# Patient Record
Sex: MALE | Race: WHITE | NOT HISPANIC OR LATINO | Employment: OTHER | URBAN - METROPOLITAN AREA
[De-identification: names, ages, dates, MRNs, and addresses within clinical notes are randomized per-mention and may not be internally consistent; named-entity substitution may affect disease eponyms.]

---

## 2017-02-02 ENCOUNTER — OFFICE VISIT (OUTPATIENT)
Dept: FAMILY MEDICINE | Facility: CLINIC | Age: 82
End: 2017-02-02
Payer: COMMERCIAL

## 2017-02-02 VITALS
OXYGEN SATURATION: 94 % | TEMPERATURE: 97.4 F | DIASTOLIC BLOOD PRESSURE: 52 MMHG | WEIGHT: 187 LBS | HEART RATE: 57 BPM | BODY MASS INDEX: 30.05 KG/M2 | SYSTOLIC BLOOD PRESSURE: 138 MMHG | HEIGHT: 66 IN

## 2017-02-02 LAB
CREAT UR-MCNC: 47 MG/DL
GLUCOSE BLD-MCNC: 250 MG/DL (ref 70–99)
HBA1C MFR BLD: 8.8 % (ref 4.3–6)
MICROALBUMIN UR-MCNC: 130 MG/L
MICROALBUMIN/CREAT UR: 278.97 MG/G CR (ref 0–17)
TSH SERPL DL<=0.005 MIU/L-ACNC: 1.21 MU/L (ref 0.4–4)

## 2017-02-02 PROCEDURE — 36415 COLL VENOUS BLD VENIPUNCTURE: CPT | Performed by: FAMILY MEDICINE

## 2017-02-02 PROCEDURE — 99214 OFFICE O/P EST MOD 30 MIN: CPT | Performed by: FAMILY MEDICINE

## 2017-02-02 PROCEDURE — 84443 ASSAY THYROID STIM HORMONE: CPT | Performed by: FAMILY MEDICINE

## 2017-02-02 PROCEDURE — 82947 ASSAY GLUCOSE BLOOD QUANT: CPT | Performed by: FAMILY MEDICINE

## 2017-02-02 PROCEDURE — 82043 UR ALBUMIN QUANTITATIVE: CPT | Performed by: FAMILY MEDICINE

## 2017-02-02 PROCEDURE — 83036 HEMOGLOBIN GLYCOSYLATED A1C: CPT | Performed by: FAMILY MEDICINE

## 2017-02-02 ASSESSMENT — PAIN SCALES - GENERAL: PAINLEVEL: NO PAIN (0)

## 2017-02-02 NOTE — PATIENT INSTRUCTIONS
================================================================================  Normal Values   Blood pressure  <140/90 for most adults    <130/80 for some chronic diseases (ask your care team about yours)    BMI (body mass index)  18.5-25 kg/m2 (based on height and weight)     Thank you for visiting [facility]    Normal or non-critical lab and imaging results will be communicated to you by MyChart, letter or phone within 7 days.  If you do not hear from us within 10 days, please call the clinic. If you have a critical or abnormal lab result, we will notify you by phone as soon as possible.     If you have any questions regarding your visit please contact:     Team Comfort:   Clinic Hours Telephone Number   Dr. Ronaldo Morillo 7am-5pm  Monday - Friday [phone]  Chavez Mora RN   Pharmacy 8:00am-8pm Monday-Friday    9am-5pm Saturday-Sunday [phone]   Urgent Care 11am-9pm Monday-Friday        9am-5pm Saturday-Sunday [phone]     After hours, weekend or if you need to make an appointment with your primary provider please call [phone].   After Hours nurse advise: call [facility]: [phone]    Medication Refills:  Call your pharmacy and they will forward the refill to us. Please allow 3 business days for your refills to be completed.

## 2017-02-02 NOTE — NURSING NOTE
"Chief Complaint   Patient presents with     Hypertension     Diabetes     Lipids       Initial /62 mmHg  Pulse 57  Temp(Src) 97.4  F (36.3  C) (Oral)  Ht 5' 6\" (1.676 m)  Wt 187 lb (84.823 kg)  BMI 30.20 kg/m2  SpO2 94% Estimated body mass index is 30.2 kg/(m^2) as calculated from the following:    Height as of this encounter: 5' 6\" (1.676 m).    Weight as of this encounter: 187 lb (84.823 kg).  BP completed using cuff size: ruben Galdamez MA      "

## 2017-02-02 NOTE — MR AVS SNAPSHOT
After Visit Summary   2/2/2017    Manuel Rosales    MRN: [id number]           Patient Information     Date Of Birth          3/22/1931        Visit Information        Provider Department      2/2/2017 9:20 AM Ronaldo Corral MD [facility]        Today's Diagnoses     Uncontrolled type 2 diabetes mellitus with diabetic nephropathy, without long-term current use of insulin (H)    -  1       Care Instructions        ================================================================================  Normal Values   Blood pressure  <140/90 for most adults    <130/80 for some chronic diseases (ask your care team about yours)    BMI (body mass index)  18.5-25 kg/m2 (based on height and weight)     Thank you for visiting [facility]    Normal or non-critical lab and imaging results will be communicated to you by MyChart, letter or phone within 7 days.  If you do not hear from us within 10 days, please call the clinic. If you have a critical or abnormal lab result, we will notify you by phone as soon as possible.     If you have any questions regarding your visit please contact:     Team Comfort:   Clinic Hours Telephone Number   Dr. Ronaldo Morillo 7am-5pm  Monday - Friday [phone]  Chavez Mora RN   Pharmacy 8:00am-8pm Monday-Friday    9am-5pm Saturday-Sunday [phone]   Urgent Care 11am-9pm Monday-Friday        9am-5pm Saturday-Sunday [phone]     After hours, weekend or if you need to make an appointment with your primary provider please call [phone].   After Hours nurse advise: call [facility]: [phone]    Medication Refills:  Call your pharmacy and they will forward the refill to us. Please allow 3 business days for your refills to be completed.                Follow-ups after your visit        Additional Services     MED THERAPY MANAGE REFERRAL        Your provider has referred you to: **[facility] Scheduling (numerous locations) [phone]   [email or web address]  [facility]: [facility] [phone]   [email or web address]    Reason for Referral: Uncontrolled diabetes (max dose glipizide, on Januvia which he gets from the , can't take metformin)    The [facility] department will contact you to schedule an appointment.  You may also schedule the appointment by calling [phone].  For [facility]   [place] patients, please call [phone] to confirm/schedule your appointment on the next business day.    This service is designed to help you get the most from your medications.  A specially trained Pharmacist will work closely with you and your providers to solve any questions, concerns, issues or problems related to your medications.    Please bring all of your prescription and non-prescription medications (such as vitamins, over-the-counter medications, and herbals) or a detailed medication list to your appointment.    If you have a glucose meter or other home monitoring information, please also bring this to your appointment (i.e. blood glucose log, blood pressure log, pain log, etc.).                  Follow-up notes from your care team     Return in about 4 months (around 5/22/2017) for diabetes.      Your next 10 appointments already scheduled     Feb 06, 2017  1:45 PM   Return Visit with Willy Cardoso MD   [facility] [place] [facility] [place]    [address]  [place] [address] [phone]              Who to contact     If you have questions or need follow up information about today's clinic visit or your schedule please contact [facility] RAMEZ [facility] directly at [phone].  Normal or non-critical lab and imaging results will  "be communicated to you by MyChart, letter or phone within 4 business days after the clinic has received the results. If you do not hear from us within 7 days, please contact the clinic through [employer] or phone. If you have a critical or abnormal lab result, we will notify you by phone as soon as possible.  Submit refill requests through [employer] or call your pharmacy and they will forward the refill request to us. Please allow 3 business days for your refill to be completed.          Additional Information About Your Visit        [employer] Information     [employer] lets you send messages to your doctor, view your test results, renew your prescriptions, schedule appointments and more. To sign up, go to [email or web address] . Click on \"Log in\" on the left side of the screen, which will take you to the Welcome page. Then click on \"Sign up Now\" on the right side of the page.     You will be asked to enter the access code listed below, as well as some personal information. Please follow the directions to create your username and password.     Your access code is: [email or web address]  Expires: 2017 10:35 AM     Your access code will  in 90 days. If you need help or a new code, please call your [place] clinic or [phone].        Care EveryWhere ID     This is your Care EveryWhere ID. This could be used by other organizations to access your [place] medical records  [id number]        Your Vitals Were     Pulse Temperature Height BMI (Body Mass Index) Pulse Oximetry       57 97.4  F (36.3  C) (Oral) 5' 6\" (1.676 m) 30.20 kg/m2 94%        Blood Pressure from Last 3 Encounters:   17 138/52   16 136/48   16 132/52    Weight from Last 3 Encounters:   17 187 lb (84.823 kg)   16 189 lb (85.73 kg)   16 195 lb (88.451 kg)              We Performed the Following     Albumin Random Urine Quantitative     Glucose whole blood     Hemoglobin A1c     MED THERAPY MANAGE REFERRAL     TSH with " free T4 reflex          Today's Medication Changes          These changes are accurate as of: 2/2/17  9:32 AM.  If you have any questions, ask your nurse or doctor.               These medicines have changed or have updated prescriptions.        Dose/Directions    blood glucose monitoring test strip   Commonly known as:  ONE TOUCH ULTRA   This may have changed:  See the new instructions.   Used for:  Uncontrolled type 2 diabetes mellitus with diabetic nephropathy, without long-term current use of insulin (H)   Changed by:  Ronaldo Corral MD        Use to test blood sugar once daily or as directed.   Quantity:  25 each   Refills:  15            Where to get your medicines      These medications were sent to [facility] [address] [place] [address] [address] [facility] [address]  [address] [facility] [address]     Phone:  [phone]    - blood glucose monitoring test strip             Primary Care Provider Office Phone # Fax #    Ronaldo Corral [phone]       [facility] [address] [place]  [facility] [address]        Thank you!     Thank you for choosing [facility]  for your care. Our goal is always to provide you with excellent care. Hearing back from our patients is one way we can continue to improve our services. Please take a few minutes to complete the written survey that you may receive in the mail after your visit with us. Thank you!             Your Updated Medication List - Protect others around you: Learn how to safely use, store and throw away your medicines at www.disposemymeds.org.          This list is accurate as of: 2/2/17  9:32 AM.  Always use your most recent med list.                   Brand Name Dispense Instructions for use    amLODIPine 10 MG tablet    NORVASC    180 tablet    Take 1 tablet (10 mg) by mouth 2 times daily for blood pressure.       ASPIRIN NOT PRESCRIBED    INTENTIONAL     due to chronic gastritis on EGD 2/09*        atorvastatin 80 MG tablet    LIPITOR    90 tablet    Take 1 tablet (80 mg) by mouth daily for cholesterol.       blood glucose monitoring lancets     100 Box    USE ONE TO CHECK GLUCOSE ONCE DAILY       blood glucose monitoring test strip    ONE TOUCH ULTRA    25 each    Use to test blood sugar once daily or as directed.       brimonidine 0.15 % ophthalmic solution    ALPHAGAN-P    1 Bottle    Place 1 drop into both eyes 2 times daily       dorzolamide-timolol 2-0.5 % ophthalmic solution    COSOPT    1 Bottle    Place 1 drop into both eyes 2 times daily       glipiZIDE 10 MG tablet    GLUCOTROL    180 tablet    Take 1 tablet (10 mg) by mouth 2 times daily (with meals) for diabetes.       hydrochlorothiazide 25 MG tablet    HYDRODIURIL    90 tablet    Take 1 tablet (25 mg) by mouth daily for blood pressure and leg swelling.       latanoprost 0.005 % ophthalmic solution    XALATAN    1 Bottle    Place 1 drop into both eyes At Bedtime       lisinopril 40 MG tablet    PRINIVIL/ZESTRIL    90 tablet    Take 1 tablet (40 mg) by mouth daily for blood pressure.       metoprolol 25 MG 24 hr tablet    TOPROL-XL    90 tablet    Take 1 tablet (25 mg) by mouth daily for blood pressure and heart.       sitagliptin 100 MG tablet    JANUVIA    90 tablet    Take 1 tablet (100 mg) by mouth daily for diabetes.

## 2017-02-02 NOTE — PROGRESS NOTES
"  SUBJECTIVE:                                                    Manuel Rosales is a 85 year old male who presents to clinic today for the following health issues:    Diabetes Follow-up    Patient is checking blood sugars: once daily.  Results are as follows (patient brought in home glucose logs):         am - 170-199 over the past 2+ months    Diabetic concerns: other - blood sugars     Symptoms of hypoglycemia (low blood sugar): none     Paresthesias (numbness or burning in feet) or sores: No     Date of last diabetic eye exam: DUE   -Patient notes he still has to work on his diet and reports his blood sugars \"jump around.\"     Hyperlipidemia Follow-Up      Rate your low fat/cholesterol diet?: fair    Taking statin?  Yes, no muscle aches from statin    Other lipid medications/supplements?:  none     Hypertension Follow-up      Outpatient blood pressures are not being checked.    Low Salt Diet: low salt         Amount of exercise or physical activity: None    Problems taking medications regularly: No    Medication side effects: none    Diet: carbohydrate counting    Past medical, family, and social histories, medications, and allergies are reviewed and updated in Epic.     ROS:  Constitutional, HEENT, cardiovascular, pulmonary, gi and gu systems are negative, except as otherwise noted.      Any history above obtained by the Medical Assistant was reviewed by Dr. Ronaldo Corral MD, and edited when necessary.    This document serves as a record of the services and decisions personally performed and made by Dr. Corral. It was created on his behalf by Araceli Perez, a trained medical scribe. The creation of this document is based on the provider's statements to the medical scribe.  Araceli Perez February 2, 2017 9:08 AM   OBJECTIVE:                                                    /52 mmHg  Pulse 57  Temp(Src) 97.4  F (36.3  C) (Oral)  Ht 5' 6\" (1.676 m)  Wt 187 lb (84.823 kg)  BMI 30.20 kg/m2  SpO2 " 94%  Body mass index is 30.2 kg/(m^2).  GENERAL: healthy, alert and no distress  EYES: Eyes grossly normal to inspection, PERRL and conjunctivae and sclerae normal  Heart & CV:  RRR no murmur.  Intact distal pulses, good cap refill.  LUNGS:  CTA B/L, no wheezing or crackles.  MS: no gross musculoskeletal defects noted, no edema  SKIN: no suspicious lesions or rashes  NEURO: Normal strength and tone, mentation intact and speech normal, cranial nerves 2-12 intact   PSYCH: mentation appears normal, affect normal/bright     Diagnostic Test Results:  Results for orders placed or performed in visit on 02/02/17 (from the past 24 hour(s))   Hemoglobin A1c   Result Value Ref Range    Hemoglobin A1C 8.8 (H) 4.3 - 6.0 %   Glucose whole blood   Result Value Ref Range    Glucose Whole Blood 250 (H) 70 - 99 mg/dL     A1C      8.8   2/2/2017  A1C      8.4   11/29/2016  A1C      7.9   7/28/2016  A1C      8.6   5/19/2016  A1C      7.2   11/19/2015      ASSESSMENT/PLAN:                                                    (E11.21,  E11.65) Uncontrolled type 2 diabetes mellitus with diabetic nephropathy, without long-term current use of insulin (H)  (primary encounter diagnosis)  Comment: Despite medication compliance and his attempts at diet change, his diabetes has been uncontrolled for the past 1/2 year. I think he needs basal insulin or some other third agent.   Plan: Hemoglobin A1c, Glucose whole blood, Albumin         Random Urine Quantitative, TSH with free T4         reflex, blood glucose monitoring (ONE TOUCH         ULTRA) test strip, MED THERAPY MANAGE REFERRAL        Follow up in 4 months (around 5/22/17)         The information in this document, created by the medical scribe for me, accurately reflects the services I personally performed and the decisions made by me. I have reviewed and approved this document for accuracy prior to leaving the patient care area.    Ronaldo Corral MD

## 2017-02-06 ENCOUNTER — OFFICE VISIT (OUTPATIENT)
Dept: OPHTHALMOLOGY | Facility: CLINIC | Age: 82
End: 2017-02-06
Payer: COMMERCIAL

## 2017-02-06 DIAGNOSIS — H40.1132 PRIMARY OPEN ANGLE GLAUCOMA OF BOTH EYES, MODERATE STAGE: Primary | ICD-10-CM

## 2017-02-06 PROCEDURE — 92012 INTRM OPH EXAM EST PATIENT: CPT | Performed by: OPHTHALMOLOGY

## 2017-02-06 PROCEDURE — 92133 CPTRZD OPH DX IMG PST SGM ON: CPT | Performed by: OPHTHALMOLOGY

## 2017-02-06 PROCEDURE — 92083 EXTENDED VISUAL FIELD XM: CPT | Performed by: OPHTHALMOLOGY

## 2017-02-06 ASSESSMENT — SLIT LAMP EXAM - LIDS
COMMENTS: 2+ SCLERAL SHOW, 1+ DERMATOCHALASIS - UPPER LID, CICATRICIAL ECTROPION LOWER LID LEFT EYE>OD
COMMENTS: 2+ SCLERAL SHOW, 1+ DERMATOCHALASIS - UPPER LID, CICATRICIAL ECTROPION LOWER LID LEFT EYE>OD

## 2017-02-06 ASSESSMENT — TONOMETRY
OS_IOP_MMHG: 18
OD_IOP_MMHG: 17
IOP_METHOD: APPLANATION

## 2017-02-06 ASSESSMENT — VISUAL ACUITY
OD_CC: 20/20
OS_CC+: -1
OS_CC: 20/20
OD_CC+: -1
METHOD: SNELLEN - LINEAR

## 2017-02-06 ASSESSMENT — EXTERNAL EXAM - LEFT EYE: OS_EXAM: NORMAL

## 2017-02-06 ASSESSMENT — EXTERNAL EXAM - RIGHT EYE: OD_EXAM: NORMAL

## 2017-02-06 NOTE — PROGRESS NOTES
" Current Eye Medications:  Brimonidine bid both eyes  8am, Cosopt bid both eyes 8am, Latanoprost every evening both eyes 8:30pm     Subjective:  Ta, OCt, HVF  Vision and eyes about the same. Patient \"not bothered\" by ectropian of lower lids.  Both eyes do water however.     Objective:  See Ophthalmology Exam.       Assessment:  Stable intraocular pressure, glaucoma OCT, and Rios Visual Field both eyes in patient with open angle glaucoma.      Plan: Continue Brimonidine twice daily both eyes, Cosopt twice daily both eyes, and Latanoprost every evening both eyes.  Return visit 6 months for a complete exam.     Willy Cardoso M.D.             "

## 2017-02-06 NOTE — MR AVS SNAPSHOT
After Visit Summary   2/6/2017    Maunel Rosales    MRN: [id number]           Patient Information     Date Of Birth          3/22/1931        Visit Information        Provider Department      2/6/2017 1:45 PM Willy Cardoso MD [facility]        Today's Diagnoses     Primary open angle glaucoma of both eyes, moderate stage    -  1       Care Instructions    Continue Brimonidine twice daily  both eyes, Cosopt twice daily both eyes and Latanoprost every evening both eyes  Return visit 6 months for a complete exam     Willy Cardoso M.D.          Follow-ups after your visit        Your next 10 appointments already scheduled     Feb 13, 2017  1:00 PM   Office Visit with [place]   [facility]    [address] [place] [address] [phone]           Bring a current list of meds and any records pertaining to this visit.  For Physicals, please bring immunization records and any forms needing to be filled out.  Please arrive 10 minutes early to complete paperwork.            May 22, 2017  9:20 AM   [facility]    [address] [address] [phone]           Bring a current list of meds and any records pertaining to this visit.  For Physicals, please bring immunization records and any forms needing to be filled out.  Please arrive 10 minutes early to complete paperwork.              Who to contact     If you have questions or need follow up information about today's clinic visit or your schedule please contact [facility] directly at [phone].  Normal or non-critical lab and imaging results will be communicated to you by MyChart, letter or phone within 4 business days after the clinic has received the results. If you do not hear from us within 7 days,  "please contact the clinic through [email or web address] or phone. If you have a critical or abnormal lab result, we will notify you by phone as soon as possible.  Submit refill requests through [email or web address] or call your pharmacy and they will forward the refill request to us. Please allow 3 business days for your refill to be completed.          Additional Information About Your Visit        [employer] Information     [email or web address] lets you send messages to your doctor, view your test results, renew your prescriptions, schedule appointments and more. To sign up, go to [email or web address] . Click on \"Log in\" on the left side of the screen, which will take you to the Welcome page. Then click on \"Sign up Now\" on the right side of the page.     You will be asked to enter the access code listed below, as well as some personal information. Please follow the directions to create your username and password.     Your access code is: [email or web address]  Expires: 2017 10:35 AM     Your access code will  in 90 days. If you need help or a new code, please call your [place] clinic or [phone].        Care EveryWhere ID     This is your Care EveryWhere ID. This could be used by other organizations to access your [place] medical records  [id number]         Blood Pressure from Last 3 Encounters:   17 138/52   16 136/48   16 132/52    Weight from Last 3 Encounters:   17 84.823 kg (187 lb)   16 85.73 kg (189 lb)   16 88.451 kg (195 lb)              Today, you had the following     No orders found for display       Primary Care Provider Office Phone # Fax #    Ronaldo Corral [phone]       [facility] [address] [place]  [facility] [address]        Thank you!     Thank you for choosing [facility]  for your care. Our goal is always to provide you with excellent care. Hearing back from our patients is one way we can continue to improve our services. Please take a " few minutes to complete the written survey that you may receive in the mail after your visit with us. Thank you!             Your Updated Medication List - Protect others around you: Learn how to safely use, store and throw away your medicines at www.disposemymeds.org.          This list is accurate as of: 2/6/17  3:04 PM.  Always use your most recent med list.                   Brand Name Dispense Instructions for use    amLODIPine 10 MG tablet    NORVASC    180 tablet    Take 1 tablet (10 mg) by mouth 2 times daily for blood pressure.       ASPIRIN NOT PRESCRIBED    INTENTIONAL     due to chronic gastritis on EGD 2/09*       atorvastatin 80 MG tablet    LIPITOR    90 tablet    Take 1 tablet (80 mg) by mouth daily for cholesterol.       blood glucose monitoring lancets     100 Box    USE ONE TO CHECK GLUCOSE ONCE DAILY       blood glucose monitoring test strip    ONE TOUCH ULTRA    25 each    Use to test blood sugar once daily or as directed.       brimonidine 0.15 % ophthalmic solution    ALPHAGAN-P    1 Bottle    Place 1 drop into both eyes 2 times daily       dorzolamide-timolol 2-0.5 % ophthalmic solution    COSOPT    1 Bottle    Place 1 drop into both eyes 2 times daily       glipiZIDE 10 MG tablet    GLUCOTROL    180 tablet    Take 1 tablet (10 mg) by mouth 2 times daily (with meals) for diabetes.       hydrochlorothiazide 25 MG tablet    HYDRODIURIL    90 tablet    Take 1 tablet (25 mg) by mouth daily for blood pressure and leg swelling.       latanoprost 0.005 % ophthalmic solution    XALATAN    1 Bottle    Place 1 drop into both eyes At Bedtime       lisinopril 40 MG tablet    PRINIVIL/ZESTRIL    90 tablet    Take 1 tablet (40 mg) by mouth daily for blood pressure.       metoprolol 25 MG 24 hr tablet    TOPROL-XL    90 tablet    Take 1 tablet (25 mg) by mouth daily for blood pressure and heart.       sitagliptin 100 MG tablet    JANUVIA    90 tablet    Take 1 tablet (100 mg) by mouth daily for diabetes.

## 2017-02-06 NOTE — PATIENT INSTRUCTIONS
Continue Brimonidine twice daily  both eyes, Cosopt twice daily both eyes and Latanoprost every evening both eyes  Return visit 6 months for a complete exam     Willy Cardoso M.D.

## 2017-02-07 ASSESSMENT — PACHYMETRY
OS_CT(UM): 594
OD_CT(UM): 593

## 2017-02-13 ENCOUNTER — OFFICE VISIT (OUTPATIENT)
Dept: PHARMACY | Facility: CLINIC | Age: 82
End: 2017-02-13
Payer: COMMERCIAL

## 2017-02-13 DIAGNOSIS — I10 HYPERTENSION GOAL BP (BLOOD PRESSURE) < 130/80: ICD-10-CM

## 2017-02-13 DIAGNOSIS — E11.65 TYPE 2 DIABETES MELLITUS WITH HYPERGLYCEMIA, WITHOUT LONG-TERM CURRENT USE OF INSULIN (H): Primary | ICD-10-CM

## 2017-02-13 DIAGNOSIS — E78.5 HYPERLIPIDEMIA LDL GOAL <100: ICD-10-CM

## 2017-02-13 DIAGNOSIS — H40.1132 PRIMARY OPEN ANGLE GLAUCOMA OF BOTH EYES, MODERATE STAGE: ICD-10-CM

## 2017-02-13 PROCEDURE — 99607 MTMS BY PHARM ADDL 15 MIN: CPT | Performed by: PHARMACIST

## 2017-02-13 PROCEDURE — 99605 MTMS BY PHARM NP 15 MIN: CPT | Performed by: PHARMACIST

## 2017-02-13 RX ORDER — VITAMIN B COMPLEX
1 TABLET ORAL DAILY
COMMUNITY
End: 2022-07-29

## 2017-02-13 NOTE — PROGRESS NOTES
SUBJECTIVE/OBJECTIVE:                                                    Manuel Rosales is a 85 year old male coming in for an initial visit for Medication Therapy Management.  He was referred to me from Dr. Corral.     Chief Complaint: Increasing A1c/BGs.    Allergies/ADRs: Reviewed in Epic  Tobacco: History of tobacco dependence - quit 1970   Alcohol: 7-9 beverages / week  PMH: Reviewed in Epic    Medication Adherence: no issues reported. Pt seems very informed about his medications and conditions.     Diabetes:  Pt currently taking glipizide 10mg BID and Januvia 100mg daily. Pt is not experiencing side effects. Has previously tried metformin, saxagliptin, Actos, Avandia.   SMBG: one time daily.   Ranges (from patient's glucose log): 193, 214, 242, 274, 192, 183, 193, 190, 189, 159, 173, 170, 177  Symptoms of low blood sugar? none. Frequency of hypoglycemia? never.  Recent symptoms of high blood sugar? none  Eye exam: due  Foot exam: up to date  Microalbumin is not < 30 mg/g. Pt is taking an ACEi/ARB.  Aspirin: Not taking due to chronic gastritis.   Diet/Exercise: Has cut down on alcohol to at max 1 drink per day.   Breakfast: bowl of Crunchy Raisin Bran with 2% milk and cup of coffee  Lunch: Salad, celery, radish and half a tomato  Dinner: Henderson Soup with chicken, steak occasionally  Rare chocolate, but he says that this doesn't change his sugars too much.    Hypertension: Current medications include amlodipine 10mg BID, HCTZ 25mg daily, lisinopril 40mg daily, metoprolol XL 25mg.  Patient does not self-monitor BP.  Patient reports no current medication side effects.    Hyperlipidemia: Current therapy includes atorvastatin 80mg once daily.  Pt reports no significant myalgias or other side effects.     Glaucoma: Currently using Alphagan-P in both eyes BID, Cosopt in both eyes BID, Xalatan in both eyes QHS. No side effects noted with these.       Current labs include:  BP Readings from Last 3 Encounters:    02/02/17 138/52   11/29/16 136/48   07/28/16 132/52     Lab Results   Component Value Date    A1C 8.8 02/02/2017   .  Lab Results   Component Value Date    CHOL 179 11/29/2016     Lab Results   Component Value Date    TRIG 143 11/29/2016     Lab Results   Component Value Date    HDL 67 11/29/2016     Lab Results   Component Value Date    LDL 83 11/29/2016       Liver Function Studies -   Recent Labs   Lab Test  11/29/16   0936   ALT  39       Lab Results   Component Value Date    UCRR 47 02/02/2017    MICROL 130 02/02/2017    UMALCR 278.97 (H) 02/02/2017       Last Basic Metabolic Panel:  Lab Results   Component Value Date     11/29/2016      Lab Results   Component Value Date    POTASSIUM 4.6 11/29/2016     Lab Results   Component Value Date    CHLORIDE 99 11/29/2016     Lab Results   Component Value Date    BUN 19 11/29/2016     Lab Results   Component Value Date    CR 1.08 11/29/2016     GFR Estimate   Date Value Ref Range Status   11/29/2016 65 >60 mL/min/1.7m2 Final     Comment:     Non  GFR Calc   05/19/2016 78 >60 mL/min/1.7m2 Final     Comment:     Non  GFR Calc   11/19/2015 83 >60 mL/min/1.7m2 Final     Comment:     Non  GFR Calc     TSH   Date Value Ref Range Status   02/02/2017 1.21 0.40 - 4.00 mU/L Final     Most Recent Immunizations   Administered Date(s) Administered     Influenza (H1N1) 01/14/2010     Influenza (High Dose) 3 valent vaccine 09/16/2016     Influenza (IIV3) 10/01/2013     Pneumococcal (PCV 13) 11/19/2015     Pneumococcal 23 valent 11/29/2000     TD (ADULT, 7+) 10/25/2010     Tdap (Adacel,Boostrix) 10/01/2013     Zoster vaccine, live 09/30/2008     ASSESSMENT:                                                       Current medications were reviewed today.     Medication Adherence: no issues identified    Diabetes: Needs Improvement. Patient is not meeting A1c goal of < 8%. Self monitoring of blood glucose is not at goal of fasting   mg/dL and post prandial < 150 mg/dL. Pt would benefit from Basal Insulin (Lantus) :  Start at dose : 5 units daily. Basic nutrition discussion, pt declines CDE/RD referral at this time.    Hypertension: Stable. Patient is meeting BP goal of < 140/90mmHg.     Hyperlipidemia: Stable.    Glaucoma: Stable, followed by ophthalmology.    PLAN:                                                      1. Start L[facility]us 5units daily. [facility]   2. Pt to try getting carbs and protein with each meal - continue to consider CDE/RD referral    I spent 30 minutes with this patient today.  All changes were made via collaborative practice agreement with Ronaldo Corral. A copy of the visit note was provided to the patient's primary care provider.    Will follow up in 2 weeks via phone.    The patient was given a summary of these recommendations as an after visit summary.     [place], PharmD  Medication Therapy Management Pharmacist  Pager: [phone]

## 2017-02-13 NOTE — PATIENT INSTRUCTIONS
Recommendations from today's MTM visit:                                                    MTM (medication therapy management) is a service provided by a clinical pharmacist designed to help you get the most of out of your medicines.     1. Try to get carbs and protein in with each meal.     2. Start Lantus 5 units daily. Keep taking the other meds. We might need to change it to either insulins called Levemir, Basaglar or Tresiba.      Next MTM visit: Phone appointment on 12/27/17 at 12:30    To schedule another MTM appointment, please call the clinic directly or you may call the MTM scheduling line at [phone] or toll-free at [phone].     My Clinical Pharmacist's contact information:                                                      It was a pleasure seeing you today!  Please feel free to contact me with any questions or concerns you have.      [place], PharmD  Medication Therapy Management Pharmacist  Pager: [phone]    You may receive a survey about the MTM services you received.  I would appreciate your feedback to help me serve you better in the future. Please fill it out and return it when you can. Your comments will be anonymous.

## 2017-02-13 NOTE — MR AVS SNAPSHOT
After Visit Summary   2/13/2017    Manuel Rosales    MRN: [id number]           Patient Information     Date Of Birth          3/22/1931        Visit Information        Provider Department      2/13/2017 1:00 PM La Santo [facility]        Today's Diagnoses     Type 2 diabetes mellitus with hyperglycemia, without long-term current use of insulin (H)    -  1      Care Instructions    Recommendations from today's MTM visit:                                                    MTM (medication therapy management) is a service provided by a clinical pharmacist designed to help you get the most of out of your medicines.     1. Try to get carbs and protein in with each meal.     2. Start Lantus 5 units daily. Keep taking the other meds. We might need to change it to either insulins called Levemir, Basaglar or Tresiba.      Next MTM visit: Phone appointment on 12/27/17 at 12:30    To schedule another MTM appointment, please call the clinic directly or you may call the MTM scheduling line at [phone] or toll-free at [phone].     My Clinical Pharmacist's contact information:                                                      It was a pleasure seeing you today!  Please feel free to contact me with any questions or concerns you have.      [place], PharmD  Medication Therapy Management Pharmacist  Pager: [phone]    You may receive a survey about the MTM services you received.  I would appreciate your feedback to help me serve you better in the future. Please fill it out and return it when you can. Your comments will be anonymous.                  Follow-ups after your visit        Your next 10 appointments already scheduled     Feb 27, 2017 12:30 PM CST   TELEMEDICINE with La Santo   [facility]    [address] [place] [id number] [phone]            "Note: this is not an onsite visit; there is no need to come to the facility.            May 22, 2017  9:20 AM CDT   [facility]ral MD   [facility]    [address] [facility] [id number] [phone]           Bring a current list of meds and any records pertaining to this visit.  For Physicals, please bring immunization records and any forms needing to be filled out.  Please arrive 10 minutes early to complete paperwork.            Aug 08, 2017  1:00 PM CDT   New Visit with Willy Cardoso MD   [facility] ([address] [address] [phone]              Who to contact     If you have questions or need follow up information about today's clinic visit or your schedule please contact [facility] MTM directly at [phone].  Normal or non-critical lab and imaging results will be communicated to you by MyChart, letter or phone within 4 business days after the clinic has received the results. If you do not hear from us within 7 days, please contact the clinic through [employer] or phone. If you have a critical or abnormal lab result, we will notify you by phone as soon as possible.  Submit refill requests through [employer] or call your pharmacy and they will forward the refill request to us. Please allow 3 business days for your refill to be completed.          Additional Information About Your Visit        [employer] Information     [employer] lets you send messages to your doctor, view your test results, renew your prescriptions, schedule appointments and more. To sign up, go to [email or web address] . Click on \"Log in\" on the left side of the screen, which will take you to the Welcome page. Then click on \"Sign up Now\" on the right side of the page.     You will be asked to enter the access code listed below, as well as some personal information. " Please follow the directions to create your username and password.     Your access code is: [email or web address]  Expires: 2017 10:35 AM     Your access code will  in 90 days. If you need help or a new code, please call your [facility] clinic or [phone].        Care EveryWhere ID     This is your Care EveryWhere ID. This could be used by other organizations to access your [facility] medical records  [id number]         Blood Pressure from Last 3 Encounters:   17 138/52   16 136/48   16 132/52    Weight from Last 3 Encounters:   17 187 lb (84.8 kg)   16 189 lb (85.7 kg)   16 195 lb (88.5 kg)              Today, you had the following     No orders found for display         Today's Medication Changes          These changes are accurate as of: 17  1:50 PM.  If you have any questions, ask your nurse or doctor.               Start taking these medicines.        Dose/Directions    insulin glargine 100 UNIT/ML injection   Commonly known as:  LANTUS SOLOSTAR   Used for:  Type 2 diabetes mellitus with hyperglycemia, without long-term current use of insulin (H)   Started by:  La Santo        Inject 5 units under the skin daily.   Quantity:  15 mL   Refills:  0            Where to get your medicines      Some of these will need a paper prescription and others can be bought over the counter.  Ask your nurse if you have questions.     Bring a paper prescription for each of these medications     insulin glargine 100 UNIT/ML injection                Primary Care Provider Office Phone # Fax #    Ronaldo Corral [phone]       [facility] [address]  [facility] [address]        Thank you!     Thank you for choosing [facility]  for your care. Our goal is always to provide you with excellent care. Hearing back from our patients is one way we can continue to improve our services. Please take a few minutes to  complete the written survey that you may receive in the mail after your visit with us. Thank you!             Your Updated Medication List - Protect others around you: Learn how to safely use, store and throw away your medicines at www.disposemymeds.org.          This list is accurate as of: 2/13/17  1:50 PM.  Always use your most recent med list.                   Brand Name Dispense Instructions for use    amLODIPine 10 MG tablet    NORVASC    180 tablet    Take 1 tablet (10 mg) by mouth 2 times daily for blood pressure.       ASPIRIN NOT PRESCRIBED    INTENTIONAL     due to chronic gastritis on EGD 2/09*       atorvastatin 80 MG tablet    LIPITOR    90 tablet    Take 1 tablet (80 mg) by mouth daily for cholesterol.       blood glucose monitoring lancets     100 Box    USE ONE TO CHECK GLUCOSE ONCE DAILY       blood glucose monitoring test strip    ONE TOUCH ULTRA    25 each    Use to test blood sugar once daily or as directed.       brimonidine 0.15 % ophthalmic solution    ALPHAGAN-P    1 Bottle    Place 1 drop into both eyes 2 times daily       dorzolamide-timolol 2-0.5 % ophthalmic solution    COSOPT    1 Bottle    Place 1 drop into both eyes 2 times daily       glipiZIDE 10 MG tablet    GLUCOTROL    180 tablet    Take 1 tablet (10 mg) by mouth 2 times daily (with meals) for diabetes.       hydrochlorothiazide 25 MG tablet    HYDRODIURIL    90 tablet    Take 1 tablet (25 mg) by mouth daily for blood pressure and leg swelling.       insulin glargine 100 UNIT/ML injection    LANTUS SOLOSTAR    15 mL    Inject 5 units under the skin daily.       latanoprost 0.005 % ophthalmic solution    XALATAN    1 Bottle    Place 1 drop into both eyes At Bedtime       lisinopril 40 MG tablet    PRINIVIL/ZESTRIL    90 tablet    Take 1 tablet (40 mg) by mouth daily for blood pressure.       metoprolol 25 MG 24 hr tablet    TOPROL-XL    90 tablet    Take 1 tablet (25 mg) by mouth daily for blood pressure and heart.        sitagliptin 100 MG tablet    JANUVIA    90 tablet    Take 1 tablet (100 mg) by mouth daily for diabetes.       vitamin D 2000 UNITS Caps      Take 1 capsule by mouth daily

## 2017-02-17 DIAGNOSIS — E11.59 CONTROLLED TYPE 2 DIABETES MELLITUS WITH OTHER CIRCULATORY COMPLICATION, WITHOUT LONG-TERM CURRENT USE OF INSULIN (H): ICD-10-CM

## 2017-02-17 RX ORDER — LANCETS
EACH MISCELLANEOUS
Qty: 1 BOX | Refills: 0 | Status: SHIPPED | OUTPATIENT
Start: 2017-02-17 | End: 2017-03-27

## 2017-02-17 NOTE — TELEPHONE ENCOUNTER
Prescription approved per [facility] Refill Protocol.  Abby Palumbo RN

## 2017-02-17 NOTE — TELEPHONE ENCOUNTER
blood glucose monitoring (ONE TOUCH ULTRASOFT) lancets         Last Written Prescription Date: 11/22/16  Last Fill Quantity: 100, # refills: 0  Last Office Visit with G, [facility] or [facility] prescribing provider:  2/2/17        BP Readings from Last 3 Encounters:   02/02/17 138/52   11/29/16 136/48   07/28/16 132/52     Lab Results   Component Value Date    MICROL 130 02/02/2017     No results found for: MICROALBUMIN  Creatinine   Date Value Ref Range Status   11/29/2016 1.08 0.66 - 1.25 mg/dL Final   ]  GFR Estimate   Date Value Ref Range Status   11/29/2016 65 >60 mL/min/1.7m2 Final     Comment:     Non  GFR Calc   05/19/2016 78 >60 mL/min/1.7m2 Final     Comment:     Non  GFR Calc   11/19/2015 83 >60 mL/min/1.7m2 Final     Comment:     Non  GFR Calc     GFR Estimate If Black   Date Value Ref Range Status   11/29/2016 79 >60 mL/min/1.7m2 Final     Comment:      GFR Calc   05/19/2016 >90   GFR Calc   >60 mL/min/1.7m2 Final   11/19/2015 >90   GFR Calc   >60 mL/min/1.7m2 Final     Lab Results   Component Value Date    CHOL 179 11/29/2016     Lab Results   Component Value Date    HDL 67 11/29/2016     Lab Results   Component Value Date    LDL 83 11/29/2016     Lab Results   Component Value Date    TRIG 143 11/29/2016     Lab Results   Component Value Date    CHOLHDLRATIO 2.5 02/12/2015     No results found for: AST  Lab Results   Component Value Date    ALT 39 11/29/2016     Lab Results   Component Value Date    A1C 8.8 02/02/2017    A1C 8.4 11/29/2016    A1C 7.9 07/28/2016    A1C 8.6 05/19/2016    A1C 7.2 11/19/2015     Potassium   Date Value Ref Range Status   11/29/2016 4.6 3.4 - 5.3 mmol/L Final

## 2017-02-27 ENCOUNTER — ALLIED HEALTH/NURSE VISIT (OUTPATIENT)
Dept: PHARMACY | Facility: CLINIC | Age: 82
End: 2017-02-27
Payer: COMMERCIAL

## 2017-02-27 ENCOUNTER — TELEPHONE (OUTPATIENT)
Dept: PHARMACY | Facility: CLINIC | Age: 82
End: 2017-02-27

## 2017-02-27 DIAGNOSIS — E11.65 TYPE 2 DIABETES MELLITUS WITH HYPERGLYCEMIA, WITHOUT LONG-TERM CURRENT USE OF INSULIN (H): Primary | ICD-10-CM

## 2017-02-27 PROCEDURE — 99606 MTMS BY PHARM EST 15 MIN: CPT | Performed by: PHARMACIST

## 2017-02-27 NOTE — TELEPHONE ENCOUNTER
Called pt for Lantus f/up phone appt. Line busy. Will call again in 10-15 minutes.     [place], PharmD  Medication Therapy Management Pharmacist  Pager: [phone]

## 2017-02-27 NOTE — PROGRESS NOTES
After visit: [facility]. They informed me that they can't see the Rx in the system until at least 24 hours after being faxed in to the pharmacy.     Will call later this week to make sure that they got it.     [place], PharmD  Medication Therapy Management Pharmacist  Pager: [phone]

## 2017-02-27 NOTE — PROGRESS NOTES
SUBJECTIVE/OBJECTIVE:                                                    Manuel Rosales is a 85 year old male coming in for an initial visit for Medication Therapy Management.  He was referred to me from Dr. Corral.     Chief Complaint: Increasing A1c/BGs.    Allergies/ADRs: Reviewed in Epic  Tobacco: History of tobacco dependence - quit 1970   Alcohol: 7-9 beverages / week  PMH: Reviewed in Epic    Medication Adherence: no issues reported. Pt seems very informed about his medications and conditions.     Diabetes:  Pt currently taking glipizide 10mg BID and Januvia 100mg daily. Pt is not experiencing side effects. Has previously tried metformin, saxagliptin, Actos, Avandia. Is not taking long-acting insulin as discussed at last visit - [facility] and they have no record of the [facility]ed prescription. [facility] with [facility] that Lantus is on formulary for them.   SMBG: one time daily.   Range is unchanged.  Symptoms of low blood sugar? none. Frequency of hypoglycemia? never.  Recent symptoms of high blood sugar? none  Eye exam: due  Foot exam: up to date  Microalbumin is not < 30 mg/g. Pt is taking an ACEi/ARB.  Aspirin: Not taking due to chronic gastritis.   Diet/Exercise: Asking today for a referral for RD/CDE    Current labs include:  BP Readings from Last 3 Encounters:   02/02/17 138/52   11/29/16 136/48   07/28/16 132/52     Today's Vitals: There were no vitals taken for this visit.  Lab Results   Component Value Date    A1C 8.8 02/02/2017   .  Lab Results   Component Value Date    CHOL 179 11/29/2016     Lab Results   Component Value Date    TRIG 143 11/29/2016     Lab Results   Component Value Date    HDL 67 11/29/2016     Lab Results   Component Value Date    LDL 83 11/29/2016       Liver Function Studies -   Recent Labs   Lab Test  11/29/16   0936   ALT  39       Lab Results   Component Value Date    UCRR 47 02/02/2017    MICROL 130 02/02/2017    UMALCR 278.97 (H) 02/02/2017       Last Basic Metabolic Panel:  Lab  Results   Component Value Date     11/29/2016      Lab Results   Component Value Date    POTASSIUM 4.6 11/29/2016     Lab Results   Component Value Date    CHLORIDE 99 11/29/2016     Lab Results   Component Value Date    BUN 19 11/29/2016     Lab Results   Component Value Date    CR 1.08 11/29/2016     GFR Estimate   Date Value Ref Range Status   11/29/2016 65 >60 mL/min/1.7m2 Final     Comment:     Non  GFR Calc   05/19/2016 78 >60 mL/min/1.7m2 Final     Comment:     Non  GFR Calc   11/19/2015 83 >60 mL/min/1.7m2 Final     Comment:     Non  GFR Calc     TSH   Date Value Ref Range Status   02/02/2017 1.21 0.40 - 4.00 mU/L Final     Most Recent Immunizations   Administered Date(s) Administered     Influenza (H1N1) 01/14/2010     Influenza (High Dose) 3 valent vaccine 09/16/2016     Influenza (IIV3) 10/01/2013     Pneumococcal (PCV 13) 11/19/2015     Pneumococcal 23 valent 11/29/2000     TD (ADULT, 7+) 10/25/2010     Tdap (Adacel,Boostrix) 10/01/2013     Zoster vaccine, live 09/30/2008     ASSESSMENT:                                                       Current medications were reviewed today.     Medication Adherence: no issues identified    Diabetes: Needs Improvement. Re-faxed Lantus and pen needle prescriptions, along with progress note from last week ([facility] for [facility]). Also placed CDE/RD referral today.     PLAN:                                                      1. Refaxed Lantus 5 units daily and pen needle prescriptions.   2. Placed CDE/RD referral.     I spent 10minutes with this patient today.  All changes were made via collaborative practice agreement with Ronaldo Corral. A copy of the visit note was provided to the patient's primary care provider.    Will follow up in 1 week.    The patient declined a summary of these recommendations as an after visit summary.     [place], PharmD  Medication Therapy Management  Pharmacist  Pager: [phone]

## 2017-02-27 NOTE — MR AVS SNAPSHOT
After Visit Summary   2/27/2017    Manuel Rosales    MRN: [id number]           Patient Information     Date Of Birth          3/22/1931        Visit Information        Provider Department      2/27/2017 12:30 PM [place] [facility] [place]        Today's Diagnoses     Type 2 diabetes mellitus with hyperglycemia, without long-term current use of insulin (H)    -  1       Follow-ups after your visit        Additional Services     DIABETES EDUCATOR REFERRAL       Your provider has referred you to Diabetes Education: [facility]: Diabetes Education - [facility] [phone]   [email or web address]    This is a Previous Diagnosis: Follow-up DSMT - 2 hours.  Type of diabetes is Type 2 - On Oral Medication                                                          A1C is: Lab Results       Component                Value               Date                       A1C                      8.8                 02/02/2017            If an urgent visit is needed or A1C is above 12, Care Team to call the diabetes education team at [phone] or send a message to the diabetes education pool (P DIAB ED-PATIENT CARE).    Diabetes education focus: Comprehensive Knowledge Assessment and Instruction      Education needs: None                                                                                                                                                      Please be aware that coverage of these services is subject to the terms and limitations of your health insurance plan.  Call member services at your health plan to determine Diabetes Self-Management Training benefits and ask which blood glucose monitor brands are covered by your plan.      Please bring the following to your appointment:    -   List of current medications   -   List of blood glucose monitor brands that are covered by your insurance plan  -   Blood glucose  "monitor and log book  -   Food records for the 3 days prior to your visit                  Your next 10 appointments already scheduled     May 22, 2017  9:20 AM CDT   Office Visit with Ronaldo Corral MD   [facility] ([facility])    [address] [facility] [id number] [phone]           Bring a current list of meds and any records pertaining to this visit.  For Physicals, please bring immunization records and any forms needing to be filled out.  Please arrive 10 minutes early to complete paperwork.            Aug 08, 2017  1:00 PM CDT   New Visit with Willy Cardoso MD   [facility] ([address] [address] [phone]              Who to contact     If you have questions or need follow up information about today's clinic visit or your schedule please contact [facility] MTM directly at [phone].  Normal or non-critical lab and imaging results will be communicated to you by Voxoundhart, letter or phone within 4 business days after the clinic has received the results. If you do not hear from us within 7 days, please contact the clinic through Voxoundhart or phone. If you have a critical or abnormal lab result, we will notify you by phone as soon as possible.  Submit refill requests through [facility] or call your pharmacy and they will forward the refill request to us. Please allow 3 business days for your refill to be completed.          Additional Information About Your Visit        [facility] Information     [facility] lets you send messages to your doctor, view your test results, renew your prescriptions, schedule appointments and more. To sign up, go to [email or web address] . Click on \"Log in\" on the left side of the screen, which will take you to the Welcome page. Then click on \"Sign up Now\" on the right side of the page.     You will be asked to enter the access code " listed below, as well as some personal information. Please follow the directions to create your username and password.     Your access code is: [email or web address]  Expires: 2017  1:03 PM     Your access code will  in 90 days. If you need help or a new code, please call your [place] clinic or [phone].        Care EveryWhere ID     This is your Care EveryWhere ID. This could be used by other organizations to access your [place] medical records  [id number]         Blood Pressure from Last 3 Encounters:   17 138/52   16 136/48   16 132/52    Weight from Last 3 Encounters:   17 187 lb (84.8 kg)   16 189 lb (85.7 kg)   16 195 lb (88.5 kg)              We Performed the Following     DIABETES EDUCATOR REFERRAL        Primary Care Provider Office Phone # Fax #    Ronaldo Corral [phone]       [facility] [address]  [facility] [address]        Thank you!     Thank you for choosing [facility]  for your care. Our goal is always to provide you with excellent care. Hearing back from our patients is one way we can continue to improve our services. Please take a few minutes to complete the written survey that you may receive in the mail after your visit with us. Thank you!             Your Updated Medication List - Protect others around you: Learn how to safely use, store and throw away your medicines at www.disposemymeds.org.          This list is accurate as of: 17  1:03 PM.  Always use your most recent med list.                   Brand Name Dispense Instructions for use    amLODIPine 10 MG tablet    NORVASC    180 tablet    Take 1 tablet (10 mg) by mouth 2 times daily for blood pressure.       ASPIRIN NOT PRESCRIBED    INTENTIONAL     due to chronic gastritis on EGD *       atorvastatin 80 MG tablet    LIPITOR    90 tablet    Take 1 tablet (80 mg) by mouth daily for cholesterol.       blood glucose  monitoring lancets     1 Box    USE ONE  TO CHECK GLUCOSE ONCE DAILY       blood glucose monitoring test strip    ONE TOUCH ULTRA    25 each    Use to test blood sugar once daily or as directed.       brimonidine 0.15 % ophthalmic solution    ALPHAGAN-P    1 Bottle    Place 1 drop into both eyes 2 times daily       dorzolamide-timolol 2-0.5 % ophthalmic solution    COSOPT    1 Bottle    Place 1 drop into both eyes 2 times daily       glipiZIDE 10 MG tablet    GLUCOTROL    180 tablet    Take 1 tablet (10 mg) by mouth 2 times daily (with meals) for diabetes.       hydrochlorothiazide 25 MG tablet    HYDRODIURIL    90 tablet    Take 1 tablet (25 mg) by mouth daily for blood pressure and leg swelling.       insulin glargine 100 UNIT/ML injection    LANTUS SOLOSTAR    15 mL    Inject 5 units under the skin daily.       insulin pen needle 32G X 4 MM    BD CAROL ANN U/F    100 each    Use 1 daily as directed.       latanoprost 0.005 % ophthalmic solution    XALATAN    1 Bottle    Place 1 drop into both eyes At Bedtime       lisinopril 40 MG tablet    PRINIVIL/ZESTRIL    90 tablet    Take 1 tablet (40 mg) by mouth daily for blood pressure.       metoprolol 25 MG 24 hr tablet    TOPROL-XL    90 tablet    Take 1 tablet (25 mg) by mouth daily for blood pressure and heart.       sitagliptin 100 MG tablet    JANUVIA    90 tablet    Take 1 tablet (100 mg) by mouth daily for diabetes.       vitamin D 2000 UNITS Caps      Take 1 capsule by mouth daily

## 2017-03-03 ENCOUNTER — ALLIED HEALTH/NURSE VISIT (OUTPATIENT)
Dept: EDUCATION SERVICES | Facility: CLINIC | Age: 82
End: 2017-03-03
Payer: COMMERCIAL

## 2017-03-03 VITALS — WEIGHT: 192 LBS | BODY MASS INDEX: 30.99 KG/M2

## 2017-03-03 PROCEDURE — G0108 DIAB MANAGE TRN  PER INDIV: HCPCS

## 2017-03-03 PROCEDURE — 99207 ZZC NO CHARGE LOS: CPT

## 2017-03-03 NOTE — PROGRESS NOTES
Diabetes Self Management Training: Individual Review Visit    Manuel Rosales presents today for evaluation of glucose control related to Type 2 diabetes.  He is accompanied by self.  Patient's diabetes management related comments/concerns: has had DM2 x 20 years plus, on oral medications.  [facility]  MTM is managing the insulin, but will review injection technique so that patient feels confident with that.  [facility] but [facility] was difficult.  Wants to review what he can do with food and diet.  Feels stressed out with wife's illness.     Patient's emotional response to diabetes: expresses readiness to learn.  Patient would like this visit to be focused around the following diabetes-related behaviors and goals: Healthy Eating    ASSESSMENT:  Patient Problem List and Family Medical History reviewed for relevant medical history, current medical status, and diabetes risk factors.    Current Diabetes Management per Patient:  Taking diabetes medications?   yes:     Diabetes Medication(s)     Dipeptidyl Peptidase-4 (DPP-4) Inhibitors Sig    sitagliptin (JANUVIA) 100 MG tablet Take 1 tablet (100 mg) by mouth daily for diabetes.    Insulin Sig    insulin glargine (LANTUS SOLOSTAR) 100 UNIT/ML injection Inject 5 units under the skin daily.    Sulfonylureas Sig    glipiZIDE (GLUCOTROL) 10 MG tablet Take 1 tablet (10 mg) by mouth 2 times daily (with meals) for diabetes.      Hasn't started lantus yet.     Past Diabetes Education: Yes    Patient glucose self monitoring as follows: one time daily.   BG meter: One Touch Ultra meter  BG results:   Takes a fasting value every morning, averaging around 170mg/dL.  Downloaded meter.     BG values are: Not in goal  Patient's most recent   Lab Results   Component Value Date    A1C 8.8 02/02/2017    is not meeting goal of <8.0    Nutrition:  Patient eats 3 meals per day    Breakfast - 1 boiled egg, bowl  "of cereal (granola raisin bran), 3 prunes, 1-2 black coffee   Lunch - raw salad (tomato, radish, celery, green pepper) + 1 glass white wine  Dinner -3pm *5oz steak, asparagus, 1 glass 2% milk OR soup  Snacks - blueberries, pickle, apple 1/2 cheese snack + 1 glass wine, pistachios     Beverages: water a couple glasses, soups, 3 cups decaff, 2 wine glasses a day    Cultural/Anglican diet restrictions: No   Biggest Challenge to Healthy Eating: evening snacking    Physical Activity:    Not assessed.     Diabetes Complications:  Acute Complications: At risk for hypoglycemia? yes Frequency of hypoglycemia: has never had    Vitals:  Wt 192 lb (87.1 kg)  BMI 30.99 kg/m2  Estimated body mass index is 30.99 kg/(m^2) as calculated from the following:    Height as of 2/2/17: 5' 6\" (1.676 m).    Weight as of this encounter: 192 lb (87.1 kg).   Last 3 BP:   BP Readings from Last 3 Encounters:   02/02/17 138/52   11/29/16 136/48   07/28/16 132/52       History   Smoking Status     Former Smoker     Quit date: 1/14/1970   Smokeless Tobacco     Never Used       Labs:  Lab Results   Component Value Date    A1C 8.8 02/02/2017     Lab Results   Component Value Date     11/29/2016     Lab Results   Component Value Date    LDL 83 11/29/2016     HDL Cholesterol   Date Value Ref Range Status   11/29/2016 67 >39 mg/dL Final   ]  GFR Estimate   Date Value Ref Range Status   11/29/2016 65 >60 mL/min/1.7m2 Final     Comment:     Non  GFR Calc     GFR Estimate If Black   Date Value Ref Range Status   11/29/2016 79 >60 mL/min/1.7m2 Final     Comment:      GFR Calc     Lab Results   Component Value Date    CR 1.08 11/29/2016     No results found for: MICROALBUMIN    Socio/Economic Considerations:  Support system: family    Health Beliefs and Attitudes:   Patient Activation Measure Survey Score:  ARPIT Score (Last Two) 7/25/2011   ARPIT Raw Score 46   Activation Score 75.3   ARPIT Level 4     Stage of Change: " ACTION (Actively working towards change)    Diabetes knowledge and skills assessment:   Patient is knowledgeable in diabetes management concepts related to: Healthy Eating, Being Active, Taking Medication, Reducing Risks and Healthy Coping  Patient needs further education on the following diabetes management concepts: Monitoring, Taking Medication and Reducing Risks  Barriers to Learning Assessment: No Barriers identified  Based on learning assessment above, most appropriate setting for further diabetes education would be: Individual setting.    INTERVENTION/Education provided today on:  AADE Self-Care Behaviors:  Healthy Eating: carbohydrate counting, consistency in amount, composition, and timing of food intake, portion control, plate planning method and label reading.  Carb level varies at meals from ~ 20gm-90gm per records.  Discussed balancing this throughout the three meals.   Monitoring: individual blood glucose targets and frequency of monitoring  Taking Medication: action of prescribed medication, drawing up, administering and storing injectable diabetes medications, proper site selection and rotation for injections, side effects of prescribed medications and when to take medications    Reviewed insulin injection technique.  Patient had learned this with MTM, but additional review was helpful.  [facility]  Reviewed(new needle each time, priming, insulin storage,  90 degree insertion angle, rotating  location of injection,  holding pen needle in skin for 10 seconds, holding button on top until needle is fully removed.  Pt verbalized understanding of above technique.   Reveiwed disposing of sharps.     Spent time reviewing low blood sugars, how to treat, fast acting carbs, buying glucose tabs etc.  Patient will review booklet information again on how to treat a low BG.      Opportunities for ongoing education and support in diabetes-self management were discussed.  Pt verbalized  understanding of concepts discussed and recommendations provided today.       Education Materials Provided:  [facility] Understanding Diabetes Booklet    PLAN:  See Patient Instructions for co-developed, patient-stated behavior change goals.  AVS printed and provided to patient today.    FOLLOW-UP:  Follow-up as needed.   Follow-up yearly for diabetes education review.  Chart routed to referring provider - MTM    Ongoing plan for education and support: Follow-up with primary care provider and MTM    Mikayla Garza RD, LD   Diabetes Education    Time Spent: 40 minutes  Encounter Type: Individual

## 2017-03-03 NOTE — MR AVS SNAPSHOT
After Visit Summary   3/3/2017    Manuel Rosales    MRN: [id number]           Patient Information     Date Of Birth          3/22/1931        Visit Information        Provider Department      3/3/2017 9:00 AM [facility] DIABETIC ED RESOURCE [facility]        Care Instructions    1.  Try to balance out meals for around 50-70 grams of carb.   For example take out some of the carbohydrates at breakfast and move them to lunch since your lunch is lower carbohydrate.       2.  Always have a source of carbohydrate if you have a glass of wine    3.  Start checking blood sugar 2-3 times daily once you start the insulin    4.  Buy glucose tabs to keep in your car and a source of sugar with you at all times.       Mikayla Garza RD, LD  For Diabetes Education appointments call: [phone]   For Diabetes Education Related Questions call: [phone] or email: [email or web address]          Follow-ups after your visit        Your next 10 appointments already scheduled     May 22, 2017  9:20 AM CDT   Office Visit with Ronaldo Corral MD   [facility] ([facility])    [address] [place] [address] [phone]           Bring a current list of meds and any records pertaining to this visit.  For Physicals, please bring immunization records and any forms needing to be filled out.  Please arrive 10 minutes early to complete paperwork.            Aug 08, 2017  1:00 PM CDT   New Visit with Willy Cardoso MD   [facility] ([address] [id number] [phone]              Who to contact     If you have questions or need follow up information about today's clinic visit or your schedule please contact [facility] directly at [phone].  Normal or non-critical lab and imaging results will be communicated to you by MyChart, letter or phone within 4  "business days after the clinic has received the results. If you do not hear from us within 7 days, please contact the clinic through BPT or phone. If you have a critical or abnormal lab result, we will notify you by phone as soon as possible.  Submit refill requests through BPT or call your pharmacy and they will forward the refill request to us. Please allow 3 business days for your refill to be completed.          Additional Information About Your Visit        BPT Information     BPT lets you send messages to your doctor, view your test results, renew your prescriptions, schedule appointments and more. To sign up, go to [email or web address] . Click on \"Log in\" on the left side of the screen, which will take you to the Welcome page. Then click on \"Sign up Now\" on the right side of the page.     You will be asked to enter the access code listed below, as well as some personal information. Please follow the directions to create your username and password.     Your access code is: [email or web address]  Expires: 2017  1:03 PM     Your access code will  in 90 days. If you need help or a new code, please call your [place] clinic or [phone].        Care EveryWhere ID     This is your Care EveryWhere ID. This could be used by other organizations to access your [place] medical records  [id number]        Your Vitals Were     BMI (Body Mass Index)                   30.99 kg/m2            Blood Pressure from Last 3 Encounters:   17 138/52   16 136/48   16 132/52    Weight from Last 3 Encounters:   17 192 lb (87.1 kg)   17 187 lb (84.8 kg)   16 189 lb (85.7 kg)              Today, you had the following     No orders found for display       Primary Care Provider Office Phone # Fax #    Ronaldo Corral [phone]       [facility] [address]  [facility] [address]        Thank you!     Thank you for choosing [facility] " [facility]  for your care. Our goal is always to provide you with excellent care. Hearing back from our patients is one way we can continue to improve our services. Please take a few minutes to complete the written survey that you may receive in the mail after your visit with us. Thank you!             Your Updated Medication List - Protect others around you: Learn how to safely use, store and throw away your medicines at www.disposemymeds.org.          This list is accurate as of: 3/3/17  9:43 AM.  Always use your most recent med list.                   Brand Name Dispense Instructions for use    amLODIPine 10 MG tablet    NORVASC    180 tablet    Take 1 tablet (10 mg) by mouth 2 times daily for blood pressure.       ASPIRIN NOT PRESCRIBED    INTENTIONAL     due to chronic gastritis on EGD 2/09*       atorvastatin 80 MG tablet    LIPITOR    90 tablet    Take 1 tablet (80 mg) by mouth daily for cholesterol.       blood glucose monitoring lancets     1 Box    USE ONE  TO CHECK GLUCOSE ONCE DAILY       blood glucose monitoring test strip    ONE TOUCH ULTRA    25 each    Use to test blood sugar once daily or as directed.       brimonidine 0.15 % ophthalmic solution    ALPHAGAN-P    1 Bottle    Place 1 drop into both eyes 2 times daily       dorzolamide-timolol 2-0.5 % ophthalmic solution    COSOPT    1 Bottle    Place 1 drop into both eyes 2 times daily       glipiZIDE 10 MG tablet    GLUCOTROL    180 tablet    Take 1 tablet (10 mg) by mouth 2 times daily (with meals) for diabetes.       hydrochlorothiazide 25 MG tablet    HYDRODIURIL    90 tablet    Take 1 tablet (25 mg) by mouth daily for blood pressure and leg swelling.       insulin glargine 100 UNIT/ML injection    LANTUS SOLOSTAR    15 mL    Inject 5 units under the skin daily.       insulin pen needle 32G X 4 MM    BD CAROL ANN U/F    100 each    Use 1 daily as directed.       latanoprost 0.005 % ophthalmic solution    XALATAN    1 Bottle    Place 1 drop into both  eyes At Bedtime       lisinopril 40 MG tablet    PRINIVIL/ZESTRIL    90 tablet    Take 1 tablet (40 mg) by mouth daily for blood pressure.       metoprolol 25 MG 24 hr tablet    TOPROL-XL    90 tablet    Take 1 tablet (25 mg) by mouth daily for blood pressure and heart.       sitagliptin 100 MG tablet    JANUVIA    90 tablet    Take 1 tablet (100 mg) by mouth daily for diabetes.       vitamin D 2000 UNITS Caps      Take 1 capsule by mouth daily

## 2017-03-03 NOTE — PATIENT INSTRUCTIONS
1.  Try to balance out meals for around 50-70 grams of carb.   For example take out some of the carbohydrates at breakfast and move them to lunch since your lunch is lower carbohydrate.       2.  Always have a source of carbohydrate if you have a glass of wine    3.  Start checking blood sugar 2-3 times daily once you start the insulin    4.  Buy glucose tabs to keep in your car and a source of sugar with you at all times.       Mikayla Garza RD, LD  For Diabetes Education appointments call: [phone]   For Diabetes Education Related Questions call: [phone] or email: [email or web address]

## 2017-03-20 ENCOUNTER — TELEPHONE (OUTPATIENT)
Dept: PHARMACY | Facility: CLINIC | Age: 82
End: 2017-03-20

## 2017-03-20 ENCOUNTER — ALLIED HEALTH/NURSE VISIT (OUTPATIENT)
Dept: PHARMACY | Facility: CLINIC | Age: 82
End: 2017-03-20
Payer: COMMERCIAL

## 2017-03-20 DIAGNOSIS — E11.65 TYPE 2 DIABETES MELLITUS WITH HYPERGLYCEMIA, WITHOUT LONG-TERM CURRENT USE OF INSULIN (H): Primary | ICD-10-CM

## 2017-03-20 PROCEDURE — 99606 MTMS BY PHARM EST 15 MIN: CPT | Performed by: PHARMACIST

## 2017-03-20 NOTE — TELEPHONE ENCOUNTER
Called pt to follow-up on whether or not the Lantus had been sent to him. See MTM note for details.    [place], PharmD  Medication Therapy Management Pharmacist  Pager: [phone]

## 2017-03-20 NOTE — MR AVS SNAPSHOT
After Visit Summary   3/20/2017    Manuel Rosales    MRN: [id number]           Patient Information     Date Of Birth          3/22/1931        Visit Information        Provider Department      3/20/2017 3:00 PM [place] [facility]        Today's Diagnoses     Type 2 diabetes mellitus with hyperglycemia, without long-term current use of insulin (H)    -  1       Follow-ups after your visit        Your next 10 appointments already scheduled     Mar 27, 2017 12:30 PM CDT   SHORT with La Maguirekim Hansa   [facility] ([facility])    [address] [id number] [phone]            May 22, 2017  9:20 AM CDT   Office Visit with Ronaldo Corral MD   [facility] ([facility])    [address] [address] [id number] [phone]           Bring a current list of meds and any records pertaining to this visit.  For Physicals, please bring immunization records and any forms needing to be filled out.  Please arrive 10 minutes early to complete paperwork.            Aug 08, 2017  1:00 PM CDT   New Visit with Willy Cardoso MD   [facility] ([address] [phone] [phone]              Who to contact     If you have questions or need follow up information about today's clinic visit or your schedule please contact [facility] directly at [phone].  Normal or non-critical lab and imaging results will be communicated to you by MyChart, letter or phone within 4 business days after the clinic has received the results. If you do not hear from us within 7 days, please contact the clinic through MyChart or phone. If you have a critical or abnormal lab result, we will notify you by phone as soon as possible.  Submit refill requests through Beneqhart or call  "your pharmacy and they will forward the refill request to us. Please allow 3 business days for your refill to be completed.          Additional Information About Your Visit        MyChart Information     Dodreamshart lets you send messages to your doctor, view your test results, renew your prescriptions, schedule appointments and more. To sign up, go to [email or web address] . Click on \"Log in\" on the left side of the screen, which will take you to the Welcome page. Then click on \"Sign up Now\" on the right side of the page.     You will be asked to enter the access code listed below, as well as some personal information. Please follow the directions to create your username and password.     Your access code is: [email or web address]  Expires: 2017  2:03 PM     Your access code will  in 90 days. If you need help or a new code, please call your [place] clinic or [phone].        Care EveryWhere ID     This is your Care EveryWhere ID. This could be used by other organizations to access your [place] medical records  [id number]         Blood Pressure from Last 3 Encounters:   17 138/52   16 136/48   16 132/52    Weight from Last 3 Encounters:   17 192 lb (87.1 kg)   17 187 lb (84.8 kg)   16 189 lb (85.7 kg)              Today, you had the following     No orders found for display       Primary Care Provider Office Phone # Fax #    Ronaldo Corral [phone]       [facility] [address]  [facility] [address]        Thank you!     Thank you for choosing [facility]  for your care. Our goal is always to provide you with excellent care. Hearing back from our patients is one way we can continue to improve our services. Please take a few minutes to complete the written survey that you may receive in the mail after your visit with us. Thank you!             Your Updated Medication List - Protect others around you: Learn how " to safely use, store and throw away your medicines at www.disposemymeds.org.          This list is accurate as of: 3/20/17  3:11 PM.  Always use your most recent med list.                   Brand Name Dispense Instructions for use    amLODIPine 10 MG tablet    NORVASC    180 tablet    Take 1 tablet (10 mg) by mouth 2 times daily for blood pressure.       ASPIRIN NOT PRESCRIBED    INTENTIONAL     due to chronic gastritis on EGD 2/09*       atorvastatin 80 MG tablet    LIPITOR    90 tablet    Take 1 tablet (80 mg) by mouth daily for cholesterol.       blood glucose monitoring lancets     1 Box    USE ONE  TO CHECK GLUCOSE ONCE DAILY       blood glucose monitoring test strip    ONE TOUCH ULTRA    25 each    Use to test blood sugar once daily or as directed.       brimonidine 0.15 % ophthalmic solution    ALPHAGAN-P    1 Bottle    Place 1 drop into both eyes 2 times daily       dorzolamide-timolol 2-0.5 % ophthalmic solution    COSOPT    1 Bottle    Place 1 drop into both eyes 2 times daily       glipiZIDE 10 MG tablet    GLUCOTROL    180 tablet    Take 1 tablet (10 mg) by mouth 2 times daily (with meals) for diabetes.       hydrochlorothiazide 25 MG tablet    HYDRODIURIL    90 tablet    Take 1 tablet (25 mg) by mouth daily for blood pressure and leg swelling.       insulin glargine 100 UNIT/ML injection    LANTUS SOLOSTAR    15 mL    Inject 5 units under the skin daily.       insulin pen needle 32G X 4 MM    BD CAROL ANN U/F    100 each    Use 1 daily as directed.       latanoprost 0.005 % ophthalmic solution    XALATAN    1 Bottle    Place 1 drop into both eyes At Bedtime       lisinopril 40 MG tablet    PRINIVIL/ZESTRIL    90 tablet    Take 1 tablet (40 mg) by mouth daily for blood pressure.       metoprolol 25 MG 24 hr tablet    TOPROL-XL    90 tablet    Take 1 tablet (25 mg) by mouth daily for blood pressure and heart.       sitagliptin 100 MG tablet    JANUVIA    90 tablet    Take 1 tablet (100 mg) by mouth daily for  diabetes.       vitamin D 2000 UNITS Caps      Take 1 capsule by mouth daily

## 2017-03-20 NOTE — PROGRESS NOTES
SUBJECTIVE/OBJECTIVE:                                                    Manuel Rosales is a 85 year old male called for a follow-up visit for Medication Therapy Management.  He was referred to me from Dr. Corral. Pt is hard of hearing, making phone appointment difficult.     Chief Complaint: Follow-up from our appointment on 2/27. F/up on Lantus start.     Allergies/ADRs: Reviewed in Epic  Tobacco: History of tobacco dependence - quit 1970   Alcohol: 7-9 beverages / week  PMH: Reviewed in Epic    Medication Adherence: no issues reported. Pt seems very informed about his medications and conditions.     Diabetes:  Pt currently taking Lantus 5 units daily, glipizide 10mg BID and Januvia 100mg daily. Pt is not experiencing side effects. Has previously tried metformin, saxagliptin, Actos, Avandia. Pt says that he was getting Januvia free from [employer] in the past, however he didn't renew the paperwork. Wondering if he can just stop the Januvia and adjust the Lantus.   SMBG: one time daily. Improved per pt with Lantus, however not significantly changed. Doesn't have values today.   Symptoms of low blood sugar? none. Frequency of hypoglycemia? never.  Recent symptoms of high blood sugar? none  Eye exam: due  Foot exam: up to date  Microalbumin is not < 30 mg/g. Pt is taking an ACEi/ARB.  Aspirin: Not taking due to chronic gastritis.     Current labs include:  BP Readings from Last 3 Encounters:   02/02/17 138/52   11/29/16 136/48   07/28/16 132/52     Today's Vitals: There were no vitals taken for this visit.  Lab Results   Component Value Date    A1C 8.8 02/02/2017   .  Lab Results   Component Value Date    CHOL 179 11/29/2016     Lab Results   Component Value Date    TRIG 143 11/29/2016     Lab Results   Component Value Date    HDL 67 11/29/2016     Lab Results   Component Value Date    LDL 83 11/29/2016       Liver Function Studies -   Recent Labs   Lab Test  11/29/16   0936   ALT  39       Lab Results   Component Value  Date    UCRR 47 02/02/2017    MICROL 130 02/02/2017    UMALCR 278.97 (H) 02/02/2017       Last Basic Metabolic Panel:  Lab Results   Component Value Date     11/29/2016      Lab Results   Component Value Date    POTASSIUM 4.6 11/29/2016     Lab Results   Component Value Date    CHLORIDE 99 11/29/2016     Lab Results   Component Value Date    BUN 19 11/29/2016     Lab Results   Component Value Date    CR 1.08 11/29/2016     GFR Estimate   Date Value Ref Range Status   11/29/2016 65 >60 mL/min/1.7m2 Final     Comment:     Non  GFR Calc   05/19/2016 78 >60 mL/min/1.7m2 Final     Comment:     Non  GFR Calc   11/19/2015 83 >60 mL/min/1.7m2 Final     Comment:     Non  GFR Calc     TSH   Date Value Ref Range Status   02/02/2017 1.21 0.40 - 4.00 mU/L Final     Most Recent Immunizations   Administered Date(s) Administered     Influenza (H1N1) 01/14/2010     Influenza (High Dose) 3 valent vaccine 09/16/2016     Influenza (IIV3) 10/01/2013     Pneumococcal (PCV 13) 11/19/2015     Pneumococcal 23 valent 11/29/2000     TD (ADULT, 7+) 10/25/2010     Tdap (Adacel,Boostrix) 10/01/2013     Zoster vaccine, live 09/30/2008     ASSESSMENT:                                                       Current medications were reviewed today.     Medication Adherence: no issues identified    Diabetes: Needs Improvement. Januvia unlikely to change BGs too much, can discontinue and see what values are without the Januvia and adjust Lantus in the future. Hesitate to change Lantus over the phone as he is hard of hearing.     PLAN:                                                      1. Stop Januvia  2. Pt to keep checking BGs and bring to next appointment.     I spent 10 minutes with this patient today.  All changes were made via collaborative practice agreement with Ronaldo Corral A copy of the visit note was provided to the patient's primary care provider.    Will follow up in 1 week in  clinic.    The patient declined a summary of these recommendations as an after visit summary.     [place], PharmD  Medication Therapy Management Pharmacist  Pager: [phone]

## 2017-03-27 ENCOUNTER — OFFICE VISIT (OUTPATIENT)
Dept: PHARMACY | Facility: CLINIC | Age: 82
End: 2017-03-27
Payer: COMMERCIAL

## 2017-03-27 DIAGNOSIS — E11.65 TYPE 2 DIABETES MELLITUS WITH HYPERGLYCEMIA, WITHOUT LONG-TERM CURRENT USE OF INSULIN (H): Primary | ICD-10-CM

## 2017-03-27 PROCEDURE — 99606 MTMS BY PHARM EST 15 MIN: CPT | Performed by: PHARMACIST

## 2017-03-27 PROCEDURE — 99607 MTMS BY PHARM ADDL 15 MIN: CPT | Performed by: PHARMACIST

## 2017-03-27 NOTE — PATIENT INSTRUCTIONS
Recommendations from today's MTM visit:                                                      1. Increase Lantus to 7 units. Increase by 1 unit every 4 days until your morning sugars are 80-120mg/dL. Call me if you reach a max of 15 units.     Next MTM visit: 5/22 with Dr. Corral    To schedule another MTM appointment, please call the clinic directly or you may call the MTM scheduling line at [phone] or toll-free at [phone].     My Clinical Pharmacist's contact information:                                                      It was a pleasure seeing you today!  Please feel free to contact me with any questions or concerns you have.      [place], PharmD  Medication Therapy Management Pharmacist  Pager: [phone]    You may receive a survey about the MTM services you received.  I would appreciate your feedback to help me serve you better in the future. Please fill it out and return it when you can. Your comments will be anonymous.

## 2017-03-27 NOTE — PROGRESS NOTES
SUBJECTIVE/OBJECTIVE:                                                    Manuel Rosales is a 86 year old male coming in for a follow-up visit for Medication Therapy Management.  He was referred to me from Dr. Corral.    Chief Complaint: Follow-up from our appointment on 3/20/17. DM    Allergies/ADRs: Reviewed in Epic  Tobacco: History of tobacco dependence - quit 1970   Alcohol: 7-9 beverages / week  PMH: Reviewed in Epic    Medication Adherence: no issues reported. Pt seems very informed about his medications and conditions.     Diabetes:  Pt currently taking Lantus 5 units daily, glipizide 10mg BID. Pt is not experiencing side effects. Has previously tried metformin, saxagliptin, Actos, Avandia, Januvia. Now has an Accuchek Maria Elena Plus.   SMBG: one time daily. 190, 162, 145, 209, 168, 142, 171, 170, 152, 150, 154, 174, 170, 187  Symptoms of low blood sugar? none. Frequency of hypoglycemia? never.  Recent symptoms of high blood sugar? none  Eye exam: due  Foot exam: up to date  Microalbumin is not < 30 mg/g. Pt is taking an ACEi/ARB.  Aspirin: Not taking due to chronic gastritis.     Current labs include:  BP Readings from Last 3 Encounters:   02/02/17 138/52   11/29/16 136/48   07/28/16 132/52     Today's Vitals: There were no vitals taken for this visit.  Lab Results   Component Value Date    A1C 8.8 02/02/2017   .  Lab Results   Component Value Date    CHOL 179 11/29/2016     Lab Results   Component Value Date    TRIG 143 11/29/2016     Lab Results   Component Value Date    HDL 67 11/29/2016     Lab Results   Component Value Date    LDL 83 11/29/2016       Liver Function Studies -   Recent Labs   Lab Test  11/29/16   0936   ALT  39       Lab Results   Component Value Date    UCRR 47 02/02/2017    MICROL 130 02/02/2017    UMALCR 278.97 (H) 02/02/2017       Last Basic Metabolic Panel:  Lab Results   Component Value Date     11/29/2016      Lab Results   Component Value Date    POTASSIUM 4.6 11/29/2016      Lab Results   Component Value Date    CHLORIDE 99 11/29/2016     Lab Results   Component Value Date    BUN 19 11/29/2016     Lab Results   Component Value Date    CR 1.08 11/29/2016     GFR Estimate   Date Value Ref Range Status   11/29/2016 65 >60 mL/min/1.7m2 Final     Comment:     Non  GFR Calc   05/19/2016 78 >60 mL/min/1.7m2 Final     Comment:     Non  GFR Calc   11/19/2015 83 >60 mL/min/1.7m2 Final     Comment:     Non  GFR Calc     TSH   Date Value Ref Range Status   02/02/2017 1.21 0.40 - 4.00 mU/L Final     Most Recent Immunizations   Administered Date(s) Administered     Influenza (H1N1) 01/14/2010     Influenza (High Dose) 3 valent vaccine 09/16/2016     Influenza (IIV3) 10/01/2013     Pneumococcal (PCV 13) 11/19/2015     Pneumococcal 23 valent 11/29/2000     TD (ADULT, 7+) 10/25/2010     Tdap (Adacel,Boostrix) 10/01/2013     Zoster vaccine, live 09/30/2008     ASSESSMENT:                                                       Current medications were reviewed today.     Medication Adherence: no issues identified    Diabetes: Needs Improvement. BGs still not at goal. Lantus dose still low and pt not experiencing hypoglycemia. As above, he is well informed and will be able to self-titrate insulin in an appropriate manner.     PLAN:                                                      1. Increase Lantus to 7 units. Increase by 1 unit every 4 days until your morning sugars are 80-120mg/dL. Call me if you reach a max of 15 units.    I spent 30 minutes with this patient today.  All changes were made via collaborative practice agreement with Ronaldo Corral. A copy of the visit note was provided to the patient's primary care provider.    Will follow up in 3 weeks via phone and on 5/22 with PCP.    The patient declined a summary of these recommendations as an after visit summary.     [place], PharmD  Medication Therapy Management Pharmacist  Pager:  [phone]

## 2017-03-27 NOTE — MR AVS SNAPSHOT
After Visit Summary   3/27/2017    Manuel Rosales    MRN: [id number]           Patient Information     Date Of Birth          3/22/1931        Visit Information        Provider Department      3/27/2017 12:30 PM [place] [facility] MTM        Care Instructions    Recommendations from today's MTM visit:                                                      1. Increase Lantus to 7 units. Increase by 1 unit every 4 days until your morning sugars are 80-120mg/dL. Call me if you reach a max of 15 units.     Next MTM visit: 5/22 with Dr. Corral    To schedule another MTM appointment, please call the clinic directly or you may call the MTM scheduling line at [phone] or toll-free at [phone].     My Clinical Pharmacist's contact information:                                                      It was a pleasure seeing you today!  Please feel free to contact me with any questions or concerns you have.      [place], PharmD  Medication Therapy Management Pharmacist  Pager: [phone]    You may receive a survey about the MTM services you received.  I would appreciate your feedback to help me serve you better in the future. Please fill it out and return it when you can. Your comments will be anonymous.                Follow-ups after your visit        Your next 10 appointments already scheduled     May 22, 2017  9:20 AM CDT   [facility] Visit [facility] Ronaldo [facility]ral MD   [facility]    [address] [place] [id number] [phone]           Bring a current list of meds and any records pertaining to this visit.  For Physicals, please bring immunization records and any forms needing to be filled out.  Please arrive 10 minutes early to complete paperwork.            May 22, 2017  9:30 AM CDT   Office Visit with [place]   [facility] ([facility]  "[facility]    [address] [address] [id number] [phone]           Bring a current list of meds and any records pertaining to this visit.  For Physicals, please bring immunization records and any forms needing to be filled out.  Please arrive 10 minutes early to complete paperwork.            Aug 08, 2017  1:00 PM CDT   New Visit with Willy Cardoso MD   [facility] ([address] [address] [phone]              Who to contact     If you have questions or need follow up information about today's clinic visit or your schedule please contact [facility] MTM directly at [phone].  Normal or non-critical lab and imaging results will be communicated to you by MyChart, letter or phone within 4 business days after the clinic has received the results. If you do not hear from us within 7 days, please contact the clinic through MyChart or phone. If you have a critical or abnormal lab result, we will notify you by phone as soon as possible.  Submit refill requests through NeurogesX or call your pharmacy and they will forward the refill request to us. Please allow 3 business days for your refill to be completed.          Additional Information About Your Visit        Xiamhart Information     NeurogesX lets you send messages to your doctor, view your test results, renew your prescriptions, schedule appointments and more. To sign up, go to [email or web address] . Click on \"Log in\" on the left side of the screen, which will take you to the Welcome page. Then click on \"Sign up Now\" on the right side of the page.     You will be asked to enter the access code listed below, as well as some personal information. Please follow the directions to create your username and password.     Your access code is: [email or web address]  Expires: 2017  2:03 PM     Your access code will  in 90 days. If you need help or a new " code, please call your [place] clinic or [phone].        Care EveryWhere ID     This is your Care EveryWhere ID. This could be used by other organizations to access your [place] medical records  [id number]         Blood Pressure from Last 3 Encounters:   02/02/17 138/52   11/29/16 136/48   07/28/16 132/52    Weight from Last 3 Encounters:   03/03/17 192 lb (87.1 kg)   02/02/17 187 lb (84.8 kg)   11/29/16 189 lb (85.7 kg)              Today, you had the following     No orders found for display       Primary Care Provider Office Phone # Fax #    Ronaldo Corral [phone]       [facility] [address]  [facility] [address]        Thank you!     Thank you for choosing [facility]  for your care. Our goal is always to provide you with excellent care. Hearing back from our patients is one way we can continue to improve our services. Please take a few minutes to complete the written survey that you may receive in the mail after your visit with us. Thank you!             Your Updated Medication List - Protect others around you: Learn how to safely use, store and throw away your medicines at www.disposemymeds.org.          This list is accurate as of: 3/27/17 12:37 PM.  Always use your most recent med list.                   Brand Name Dispense Instructions for use    amLODIPine 10 MG tablet    NORVASC    180 tablet    Take 1 tablet (10 mg) by mouth 2 times daily for blood pressure.       ASPIRIN NOT PRESCRIBED    INTENTIONAL     due to chronic gastritis on EGD 2/09*       atorvastatin 80 MG tablet    LIPITOR    90 tablet    Take 1 tablet (80 mg) by mouth daily for cholesterol.       brimonidine 0.15 % ophthalmic solution    ALPHAGAN-P    1 Bottle    Place 1 drop into both eyes 2 times daily       dorzolamide-timolol 2-0.5 % ophthalmic solution    COSOPT    1 Bottle    Place 1 drop into both eyes 2 times daily       glipiZIDE 10 MG tablet    GLUCOTROL     180 tablet    Take 1 tablet (10 mg) by mouth 2 times daily (with meals) for diabetes.       hydrochlorothiazide 25 MG tablet    HYDRODIURIL    90 tablet    Take 1 tablet (25 mg) by mouth daily for blood pressure and leg swelling.       insulin glargine 100 UNIT/ML injection    LANTUS SOLOSTAR    15 mL    Inject 5 units under the skin daily.       insulin pen needle 32G X 4 MM    BD CAROL ANN U/F    100 each    Use 1 daily as directed.       latanoprost 0.005 % ophthalmic solution    XALATAN    1 Bottle    Place 1 drop into both eyes At Bedtime       lisinopril 40 MG tablet    PRINIVIL/ZESTRIL    90 tablet    Take 1 tablet (40 mg) by mouth daily for blood pressure.       metoprolol 25 MG 24 hr tablet    TOPROL-XL    90 tablet    Take 1 tablet (25 mg) by mouth daily for blood pressure and heart.       sitagliptin 100 MG tablet    JANUVIA    90 tablet    Take 1 tablet (100 mg) by mouth daily for diabetes.       vitamin D 2000 UNITS Caps      Take 1 capsule by mouth daily

## 2017-04-10 DIAGNOSIS — H40.1132 PRIMARY OPEN ANGLE GLAUCOMA OF BOTH EYES, MODERATE STAGE: Primary | ICD-10-CM

## 2017-04-10 RX ORDER — BRIMONIDINE TARTRATE 1.5 MG/ML
1 SOLUTION/ DROPS OPHTHALMIC 2 TIMES DAILY
Qty: 1 BOTTLE | Refills: 12 | Status: SHIPPED | OUTPATIENT
Start: 2017-04-10 | End: 2018-02-06

## 2017-05-22 ENCOUNTER — OFFICE VISIT (OUTPATIENT)
Dept: PHARMACY | Facility: CLINIC | Age: 82
End: 2017-05-22
Payer: COMMERCIAL

## 2017-05-22 ENCOUNTER — OFFICE VISIT (OUTPATIENT)
Dept: FAMILY MEDICINE | Facility: CLINIC | Age: 82
End: 2017-05-22
Payer: COMMERCIAL

## 2017-05-22 VITALS
OXYGEN SATURATION: 98 % | HEART RATE: 56 BPM | HEIGHT: 66 IN | SYSTOLIC BLOOD PRESSURE: 134 MMHG | TEMPERATURE: 97.1 F | BODY MASS INDEX: 29.41 KG/M2 | DIASTOLIC BLOOD PRESSURE: 44 MMHG | WEIGHT: 183 LBS

## 2017-05-22 DIAGNOSIS — L57.0 ACTINIC KERATOSES: ICD-10-CM

## 2017-05-22 DIAGNOSIS — E11.65 TYPE 2 DIABETES MELLITUS WITH HYPERGLYCEMIA, WITHOUT LONG-TERM CURRENT USE OF INSULIN (H): ICD-10-CM

## 2017-05-22 DIAGNOSIS — I12.9 BENIGN HYPERTENSION WITH CHRONIC KIDNEY DISEASE, STAGE III (H): ICD-10-CM

## 2017-05-22 DIAGNOSIS — J01.90 ACUTE SINUSITIS WITH SYMPTOMS > 10 DAYS: ICD-10-CM

## 2017-05-22 DIAGNOSIS — E78.5 HYPERLIPIDEMIA LDL GOAL <100: ICD-10-CM

## 2017-05-22 DIAGNOSIS — N18.30 BENIGN HYPERTENSION WITH CHRONIC KIDNEY DISEASE, STAGE III (H): ICD-10-CM

## 2017-05-22 LAB
ALT SERPL W P-5'-P-CCNC: 35 U/L (ref 0–70)
CHOLEST SERPL-MCNC: 143 MG/DL
CREAT SERPL-MCNC: 0.81 MG/DL (ref 0.66–1.25)
GFR SERPL CREATININE-BSD FRML MDRD: NORMAL ML/MIN/1.7M2
HBA1C MFR BLD: 8.4 % (ref 4.3–6)
HDLC SERPL-MCNC: 76 MG/DL
LDLC SERPL CALC-MCNC: 51 MG/DL
NONHDLC SERPL-MCNC: 67 MG/DL
POTASSIUM SERPL-SCNC: 5 MMOL/L (ref 3.4–5.3)
TRIGL SERPL-MCNC: 82 MG/DL

## 2017-05-22 PROCEDURE — 84132 ASSAY OF SERUM POTASSIUM: CPT | Performed by: FAMILY MEDICINE

## 2017-05-22 PROCEDURE — 82565 ASSAY OF CREATININE: CPT | Performed by: FAMILY MEDICINE

## 2017-05-22 PROCEDURE — 84460 ALANINE AMINO (ALT) (SGPT): CPT | Performed by: FAMILY MEDICINE

## 2017-05-22 PROCEDURE — 80061 LIPID PANEL: CPT | Performed by: FAMILY MEDICINE

## 2017-05-22 PROCEDURE — 83036 HEMOGLOBIN GLYCOSYLATED A1C: CPT | Performed by: FAMILY MEDICINE

## 2017-05-22 PROCEDURE — 99606 MTMS BY PHARM EST 15 MIN: CPT | Performed by: PHARMACIST

## 2017-05-22 PROCEDURE — 17000 DESTRUCT PREMALG LESION: CPT | Performed by: FAMILY MEDICINE

## 2017-05-22 PROCEDURE — 17003 DESTRUCT PREMALG LES 2-14: CPT | Performed by: FAMILY MEDICINE

## 2017-05-22 PROCEDURE — 36415 COLL VENOUS BLD VENIPUNCTURE: CPT | Performed by: FAMILY MEDICINE

## 2017-05-22 PROCEDURE — 99214 OFFICE O/P EST MOD 30 MIN: CPT | Mod: 25 | Performed by: FAMILY MEDICINE

## 2017-05-22 RX ORDER — GLIPIZIDE 10 MG/1
10 TABLET ORAL 2 TIMES DAILY WITH MEALS
Qty: 180 TABLET | Refills: 1 | Status: SHIPPED | OUTPATIENT
Start: 2017-05-22 | End: 2017-12-17

## 2017-05-22 RX ORDER — HYDROCHLOROTHIAZIDE 25 MG/1
25 TABLET ORAL DAILY
Qty: 90 TABLET | Refills: 1 | Status: SHIPPED | OUTPATIENT
Start: 2017-05-22 | End: 2018-01-08

## 2017-05-22 RX ORDER — AMLODIPINE BESYLATE 10 MG/1
10 TABLET ORAL 2 TIMES DAILY
Qty: 180 TABLET | Refills: 1 | Status: SHIPPED | OUTPATIENT
Start: 2017-05-22 | End: 2017-08-07

## 2017-05-22 RX ORDER — METOPROLOL SUCCINATE 25 MG/1
25 TABLET, EXTENDED RELEASE ORAL DAILY
Qty: 90 TABLET | Refills: 1 | Status: SHIPPED | OUTPATIENT
Start: 2017-05-22 | End: 2017-08-07

## 2017-05-22 RX ORDER — ATORVASTATIN CALCIUM 80 MG/1
80 TABLET, FILM COATED ORAL DAILY
Qty: 90 TABLET | Refills: 3 | Status: SHIPPED | OUTPATIENT
Start: 2017-05-22 | End: 2018-01-08

## 2017-05-22 RX ORDER — AMOXICILLIN 500 MG/1
500 TABLET, FILM COATED ORAL 3 TIMES DAILY
Qty: 30 TABLET | Refills: 0 | Status: SHIPPED | OUTPATIENT
Start: 2017-05-22 | End: 2017-06-01

## 2017-05-22 ASSESSMENT — PAIN SCALES - GENERAL: PAINLEVEL: NO PAIN (0)

## 2017-05-22 NOTE — PROGRESS NOTES
"  SUBJECTIVE:                                                    Manuel Rosales is a 86 year old male who presents to clinic today for the following health issues:        Diabetes Follow-up    Patient is checking blood sugars: once daily.  Results are as follows:         am - 105 today    Diabetic concerns: None     Symptoms of hypoglycemia (low blood sugar): none     Paresthesias (numbness or burning in feet) or sores: No     Date of last diabetic eye exam: DUE    STOPPED TAKING JANUVIA ON 3/20/17 due to maseymouractkeith's rule change on financial support     Hyperlipidemia Follow-Up      Rate your low fat/cholesterol diet?: good    Taking statin?  Yes, no muscle aches from statin    Other lipid medications/supplements?:  none     Hypertension Follow-up      Outpatient blood pressures are not being checked.    Low Salt Diet: not monitoring salt         Amount of exercise or physical activity: None    Problems taking medications regularly: No    Medication side effects: none    Diet: regular (no restrictions)      Past medical, family, and social histories, medications, and allergies are reviewed and updated in Epic.     ROS:  C: NEGATIVE for fever, chills, change in weight  I: scaly spot on right cheek for 2 years, doesn't respond well to lotion  ENT/MOUTH: cold symptoms for about 3 weeks  R: NEGATIVE for significant cough or SOB  CV: NEGATIVE for chest pain, palpitations or peripheral edema      OBJECTIVE:                                                    /44  Pulse 56  Temp 97.1  F (36.2  C) (Oral)  Ht 5' 6\" (1.676 m)  Wt 183 lb (83 kg)  SpO2 98%  BMI 29.54 kg/m2  Body mass index is 29.54 kg/(m^2).  GENERAL: healthy, alert and no distress  EYES: Eyes grossly normal to inspection, PERRL and conjunctivae and sclerae normal  HENT: ear canals and TM's normal, nose and mouth without ulcers or lesions  NECK: no adenopathy, no asymmetry, masses, or scars and thyroid normal to palpation  RESP: lungs clear to " auscultation - no rales, rhonchi or wheezes  CV: regular rate and rhythm, normal S1 S2, no S3 or S4, no murmur, click or rub, no peripheral edema and peripheral pulses strong  MS: no gross musculoskeletal defects noted, no edema  SKIN: several actinic keratoses: 1 RT cheek, 5 along RT temple/sideburn, 1 superior helix of RT ear  NEURO: Normal strength and tone, mentation intact and speech normal  NEURO: cranial nerves 2-12 intact  PSYCH: mentation appears normal, affect normal/bright    Diagnostic Test Results:  Results for orders placed or performed in visit on 05/22/17 (from the past 24 hour(s))   Hemoglobin A1c   Result Value Ref Range    Hemoglobin A1C 8.4 (H) 4.3 - 6.0 %        Lab Results   Component Value Date    A1C 8.4 05/22/2017    A1C 8.8 02/02/2017    A1C 8.4 11/29/2016    A1C 7.9 07/28/2016    A1C 8.6 05/19/2016        ASSESSMENT/PLAN:                                                    ASSESSMENT/PLAN:  (E11.29,  E11.65,  R80.9,  Z79.4) Uncontrolled type 2 diabetes mellitus with microalbuminuria, with long-term current use of insulin (H)  (primary encounter diagnosis)  Comment: Improved, but not yet at goal. His am fasting glc levels (and Lantus dose) have stablised in the past 1-2 months, so his HgbA1c should continue to improve over the next 3 mo.  Plan: Hemoglobin A1c, glipiZIDE (GLUCOTROL) 10 MG         tablet, Hemoglobin A1c        Recheck HgbA1c late July (lab only)    (E78.5) Hyperlipidemia LDL goal <100  Comment: historically @ goal  Plan: Lipid panel reflex to direct LDL, ALT        F/u 6 mo @ [facility]/diabetes check    (I12.9,  N18.3) Benign hypertension with chronic kidney disease, stage III  Comment: well cotnrolled  Plan: Potassium, Creatinine, amLODIPine (NORVASC) 10         MG tablet, hydrochlorothiazide (HYDRODIURIL) 25        MG tablet, metoprolol (TOPROL-XL) 25 MG 24 hr         tablet        F/u 6 mo    (L57.0) Actinic keratoses  Comment: 7 on RT side of face  Plan: DESTRUCT PREMALIGNANT  LESION, FIRST, DESTRUCT         PREMALIGNANT LESION, 2-14        Each lesion is treated with cryotherapy using liquid nitrogen x 6 seconds x 3 applications. The patient tolerated the procedure well.     (J01.90) Acute sinusitis with symptoms > 10 days  Comment:   Plan: amoxicillin (AMOXIL) 500 MG tablet        F/u if symptoms persist past 2 weeks     Ronaldo Corral MD

## 2017-05-22 NOTE — PATIENT INSTRUCTIONS
================================================================================  Normal Values   Blood pressure  <140/90 for most adults    <130/80 for some chronic diseases (ask your care team about yours)    BMI (body mass index)  18.5-25 kg/m2 (based on height and weight)     Thank you for visiting [facility]    Normal or non-critical lab and imaging results will be communicated to you by MyChart, letter or phone within 7 days.  If you do not hear from us within 10 days, please call the clinic. If you have a critical or abnormal lab result, we will notify you by phone as soon as possible.     If you have any questions regarding your visit please contact:     Team Comfort:   Clinic Hours Telephone Number   Dr. Ronaldo Morillo 7am-5pm  Monday - Friday [phone]  Chavez Mora RN   Pharmacy 8:00am-8pm Monday-Friday    9am-5pm Saturday-Sunday [phone]   Urgent Care 11am-9pm Monday-Friday        9am-5pm Saturday-Sunday [phone]     After hours, weekend or if you need to make an appointment with your primary provider please call [phone].   After Hours nurse advise: call [facility]: [phone]    Medication Refills:  Call your pharmacy and they will forward the refill to us. Please allow 3 business days for your refills to be completed.

## 2017-05-22 NOTE — LETTER
[facility]  [address]  [place] [address]      2017      Manuel Rosales  [address]  [facility] [place] [address]              Dear Manuel ROVERTO Elaximena        All of the rest of your test results were within the expected range for you.     Enclosed is a copy of the results.  It was a pleasure to see you at your last appointment.    If you have any questions or concerns, please call myself or my nurse at [phone].      Sincerely,      Ronaldo Corral MD/YOLANDA Galdamez MA  TEAM COMFORT      Results for orders placed or performed in visit on 17   Hemoglobin A1c   Result Value Ref Range    Hemoglobin A1C 8.4 (H) 4.3 - 6.0 %   Lipid panel reflex to direct LDL   Result Value Ref Range    Cholesterol 143 <200 mg/dL    Triglycerides 82 <150 mg/dL    HDL Cholesterol 76 >39 mg/dL    LDL Cholesterol Calculated 51 <100 mg/dL    Non HDL Cholesterol 67 <130 mg/dL   ALT   Result Value Ref Range    ALT 35 0 - 70 U/L   Potassium   Result Value Ref Range    Potassium 5.0 3.4 - 5.3 mmol/L   Creatinine   Result Value Ref Range    Creatinine 0.81 0.66 - 1.25 mg/dL    GFR Estimate >90  Non  GFR Calc   >60 mL/min/1.7m2    GFR Estimate If Black >90   GFR Calc   >60 mL/min/1.7m2                 RE: Manuel Rosales   MRN: [id number]  : 3/22/1931  ENC DATE: May 22, 2017

## 2017-05-22 NOTE — PROGRESS NOTES
SUBJECTIVE/OBJECTIVE:                                                    Manuel Rosales is a 86 year old male coming in for a follow-up visit for Medication Therapy Management.  He was referred to me from Dr. Corral.    Chief Complaint: Follow-up from our appointment on 3/27/17. DM    Allergies/ADRs: Reviewed in Epic  Tobacco: History of tobacco dependence - quit 1970   Alcohol: 7-9 beverages / week  PMH: Reviewed in Epic    Medication Adherence: no issues reported. Pt seems very informed about his medications and conditions.     Diabetes:  Pt currently taking Lantus 22 units daily, glipizide 10mg BID. Pt is not experiencing side effects. Has previously tried metformin, saxagliptin, Actos, Avandia, Januvia. Now has an Accuchek Maria Elena Plus.   SMBG: one time daily, fasting. Per BG log (in mg/dL): 105, 146, 72, 116, 140, 81, 135, 151, 131, 116, 133, 155, 168, 178  BGs have been at this level for about a month, has followed the plan of increasing by 1 unit every 4 days if BGs are elevated. Did decrease to 21 units of Lantus at one point, however his BGs went back up. Thinks that 22 units is working very well.  Symptoms of low blood sugar? none. Frequency of hypoglycemia? never.  Recent symptoms of high blood sugar? none  Eye exam: due  Foot exam: up to date  Microalbumin is not < 30 mg/g. Pt is taking an ACEi/ARB.  Aspirin: Not taking due to chronic gastritis.     Current labs include:  BP Readings from Last 3 Encounters:   05/22/17 157/53   02/02/17 138/52   11/29/16 136/48     Lab Results   Component Value Date    A1C 8.4 05/22/2017    A1C 8.8 02/02/2017    A1C 8.4 11/29/2016    A1C 7.9 07/28/2016    A1C 8.6 05/19/2016     Lab Results   Component Value Date    CHOL 179 11/29/2016     Lab Results   Component Value Date    TRIG 143 11/29/2016     Lab Results   Component Value Date    HDL 67 11/29/2016     Lab Results   Component Value Date    LDL 83 11/29/2016       Liver Function Studies -   Recent Labs   Lab Test   11/29/16   0936   ALT  39       Lab Results   Component Value Date    UCRR 47 02/02/2017    MICROL 130 02/02/2017    UMALCR 278.97 (H) 02/02/2017       Last Basic Metabolic Panel:  Lab Results   Component Value Date     11/29/2016      Lab Results   Component Value Date    POTASSIUM 4.6 11/29/2016     Lab Results   Component Value Date    CHLORIDE 99 11/29/2016     Lab Results   Component Value Date    BUN 19 11/29/2016     Lab Results   Component Value Date    CR 1.08 11/29/2016     GFR Estimate   Date Value Ref Range Status   11/29/2016 65 >60 mL/min/1.7m2 Final     Comment:     Non  GFR Calc   05/19/2016 78 >60 mL/min/1.7m2 Final     Comment:     Non  GFR Calc   11/19/2015 83 >60 mL/min/1.7m2 Final     Comment:     Non  GFR Calc     TSH   Date Value Ref Range Status   02/02/2017 1.21 0.40 - 4.00 mU/L Final     Most Recent Immunizations   Administered Date(s) Administered     Influenza (H1N1) 01/14/2010     Influenza (High Dose) 3 valent vaccine 09/16/2016     Influenza (IIV3) 10/01/2013     Pneumococcal (PCV 13) 11/19/2015     Pneumococcal 23 valent 11/29/2000     TD (ADULT, 7+) 10/25/2010     Tdap (Adacel,Boostrix) 10/01/2013     Zoster vaccine, live 09/30/2008     ASSESSMENT:                                                       Current medications were reviewed today.     Medication Adherence: no issues identified    Diabetes: Needs Improvement. BGs much improved and largely at goal though A1c not at goal. BGs have been improved for about a month, so seems reasonable to assume that A1c will be at goal of A1c< 8% in another 3 months with current regimen.    PLAN:                                                      1. Continue current medications - [facility] so pt has enough to take 22 units daily.     I spent 10 minutes with this patient today.  All changes were made via collaborative practice agreement with Ronaldo Corral  A copy of the visit note was provided to the patient's primary care provider.    Will follow up in 3 months.    The patient declined a summary of these recommendations as an after visit summary.     [place], PharmD  Medication Therapy Management Pharmacist  Pager: [phone]

## 2017-05-22 NOTE — NURSING NOTE
"Chief Complaint   Patient presents with     Hypertension     Diabetes     Lipids       Initial /59 (BP Location: Left arm, Patient Position: Chair, Cuff Size: Adult Regular)  Pulse (!) 48  Temp 97.1  F (36.2  C) (Oral)  Ht 5' 6\" (1.676 m)  Wt 183 lb (83 kg)  SpO2 98%  BMI 29.54 kg/m2 Estimated body mass index is 29.54 kg/(m^2) as calculated from the following:    Height as of this encounter: 5' 6\" (1.676 m).    Weight as of this encounter: 183 lb (83 kg).  Medication Reconciliation: lizzie Galdamez MA      "

## 2017-05-22 NOTE — MR AVS SNAPSHOT
After Visit Summary   5/22/2017    Manuel Rosales    MRN: [id number]           Patient Information     Date Of Birth          3/22/1931        Visit Information        Provider Department      5/22/2017 9:20 AM Ronaldo Corral MD [facility]        Today's Diagnoses     Uncontrolled type 2 diabetes mellitus with microalbuminuria, with long-term current use of insulin (H)    -  1    Hyperlipidemia LDL goal <100        Benign hypertension with chronic kidney disease, stage III        Actinic keratoses        Acute sinusitis with symptoms > 10 days          Care Instructions        ================================================================================  Normal Values   Blood pressure  <140/90 for most adults    <130/80 for some chronic diseases (ask your care team about yours)    BMI (body mass index)  18.5-25 kg/m2 (based on height and weight)     Thank you for visiting [facility]    Normal or non-critical lab and imaging results will be communicated to you by MyChart, letter or phone within 7 days.  If you do not hear from us within 10 days, please call the clinic. If you have a critical or abnormal lab result, we will notify you by phone as soon as possible.     If you have any questions regarding your visit please contact:     Team Comfort:   Clinic Hours Telephone Number   Dr. Ronaldo Morillo 7am-5pm  Monday - Friday [phone]  Chavez Mora RN   Pharmacy 8:00am-8pm Monday-Friday    9am-5pm Saturday-Sunday [phone]   Urgent Care 11am-9pm Monday-Friday        9am-5pm Saturday-Sunday [phone]     After hours, weekend or if you need to make an appointment with your primary provider please call [phone].   After Hours nurse advise: call [facility]: [phone]    Medication Refills:  Call your pharmacy and they will forward the refill to us.  "Please allow 3 business days for your refills to be completed.                Follow-ups after your visit        Follow-up notes from your care team     Return in about 6 months (around 11/13/2017) for full physical, diabetes.  Return for lab appointment in late July.      Your next 10 appointments already scheduled     Aug 08, 2017  1:00 PM CDT   New Visit with Willy Cardoso MD   [facility] ([address]  [place] [address]   [phone]              Future tests that were ordered for you today     Open Future Orders        Priority Expected Expires Ordered    Hemoglobin A1c Routine 7/31/2017 5/22/2018 5/22/2017            Who to contact     If you have questions or need follow up information about today's clinic visit or your schedule please contact [facility] RAMEZ BUCKLEY directly at [phone].  Normal or non-critical lab and imaging results will be communicated to you by MyChart, letter or phone within 4 business days after the clinic has received the results. If you do not hear from us within 7 days, please contact the clinic through MyChart or phone. If you have a critical or abnormal lab result, we will notify you by phone as soon as possible.  Submit refill requests through [employer] or call your pharmacy and they will forward the refill request to us. Please allow 3 business days for your refill to be completed.          Additional Information About Your Visit        MyChart Information     [employer] lets you send messages to your doctor, view your test results, renew your prescriptions, schedule appointments and more. To sign up, go to [email or web address] . Click on \"Log in\" on the left side of the screen, which will take you to the Welcome page. Then click on \"Sign up Now\" on the right side of the page.     You will be asked to enter the access code listed below, as well as some personal information. Please follow the directions to " "create your username and password.     Your access code is: [email or web address]  Expires: 2017  2:03 PM     Your access code will  in 90 days. If you need help or a new code, please call your [facility] or [phone].        Care EveryWhere ID     This is your Care EveryWhere ID. This could be used by other organizations to access your [facility] medical records  [id number]        Your Vitals Were     Pulse Temperature Height Pulse Oximetry BMI (Body Mass Index)       56 97.1  F (36.2  C) (Oral) 5' 6\" (1.676 m) 98% 29.54 kg/m2        Blood Pressure from Last 3 Encounters:   17 134/44   17 138/52   16 136/48    Weight from Last 3 Encounters:   17 183 lb (83 kg)   17 192 lb (87.1 kg)   17 187 lb (84.8 kg)              We Performed the Following     ALT     Creatinine     DESTRUCT PREMALIGNANT LESION, 2-14     DESTRUCT PREMALIGNANT LESION, FIRST     Hemoglobin A1c     Lipid panel reflex to direct LDL     Potassium          Today's Medication Changes          These changes are accurate as of: 17 10:01 AM.  If you have any questions, ask your nurse or doctor.               Start taking these medicines.        Dose/Directions    amoxicillin 500 MG tablet   Commonly known as:  AMOXIL   Used for:  Acute sinusitis with symptoms > 10 days   Started by:  Ronaldo Corral MD        Dose:  500 mg   Take 1 tablet (500 mg) by mouth 3 times daily for 10 days for sinus infection.   Quantity:  30 tablet   Refills:  0       insulin glargine 100 UNIT/ML injection   Commonly known as:  LANTUS SOLOSTAR   Used for:  Type 2 diabetes mellitus with hyperglycemia, without long-term current use of insulin (H)   Started by:  La Santo        Inject 22 units under the skin daily.   Quantity:  30 mL   Refills:  3            Where to get your medicines      [facility] [address] [place] [address] - [address], " [facility] [address]     Phone:  [phone]     amLODIPine 10 MG tablet    amoxicillin 500 MG tablet    glipiZIDE 10 MG tablet    hydrochlorothiazide 25 MG tablet    metoprolol 25 MG 24 hr tablet         Some of these will need a paper prescription and others can be bought over the counter.  Ask your nurse if you have questions.     Bring a paper prescription for each of these medications     insulin glargine 100 UNIT/ML injection                Primary Care Provider Office Phone # Fax #    Ronaldo Corral [phone]       [facility] [address]  [facility] [address]        Thank you!     Thank you for choosing [facility]  for your care. Our goal is always to provide you with excellent care. Hearing back from our patients is one way we can continue to improve our services. Please take a few minutes to complete the written survey that you may receive in the mail after your visit with us. Thank you!             Your Updated Medication List - Protect others around you: Learn how to safely use, store and throw away your medicines at www.disposemymeds.org.          This list is accurate as of: 5/22/17 10:01 AM.  Always use your most recent med list.                   Brand Name Dispense Instructions for use    amLODIPine 10 MG tablet    NORVASC    180 tablet    Take 1 tablet (10 mg) by mouth 2 times daily for blood pressure.       amoxicillin 500 MG tablet    AMOXIL    30 tablet    Take 1 tablet (500 mg) by mouth 3 times daily for 10 days for sinus infection.       ASPIRIN NOT PRESCRIBED    INTENTIONAL     due to chronic gastritis on EGD 2/09*       atorvastatin 80 MG tablet    LIPITOR    90 tablet    Take 1 tablet (80 mg) by mouth daily for cholesterol.       brimonidine 0.15 % ophthalmic solution    ALPHAGAN-P    1 Bottle    Place 1 drop into both eyes 2 times daily       dorzolamide-timolol 2-0.5 % ophthalmic solution    COSOPT    1 Bottle    Place 1 drop  into both eyes 2 times daily       glipiZIDE 10 MG tablet    GLUCOTROL    180 tablet    Take 1 tablet (10 mg) by mouth 2 times daily (with meals) for diabetes.       hydrochlorothiazide 25 MG tablet    HYDRODIURIL    90 tablet    Take 1 tablet (25 mg) by mouth daily for blood pressure and leg swelling.       insulin glargine 100 UNIT/ML injection    LANTUS SOLOSTAR    30 mL    Inject 22 units under the skin daily.       insulin pen needle 32G X 4 MM    BD CAROL ANN U/F    100 each    Use 1 daily as directed.       latanoprost 0.005 % ophthalmic solution    XALATAN    1 Bottle    Place 1 drop into both eyes At Bedtime       lisinopril 40 MG tablet    PRINIVIL/ZESTRIL    90 tablet    Take 1 tablet (40 mg) by mouth daily for blood pressure.       metoprolol 25 MG 24 hr tablet    TOPROL-XL    90 tablet    Take 1 tablet (25 mg) by mouth daily for blood pressure and heart.       vitamin D 2000 UNITS Caps      Take 1 capsule by mouth daily

## 2017-05-25 ENCOUNTER — TELEPHONE (OUTPATIENT)
Dept: PHARMACY | Facility: OTHER | Age: 82
End: 2017-05-25

## 2017-05-25 NOTE — TELEPHONE ENCOUNTER
Spoke with David on the phone today to let him know that [facility].  [facility] with the old dose in the instructions on Wednesday.  It is likely they crossed each other in the mail and that the [facility] will take into account the change in his dose when he needs his next refill.  He is to take his new dose, 22 units daily, as directed by La.  If he has questions or concerns, he is instructed to call.    Meghan Thomson, Pharm.D.  Medication Therapy Management Pharmacist  Page/VM:  [phone]

## 2017-05-30 ENCOUNTER — TELEPHONE (OUTPATIENT)
Dept: FAMILY MEDICINE | Facility: CLINIC | Age: 82
End: 2017-05-30

## 2017-05-30 NOTE — TELEPHONE ENCOUNTER
Will postpone encounter for now until receive letter and when Dr Corral returns.  Muriel Gonsales MA/  For Teams Duy

## 2017-05-30 NOTE — TELEPHONE ENCOUNTER
What type of form? [employer] Paper work    What day did you drop off your forms? 05/30/2017  Is there a due date? ASAP   How would you like to receive these forms? Fax/Mail      What is the best number to contact you? [phone]   What time works best to contact you with in 4 hrs? Any   Is it okay to leave a message? YES    Kerri Bro

## 2017-05-30 NOTE — TELEPHONE ENCOUNTER
..Reason for Call:  Letter from insurance company denying payment    Detailed comments: pt was referred to the MTM to manage meds/DM; has been seen several times, the date specified was 03-20, athough seen on 02-13, 02-27, 03-27 and 05-22; (some were telemedicine)    Received letter on 05-24, denial of payment as is it not a covered benefit; told to contact pcp for an appeal or support     Pt is dropping off the letter he received for Dr HOFFMAN to review, also aware Dr HOFFMAN is out until Thurs; (06-01)    Phone Number Patient can be reached at: Home number on file [phone] (home)    Best Time: anytime    Can we leave a detailed message on this number? unk    Call taken on 5/30/2017 at 9:36 AM by Joyce Bright

## 2017-05-30 NOTE — LETTER
2017        Re:  Manuel Rosales  : 3/22/1931  Our MR#: [id number]   Member ID #: [id number]   Claim Number: [id number]      To whom it may concern,    Mr. Rosales has been a diabetic for several years, and recently his diabetes was uncontrolled and worsening despite being on the maximum dose of glipizide and Januvia. I felt that it was medically necessary to send him to Medical Therapy Management to get his diabetes under control. I still believe this is necessary, and so far the results are very good. See attached documentation.     Sincerely,          Ronaldo Corral MD  [address] [place]  [address]  [phone]

## 2017-05-31 NOTE — TELEPHONE ENCOUNTER
Per Yessy Perdomo either provider or patient needs to write a letter and submit the letter to [facility] explaining what the need for the visit was. Letter needs to include pt name, address, member ID # ([id number]), claim number ([id number]) and any evidence (records) to support need for visit. Damian Andrade,  For Teams Comfort and Heart

## 2017-05-31 NOTE — TELEPHONE ENCOUNTER
This is not a form for the provider to fill out, this is a denial medical claim from [facility]. Forwarding the notice to Yessy Perdomo to help address.  Form is put in our  basket to be delivered to Yessy.  Damian Andrade,  For Teams Comfort and Heart

## 2017-06-01 NOTE — TELEPHONE ENCOUNTER
Closing this encounter, see other message, letter is completed and faxed back to [facility].  Damian Andrade,  For Teams Comfort and Heart

## 2017-06-01 NOTE — TELEPHONE ENCOUNTER
Letter writen. Please insert the address, claim number, etc etc.  Please include my 2/2/17 and 5/22/17 notes, nad lab results Nov 2106 to present.

## 2017-06-01 NOTE — TELEPHONE ENCOUNTER
There are 2 messages for this patient on the same issue, pls see other message.  Damian Andrade,  [facility]

## 2017-06-01 NOTE — TELEPHONE ENCOUNTER
Letter is complete and progress notes from Dr. Corral visits printed and attached and faxed to [facility] Attn: Member Complaints, Appeals, Grievances at fax # [phone].  Damian Andrade,  [facility]    Called pt is notified of letter completed and faxed to [facility]. Damian Andrade,  [facility]

## 2017-07-13 ENCOUNTER — OFFICE VISIT (OUTPATIENT)
Dept: FAMILY MEDICINE | Facility: CLINIC | Age: 82
End: 2017-07-13
Payer: COMMERCIAL

## 2017-07-13 ENCOUNTER — RADIANT APPOINTMENT (OUTPATIENT)
Dept: GENERAL RADIOLOGY | Facility: CLINIC | Age: 82
End: 2017-07-13
Attending: FAMILY MEDICINE
Payer: COMMERCIAL

## 2017-07-13 VITALS
HEART RATE: 43 BPM | DIASTOLIC BLOOD PRESSURE: 51 MMHG | WEIGHT: 194 LBS | SYSTOLIC BLOOD PRESSURE: 162 MMHG | HEIGHT: 66 IN | TEMPERATURE: 97.5 F | BODY MASS INDEX: 31.18 KG/M2 | OXYGEN SATURATION: 96 %

## 2017-07-13 DIAGNOSIS — R06.02 SHORTNESS OF BREATH: ICD-10-CM

## 2017-07-13 DIAGNOSIS — R60.0 LOWER EXTREMITY EDEMA: ICD-10-CM

## 2017-07-13 DIAGNOSIS — R06.02 SHORTNESS OF BREATH: Primary | ICD-10-CM

## 2017-07-13 LAB — HBA1C MFR BLD: 6.8 % (ref 4.3–6)

## 2017-07-13 PROCEDURE — 36415 COLL VENOUS BLD VENIPUNCTURE: CPT | Performed by: FAMILY MEDICINE

## 2017-07-13 PROCEDURE — 83036 HEMOGLOBIN GLYCOSYLATED A1C: CPT | Performed by: FAMILY MEDICINE

## 2017-07-13 PROCEDURE — 71020 XR CHEST 2 VW: CPT

## 2017-07-13 PROCEDURE — 99214 OFFICE O/P EST MOD 30 MIN: CPT | Performed by: FAMILY MEDICINE

## 2017-07-13 RX ORDER — CEPHALEXIN 500 MG/1
500 CAPSULE ORAL 3 TIMES DAILY
Qty: 30 CAPSULE | Refills: 0 | Status: SHIPPED | OUTPATIENT
Start: 2017-07-13 | End: 2017-07-23

## 2017-07-13 RX ORDER — FUROSEMIDE 20 MG
20 TABLET ORAL 2 TIMES DAILY PRN
Qty: 30 TABLET | Refills: 0 | Status: SHIPPED | OUTPATIENT
Start: 2017-07-13 | End: 2017-07-17

## 2017-07-13 ASSESSMENT — PAIN SCALES - GENERAL: PAINLEVEL: NO PAIN (0)

## 2017-07-13 NOTE — PATIENT INSTRUCTIONS
================================================================================  Normal Values   Blood pressure  <140/90 for most adults    <130/80 for some chronic diseases (ask your care team about yours)    BMI (body mass index)  18.5-25 kg/m2 (based on height and weight)     Thank you for visiting [facility]    Normal or non-critical lab and imaging results will be communicated to you by MyChart, letter or phone within 7 days.  If you do not hear from us within 10 days, please call the clinic. If you have a critical or abnormal lab result, we will notify you by phone as soon as possible.     If you have any questions regarding your visit please contact:     Team Comfort:   Clinic Hours Telephone Number   Dr. Ronaldo Tillman Dr. Vocal 7am-5pm  Monday - Friday [phone]  Chavez Mora RN   Pharmacy 8:00am-8pm Monday-Friday    9am-5pm Saturday- [phone]   Urgent Care 11am-9pm Monday-Friday        9am-5pm Saturday- [phone]     After hours, weekend or if you need to make an appointment with your primary provider please call [phone].   After Hours nurse advise: call [facility]: [phone]    Medication Refills:  Call your pharmacy and they will forward the refill to us. Please allow 3 business days for your refills to be completed.        Please call your clinic of choice to schedule your echocardiogram (heart ultrasound):    [facility]: [phone]  [facility]: [phone]  [facility]: [phone]  [facility]): [phone] (Friday)  [facility]: [phone]  [facility]: [phone]  [facility]): [phone]  [facility] [phone]

## 2017-07-13 NOTE — MR AVS SNAPSHOT
After Visit Summary   7/13/2017    Manuel Rosales    MRN: [id number]           Patient Information     Date Of Birth          3/22/1931        Visit Information        Provider Department      7/13/2017 10:40 AM Ronaldo Corral MD [facility]        Today's Diagnoses     Shortness of breath    -  1    Lower extremity edema          Care Instructions        ================================================================================  Normal Values   Blood pressure  <140/90 for most adults    <130/80 for some chronic diseases (ask your care team about yours)    BMI (body mass index)  18.5-25 kg/m2 (based on height and weight)     Thank you for visiting [facility]    Normal or non-critical lab and imaging results will be communicated to you by MyChart, letter or phone within 7 days.  If you do not hear from us within 10 days, please call the clinic. If you have a critical or abnormal lab result, we will notify you by phone as soon as possible.     If you have any questions regarding your visit please contact:     Team Comfort:   Clinic Hours Telephone Number   Dr. Ronaldo Morillo 7am-5pm  Monday - Friday [phone]  Chavez Mora RN   Pharmacy 8:00am-8pm Monday-Friday    9am-5pm Saturday-Sunday [phone]   Urgent Care 11am-9pm Monday-Friday        9am-5pm Saturday-Sunday [phone]     After hours, weekend or if you need to make an appointment with your primary provider please call [phone].   After Hours nurse advise: call [facility]: [phone]    Medication Refills:  Call your pharmacy and they will forward the refill to us. Please allow 3 business days for your refills to be completed.        Please call your clinic of choice to schedule your echocardiogram (heart ultrasound):    [facility]: [phone]  [facility]: [phone]  [facility]: [phone]  [facility] ([place]): [phone] (Friday)  [facility]: [phone]  [facility]: [phone]  [facility] ([facility]): [phone]  [facility] [phone]          Follow-ups after your visit        Follow-up notes from your care team     Return in about 4 days (around 2017).      Your next 10 appointments already scheduled     2017  9:45 AM CDT   [facility]   [facility]    [address] [address] [phone]           Patient must bring picture ID.  Patient should be prepared to give a urine specimen  Please do not eat 10-12 hours before your appointment if you are coming in fasting for labs on lipids, cholesterol, or glucose (sugar).  Pregnant women should follow their Care Team instructions. Water with medications is okay. Do not drink coffee or other fluids.   If you have concerns about taking  your medications, please ask at office or if scheduling via [facility], send a message by clicking on Secure Messaging, Message Your Care Team.            Aug 08, 2017  1:00 PM CDT   [facility] Visit with Willy [facility]oso MD   [facility]    [address] [address]   [phone]            2017 10:00 AM CST   Office Visit with Ronaldo Corral MD   [facility]    [address] [id number] [phone]           Bring a current list of meds and any records pertaining to this visit.  For Physicals, please bring immunization records and any forms needing to be filled out.  Please arrive 10 minutes early to complete paperwork.              Future tests that were ordered for you today     Open Future Orders        Priority Expected Expires Ordered    Echocardiogram Complete Routine  2018           "  Who to contact     If you have questions or need follow up information about today's clinic visit or your schedule please contact [facility] RAMEZ BUCKLEY directly at [phone].  Normal or non-critical lab and imaging results will be communicated to you by MyChart, letter or phone within 4 business days after the clinic has received the results. If you do not hear from us within 7 days, please contact the clinic through MyChart or phone. If you have a critical or abnormal lab result, we will notify you by phone as soon as possible.  Submit refill requests through [employer] or call your pharmacy and they will forward the refill request to us. Please allow 3 business days for your refill to be completed.          Additional Information About Your Visit        [facility] Information     [employer] lets you send messages to your doctor, view your test results, renew your prescriptions, schedule appointments and more. To sign up, go to [email or web address] . Click on \"Log in\" on the left side of the screen, which will take you to the Welcome page. Then click on \"Sign up Now\" on the right side of the page.     You will be asked to enter the access code listed below, as well as some personal information. Please follow the directions to create your username and password.     Your access code is: [email or web address]  Expires: 10/11/2017 11:35 AM     Your access code will  in 90 days. If you need help or a new code, please call your [facility] clinic or [phone].        Care EveryWhere ID     This is your Care EveryWhere ID. This could be used by other organizations to access your [facility] medical records  [id number]        Your Vitals Were     Pulse Temperature Height Pulse Oximetry BMI (Body Mass Index)       43 97.5  F (36.4  C) (Oral) 1.676 m (5' 6\") 96% 31.31 kg/m2        Blood Pressure from Last 3 Encounters:   17 162/51   17 134/44   17 138/52    Weight from Last 3 Encounters:   17 88 " kg (194 lb)   05/22/17 83 kg (183 lb)   03/03/17 87.1 kg (192 lb)                 Today's Medication Changes          These changes are accurate as of: 7/13/17 11:35 AM.  If you have any questions, ask your nurse or doctor.               Start taking these medicines.        Dose/Directions    cephALEXin 500 MG capsule   Commonly known as:  KEFLEX   Used for:  Lower extremity edema   Started by:  Ronaldo Corral MD        Dose:  500 mg   Take 1 capsule (500 mg) by mouth 3 times daily for 10 days for possible skin infection   Quantity:  30 capsule   Refills:  0       furosemide 20 MG tablet   Commonly known as:  LASIX   Used for:  Lower extremity edema, Shortness of breath   Started by:  Ronaldo Corral MD        Dose:  20 mg   Take 1 tablet (20 mg) by mouth 2 times daily as needed for leg swelling.   Quantity:  30 tablet   Refills:  0            Where to get your medicines      These medications were sent to [facility] [address] [place] [address]     Phone:  [phone]     cephALEXin 500 MG capsule    furosemide 20 MG tablet                Primary Care Provider Office Phone # Fax #    Ronaldo Corral [phone]       [facility] [address] [facility] [address]        Equal Access to Services     [facility] AH: Hadii aad ku hadasho Soomaali, waaxda luqadaha, qaybta kaalmada adeegyada, waxay idiin hayaan adellay kharacrow sheppard . So [facility] [phone].    ATENCIÓN: Si habla español, tiene a claire disposición servicios gratuitos de asistencia lingüística. Llame al [phone].    We comply with applicable federal civil rights laws and [place] laws. We do not discriminate on the basis of race, color, national origin, age, disability sex, sexual orientation or gender identity.            Thank you!     Thank you for choosing [facility]  for your care. Our goal is always to provide you  with excellent care. Hearing back from our patients is one way we can continue to improve our services. Please take a few minutes to complete the written survey that you may receive in the mail after your visit with us. Thank you!             Your Updated Medication List - Protect others around you: Learn how to safely use, store and throw away your medicines at www.disposemymeds.org.          This list is accurate as of: 7/13/17 11:35 AM.  Always use your most recent med list.                   Brand Name Dispense Instructions for use Diagnosis    amLODIPine 10 MG tablet    NORVASC    180 tablet    Take 1 tablet (10 mg) by mouth 2 times daily for blood pressure.    Benign hypertension with chronic kidney disease, stage III       ASPIRIN NOT PRESCRIBED    INTENTIONAL     due to chronic gastritis on EGD 2/09*        atorvastatin 80 MG tablet    LIPITOR    90 tablet    Take 1 tablet (80 mg) by mouth daily for cholesterol.    Hyperlipidemia LDL goal <100       brimonidine 0.15 % ophthalmic solution    ALPHAGAN-P    1 Bottle    Place 1 drop into both eyes 2 times daily    Primary open angle glaucoma of both eyes, moderate stage       cephALEXin 500 MG capsule    KEFLEX    30 capsule    Take 1 capsule (500 mg) by mouth 3 times daily for 10 days for possible skin infection    Lower extremity edema       dorzolamide-timolol 2-0.5 % ophthalmic solution    COSOPT    1 Bottle    Place 1 drop into both eyes 2 times daily    Primary open angle glaucoma of both eyes, moderate stage       furosemide 20 MG tablet    LASIX    30 tablet    Take 1 tablet (20 mg) by mouth 2 times daily as needed for leg swelling.    Lower extremity edema, Shortness of breath       glipiZIDE 10 MG tablet    GLUCOTROL    180 tablet    Take 1 tablet (10 mg) by mouth 2 times daily (with meals) for diabetes.    Uncontrolled type 2 diabetes mellitus with microalbuminuria, with long-term current use of insulin (H)       hydrochlorothiazide 25 MG tablet     HYDRODIURIL    90 tablet    Take 1 tablet (25 mg) by mouth daily for blood pressure and leg swelling.    Benign hypertension with chronic kidney disease, stage III       insulin glargine 100 UNIT/ML injection    LANTUS SOLOSTAR    30 mL    Inject 22 units under the skin daily.    Type 2 diabetes mellitus with hyperglycemia, without long-term current use of insulin (H)       insulin pen needle 32G X 4 MM    BD CAROL ANN U/F    100 each    Use 1 daily as directed.    Type 2 diabetes mellitus with hyperglycemia, without long-term current use of insulin (H)       latanoprost 0.005 % ophthalmic solution    XALATAN    1 Bottle    Place 1 drop into both eyes At Bedtime    Primary open angle glaucoma of both eyes, moderate stage       lisinopril 40 MG tablet    PRINIVIL/ZESTRIL    90 tablet    Take 1 tablet (40 mg) by mouth daily for blood pressure.    Benign hypertension with chronic kidney disease, stage III       metoprolol 25 MG 24 hr tablet    TOPROL-XL    90 tablet    Take 1 tablet (25 mg) by mouth daily for blood pressure and heart.    Benign hypertension with chronic kidney disease, stage III       vitamin D 2000 UNITS Caps      Take 1 capsule by mouth daily

## 2017-07-13 NOTE — NURSING NOTE
"Chief Complaint   Patient presents with     Shortness of Breath     Swelling       Initial /50 (BP Location: Left arm, Patient Position: Chair, Cuff Size: Adult Regular)  Pulse (!) 43  Temp 97.5  F (36.4  C) (Oral)  Ht 5' 6\" (1.676 m)  Wt 194 lb (88 kg)  SpO2 96%  BMI 31.31 kg/m2 Estimated body mass index is 31.31 kg/(m^2) as calculated from the following:    Height as of this encounter: 5' 6\" (1.676 m).    Weight as of this encounter: 194 lb (88 kg).  Medication Reconciliation: complete   YOLANDA Galdamez MA      "

## 2017-07-13 NOTE — PROGRESS NOTES
SUBJECTIVE:                                                    Manuel Rosales is a 86 year old male who presents to clinic today for the following health issues:      Concern - SOB & Swelling     When did it start?: SOB about 2 weeks, swelling a couple months (but since last visit with me)    Any strain/injury, other illness, or event to trigger this problem?   no    Previous history of similar problem:   no      Location:            Left & Right leg     Describe it:   SOB & Left & Right leg swelling     Severity: moderate      Progression of symptoms from onset until now:  worsening      Accompanying Signs & Symptoms:  Swelling & SOB   What have you noticed that tends to make this worse?     Laying down      What have you noticed that tends to make this better?     Sitting up improve it      What have you done (this time) to try to treat this problem yourself?     None      Did it help?     no         Patient notes swelling of both legs started awhile back (but after last visit here, 5/22), with his right leg worse than left. He states that his right leg is nearly always red as well as swollen. He mentions he was put on HCTZ (25 mg since November 2016) by his cardiologist for a similar problem.    Past medical, family, and social histories, medications, and allergies are reviewed and updated in Epic.     ROS:  C: NEGATIVE for fever, chills, change in weight  E/M: NEGATIVE for ear, mouth and throat problems  R: See above. NEGATIVE for significant cough  CV: See above. NEGATIVE for chest pain, palpitations  ROS otherwise negative    Any history above obtained by the Medical Assistant was reviewed by Dr. Ronaldo Corral MD, and edited when necessary.    This document serves as a record of the services and decisions personally performed and made by Dr. Corral. It was created on his behalf by Era Calderon, a trained medical scribe. The creation of this document is based the provider's statements to the medical  "jailene  Era Calderon July 13, 2017 10:47 AM     OBJECTIVE:                                                    /51  Pulse (!) 43  Temp 97.5  F (36.4  C) (Oral)  Ht 1.676 m (5' 6\")  Wt 88 kg (194 lb)  SpO2 96%  BMI 31.31 kg/m2   Body mass index is 31.31 kg/(m^2).     EXAM:  GENERAL: healthy, alert and no distress  EYES: Eyes grossly normal to inspection, PERRL, EOMI, sclerae white and conjunctivae normal  RESP: lungs clear to auscultation - no crackles or wheezes, no areas of dullness, no tachypnea  CV: Heart regular rate and rhythm without murmur, click or rub. peripheral pulses strong. 1+ pitting edema care home up the left leg with decreased swelling where his sock sits. Pitting edema involving the entire right leg, the right leg is significantly larger than the left and he has developed erythema of venous stasis changes of much of the lower half of the right leg.    MS: no gross musculoskeletal defects noted, no edema  SKIN: no suspicious lesions or rashes  NEURO: Normal strength and tone, sensory exam grossly normal, mentation intact, oriented times 3 and cranial nerves 2-12 intact  PSYCH: mentation appears normal, affect normal/bright     Diagnostic Test Results:  A Chest X-Ray was ordered. My reading of this film is possible pulmonary congestion with possible slight effusions at the costophrenic angles. (No comparison films available: pending review by Radiologist.)      ASSESSMENT/PLAN:                                                      (R06.02) Shortness of breath  (primary encounter diagnosis)  Comment: Rule out CHF (previous echo suggests he is prone to right-sided heart failure).   Plan: XR Chest 2 Views, furosemide (LASIX) 20 MG         tablet, Echocardiogram Complete        Follow up in 4-5 days.    (R60.0) Lower extremity edema  Comment: DDx includes CHF, chronic venous insufficiency, and/or side effect of amlodipine.   Plan: XR Chest 2 Views, furosemide (LASIX) 20 MG         tablet, " Echocardiogram Complete, cephALEXin         (KEFLEX) 500 MG capsule        Follow up in 4-5 days.      The information in this document, created by the medical scribe for me, accurately reflects the services I personally performed and the decisions made by me. I have reviewed and approved this document for accuracy prior to leaving the patient care area. July 13, 2017 10:47 AM     Ronaldo Corral MD

## 2017-07-14 ENCOUNTER — RADIANT APPOINTMENT (OUTPATIENT)
Dept: CARDIOLOGY | Facility: CLINIC | Age: 82
End: 2017-07-14
Attending: FAMILY MEDICINE
Payer: COMMERCIAL

## 2017-07-14 DIAGNOSIS — R60.0 LOWER EXTREMITY EDEMA: ICD-10-CM

## 2017-07-14 DIAGNOSIS — R06.02 SHORTNESS OF BREATH: ICD-10-CM

## 2017-07-14 PROCEDURE — 93306 TTE W/DOPPLER COMPLETE: CPT | Performed by: INTERNAL MEDICINE

## 2017-07-17 ENCOUNTER — RADIANT APPOINTMENT (OUTPATIENT)
Dept: GENERAL RADIOLOGY | Facility: CLINIC | Age: 82
End: 2017-07-17
Attending: FAMILY MEDICINE
Payer: COMMERCIAL

## 2017-07-17 ENCOUNTER — OFFICE VISIT (OUTPATIENT)
Dept: FAMILY MEDICINE | Facility: CLINIC | Age: 82
End: 2017-07-17
Payer: COMMERCIAL

## 2017-07-17 VITALS
BODY MASS INDEX: 30.86 KG/M2 | SYSTOLIC BLOOD PRESSURE: 140 MMHG | WEIGHT: 192 LBS | HEIGHT: 66 IN | HEART RATE: 41 BPM | TEMPERATURE: 96.9 F | OXYGEN SATURATION: 96 % | DIASTOLIC BLOOD PRESSURE: 50 MMHG

## 2017-07-17 DIAGNOSIS — R60.0 LOWER EXTREMITY EDEMA: ICD-10-CM

## 2017-07-17 DIAGNOSIS — R93.1 ABNORMAL ECHOCARDIOGRAM: ICD-10-CM

## 2017-07-17 DIAGNOSIS — I27.20 PULMONARY HYPERTENSION (H): ICD-10-CM

## 2017-07-17 DIAGNOSIS — R60.0 LOWER EXTREMITY EDEMA: Primary | ICD-10-CM

## 2017-07-17 LAB
CREAT SERPL-MCNC: 1.04 MG/DL (ref 0.66–1.25)
GFR SERPL CREATININE-BSD FRML MDRD: 68 ML/MIN/1.7M2
POTASSIUM SERPL-SCNC: 5.2 MMOL/L (ref 3.4–5.3)

## 2017-07-17 PROCEDURE — 82565 ASSAY OF CREATININE: CPT | Performed by: FAMILY MEDICINE

## 2017-07-17 PROCEDURE — 36415 COLL VENOUS BLD VENIPUNCTURE: CPT | Performed by: FAMILY MEDICINE

## 2017-07-17 PROCEDURE — 71020 XR CHEST 2 VW: CPT

## 2017-07-17 PROCEDURE — 84132 ASSAY OF SERUM POTASSIUM: CPT | Performed by: FAMILY MEDICINE

## 2017-07-17 PROCEDURE — 99214 OFFICE O/P EST MOD 30 MIN: CPT | Performed by: FAMILY MEDICINE

## 2017-07-17 RX ORDER — FUROSEMIDE 20 MG
20 TABLET ORAL 2 TIMES DAILY PRN
Qty: 180 TABLET | Refills: 0 | Status: SHIPPED | OUTPATIENT
Start: 2017-07-17 | End: 2018-08-16 | Stop reason: DRUGHIGH

## 2017-07-17 ASSESSMENT — PAIN SCALES - GENERAL: PAINLEVEL: NO PAIN (0)

## 2017-07-17 NOTE — LETTER
[address] [address]  [phone]             July 18, 2017    Manuel Rosales  [address] [facility] [place] [address]          Mr. Rosales,     All of your labs were normal for you. Enclosed are the results.  Results for orders placed or performed in visit on 07/17/17   Creatinine   Result Value Ref Range    Creatinine 1.04 0.66 - 1.25 mg/dL    GFR Estimate 68 >60 mL/min/1.7m2    GFR Estimate If Black 82 >60 mL/min/1.7m2   Potassium   Result Value Ref Range    Potassium 5.2 3.4 - 5.3 mmol/L       Please contact the clinic if you have additional questions.  Thank you.     Sincerely,       Ronaldo Corral MD/denis

## 2017-07-17 NOTE — PATIENT INSTRUCTIONS
================================================================================  Normal Values   Blood pressure  <140/90 for most adults    <130/80 for some chronic diseases (ask your care team about yours)    BMI (body mass index)  18.5-25 kg/m2 (based on height and weight)     Thank you for visiting [facility]    Normal or non-critical lab and imaging results will be communicated to you by MyChart, letter or phone within 7 days.  If you do not hear from us within 10 days, please call the clinic. If you have a critical or abnormal lab result, we will notify you by phone as soon as possible.     If you have any questions regarding your visit please contact:     Team Comfort:   Clinic Hours Telephone Number   Dr. Ronaldo Morillo 7am-5pm  Monday - Friday [phone]  Chavez Mora RN   Pharmacy 8:00am-8pm Monday-Friday    9am-5pm Saturday-Sunday [phone]   Urgent Care 11am-9pm Monday-Friday        9am-5pm Saturday-Sunday [phone]     After hours, weekend or if you need to make an appointment with your primary provider please call [phone].   After Hours nurse advise: call [facility]: [phone]    Medication Refills:  Call your pharmacy and they will forward the refill to us. Please allow 3 business days for your refills to be completed.

## 2017-07-17 NOTE — NURSING NOTE
"Chief Complaint   Patient presents with     Swelling     Shortness of Breath       Initial /50 (BP Location: Left arm, Patient Position: Chair, Cuff Size: Adult Regular)  Pulse (!) 42  Temp 96.9  F (36.1  C) (Oral)  Ht 5' 6\" (1.676 m)  Wt 192 lb (87.1 kg)  SpO2 96%  BMI 30.99 kg/m2 Estimated body mass index is 30.99 kg/(m^2) as calculated from the following:    Height as of this encounter: 5' 6\" (1.676 m).    Weight as of this encounter: 192 lb (87.1 kg).  Medication Reconciliation: complete   YOLANDA Galdamez MA      "

## 2017-07-17 NOTE — PROGRESS NOTES
SUBJECTIVE:                                                    Manuel Rosales is a 86 year old male who presents to clinic today for the following health issues:    Concern - SOB & Swelling     When did it start?: SOB about 2 weeks ago, swelling a couple months ago     Any strain/injury, other illness, or event to trigger this problem?   no    Previous history of similar problem:   no      Location:           Left & Right legs    Describe it:   SOB & Left & Right leg swelling    Severity: moderate      Progression of symptoms from onset until now:  improving and intermittent      Accompanying Signs & Symptoms:  Swelling & SOB   What have you noticed that tends to make this worse?     none      What have you noticed that tends to make this better?     Don't know why but SOB has improved (no SOB since 7/13) & getting better night sleep. Swelling has not changed.      What have you done (this time) to try to treat this problem yourself?     Taking Keflex & Lasix. Had Echo on 7/14/17        Did it help?     YES- just a little            Past medical, family, and social histories, medications, and allergies are reviewed and updated in Epic.     ROS:  C: NEGATIVE for fever, chills, change in weight  E/M: NEGATIVE for ear, mouth and throat problems  R: NEGATIVE for significant cough or SOB  CV: NEGATIVE for chest pain, palpitations or peripheral edema  ROS otherwise negative    Any history above obtained by the Medical Assistant was reviewed by Dr. Ronaldo Corral MD, and edited when necessary.    This document serves as a record of the services and decisions personally performed and made by Dr. Corral. It was created on his behalf by Era Calderon, a trained medical scribe. The creation of this document is based the provider's statements to the medical scribe.  Era Calderon July 17, 2017 12:00 PM     OBJECTIVE:                                                    /50  Pulse (!) 41  Temp 96.9  F (36.1  C)  "(Oral)  Ht 1.676 m (5' 6\")  Wt 87.1 kg (192 lb)  SpO2 96%  BMI 30.99 kg/m2   Body mass index is 30.99 kg/(m^2).     EXAM:  GENERAL: healthy, alert and no distress  EYES: Eyes grossly normal to inspection, PERRL, EOMI, sclerae white and conjunctivae normal  RESP: lungs clear to auscultation - no crackles or wheezes, no areas of dullness, no tachypnea  CV: Heart regular rate and rhythm without murmur, click or rub. No peripheral edema and peripheral pulses strong  MS: no gross musculoskeletal defects noted, no edema  SKIN: no suspicious lesions or rashes  NEURO: Normal strength and tone, sensory exam grossly normal, mentation intact, oriented times 3 and cranial nerves 2-12 intact  PSYCH: mentation appears normal, affect normal/bright     Diagnostic Test Results:  A Chest X-Ray was ordered. My reading of this film is diminished pleural effusions. (Comparison films available: pending review by Radiologist.)      ASSESSMENT/PLAN:                                                      (R60.0) Lower extremity edema  (primary encounter diagnosis)  Comment: Mild improvement. Patient also notes that his breathing has improved significantly.   Plan: CARDIOLOGY EVAL ADULT REFERRAL, XR Chest 2         Views, Creatinine, Potassium, furosemide         (LASIX) 20 MG tablet        Follow up in 3 weeks. Add potassium supplement if needed.    (I27.2) Pulmonary hypertension (H)  (R93.1) Abnormal echocardiogram  Comment: Abnormal findings may be contributing to his leg edema (versus venous incompetence).   Plan: CARDIOLOGY EVAL ADULT REFERRAL, XR Chest 2         Views        Await the opinion of his cardiologist.       The information in this document, created by the medical scribe for me, accurately reflects the services I personally performed and the decisions made by me. I have reviewed and approved this document for accuracy prior to leaving the patient care area. July 17, 2017 12:00 PM     Ronaldo Corral MD     "

## 2017-07-17 NOTE — MR AVS SNAPSHOT
After Visit Summary   7/17/2017    Manuel Rosales    MRN: [id number]           Patient Information     Date Of Birth          3/22/1931        Visit Information        Provider Department      7/17/2017 11:20 AM Ronaldo Corral MD [facility]        Today's Diagnoses     Lower extremity edema    -  1    Pulmonary hypertension (H)        Abnormal echocardiogram          Care Instructions        ================================================================================  Normal Values   Blood pressure  <140/90 for most adults    <130/80 for some chronic diseases (ask your care team about yours)    BMI (body mass index)  18.5-25 kg/m2 (based on height and weight)     Thank you for visiting [facility]    Normal or non-critical lab and imaging results will be communicated to you by MyChart, letter or phone within 7 days.  If you do not hear from us within 10 days, please call the clinic. If you have a critical or abnormal lab result, we will notify you by phone as soon as possible.     If you have any questions regarding your visit please contact:     Team Comfort:   Clinic Hours Telephone Number   Dr. Ronaldo Morillo 7am-5pm  Monday - Friday [phone]  Chavez Mora RN   Pharmacy 8:00am-8pm Monday-Friday    9am-5pm Saturday-Sunday [phone]   Urgent Care 11am-9pm Monday-Friday        9am-5pm Saturday-Sunday [phone]     After hours, weekend or if you need to make an appointment with your primary provider please call [phone].   After Hours nurse advise: call [facility]: [phone]    Medication Refills:  Call your pharmacy and they will forward the refill to us. Please allow 3 business days for your refills to be completed.                  Follow-ups after your visit        Additional Services     CARDIOLOGY EVAL ADULT REFERRAL       Your provider  has referred you to:    [facility] [place]    [address]    [place] [address]    [phone]       Lyla Avalos MD, et al.   [address]    [place] [id number] [phone] (Fax)        Please be aware that coverage of these services is subject to the terms and limitations of your health insurance plan.  Call member services at your health plan with any benefit or coverage questions.      Type of Referral:  New Cardiology Consult    Timeframe requested:  1 Week    Please bring the following to your appointment:  >>   Any x-rays, CTs or MRIs which have been performed.  Contact the facility where they were done to arrange for  prior to your scheduled appointment.    >>   List of current medications  >>   This referral request   >>   Any documents/labs given to you for this referral                  Follow-up notes from your care team     Return in about 3 weeks (around 8/7/2017) for blood pressure check, recheck medications, lab tests.      Your next 10 appointments already scheduled     Aug 08, 2017  1:00 PM CDT   [facility] Willy [facility]oso MD   [facility]    [address] [place] [address]  [place] [address] [phone]            Nov 13, 2017 10:00 AM CST   Office Visit with Ronaldo Corral MD   [facility] ([facility])    [address] [facility] [address] [phone]           Bring a current list of meds and any records pertaining to this visit.  For Physicals, please bring immunization records and any forms needing to be filled out.  Please arrive 10 minutes early to complete paperwork.              Who to contact     If you have questions or need follow up information about today's clinic visit or your schedule please contact [facility] directly at [phone].  Normal or non-critical lab and  "imaging results will be communicated to you by MyChart, letter or phone within 4 business days after the clinic has received the results. If you do not hear from us within 7 days, please contact the clinic through [employer] or phone. If you have a critical or abnormal lab result, we will notify you by phone as soon as possible.  Submit refill requests through [employer] or call your pharmacy and they will forward the refill request to us. Please allow 3 business days for your refill to be completed.          Additional Information About Your Visit        [id number] Information     [employer] lets you send messages to your doctor, view your test results, renew your prescriptions, schedule appointments and more. To sign up, go to [email or web address] . Click on \"Log in\" on the left side of the screen, which will take you to the Welcome page. Then click on \"Sign up Now\" on the right side of the page.     You will be asked to enter the access code listed below, as well as some personal information. Please follow the directions to create your username and password.     Your access code is: [email or web address]  Expires: 10/11/2017 11:35 AM     Your access code will  in 90 days. If you need help or a new code, please call your [place] clinic or [phone].        Care EveryWhere ID     This is your Care EveryWhere ID. This could be used by other organizations to access your [place] medical records  [id number]        Your Vitals Were     Pulse Temperature Height Pulse Oximetry BMI (Body Mass Index)       41 96.9  F (36.1  C) (Oral) 5' 6\" (1.676 m) 96% 30.99 kg/m2        Blood Pressure from Last 3 Encounters:   17 140/50   17 162/51   17 134/44    Weight from Last 3 Encounters:   17 192 lb (87.1 kg)   17 194 lb (88 kg)   17 183 lb (83 kg)              We Performed the Following     CARDIOLOGY EVAL ADULT REFERRAL     Creatinine     Potassium          Where to get your medicines      These " medications were sent to [facility] [address] [place] [address], [place] [address] [facility]  [address] [facility] [address]     Phone:  [phone]     furosemide 20 MG tablet          Primary Care Provider Office Phone # Fax #    Ronaldo Corral [phone]       [facility] [address]  [facility] [address]        Equal Access to Services     [facility] : Hadii aad ku hadasho Soomaali, waaxda luqadaha, qaybta kaalmada adeegyada, waxay idiin hayaan adeeg kharash la'sukumar . So [facility] [phone].    ATENCIÓN: Si habla español, tiene a claire disposición servicios gratuitos de asistencia lingüística. [facility] [phone].    We comply with applicable federal civil rights laws and [place] laws. We do not discriminate on the basis of race, color, national origin, age, disability sex, sexual orientation or gender identity.            Thank you!     Thank you for choosing [facility]  for your care. Our goal is always to provide you with excellent care. Hearing back from our patients is one way we can continue to improve our services. Please take a few minutes to complete the written survey that you may receive in the mail after your visit with us. Thank you!             Your Updated Medication List - Protect others around you: Learn how to safely use, store and throw away your medicines at www.disposemymeds.org.          This list is accurate as of: 7/17/17 12:27 PM.  Always use your most recent med list.                   Brand Name Dispense Instructions for use Diagnosis    amLODIPine 10 MG tablet    NORVASC    180 tablet    Take 1 tablet (10 mg) by mouth 2 times daily for blood pressure.    Benign hypertension with chronic kidney disease, stage III       ASPIRIN NOT PRESCRIBED    INTENTIONAL     due to chronic gastritis on EGD 2/09*        atorvastatin 80 MG tablet    LIPITOR    90 tablet    Take 1 tablet (80 mg) by mouth daily for cholesterol.     Hyperlipidemia LDL goal <100       brimonidine 0.15 % ophthalmic solution    ALPHAGAN-P    1 Bottle    Place 1 drop into both eyes 2 times daily    Primary open angle glaucoma of both eyes, moderate stage       cephALEXin 500 MG capsule    KEFLEX    30 capsule    Take 1 capsule (500 mg) by mouth 3 times daily for 10 days for possible skin infection    Lower extremity edema       dorzolamide-timolol 2-0.5 % ophthalmic solution    COSOPT    1 Bottle    Place 1 drop into both eyes 2 times daily    Primary open angle glaucoma of both eyes, moderate stage       furosemide 20 MG tablet    LASIX    180 tablet    Take 1 tablet (20 mg) by mouth 2 times daily as needed for leg swelling.    Lower extremity edema       glipiZIDE 10 MG tablet    GLUCOTROL    180 tablet    Take 1 tablet (10 mg) by mouth 2 times daily (with meals) for diabetes.    Uncontrolled type 2 diabetes mellitus with microalbuminuria, with long-term current use of insulin (H)       hydrochlorothiazide 25 MG tablet    HYDRODIURIL    90 tablet    Take 1 tablet (25 mg) by mouth daily for blood pressure and leg swelling.    Benign hypertension with chronic kidney disease, stage III       insulin glargine 100 UNIT/ML injection    LANTUS SOLOSTAR    30 mL    Inject 22 units under the skin daily.    Type 2 diabetes mellitus with hyperglycemia, without long-term current use of insulin (H)       insulin pen needle 32G X 4 MM    BD CAROL ANN U/F    100 each    Use 1 daily as directed.    Type 2 diabetes mellitus with hyperglycemia, without long-term current use of insulin (H)       latanoprost 0.005 % ophthalmic solution    XALATAN    1 Bottle    Place 1 drop into both eyes At Bedtime    Primary open angle glaucoma of both eyes, moderate stage       lisinopril 40 MG tablet    PRINIVIL/ZESTRIL    90 tablet    Take 1 tablet (40 mg) by mouth daily for blood pressure.    Benign hypertension with chronic kidney disease, stage III       metoprolol 25 MG 24 hr tablet    TOPROL-XL     90 tablet    Take 1 tablet (25 mg) by mouth daily for blood pressure and heart.    Benign hypertension with chronic kidney disease, stage III       vitamin D 2000 UNITS Caps      Take 1 capsule by mouth daily

## 2017-07-19 ENCOUNTER — TRANSFERRED RECORDS (OUTPATIENT)
Dept: HEALTH INFORMATION MANAGEMENT | Facility: CLINIC | Age: 82
End: 2017-07-19

## 2017-08-07 ENCOUNTER — OFFICE VISIT (OUTPATIENT)
Dept: FAMILY MEDICINE | Facility: CLINIC | Age: 82
End: 2017-08-07
Payer: COMMERCIAL

## 2017-08-07 VITALS
WEIGHT: 184 LBS | SYSTOLIC BLOOD PRESSURE: 169 MMHG | BODY MASS INDEX: 29.57 KG/M2 | HEIGHT: 66 IN | OXYGEN SATURATION: 99 % | TEMPERATURE: 97.2 F | DIASTOLIC BLOOD PRESSURE: 58 MMHG | HEART RATE: 46 BPM

## 2017-08-07 DIAGNOSIS — Z86.0101 HISTORY OF ADENOMATOUS POLYP OF COLON: ICD-10-CM

## 2017-08-07 DIAGNOSIS — N18.30 BENIGN HYPERTENSION WITH CHRONIC KIDNEY DISEASE, STAGE III (H): Primary | ICD-10-CM

## 2017-08-07 DIAGNOSIS — Z85.038 HISTORY OF COLON CANCER: ICD-10-CM

## 2017-08-07 DIAGNOSIS — I12.9 BENIGN HYPERTENSION WITH CHRONIC KIDNEY DISEASE, STAGE III (H): Primary | ICD-10-CM

## 2017-08-07 DIAGNOSIS — I50.30 HEART FAILURE WITH PRESERVED EJECTION FRACTION, NYHA CLASS I (H): ICD-10-CM

## 2017-08-07 PROCEDURE — 99214 OFFICE O/P EST MOD 30 MIN: CPT | Performed by: FAMILY MEDICINE

## 2017-08-07 RX ORDER — METOPROLOL SUCCINATE 25 MG/1
TABLET, EXTENDED RELEASE ORAL
COMMUNITY
Start: 2017-08-07 | End: 2017-08-07

## 2017-08-07 RX ORDER — TERAZOSIN 1 MG/1
CAPSULE ORAL
Qty: 70 CAPSULE | Refills: 0 | Status: SHIPPED | OUTPATIENT
Start: 2017-08-07 | End: 2017-09-07

## 2017-08-07 RX ORDER — AMLODIPINE BESYLATE 10 MG/1
10 TABLET ORAL DAILY
COMMUNITY
Start: 2017-08-07 | End: 2018-06-26 | Stop reason: DRUGHIGH

## 2017-08-07 ASSESSMENT — PAIN SCALES - GENERAL: PAINLEVEL: NO PAIN (0)

## 2017-08-07 NOTE — MR AVS SNAPSHOT
After Visit Summary   8/7/2017    Manuel Rosales    MRN: [id number]           Patient Information     Date Of Birth          3/22/1931        Visit Information        Provider Department      8/7/2017 11:20 AM Ronaldo Corral MD [facility]        Today's Diagnoses     Benign hypertension with chronic kidney disease, stage III    -  1    Heart failure with preserved ejection fraction, NYHA class I (H)        History of adenomatous polyp of colon        History of colon cancer          Care Instructions        ================================================================================  Normal Values   Blood pressure  <140/90 for most adults    <130/80 for some chronic diseases (ask your care team about yours)    BMI (body mass index)  18.5-25 kg/m2 (based on height and weight)     Thank you for visiting [facility]    Normal or non-critical lab and imaging results will be communicated to you by MyChart, letter or phone within 7 days.  If you do not hear from us within 10 days, please call the clinic. If you have a critical or abnormal lab result, we will notify you by phone as soon as possible.     If you have any questions regarding your visit please contact:     Team Comfort:   Clinic Hours Telephone Number   Dr. Ronaldo Morillo 7am-5pm  Monday - Friday [phone]  Chavez Mora RN   Pharmacy 8:00am-8pm Monday-Friday    9am-5pm Saturday-Sunday [phone]   Urgent Care 11am-9pm Monday-Friday        9am-5pm Saturday-Sunday [phone]     After hours, weekend or if you need to make an appointment with your primary provider please call [phone].   After Hours nurse advise: call [facility]: [phone]    Medication Refills:  Call your pharmacy and they will forward the refill to us. Please allow 3 business days for your refills to be  completed.                  Follow-ups after your visit        Additional Services     GASTROENTEROLOGY ADULT REF PROCEDURE ONLY       Last Lab Result: Creatinine (mg/dL)       Date                     Value                 07/17/2017               1.04             ----------  Body mass index is 29.7 kg/(m^2).     Needed:  No  Language:  English    Patient will be contacted to schedule procedure.     Please be aware that coverage of these services is subject to the terms and limitations of your health insurance plan.  Call member services at your health plan with any benefit or coverage questions.  Any procedures must be performed at a [facility] facility OR coordinated by your clinic's referral office.    Please bring the following with you to your appointment:    (1) Any X-Rays, CTs or MRIs which have been performed.  Contact the facility where they were done to arrange for  prior to your scheduled appointment.    (2) List of current medications   (3) This referral request   (4) Any documents/labs given to you for this referral            SLEEP EVALUATION & MANAGEMENT REFERRAL - ADULT       Please call the Sleep Scheduling Desk [phone] to see the sleep specialist.    Please be aware that coverage of these services is subject to the terms and limitations of your health insurance plan.  Call member services at your health plan with any benefit or coverage questions.      Please bring the following to your appointment:    >>   List of current medications   >>   This referral request   >>   Any documents/labs given to you for this referral                  Follow-up notes from your care team     Return in about 3 weeks (around 8/28/2017) for blood pressure check.      Your next 10 appointments already scheduled     Aug 08, 2017  1:00 PM CDT   New Visit with Willy Cardoso MD   [facility] [place] ([facility] [place]    [address]  [place] [address] [phone]        "     Nov 13, 2017 10:00 AM CST   [facility]    [address] [facility] [id number] [phone]           Bring a current list of meds and any records pertaining to this visit. For Physicals, please bring immunization records and any forms needing to be filled out. Please arrive 10 minutes early to complete paperwork.              Future tests that were ordered for you today     Open Future Orders        Priority Expected Expires Ordered    SLEEP EVALUATION & MANAGEMENT REFERRAL - ADULT Routine  8/7/2018 8/7/2017            Who to contact     If you have questions or need follow up information about today's clinic visit or your schedule please contact [facility] directly at [phone].  Normal or non-critical lab and imaging results will be communicated to you by Compositence[employer], letter or phone within 4 business days after the clinic has received the results. If you do not hear from us within 7 days, please contact the clinic through Com[employer]itence[employer] or phone. If you have a critical or abnormal lab result, we will notify you by phone as soon as possible.  Submit refill requests through [employer] or call your pharmacy and they will forward the refill request to us. Please allow 3 business days for your refill to be completed.          Additional Information About Your Visit        [employer] Information     [employer] lets you send messages to your doctor, view your test results, renew your prescriptions, schedule appointments and more. To sign up, go to [email or web address] . Click on \"Log in\" on the left side of the screen, which will take you to the Welcome page. Then click on \"Sign up Now\" on the right side of the page.     You will be asked to enter the access code listed below, as well as some personal information. Please follow the directions to create your username and password.     Your access " "code is: [email or web address]  Expires: 10/11/2017 11:35 AM     Your access code will  in 90 days. If you need help or a new code, please call your [place] clinic or [phone].        Care EveryWhere ID     This is your Care EveryWhere ID. This could be used by other organizations to access your [place] medical records  [id number]        Your Vitals Were     Pulse Temperature Height Pulse Oximetry BMI (Body Mass Index)       46 97.2  F (36.2  C) (Oral) 5' 6\" (1.676 m) 99% 29.7 kg/m2        Blood Pressure from Last 3 Encounters:   17 169/58   17 140/50   17 162/51    Weight from Last 3 Encounters:   17 184 lb (83.5 kg)   17 192 lb (87.1 kg)   17 194 lb (88 kg)              We Performed the Following     GASTROENTEROLOGY ADULT REF PROCEDURE ONLY          Today's Medication Changes          These changes are accurate as of: 17 12:03 PM.  If you have any questions, ask your nurse or doctor.               Start taking these medicines.        Dose/Directions    terazosin 1 MG capsule   Commonly known as:  HYTRIN   Used for:  Benign hypertension with chronic kidney disease, stage III   Started by:  Ronaldo Corral MD        Start with 1 tab (1 mg) daily for 1 week, then 2 tabs (2 mg) daily, for blood pressure.   Quantity:  70 capsule   Refills:  0         These medicines have changed or have updated prescriptions.        Dose/Directions    amLODIPine 10 MG tablet   Commonly known as:  NORVASC   This may have changed:  when to take this   Used for:  Benign hypertension with chronic kidney disease, stage III   Changed by:  Ronaldo Corral MD        Dose:  10 mg   Take 1 tablet (10 mg) by mouth daily for blood pressure.   Refills:  0       metoprolol 25 MG 24 hr tablet   Commonly known as:  TOPROL-XL   This may have changed:    - how much to take  - how to take this  - when to take this  - additional instructions   Used for:  Benign hypertension with chronic kidney disease, " stage III   Changed by:  Ronaldo Corral MD        Taper as scheduled by your cardiologist.   Refills:  0            Where to get your medicines      These medications were sent to [facility] [address] [place] [address] [address] [address] [facility]  [address] [facility] [address]     Phone:  [phone]     terazosin 1 MG capsule                Primary Care Provider Office Phone # Fax #    Ronaldo Corral [phone]       [facility] [address]  [facility] [address]        Equal Access to Services     [facility]: Hadii aad ku hadasho Soomaali, waaxda luqadaha, qaybta kaalmada adeegyada, waxay idiin hayaan adeeg kharash lakevin . So [facility] [phone].    ATENCIÓN: Si habla español, tiene a claire disposición servicios gratuitos de asistencia lingüística. Llame al [phone].    We comply with applicable federal civil rights laws and [place] laws. We do not discriminate on the basis of race, color, national origin, age, disability sex, sexual orientation or gender identity.            Thank you!     Thank you for choosing [facility]  for your care. Our goal is always to provide you with excellent care. Hearing back from our patients is one way we can continue to improve our services. Please take a few minutes to complete the written survey that you may receive in the mail after your visit with us. Thank you!             Your Updated Medication List - Protect others around you: Learn how to safely use, store and throw away your medicines at www.disposemymeds.org.          This list is accurate as of: 8/7/17 12:03 PM.  Always use your most recent med list.                   Brand Name Dispense Instructions for use Diagnosis    amLODIPine 10 MG tablet    NORVASC     Take 1 tablet (10 mg) by mouth daily for blood pressure.    Benign hypertension with chronic kidney disease, stage III       ASPIRIN NOT PRESCRIBED    INTENTIONAL     due to chronic  gastritis on EGD 2/09*        atorvastatin 80 MG tablet    LIPITOR    90 tablet    Take 1 tablet (80 mg) by mouth daily for cholesterol.    Hyperlipidemia LDL goal <100       brimonidine 0.15 % ophthalmic solution    ALPHAGAN-P    1 Bottle    Place 1 drop into both eyes 2 times daily    Primary open angle glaucoma of both eyes, moderate stage       dorzolamide-timolol 2-0.5 % ophthalmic solution    COSOPT    1 Bottle    Place 1 drop into both eyes 2 times daily    Primary open angle glaucoma of both eyes, moderate stage       furosemide 20 MG tablet    LASIX    180 tablet    Take 1 tablet (20 mg) by mouth 2 times daily as needed for leg swelling.    Lower extremity edema       glipiZIDE 10 MG tablet    GLUCOTROL    180 tablet    Take 1 tablet (10 mg) by mouth 2 times daily (with meals) for diabetes.    Uncontrolled type 2 diabetes mellitus with microalbuminuria, with long-term current use of insulin (H)       hydrochlorothiazide 25 MG tablet    HYDRODIURIL    90 tablet    Take 1 tablet (25 mg) by mouth daily for blood pressure and leg swelling.    Benign hypertension with chronic kidney disease, stage III       insulin glargine 100 UNIT/ML injection    LANTUS SOLOSTAR    30 mL    Inject 22 units under the skin daily.    Type 2 diabetes mellitus with hyperglycemia, without long-term current use of insulin (H)       insulin pen needle 32G X 4 MM    BD CAROL ANN U/F    100 each    Use 1 daily as directed.    Type 2 diabetes mellitus with hyperglycemia, without long-term current use of insulin (H)       latanoprost 0.005 % ophthalmic solution    XALATAN    1 Bottle    Place 1 drop into both eyes At Bedtime    Primary open angle glaucoma of both eyes, moderate stage       lisinopril 40 MG tablet    PRINIVIL/ZESTRIL    90 tablet    Take 1 tablet (40 mg) by mouth daily for blood pressure.    Benign hypertension with chronic kidney disease, stage III       metoprolol 25 MG 24 hr tablet    TOPROL-XL     Taper as scheduled by your  cardiologist.    Benign hypertension with chronic kidney disease, stage III       terazosin 1 MG capsule    HYTRIN    70 capsule    Start with 1 tab (1 mg) daily for 1 week, then 2 tabs (2 mg) daily, for blood pressure.    Benign hypertension with chronic kidney disease, stage III       vitamin D 2000 UNITS Caps      Take 1 capsule by mouth daily

## 2017-08-07 NOTE — PATIENT INSTRUCTIONS
================================================================================  Normal Values   Blood pressure  <140/90 for most adults    <130/80 for some chronic diseases (ask your care team about yours)    BMI (body mass index)  18.5-25 kg/m2 (based on height and weight)     Thank you for visiting [facility]    Normal or non-critical lab and imaging results will be communicated to you by MyChart, letter or phone within 7 days.  If you do not hear from us within 10 days, please call the clinic. If you have a critical or abnormal lab result, we will notify you by phone as soon as possible.     If you have any questions regarding your visit please contact:     Team Comfort:   Clinic Hours Telephone Number   Dr. Ronaldo Morillo 7am-5pm  Monday - Friday [phone]  Chavez Mora RN   Pharmacy 8:00am-8pm Monday-Friday    9am-5pm Saturday-Sunday [phone]   Urgent Care 11am-9pm Monday-Friday        9am-5pm Saturday-Sunday [phone]     After hours, weekend or if you need to make an appointment with your primary provider please call [phone].   After Hours nurse advise: call [facility] [place] [facility]: [phone]    Medication Refills:  Call your pharmacy and they will forward the refill to us. Please allow 3 business days for your refills to be completed.

## 2017-08-07 NOTE — NURSING NOTE
"Chief Complaint   Patient presents with     Hypertension       Initial /53 (BP Location: Left arm, Patient Position: Chair, Cuff Size: Adult Regular)  Pulse (!) 44  Temp 97.2  F (36.2  C) (Oral)  Ht 5' 6\" (1.676 m)  Wt 184 lb (83.5 kg)  SpO2 99%  BMI 29.7 kg/m2 Estimated body mass index is 29.7 kg/(m^2) as calculated from the following:    Height as of this encounter: 5' 6\" (1.676 m).    Weight as of this encounter: 184 lb (83.5 kg).  Medication Reconciliation: complete   YOLANDA Galdamez MA      "

## 2017-08-08 ENCOUNTER — OFFICE VISIT (OUTPATIENT)
Dept: OPHTHALMOLOGY | Facility: CLINIC | Age: 82
End: 2017-08-08
Payer: COMMERCIAL

## 2017-08-08 DIAGNOSIS — H40.1132 PRIMARY OPEN ANGLE GLAUCOMA OF BOTH EYES, MODERATE STAGE: ICD-10-CM

## 2017-08-08 DIAGNOSIS — H35.372 EPIRETINAL MEMBRANE, LEFT: ICD-10-CM

## 2017-08-08 DIAGNOSIS — Z96.1 PSEUDOPHAKIA: ICD-10-CM

## 2017-08-08 DIAGNOSIS — Z01.01 ENCOUNTER FOR EXAMINATION OF EYES AND VISION WITH ABNORMAL FINDINGS: Primary | ICD-10-CM

## 2017-08-08 DIAGNOSIS — H02.119 CICATRICIAL ECTROPION, UNSPECIFIED LATERALITY: ICD-10-CM

## 2017-08-08 DIAGNOSIS — H52.4 PRESBYOPIA: ICD-10-CM

## 2017-08-08 DIAGNOSIS — H43.813 POSTERIOR VITREOUS DETACHMENT, BILATERAL: ICD-10-CM

## 2017-08-08 PROCEDURE — 92015 DETERMINE REFRACTIVE STATE: CPT | Performed by: OPHTHALMOLOGY

## 2017-08-08 PROCEDURE — 92014 COMPRE OPH EXAM EST PT 1/>: CPT | Performed by: OPHTHALMOLOGY

## 2017-08-08 ASSESSMENT — CONF VISUAL FIELD
OD_NORMAL: 1
OS_SUPERIOR_NASAL_RESTRICTION: 3
METHOD: COUNTING FINGERS

## 2017-08-08 ASSESSMENT — REFRACTION_WEARINGRX
OS_AXIS: 166
OS_SPHERE: -2.00
OD_AXIS: 004
OD_CYLINDER: +0.50
OD_ADD: +3.00
OS_ADD: +3.00
OD_SPHERE: -0.50
OS_CYLINDER: +1.25
SPECS_TYPE: BIFOCAL

## 2017-08-08 ASSESSMENT — CUP TO DISC RATIO
OS_RATIO: 0.9
OD_RATIO: 0.8

## 2017-08-08 ASSESSMENT — VISUAL ACUITY
OS_SC+: -3
CORRECTION_TYPE: GLASSES
OD_SC+: -1
OS_SC: 20/20
METHOD: SNELLEN - LINEAR
OD_SC: 20/20

## 2017-08-08 ASSESSMENT — REFRACTION_MANIFEST
OD_CYLINDER: +1.25
OD_ADD: +2.75
OS_AXIS: 165
OS_SPHERE: -2.00
OS_CYLINDER: +1.75
OD_AXIS: 015
OS_ADD: +2.75
OD_SPHERE: -0.50

## 2017-08-08 ASSESSMENT — TONOMETRY
IOP_METHOD: APPLANATION
OS_IOP_MMHG: 16
OD_IOP_MMHG: 16

## 2017-08-08 ASSESSMENT — EXTERNAL EXAM - LEFT EYE: OS_EXAM: NORMAL

## 2017-08-08 ASSESSMENT — EXTERNAL EXAM - RIGHT EYE: OD_EXAM: NORMAL

## 2017-08-08 NOTE — PROGRESS NOTES
Current Eye Medications:  Continue Brimonidine twice daily both eyes, Cosopt twice daily both eyes, and Latanoprost every evening both eyes     Subjective:  Complete eye exam. Vision is doing pretty good both eyes. Zero pain or discomfort both eyes.      Objective:  See Ophthalmology Exam.       Assessment:  Stable intraocular pressure and discs in patient with moderate open angle glaucoma.      ICD-10-CM    1. Encounter for examination of eyes and vision with abnormal findings Z01.01 REFRACTIVE STATUS   2. Presbyopia H52.4 REFRACTIVE STATUS   3. Primary open angle glaucoma of both eyes, moderate stage H40.1132 EYE EXAM (SIMPLE-NONBILLABLE)   4. Pseudophakia, ou Z96.1    5. 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 Epiretinal membrane, left H35.372    7. Posterior vitreous detachment, bilateral H43.813         Plan:  Continue Continue Brimonidine twice daily both eyes.  Cosopt twice daily both eyes.  Latanoprost every evening both eyes.  Possible clouding of posterior capsule discussed  Glasses Rx given - optional   Return visit 6 months for intraocular pressure check, Rios Visual Field and glaucoma OCT     Willy Cardoso M.D.

## 2017-08-08 NOTE — PATIENT INSTRUCTIONS
Continue Continue Brimonidine twice daily both eyes.  Cosopt twice daily both eyes.  Latanoprost every evening both eyes.  Possible clouding of posterior capsule discussed  Glasses Rx given - optional   Return visit 6 months for intraocular pressure check, Rios Visual Field and glaucoma OCT     Willy Cardoso M.D.

## 2017-08-08 NOTE — MR AVS SNAPSHOT
After Visit Summary   8/8/2017    Manuel Rosales    MRN: [id number]           Patient Information     Date Of Birth          3/22/1931        Visit Information        Provider Department      8/8/2017 1:00 PM Willy Cardoso MD [facility]        Today's Diagnoses     Encounter for examination of eyes and vision with abnormal findings    -  1    Presbyopia        Regular astigmatism of both eyes        Primary open angle glaucoma of both eyes, moderate stage        Cicatricial ectropion, unspecified laterality        Posterior vitreous detachment, bilateral        Pseudophakia, ou        Epiretinal membrane, left          Care Instructions    Continue Continue Brimonidine twice daily both eyes.  Cosopt twice daily both eyes.  Latanoprost every evening both eyes.  Possible clouding of posterior capsule discussed  Glasses Rx given - optional   Return visit 6 months for intraocular pressure check, Rios Visual Field and glaucoma OCT     Willy Cardoso M.D.            Follow-ups after your visit        Your next 10 appointments already scheduled     Aug 31, 2017 11:00 AM CDT   Office Visit with Ronaldo Corral MD   [facility] ([facility]    [address] [facility] [id number] [phone]           Bring a current list of meds and any records pertaining to this visit. For Physicals, please bring immunization records and any forms needing to be filled out. Please arrive 10 minutes early to complete paperwork.            Oct 24, 2017  3:00 PM CDT   New Visit with Amaris Zambrano MD   [facility] ([facility]    [id number] [place] [address]   [phone]            Nov 13, 2017 10:00 AM CST   Office Visit with Ronaldo Corral MD   [facility] ([facility])    [address] [facility] [id number] [phone]  "          Bring a current list of meds and any records pertaining to this visit. For Physicals, please bring immunization records and any forms needing to be filled out. Please arrive 10 minutes early to complete paperwork.              Future tests that were ordered for you today     Open Future Orders        Priority Expected Expires Ordered    SLEEP EVALUATION & MANAGEMENT REFERRAL - ADULT Routine  2018            Who to contact     If you have questions or need follow up information about today's clinic visit or your schedule please contact [facility] directly at [phone].  Normal or non-critical lab and imaging results will be communicated to you by [employer], letter or phone within 4 business days after the clinic has received the results. If you do not hear from us within 7 days, please contact the clinic through [employer] or phone. If you have a critical or abnormal lab result, we will notify you by phone as soon as possible.  Submit refill requests through [employer] or call your pharmacy and they will forward the refill request to us. Please allow 3 business days for your refill to be completed.          Additional Information About Your Visit        [employer] Information     [employer] lets you send messages to your doctor, view your test results, renew your prescriptions, schedule appointments and more. To sign up, go to [email or web address] . Click on \"Log in\" on the left side of the screen, which will take you to the Welcome page. Then click on \"Sign up Now\" on the right side of the page.     You will be asked to enter the access code listed below, as well as some personal information. Please follow the directions to create your username and password.     Your access code is: [email or web address]  Expires: 10/11/2017 11:35 AM     Your access code will  in 90 days. If you need help or a new code, please call your [place] clinic or [phone].        Care EveryWhere ID     This is " your Care EveryWhere ID. This could be used by other organizations to access your [place] medical records  [id number]         Blood Pressure from Last 3 Encounters:   08/07/17 169/58   07/17/17 140/50   07/13/17 162/51    Weight from Last 3 Encounters:   08/07/17 83.5 kg (184 lb)   07/17/17 87.1 kg (192 lb)   07/13/17 88 kg (194 lb)              We Performed the Following     EYE EXAM (SIMPLE-NONBILLABLE)     REFRACTIVE STATUS        Primary Care Provider Office Phone # Fax #    Ronaldo Corral [phone]       [facility] [address]  [facility] [address]        Equal Access to Services     EDWINA CUEVAS : Hadii aad ku hadasho Soomaali, waaxda luqadaha, qaybta kaalmada adeegyada, waxay idiin hayaan adelaly kharacrow jimenez. So [facility] [phone].    ATENCIÓN: Si habla español, tiene a claire disposición servicios gratuitos de asistencia lingüística. Llame al [phone].    We comply with applicable federal civil rights laws and [place] laws. We do not discriminate on the basis of race, color, national origin, age, disability sex, sexual orientation or gender identity.            Thank you!     Thank you for choosing [facility]  for your care. Our goal is always to provide you with excellent care. Hearing back from our patients is one way we can continue to improve our services. Please take a few minutes to complete the written survey that you may receive in the mail after your visit with us. Thank you!             Your Updated Medication List - Protect others around you: Learn how to safely use, store and throw away your medicines at www.disposemymeds.org.          This list is accurate as of: 8/8/17  1:47 PM.  Always use your most recent med list.                   Brand Name Dispense Instructions for use Diagnosis    amLODIPine 10 MG tablet    NORVASC     Take 1 tablet (10 mg) by mouth daily for blood pressure.    Benign hypertension with chronic kidney disease,  stage III       atorvastatin 80 MG tablet    LIPITOR    90 tablet    Take 1 tablet (80 mg) by mouth daily for cholesterol.    Hyperlipidemia LDL goal <100       brimonidine 0.15 % ophthalmic solution    ALPHAGAN-P    1 Bottle    Place 1 drop into both eyes 2 times daily    Primary open angle glaucoma of both eyes, moderate stage       dorzolamide-timolol 2-0.5 % ophthalmic solution    COSOPT    1 Bottle    Place 1 drop into both eyes 2 times daily    Primary open angle glaucoma of both eyes, moderate stage       furosemide 20 MG tablet    LASIX    180 tablet    Take 1 tablet (20 mg) by mouth 2 times daily as needed for leg swelling.    Lower extremity edema       glipiZIDE 10 MG tablet    GLUCOTROL    180 tablet    Take 1 tablet (10 mg) by mouth 2 times daily (with meals) for diabetes.    Uncontrolled type 2 diabetes mellitus with microalbuminuria, with long-term current use of insulin (H)       hydrochlorothiazide 25 MG tablet    HYDRODIURIL    90 tablet    Take 1 tablet (25 mg) by mouth daily for blood pressure and leg swelling.    Benign hypertension with chronic kidney disease, stage III       insulin glargine 100 UNIT/ML injection    LANTUS SOLOSTAR    30 mL    Inject 22 units under the skin daily.    Type 2 diabetes mellitus with hyperglycemia, without long-term current use of insulin (H)       insulin pen needle 32G X 4 MM    BD CAROL ANN U/F    100 each    Use 1 daily as directed.    Type 2 diabetes mellitus with hyperglycemia, without long-term current use of insulin (H)       latanoprost 0.005 % ophthalmic solution    XALATAN    1 Bottle    Place 1 drop into both eyes At Bedtime    Primary open angle glaucoma of both eyes, moderate stage       lisinopril 40 MG tablet    PRINIVIL/ZESTRIL    90 tablet    Take 1 tablet (40 mg) by mouth daily for blood pressure.    Benign hypertension with chronic kidney disease, stage III       terazosin 1 MG capsule    HYTRIN    70 capsule    Start with 1 tab (1 mg) daily for 1  week, then 2 tabs (2 mg) daily, for blood pressure.    Benign hypertension with chronic kidney disease, stage III       vitamin D 2000 UNITS Caps      Take 1 capsule by mouth daily

## 2017-09-07 ENCOUNTER — OFFICE VISIT (OUTPATIENT)
Dept: FAMILY MEDICINE | Facility: CLINIC | Age: 82
End: 2017-09-07
Payer: COMMERCIAL

## 2017-09-07 VITALS
SYSTOLIC BLOOD PRESSURE: 134 MMHG | WEIGHT: 190.8 LBS | TEMPERATURE: 98.2 F | RESPIRATION RATE: 21 BRPM | DIASTOLIC BLOOD PRESSURE: 48 MMHG | OXYGEN SATURATION: 95 % | HEART RATE: 84 BPM | HEIGHT: 66 IN | BODY MASS INDEX: 30.67 KG/M2

## 2017-09-07 DIAGNOSIS — N18.30 BENIGN HYPERTENSION WITH CHRONIC KIDNEY DISEASE, STAGE III (H): Primary | ICD-10-CM

## 2017-09-07 DIAGNOSIS — I12.9 BENIGN HYPERTENSION WITH CHRONIC KIDNEY DISEASE, STAGE III (H): Primary | ICD-10-CM

## 2017-09-07 DIAGNOSIS — Z23 NEED FOR INFLUENZA VACCINATION: ICD-10-CM

## 2017-09-07 PROCEDURE — 90662 IIV NO PRSV INCREASED AG IM: CPT | Performed by: FAMILY MEDICINE

## 2017-09-07 PROCEDURE — 99213 OFFICE O/P EST LOW 20 MIN: CPT | Mod: 25 | Performed by: FAMILY MEDICINE

## 2017-09-07 PROCEDURE — G0008 ADMIN INFLUENZA VIRUS VAC: HCPCS | Performed by: FAMILY MEDICINE

## 2017-09-07 RX ORDER — LISINOPRIL 40 MG/1
40 TABLET ORAL DAILY
Qty: 90 TABLET | Refills: 1 | Status: SHIPPED | OUTPATIENT
Start: 2017-09-07 | End: 2018-01-08

## 2017-09-07 RX ORDER — TERAZOSIN 1 MG/1
2 CAPSULE ORAL DAILY
Qty: 180 CAPSULE | Refills: 1 | Status: SHIPPED | OUTPATIENT
Start: 2017-09-07 | End: 2018-01-08

## 2017-09-07 ASSESSMENT — PAIN SCALES - GENERAL: PAINLEVEL: NO PAIN (0)

## 2017-09-07 NOTE — PROGRESS NOTES
SUBJECTIVE:   Manuel Rosales is a 86 year old male who presents to clinic today for the following health issues:      Diabetes Follow-up    Patient is checking blood sugars: once daily.  Results are as follows:       am - 62 9/06/2017, 137 this am- avg. Of 120.         Diabetic concerns: None     Symptoms of hypoglycemia (low blood sugar): shaky, lethargy     Paresthesias (numbness or burning in feet) or sores: Yes feet      Date of last diabetic eye exam: done aug. 5th    Hyperlipidemia Follow-Up      Rate your low fat/cholesterol diet?: good    Taking statin?  Yes, no muscle aches from statin    Other lipid medications/supplements?:  none    Hypertension Follow-up      Outpatient blood pressures are not being checked.  Low Salt Diet: low salt    Amount of exercise or physical activity: 4-5 days/week for an average of house work and yard work      Problems taking medications regularly: No    Medication side effects: none  Diet: low salt and low fat/cholesterol    Patient remarks that he feels the terazosin is working for both his blood pressure and his urine flow.     Past medical, family, and social histories, medications, and allergies are reviewed and updated in Epic.     ROS:  C: NEGATIVE for fever, chills, change in weight  E/M: NEGATIVE for ear, mouth and throat problems  R: NEGATIVE for significant cough or SOB  CV: NEGATIVE for chest pain, palpitations or peripheral edema  ROS otherwise negative    Any history above obtained by the Medical Assistant was reviewed by Dr. Ronaldo Corral MD, and edited when necessary.    This document serves as a record of the services and decisions personally performed and made by Dr. Corral. It was created on his behalf by Era Calderon, a trained medical scribe. The creation of this document is based the provider's statements to the medical scribe.  Era Calderon September 7, 2017 1:19 PM     OBJECTIVE:                                                    /48  " Pulse 84  Temp 98.2  F (36.8  C) (Oral)  Resp 21  Ht 1.676 m (5' 6\")  Wt 86.5 kg (190 lb 12.8 oz)  SpO2 95%  BMI 30.8 kg/m2   Body mass index is 30.8 kg/(m^2).     GENERAL: healthy, alert and no distress  EYES: Eyes grossly normal to inspection, PERRL, EOMI, sclerae white and conjunctivae normal  MS: no gross musculoskeletal defects noted, no edema  SKIN: no suspicious lesions or rashes  NEURO: Normal strength and tone, sensory exam grossly normal, mentation intact, oriented times 3 and cranial nerves 2-12 intact  PSYCH: mentation appears normal, affect normal/bright        ASSESSMENT/PLAN:                                                      (I12.9,  N18.3) Benign hypertension with chronic kidney disease, stage III  (primary encounter diagnosis)  Comment: Well controlled  Plan: lisinopril (PRINIVIL/ZESTRIL) 40 MG tablet,   terazosin (HYTRIN) 1 MG capsule        Follow up in 4 months.    (Z23) Need for influenza vaccination  Comment: Influenza vaccine offered and accepted by patient. He has received it before without problems.   Plan: FLU VACCINE, INCREASED ANTIGEN, PRESV FREE,         ADMIN 1st VACCINE            The information in this document, created by the medical scribe for me, accurately reflects the services I personally performed and the decisions made by me. I have reviewed and approved this document for accuracy prior to leaving the patient care area. September 7, 2017 1:19 PM   Ronaldo Corral MD   "

## 2017-09-07 NOTE — MR AVS SNAPSHOT
After Visit Summary   9/7/2017    Manuel Rosales    MRN: [id number]           Patient Information     Date Of Birth          3/22/1931        Visit Information        Provider Department      9/7/2017 12:40 PM Ronaldo Corral MD [facility]        Today's Diagnoses     Benign hypertension with chronic kidney disease, stage III    -  1    Need for influenza vaccination           Follow-ups after your visit        Follow-up notes from your care team     Return in about 4 months (around 1/8/2018) for diabetes, blood pressure check.      Your next 10 appointments already scheduled     Oct 24, 2017  3:00 PM CDT   New Visit with Amaris Zambrano MD   [facility]    [address] [place] [id number] [phone]            Nov 13, 2017 10:00 AM CST   Office Visit with Ronaldo Corral MD   [facility] ([facility])    [address] [facility] [id number] [phone]           Bring a current list of meds and any records pertaining to this visit. For Physicals, please bring immunization records and any forms needing to be filled out. Please arrive 10 minutes early to complete paperwork.            Feb 06, 2018 12:45 PM CST   [facility]    [address] [place] [address]   [phone]              Who to contact     If you have questions or need follow up information about today's clinic visit or your schedule please contact [facility] directly at [phone].  Normal or non-critical lab and imaging results will be communicated to you by MyChart, letter or phone within 4 business days after the clinic has received the results. If you do not hear from us within 7 days, please contact the clinic through MyChart or phone. If you have a critical or  "abnormal lab result, we will notify you by phone as soon as possible.  Submit refill requests through Avedro or call your pharmacy and they will forward the refill request to us. Please allow 3 business days for your refill to be completed.          Additional Information About Your Visit        SentinelOnehart Information     Avedro lets you send messages to your doctor, view your test results, renew your prescriptions, schedule appointments and more. To sign up, go to [email or web address] . Click on \"Log in\" on the left side of the screen, which will take you to the Welcome page. Then click on \"Sign up Now\" on the right side of the page.     You will be asked to enter the access code listed below, as well as some personal information. Please follow the directions to create your username and password.     Your access code is: [email or web address]  Expires: 10/11/2017 11:35 AM     Your access code will  in 90 days. If you need help or a new code, please call your [place] clinic or [phone].        Care EveryWhere ID     This is your Care EveryWhere ID. This could be used by other organizations to access your [place] medical records  [id number]        Your Vitals Were     Pulse Temperature Respirations Height Pulse Oximetry BMI (Body Mass Index)    84 98.2  F (36.8  C) (Oral) 21 5' 6\" (1.676 m) 95% 30.8 kg/m2       Blood Pressure from Last 3 Encounters:   17 134/48   17 169/58   17 140/50    Weight from Last 3 Encounters:   17 190 lb 12.8 oz (86.5 kg)   17 184 lb (83.5 kg)   17 192 lb (87.1 kg)              We Performed the Following     ADMIN 1st VACCINE     FLU VACCINE, INCREASED ANTIGEN, PRESV FREE          Today's Medication Changes          These changes are accurate as of: 17  1:25 PM.  If you have any questions, ask your nurse or doctor.               These medicines have changed or have updated prescriptions.        Dose/Directions    terazosin 1 MG capsule "   Commonly known as:  HYTRIN   This may have changed:    - how much to take  - how to take this  - when to take this  - additional instructions   Used for:  Benign hypertension with chronic kidney disease, stage III   Changed by:  Ronaldo Corral MD        Dose:  2 mg   Take 2 capsules (2 mg) by mouth daily for blood pressure.   Quantity:  180 capsule   Refills:  1            Where to get your medicines      These medications were sent to [address] [facility]  [address] [facility] [address]     Phone:  [phone]     terazosin 1 MG capsule         Some of these will need a paper prescription and others can be bought over the counter.  Ask your nurse if you have questions.     Bring a paper prescription for each of these medications     lisinopril 40 MG tablet                Primary Care Provider Office Phone # Fax #    Ronaldo Corral [phone]       [address] [facility] [address]        Equal Access to Services     [facility] AH: Hadii aad ku hadasho Soomaali, waaxda luqadaha, qaybta kaalmada adeegyada, waxay idiin hayaan adeeg kharash la'sukumar . So [facility] [phone].    ATENCIÓN: Si habla español, tiene a claire disposición servicios gratuitos de asistencia lingüística. [facility] [phone].    We comply with applicable federal civil rights laws and [place] laws. We do not discriminate on the basis of race, color, national origin, age, disability sex, sexual orientation or gender identity.            Thank you!     Thank you for choosing [facility]  for your care. Our goal is always to provide you with excellent care. Hearing back from our patients is one way we can continue to improve our services. Please take a few minutes to complete the written survey that you may receive in the mail after your visit with us. Thank you!             Your Updated Medication List - Protect others around you: Learn how  to safely use, store and throw away your medicines at www.disposemymeds.org.          This list is accurate as of: 9/7/17  1:25 PM.  Always use your most recent med list.                   Brand Name Dispense Instructions for use Diagnosis    amLODIPine 10 MG tablet    NORVASC     Take 1 tablet (10 mg) by mouth daily for blood pressure.    Benign hypertension with chronic kidney disease, stage III       atorvastatin 80 MG tablet    LIPITOR    90 tablet    Take 1 tablet (80 mg) by mouth daily for cholesterol.    Hyperlipidemia LDL goal <100       brimonidine 0.15 % ophthalmic solution    ALPHAGAN-P    1 Bottle    Place 1 drop into both eyes 2 times daily    Primary open angle glaucoma of both eyes, moderate stage       dorzolamide-timolol 2-0.5 % ophthalmic solution    COSOPT    1 Bottle    Place 1 drop into both eyes 2 times daily    Primary open angle glaucoma of both eyes, moderate stage       furosemide 20 MG tablet    LASIX    180 tablet    Take 1 tablet (20 mg) by mouth 2 times daily as needed for leg swelling.    Lower extremity edema       glipiZIDE 10 MG tablet    GLUCOTROL    180 tablet    Take 1 tablet (10 mg) by mouth 2 times daily (with meals) for diabetes.    Uncontrolled type 2 diabetes mellitus with microalbuminuria, with long-term current use of insulin (H)       hydrochlorothiazide 25 MG tablet    HYDRODIURIL    90 tablet    Take 1 tablet (25 mg) by mouth daily for blood pressure and leg swelling.    Benign hypertension with chronic kidney disease, stage III       insulin glargine 100 UNIT/ML injection    LANTUS SOLOSTAR    30 mL    Inject 22 units under the skin daily.    Type 2 diabetes mellitus with hyperglycemia, without long-term current use of insulin (H)       insulin pen needle 32G X 4 MM    BD CAROL ANN U/F    100 each    Use 1 daily as directed.    Type 2 diabetes mellitus with hyperglycemia, without long-term current use of insulin (H)       latanoprost 0.005 % ophthalmic solution    XALATAN     1 Bottle    Place 1 drop into both eyes At Bedtime    Primary open angle glaucoma of both eyes, moderate stage       lisinopril 40 MG tablet    PRINIVIL/ZESTRIL    90 tablet    Take 1 tablet (40 mg) by mouth daily for blood pressure.    Benign hypertension with chronic kidney disease, stage III       terazosin 1 MG capsule    HYTRIN    180 capsule    Take 2 capsules (2 mg) by mouth daily for blood pressure.    Benign hypertension with chronic kidney disease, stage III       vitamin D 2000 UNITS Caps      Take 1 capsule by mouth daily

## 2017-09-07 NOTE — NURSING NOTE
"Chief Complaint   Patient presents with     Hypertension       Initial /49 (BP Location: Left arm, Patient Position: Chair, Cuff Size: Adult Large)  Pulse 84  Temp 98.2  F (36.8  C) (Oral)  Resp 21  Ht 5' 6\" (1.676 m)  Wt 190 lb 12.8 oz (86.5 kg)  SpO2 95%  BMI 30.8 kg/m2 Estimated body mass index is 30.8 kg/(m^2) as calculated from the following:    Height as of this encounter: 5' 6\" (1.676 m).    Weight as of this encounter: 190 lb 12.8 oz (86.5 kg).  Medication Reconciliation: complete   Korina Brown CMA      "

## 2017-10-10 PROBLEM — I27.20 PULMONARY HYPERTENSION (H): Status: ACTIVE | Noted: 2017-10-10

## 2017-10-10 PROBLEM — I27.20 PULMONARY HYPERTENSION (H): Chronic | Status: ACTIVE | Noted: 2017-10-10

## 2017-10-12 ENCOUNTER — OFFICE VISIT (OUTPATIENT)
Dept: SLEEP MEDICINE | Facility: CLINIC | Age: 82
End: 2017-10-12
Payer: COMMERCIAL

## 2017-10-12 VITALS
HEIGHT: 68 IN | OXYGEN SATURATION: 98 % | HEART RATE: 51 BPM | SYSTOLIC BLOOD PRESSURE: 161 MMHG | WEIGHT: 197 LBS | DIASTOLIC BLOOD PRESSURE: 67 MMHG | BODY MASS INDEX: 29.86 KG/M2

## 2017-10-12 DIAGNOSIS — R06.89 DYSPNEA AND RESPIRATORY ABNORMALITY: ICD-10-CM

## 2017-10-12 DIAGNOSIS — R06.00 DYSPNEA AND RESPIRATORY ABNORMALITY: ICD-10-CM

## 2017-10-12 DIAGNOSIS — G47.30 SLEEP APNEA, UNSPECIFIED TYPE: Primary | ICD-10-CM

## 2017-10-12 DIAGNOSIS — I50.30 HEART FAILURE WITH PRESERVED EJECTION FRACTION, NYHA CLASS I (H): ICD-10-CM

## 2017-10-12 DIAGNOSIS — Z72.820 LACK OF ADEQUATE SLEEP: ICD-10-CM

## 2017-10-12 DIAGNOSIS — R53.81 MALAISE AND FATIGUE: ICD-10-CM

## 2017-10-12 DIAGNOSIS — R53.83 MALAISE AND FATIGUE: ICD-10-CM

## 2017-10-12 DIAGNOSIS — I10 ESSENTIAL HYPERTENSION: ICD-10-CM

## 2017-10-12 PROCEDURE — 99204 OFFICE O/P NEW MOD 45 MIN: CPT | Performed by: PHYSICIAN ASSISTANT

## 2017-10-12 NOTE — NURSING NOTE
"Chief Complaint   Patient presents with     Sleep Problem     consult- referred to check for sleep apnea       Initial /57  Pulse (!) 41  Ht 1.727 m (5' 8\")  Wt 89.4 kg (197 lb)  SpO2 98%  BMI 29.95 kg/m2 Estimated body mass index is 29.95 kg/(m^2) as calculated from the following:    Height as of this encounter: 1.727 m (5' 8\").    Weight as of this encounter: 89.4 kg (197 lb).  Medication Reconciliation: complete   Neck circumference: 16.25 inches/42 cm    "

## 2017-10-12 NOTE — MR AVS SNAPSHOT
After Visit Summary   10/12/2017    Manuel Rosales    MRN: [id number]           Patient Information     Date Of Birth          3/22/1931        Visit Information        Provider Department      10/12/2017 1:00 PM Yassine Christianson PA [facility]        Today's Diagnoses     Sleep apnea, unspecified type    -  1    Lack of adequate sleep        Essential hypertension        Dyspnea and respiratory abnormality        Malaise and fatigue          Care Instructions      Your BMI is Body mass index is 29.95 kg/(m^2).  Weight management is a personal decision.  If you are interested in exploring weight loss strategies, the following discussion covers the approaches that may be successful. Body mass index (BMI) is one way to tell whether you are at a healthy weight, overweight, or obese. It measures your weight in relation to your height.  A BMI of 18.5 to 24.9 is in the healthy range. A person with a BMI of 25 to 29.9 is considered overweight, and someone with a BMI of 30 or greater is considered obese. More than two-thirds of American adults are considered overweight or obese.  Being overweight or obese increases the risk for further weight gain. Excess weight may lead to heart disease and diabetes.  Creating and following plans for healthy eating and physical activity may help you improve your health.  Weight control is part of healthy lifestyle and includes exercise, emotional health, and healthy eating habits. Careful eating habits lifelong are the mainstay of weight control. Though there are significant health benefits from weight loss, long-term weight loss with diet alone may be very difficult to achieve- studies show long-term success with dietary management in less than 10% of people. Attaining a healthy weight may be especially difficult to achieve in those with severe obesity. In some cases, medications, devices and surgical management might be considered.  What can you do?  If you  are overweight or obese and are interested in methods for weight loss, you should discuss this with your provider.     Consider reducing daily calorie intake by 500 calories.     Keep a food journal.     Avoiding skipping meals, consider cutting portions instead.    Diet combined with exercise helps maintain muscle while optimizing fat loss. Strength training is particularly important for building and maintaining muscle mass. Exercise helps reduce stress, increase energy, and improves fitness. Increasing exercise without diet control, however, may not burn enough calories to loose weight.       Start walking three days a week 10-20 minutes at a time    Work towards walking thirty minutes five days a week     Eventually, increase the speed of your walking for 1-2 minutes at time    In addition, we recommend that you review healthy lifestyles and methods for weight loss available through the National Institutes of Health patient information sites:  http://win.niddk.nih.gov/publications/index.htm    And look into health and wellness programs that may be available through your health insurance provider, employer, local community center, or ita club.    Weight management plan: Patient was referred to their PCP to discuss a diet and exercise plan.              Follow-ups after your visit        Your next 10 appointments already scheduled     Oct 30, 2017  9:30 AM CDT   [facility] with [facility]   [facility]    [id number] [place] [address]   [phone]            Oct 30, 2017  8:00 PM CDT   Psg Split W/Tcm with BK BED 2   [facility]    [address] [facility] [address]  [facility] [address]   [phone]            Nov 13, 2017  1:00 PM CST   Return Sleep Patient with ALEX Matthew   [facility] ([facility]    [address] [facility] [address]  [place] [address]   [phone]            Jan 08, 2018 11:00 AM CST   Office Visit with Ronaldo Corral MD   [facility] ([facility])    [address] [facility] [address]   [phone]           Bring a current list of meds and any records pertaining to this visit. For Physicals, please bring immunization records and any forms needing to be filled out. Please arrive 10 minutes early to complete paperwork.            Feb 06, 2018 12:45 PM CST   [facility] Visit with Willy Cardoso MD   [facility]    [address] [place] [id number] [phone]              Future tests that were ordered for you today     Open Future Orders        Priority Expected Expires Ordered    Comprehensive Sleep Study Routine  4/10/2018 10/12/2017    ABG-Blood Gas Arterial ([facility] [place]) Routine  12/11/2017 10/12/2017            Who to contact     If you have questions or need follow up information about today's clinic visit or your schedule please contact [facility] directly at [phone].  Normal or non-critical lab and imaging results will be communicated to you by MyChart, letter or phone within 4 business days after the clinic has received the results. If you do not hear from us within 7 days, please contact the clinic through SendRRhart or phone. If you have a critical or abnormal lab result, we will notify you by phone as soon as possible.  Submit refill requests through Trello or call your pharmacy and they will forward the refill request to us. Please allow 3 business days for your refill to be completed.          Additional Information About Your Visit        SendRRharTape TV Information     Trello lets you send messages to your doctor, view your test results, renew your prescriptions, schedule appointments and more. To sign up, go to [email or web address] . Click on \"Log in\" on the " "left side of the screen, which will take you to the Welcome page. Then click on \"Sign up Now\" on the right side of the page.     You will be asked to enter the access code listed below, as well as some personal information. Please follow the directions to create your username and password.     Your access code is: [email or web address]  Expires: 1/10/2018  1:28 PM     Your access code will  in 90 days. If you need help or a new code, please call your [facility] or [phone].        Care EveryWhere ID     This is your Care EveryWhere ID. This could be used by other organizations to access your [place] medical records  [id number]        Your Vitals Were     Pulse Height Pulse Oximetry BMI (Body Mass Index)          51 1.727 m (5' 8\") 98% 29.95 kg/m2         Blood Pressure from Last 3 Encounters:   10/12/17 171/57   17 134/48   17 169/58    Weight from Last 3 Encounters:   10/12/17 89.4 kg (197 lb)   17 86.5 kg (190 lb 12.8 oz)   17 83.5 kg (184 lb)               Primary Care Provider Office Phone # Fax #    Ronaldo Corral [phone]       [address] [place]  [facility] [address]        Equal Access to Services     [facility] AH: Hadii aad ku hadasho Soomaali, waaxda luqadaha, qaybta kaalmada adeegyada, radha roa haysukumar sheppard . So [facility] [phone].    ATENCIÓN: Si habla español, tiene a claire disposición servicios gratuitos de asistencia lingüística. Llame al [phone].    We comply with applicable federal civil rights laws and [place] laws. We do not discriminate on the basis of race, color, national origin, age, disability, sex, sexual orientation, or gender identity.            Thank you!     Thank you for choosing [facility]  for your care. Our goal is always to provide you with excellent care. Hearing back from our patients is one way we can continue to improve our services. Please take a few minutes to complete the written " survey that you may receive in the mail after your visit with us. Thank you!             Your Updated Medication List - Protect others around you: Learn how to safely use, store and throw away your medicines at www.disposemymeds.org.          This list is accurate as of: 10/12/17  1:28 PM.  Always use your most recent med list.                   Brand Name Dispense Instructions for use Diagnosis    amLODIPine 10 MG tablet    NORVASC     Take 1 tablet (10 mg) by mouth daily for blood pressure.    Benign hypertension with chronic kidney disease, stage III       atorvastatin 80 MG tablet    LIPITOR    90 tablet    Take 1 tablet (80 mg) by mouth daily for cholesterol.    Hyperlipidemia LDL goal <100       brimonidine 0.15 % ophthalmic solution    ALPHAGAN-P    1 Bottle    Place 1 drop into both eyes 2 times daily    Primary open angle glaucoma of both eyes, moderate stage       dorzolamide-timolol 2-0.5 % ophthalmic solution    COSOPT    1 Bottle    Place 1 drop into both eyes 2 times daily    Primary open angle glaucoma of both eyes, moderate stage       furosemide 20 MG tablet    LASIX    180 tablet    Take 1 tablet (20 mg) by mouth 2 times daily as needed for leg swelling.    Lower extremity edema       glipiZIDE 10 MG tablet    GLUCOTROL    180 tablet    Take 1 tablet (10 mg) by mouth 2 times daily (with meals) for diabetes.    Uncontrolled type 2 diabetes mellitus with microalbuminuria, with long-term current use of insulin (H)       hydrochlorothiazide 25 MG tablet    HYDRODIURIL    90 tablet    Take 1 tablet (25 mg) by mouth daily for blood pressure and leg swelling.    Benign hypertension with chronic kidney disease, stage III       insulin glargine 100 UNIT/ML injection    LANTUS SOLOSTAR    30 mL    Inject 22 units under the skin daily.    Type 2 diabetes mellitus with hyperglycemia, without long-term current use of insulin (H)       insulin pen needle 32G X 4 MM    BD CAROL ANN U/F    100 each    Use 1 daily as  directed.    Type 2 diabetes mellitus with hyperglycemia, without long-term current use of insulin (H)       latanoprost 0.005 % ophthalmic solution    XALATAN    1 Bottle    Place 1 drop into both eyes At Bedtime    Primary open angle glaucoma of both eyes, moderate stage       lisinopril 40 MG tablet    PRINIVIL/ZESTRIL    90 tablet    Take 1 tablet (40 mg) by mouth daily for blood pressure.    Benign hypertension with chronic kidney disease, stage III       terazosin 1 MG capsule    HYTRIN    180 capsule    Take 2 capsules (2 mg) by mouth daily for blood pressure.    Benign hypertension with chronic kidney disease, stage III       vitamin D 2000 UNITS Caps      Take 1 capsule by mouth daily

## 2017-10-12 NOTE — PROGRESS NOTES
Sleep Consultation:    Date on this visit: 10/12/2017    Manuel Rosales is sent by Ronaldo Corral a sleep consultation regarding sleep disordered breathing.    Primary Physician: Ronaldo Corral     Chief Complaint   Patient presents with     Sleep Problem     consult- referred to check for sleep apnea       Manuel goes to sleep at 10:30 PM during the week. He wakes up at 7:00 AM without an alarm. He falls asleep in 30 minutes.   On weekends, Manuel goes to sleep at 10:30 PM.  He wakes up at 7:00 AM without an alarm. He falls asleep in 30 minutes.  Patient gets an average of 8 hours of sleep per night. He does not feel refreshed.     Patient does not use electronics in bed, watch TV in bed and read in bed.     Manuel does not do shift work.      Manuel does snore every night and snoring is soft to loud. Patient does have a regular bed partner. There is report of snoring.  He does have witnessed apneas. They never sleep separately.  Patient sleeps on his back and side. He denies morning headaches, morning confusion and restless legs. Manuel denies any bruxism, sleep walking, sleep talking, dream enactment, sleep paralysis, cataplexy and hypnogogic/hypnopompic hallucinations.    Manuel denies difficulty breathing through his nose, claustrophobia and reflux at night.      Manuel has gained 10  pounds in the last year.  Patient describes themself as a morning person.  He would prefer to go to sleep at 10:30 PM and wake up at 7:00 AM.  Patient's [place] Sleepiness score 7/24 inconsistent with severe daytime sleepiness.  He has fatigue.     Manuel naps 7 times per week for 30 minutes, feels refreshed after naps. He takes some inadvertant naps.  He denies closing eyes, dozing and falling asleep while driving. Patient was counseled on the importance of driving while alert, to pull over if drowsy, or nap before getting into the vehicle if sleepy.      Allergies:    Allergies   Allergen Reactions     Metformin       Renal failure     Niaspan [Niacin]      hyperglycemia       Medications:    Current Outpatient Prescriptions   Medication Sig Dispense Refill     lisinopril (PRINIVIL/ZESTRIL) 40 MG tablet Take 1 tablet (40 mg) by mouth daily for blood pressure. 90 tablet 1     terazosin (HYTRIN) 1 MG capsule Take 2 capsules (2 mg) by mouth daily for blood pressure. 180 capsule 1     amLODIPine (NORVASC) 10 MG tablet Take 1 tablet (10 mg) by mouth daily for blood pressure.       furosemide (LASIX) 20 MG tablet Take 1 tablet (20 mg) by mouth 2 times daily as needed for leg swelling. 180 tablet 0     insulin glargine (LANTUS SOLOSTAR) 100 UNIT/ML injection Inject 22 units under the skin daily. 30 mL 3     glipiZIDE (GLUCOTROL) 10 MG tablet Take 1 tablet (10 mg) by mouth 2 times daily (with meals) for diabetes. 180 tablet 1     hydrochlorothiazide (HYDRODIURIL) 25 MG tablet Take 1 tablet (25 mg) by mouth daily for blood pressure and leg swelling. 90 tablet 1     atorvastatin (LIPITOR) 80 MG tablet Take 1 tablet (80 mg) by mouth daily for cholesterol. 90 tablet 3     brimonidine (ALPHAGAN-P) 0.15 % ophthalmic solution Place 1 drop into both eyes 2 times daily 1 Bottle 12     insulin pen needle (BD CAROL ANN U/F) 32G X 4 MM Use 1 daily as directed. 100 each 11     Cholecalciferol (VITAMIN D) 2000 UNITS CAPS Take 1 capsule by mouth daily       dorzolamide-timolol (COSOPT) 2-0.5 % ophthalmic solution Place 1 drop into both eyes 2 times daily 1 Bottle 12     latanoprost (XALATAN) 0.005 % ophthalmic solution Place 1 drop into both eyes At Bedtime 1 Bottle 12       Problem List:  Patient Active Problem List    Diagnosis Date Noted     HF (heart failure), diastolic (H) 10/12/2017     Priority: Medium     Pulmonary hypertension 10/10/2017     Priority: Medium     ECHO Study Date: 07/14/2017   Interpretation Summary  Global and regional left ventricular function is normal with an EF of 55-60%.  Mild right ventricular dilation is present. Global right  ventricular function  is normal.  Mild to moderate mitral, mild tricuspid insufficiency and mild aortic stenosis  is present.  PUlmonary hypertension present. Estimated pulmonary artery systolic pressure  is 67 mmHg plus right atrial pressure.       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  Manuel ROVERTO Rosales has no plan or code status on file. Discussed available resources and provided with information on 01/21/2014. Confirmed code status reflects current choices pending further ACP discussions.  Confirmed/documented designated decision maker(s). See permanent comments section of demographics in clinical tab.   Added by Pearl Kaur on 2/24/2014  Discussed advance care planning with patient; information given to patient to review. July 25, 2011  Discussed advance care planning with patient; information given to patient to review. January 21, 2014        Hypertension goal BP (blood pressure) < 130/80 01/25/2011     Priority: Medium     Posterior vitreous detachment, ou 11/16/2010     Priority: Medium     Pseudophakia, ou 11/15/2010     Priority: Medium     Uncontrolled type 2 diabetes mellitus with microalbuminuria, with long-term current use of insulin (H)      Priority: Medium     Type 2 diabetes, HbA1C goal < 8%       Hyperlipidemia LDL goal <100      Priority: Medium     Benign  hypertension with chronic kidney disease, stage III      Priority: Medium     Colon cancer (H) 08/01/2002     Priority: Medium     malignant polyp on colonoscopy, next colonoscopy due 2017          Past Medical/Surgical History:  Past Medical History:   Diagnosis Date     Benign hypertension with chronic kidney disease, stage II      Closed fracture of acromial end of clavicle 5/27/2012     Colon cancer (H) 8/2002    malignant polyp on colonoscopy, next colonoscopy due 4/12     Glaucoma      Heart murmur 1/04    (?MR)     Hyperlipidemia LDL goal <100      Obesity, Class I, BMI 30-34.9 11/19/2015     Type II or unspecified type diabetes mellitus without mention of complication, not stated as uncontrolled 1997     Past Surgical History:   Procedure Laterality Date     HC BIOPSY PROSTATE NEEDLE/PUNCH  7/02    BENIGN     HC COLONOSCOPY THRU STOMA, DIAGNOSTIC  2007, 2012    due 2017     HC REMOVE TONSILS/ADENOIDS,<13 Y/O       LASER SELECTIVE TRABECULOPLASTY  7/2011; 7/2013    right eye temp 180; left eye inf 180     LASER SELECTIVE TRABECULOPLASTY  7/2007; 2/2016    left eye 360 (STS); left eye sup 180     PHACOEMULSIFICATION WITH STANDARD INTRAOCULAR LENS IMPLANT  10/2008; 11/2009    left eye; right eye       Social History:  Social History     Social History     Marital status:      Spouse name: N/A     Number of children: 1     Years of education:  [employer]     Occupational History      Retired     Social History Main Topics     Smoking status: Former Smoker     Quit date: 1/14/1970     Smokeless tobacco: Never Used     Alcohol use 4.2 oz/week     7 Standard drinks or equivalent per week      Comment: 1 drink /day (a decrease, as of 11/29/16)     Drug use: No     Sexual activity: Not Currently     Other Topics Concern     Parent/Sibling W/ Cabg, Mi Or Angioplasty Before 65f 55m? No      Service Yes     Blood Transfusions No     Caffeine Concern No     Occupational Exposure No     Hobby Hazards No      Sleep Concern No     Stress Concern No     Weight Concern Yes     Special Diet Yes     diabetic      Back Care No     Exercise No     Bike Helmet No     Seat Belt Yes     Self-Exams No     Social History Narrative    [employer]        Dr. Eulalio Melissa    [phone]    Fax [phone]       Family History:  Family History   Problem Relation Age of Onset     CANCER Sister      lung     Lipids Mother      ?     DIABETES Sister       of PVD complications     Hypertension No family hx of      Glaucoma No family hx of      Thyroid Disease No family hx of      Macular Degeneration No family hx of        Review of Systems:  A complete review of systems reviewed by me is negative with the exeption of what has been mentioned in the history of present illness.  CONSTITUTIONAL: NEGATIVE for weight gain/loss, fever, chills, sweats or night sweats, drug allergies.  EYES: NEGATIVE for changes in vision, blind spots, double vision.  ENT: NEGATIVE for ear pain, sore throat, sinus pain, post-nasal drip, runny nose, bloody nose  CARDIAC: NEGATIVE for fast heartbeats or fluttering in chest, chest pain or pressure, breathlessness when lying flat, swollen legs or swollen feet.  NEUROLOGIC: NEGATIVE headaches, weakness or numbness in the arms or legs.  DERMATOLOGIC: NEGATIVE for rashes, new moles or change in mole(s)  PULMONARY: POSITIVE for SOB with activity. NEGATIVE SOB at rest, dry cough, productive cough, coughing up blood, wheezing or whistling when breathing.    GASTROINTESTINAL: NEGATIVE for nausea or vomitting, loose or watery stools, fat or grease in stools, constipation, abdominal pain, bowel movements black in color or blood noted.  GENITOURINARY: NEGATIVE for pain during urination, blood in urine, urinating more frequently than usual  MUSCULOSKELETAL: NEGATIVE for muscle pain, bone or joint pain, swollen joints.  ENDOCRINE: NEGATIVE for increased thirst or urination, diabetes.  LYMPHATIC: NEGATIVE for swollen lymph nodes,  "lumps or bumps in the breasts or nipple discharge.    Physical Examination:  Vitals: /67  Pulse 51  Ht 1.727 m (5' 8\")  Wt 89.4 kg (197 lb)  SpO2 98%  BMI 29.95 kg/m2  BMI= Body mass index is 29.95 kg/(m^2).         [place] Total Score 10/12/2017   Total score - [place] 7       GENERAL APPEARANCE: alert and no distress  EYES: Eyes grossly normal to inspection, PERRL and conjunctivae and sclerae normal  HENT: ear canals and TM's normal, nose and mouth without ulcers or lesions and oropharynx crowded  NECK: no asymmetry, masses, or scars  RESP: lungs clear to auscultation - no rales, rhonchi or wheezes  CV: regular rates and rhythm and normal S1 S2, no S3 or S4  MS: extremities normal- no gross deformities noted  NEURO: Normal strength and tone, mentation intact and speech normal  PSYCH: mentation appears normal and affect normal/bright  Mallampati Class: III.  Tonsillar Stage:     Impression/Plan:    Snoring, witnessed apnea, daytime fatigue, non refreshing sleep, crowded oropharynx and co-morbid HTN, HFpEF and possibly pulmonary HTN. Recommend Polysomnogram (using 4% desaturation/Medicare/2012 AASM 1B scoring rules) with TCM and pre-study ABG to evaluate for obstructive sleep apnea, hypoventilation and hypoxemia.    Literature provided regarding sleep apnea.      He will follow up with me in approximately two weeks after his sleep study has been competed to review the results and discuss plan of care.       Polysomnography reviewed.  Obstructive sleep apnea reviewed.  Complications of untreated sleep apnea were reviewed.    Yassine Christianson PA-C    CC: Ronaldo Corral        "

## 2017-10-12 NOTE — PATIENT INSTRUCTIONS

## 2017-10-30 ENCOUNTER — THERAPY VISIT (OUTPATIENT)
Dept: SLEEP MEDICINE | Facility: CLINIC | Age: 82
End: 2017-10-30
Payer: COMMERCIAL

## 2017-10-30 ENCOUNTER — OFFICE VISIT (OUTPATIENT)
Dept: NURSING | Facility: CLINIC | Age: 82
End: 2017-10-30
Payer: COMMERCIAL

## 2017-10-30 DIAGNOSIS — R00.1 BRADYCARDIA: ICD-10-CM

## 2017-10-30 DIAGNOSIS — R53.83 MALAISE AND FATIGUE: ICD-10-CM

## 2017-10-30 DIAGNOSIS — G47.30 SLEEP APNEA, UNSPECIFIED TYPE: ICD-10-CM

## 2017-10-30 DIAGNOSIS — R06.00 DYSPNEA AND RESPIRATORY ABNORMALITY: ICD-10-CM

## 2017-10-30 DIAGNOSIS — R06.89 DYSPNEA AND RESPIRATORY ABNORMALITY: ICD-10-CM

## 2017-10-30 DIAGNOSIS — G47.30 SLEEP DISORDER BREATHING: Chronic | ICD-10-CM

## 2017-10-30 DIAGNOSIS — Z72.820 LACK OF ADEQUATE SLEEP: ICD-10-CM

## 2017-10-30 DIAGNOSIS — I10 ESSENTIAL HYPERTENSION: ICD-10-CM

## 2017-10-30 DIAGNOSIS — R53.81 MALAISE AND FATIGUE: ICD-10-CM

## 2017-10-30 LAB
% O2SAT: ABNORMAL % (ref 92–100)
BASE EXCESS BLDA CALC-SCNC: -6 MMOL/L (ref -9–1.8)
FIO2: ABNORMAL
HCO3 BLDA-SCNC: 19.7 MMOL/L (ref 21–28)
PCO2 BLDA: 33.6 MMHG (ref 35–45)
PH BLDA: 7.38 [PH] (ref 7.35–7.45)
PO2 BLD: 87 MMHG (ref 80–105)

## 2017-10-30 PROCEDURE — 99207 ZZC DROP WITH A PROCEDURE: CPT

## 2017-10-30 PROCEDURE — 36600 WITHDRAWAL OF ARTERIAL BLOOD: CPT

## 2017-10-30 PROCEDURE — 82805 BLOOD GASES W/O2 SATURATION: CPT

## 2017-10-30 PROCEDURE — 95810 POLYSOM 6/> YRS 4/> PARAM: CPT | Performed by: INTERNAL MEDICINE

## 2017-10-30 NOTE — MR AVS SNAPSHOT
After Visit Summary   10/30/2017    Manuel Rosales    MRN: [id number]           Patient Information     Date Of Birth          3/22/1931        Visit Information        Provider Department      10/30/2017 8:00 PM BK BED 2 [facility]        Today's Diagnoses     Lack of adequate sleep        Essential hypertension        Sleep apnea, unspecified type        Dyspnea and respiratory abnormality        Malaise and fatigue           Follow-ups after your visit        Your next 10 appointments already scheduled     Nov 13, 2017  1:00 PM CST   Return Sleep Patient with ALEX Matthew   [facility] [place]    [address] [address] [address]   [phone]            Jan 08, 2018 11:00 AM CST   [facility]    [address] [place] [id number] [phone]           Bring a current list of meds and any records pertaining to this visit. For Physicals, please bring immunization records and any forms needing to be filled out. Please arrive 10 minutes early to complete paperwork.            Feb 06, 2018 12:45 PM CST   Return Visit with Willy Cardoso MD   [facility] ([address] [address] [phone]              Who to contact     If you have questions or need follow up information about today's clinic visit or your schedule please contact [facility] directly at [phone].  Normal or non-critical lab and imaging results will be communicated to you by MyChart, letter or phone within 4 business days after the clinic has received the results. If you do not hear from us within 7 days, please contact the clinic through MyChart or phone. If you have a critical or abnormal lab result, we will notify you by phone as soon as  "possible.  Submit refill requests through [employer] - /l?k â€¢ splik/ or call your pharmacy and they will forward the refill request to us. Please allow 3 business days for your refill to be completed.          Additional Information About Your Visit        [employer]am[employer] Information     [employer] - /l?k â€¢ splik/ lets you send messages to your doctor, view your test results, renew your prescriptions, schedule appointments and more. To sign up, go to [email or web address] - /l?k â€¢ splik/ . Click on \"Log in\" on the left side of the screen, which will take you to the Welcome page. Then click on \"Sign up Now\" on the right side of the page.     You will be asked to enter the access code listed below, as well as some personal information. Please follow the directions to create your username and password.     Your access code is: [email or web address]  Expires: 1/10/2018  1:28 PM     Your access code will  in 90 days. If you need help or a new code, please call your [place] clinic or [phone].        Care EveryWhere ID     This is your Care EveryWhere ID. This could be used by other organizations to access your [place] medical records  [id number]         Blood Pressure from Last 3 Encounters:   10/12/17 161/67   17 134/48   17 169/58    Weight from Last 3 Encounters:   10/12/17 89.4 kg (197 lb)   17 86.5 kg (190 lb 12.8 oz)   17 83.5 kg (184 lb)              We Performed the Following     Comprehensive Sleep Study        Primary Care Provider Office Phone # Fax #    Ronaldo Corral [phone]       [id number] [place]  [facility] [address]        Equal Access to Services     [facility] : Hadii sarah johnson hadasho [place], waaxda luqadaha, qaybta kaalmada adegrayda, radha jimenez. So [facility] [phone].    ATENCIÓN: Si habla español, tiene a claire disposición servicios gratuitos de asistencia lingüística. Llame al [phone].    We comply with applicable federal civil rights laws and [place] laws. We do not " discriminate on the basis of race, color, national origin, age, disability, sex, sexual orientation, or gender identity.            Thank you!     Thank you for choosing [facility]  for your care. Our goal is always to provide you with excellent care. Hearing back from our patients is one way we can continue to improve our services. Please take a few minutes to complete the written survey that you may receive in the mail after your visit with us. Thank you!             Your Updated Medication List - Protect others around you: Learn how to safely use, store and throw away your medicines at www.disposemymeds.org.          This list is accurate as of: 10/30/17 11:59 PM.  Always use your most recent med list.                   Brand Name Dispense Instructions for use Diagnosis    amLODIPine 10 MG tablet    NORVASC     Take 1 tablet (10 mg) by mouth daily for blood pressure.    Benign hypertension with chronic kidney disease, stage III       atorvastatin 80 MG tablet    LIPITOR    90 tablet    Take 1 tablet (80 mg) by mouth daily for cholesterol.    Hyperlipidemia LDL goal <100       brimonidine 0.15 % ophthalmic solution    ALPHAGAN-P    1 Bottle    Place 1 drop into both eyes 2 times daily    Primary open angle glaucoma of both eyes, moderate stage       dorzolamide-timolol 2-0.5 % ophthalmic solution    COSOPT    1 Bottle    Place 1 drop into both eyes 2 times daily    Primary open angle glaucoma of both eyes, moderate stage       furosemide 20 MG tablet    LASIX    180 tablet    Take 1 tablet (20 mg) by mouth 2 times daily as needed for leg swelling.    Lower extremity edema       glipiZIDE 10 MG tablet    GLUCOTROL    180 tablet    Take 1 tablet (10 mg) by mouth 2 times daily (with meals) for diabetes.    Uncontrolled type 2 diabetes mellitus with microalbuminuria, with long-term current use of insulin (H)       hydrochlorothiazide 25 MG tablet    HYDRODIURIL    90 tablet    Take 1 tablet (25 mg) by  mouth daily for blood pressure and leg swelling.    Benign hypertension with chronic kidney disease, stage III       insulin glargine 100 UNIT/ML injection    LANTUS SOLOSTAR    30 mL    Inject 22 units under the skin daily.    Type 2 diabetes mellitus with hyperglycemia, without long-term current use of insulin (H)       insulin pen needle 32G X 4 MM    BD CAROL ANN U/F    100 each    Use 1 daily as directed.    Type 2 diabetes mellitus with hyperglycemia, without long-term current use of insulin (H)       latanoprost 0.005 % ophthalmic solution    XALATAN    1 Bottle    Place 1 drop into both eyes At Bedtime    Primary open angle glaucoma of both eyes, moderate stage       lisinopril 40 MG tablet    PRINIVIL/ZESTRIL    90 tablet    Take 1 tablet (40 mg) by mouth daily for blood pressure.    Benign hypertension with chronic kidney disease, stage III       terazosin 1 MG capsule    HYTRIN    180 capsule    Take 2 capsules (2 mg) by mouth daily for blood pressure.    Benign hypertension with chronic kidney disease, stage III       vitamin D 2000 UNITS Caps      Take 1 capsule by mouth daily

## 2017-10-30 NOTE — PROGRESS NOTES
I plan to go over this at your follow-up appointment with me. It doesn't not work well to explain it over the phone

## 2017-10-30 NOTE — MR AVS SNAPSHOT
After Visit Summary   10/30/2017    Manuel Rosales    MRN: [id number]           Patient Information     Date Of Birth          3/22/1931        Visit Information        Provider Department      10/30/2017 9:30 AM PFT LAB [facility]        Today's Diagnoses     Essential hypertension        Sleep apnea, unspecified type        Dyspnea and respiratory abnormality        Malaise and fatigue           Follow-ups after your visit        Your next 10 appointments already scheduled     Oct 30, 2017 10:10 AM CDT   LAB with LAB FIRST FLOOR [facility] ([facility])    [id number] [place] [id number]   [phone]           Patient must bring picture ID. Patient should be prepared to give a urine specimen  Please do not eat 10-12 hours before your appointment if you are coming in fasting for labs on lipids, cholesterol, or glucose (sugar). Pregnant women should follow their Care Team instructions. Water with medications is okay. Do not drink coffee or other fluids. If you have concerns about taking  your medications, please ask at office or if scheduling via [employer], send a message by clicking on Secure Messaging, Message Your Care Team.            Oct 30, 2017  8:00 PM CDT   Psg Split W/Tcm with BK BED 2   [facility] [place]    [address] [address]   [phone]            Nov 13, 2017  1:00 PM CST   Return Sleep Patient with ALEX Matthew   [facility] [place] [place]    [address] [address]   [phone]            Jan 08, 2018 11:00 AM CST   [facility]do [facility]ral [facility]    [address] [facility] [address]   [phone]           Bring a current list of meds and  any records pertaining to this visit. For Physicals, please bring immunization records and any forms needing to be filled out. Please arrive 10 minutes early to complete paperwork.            2018 12:45 PM CST   [facility] Visit with Willy Cardoso MD   [facility]    [address] [facility] [address]  [place] [id number] [phone]              Who to contact     If you have questions or need follow up information about today's clinic visit or your schedule please contact [facility] directly at [phone].  Normal or non-critical lab and imaging results will be communicated to you by [employer], letter or phone within 4 business days after the clinic has received the results. If you do not hear from us within 7 days, please contact the clinic through [employer] or phone. If you have a critical or abnormal lab result, we will notify you by phone as soon as possible.  Submit refill requests through [employer] or call your pharmacy and they will forward the refill request to us. Please allow 3 business days for your refill to be completed.          Additional Information About Your Visit        [employer] Information     [employer] is an electronic gateway that provides easy, online access to your medical records. With [employer], you can request a clinic appointment, read your test results, renew a prescription or communicate with your care team.     To sign up for [employer] visit the website at [email or web address]   You will be asked to enter the access code listed below, as well as some personal information. Please follow the directions to create your username and password.     Your access code is: [email or web address]  Expires: 1/10/2018  1:28 PM     Your access code will  in 90 days. If you need help or a new code, please contact your [facility] or call [phone] for assistance.        Care EveryWhere ID     This is your Care  EveryWhere ID. This could be used by other organizations to access your [facility] medical records  [id number]         Blood Pressure from Last 3 Encounters:   10/12/17 161/67   09/07/17 134/48   08/07/17 169/58    Weight from Last 3 Encounters:   10/12/17 89.4 kg (197 lb)   09/07/17 86.5 kg (190 lb 12.8 oz)   08/07/17 83.5 kg (184 lb)              We Performed the Following     Blood gas arterial and oxyhgb     ISTAT gases arterial POCT        Primary Care Provider Office Phone # Fax #    Ronaldo Corral [phone]       [address]  [place] [facility] [address]        Equal Access to Services     EDWINA CUEVAS : Hadii aad ku hadasho Soomaali, waaxda luqadaha, qaybta kaalmada adeegyada, waxay idiin haysukumar sheppard . So [facility] [phone].    ATENCIÓN: Si habla español, tiene a claire disposición servicios gratuitos de asistencia lingüística. Llame al [phone].    We comply with applicable federal civil rights laws and [place] laws. We do not discriminate on the basis of race, color, national origin, age, disability, sex, sexual orientation, or gender identity.            Thank you!     Thank you for choosing [facility]  for your care. Our goal is always to provide you with excellent care. Hearing back from our patients is one way we can continue to improve our services. Please take a few minutes to complete the written survey that you may receive in the mail after your visit with us. Thank you!             Your Updated Medication List - Protect others around you: Learn how to safely use, store and throw away your medicines at www.disposemymeds.org.          This list is accurate as of: 10/30/17  9:46 AM.  Always use your most recent med list.                   Brand Name Dispense Instructions for use Diagnosis    amLODIPine 10 MG tablet    NORVASC     Take 1 tablet (10 mg) by mouth daily for blood pressure.    Benign hypertension with chronic kidney disease, stage  III       atorvastatin 80 MG tablet    LIPITOR    90 tablet    Take 1 tablet (80 mg) by mouth daily for cholesterol.    Hyperlipidemia LDL goal <100       brimonidine 0.15 % ophthalmic solution    ALPHAGAN-P    1 Bottle    Place 1 drop into both eyes 2 times daily    Primary open angle glaucoma of both eyes, moderate stage       dorzolamide-timolol 2-0.5 % ophthalmic solution    COSOPT    1 Bottle    Place 1 drop into both eyes 2 times daily    Primary open angle glaucoma of both eyes, moderate stage       furosemide 20 MG tablet    LASIX    180 tablet    Take 1 tablet (20 mg) by mouth 2 times daily as needed for leg swelling.    Lower extremity edema       glipiZIDE 10 MG tablet    GLUCOTROL    180 tablet    Take 1 tablet (10 mg) by mouth 2 times daily (with meals) for diabetes.    Uncontrolled type 2 diabetes mellitus with microalbuminuria, with long-term current use of insulin (H)       hydrochlorothiazide 25 MG tablet    HYDRODIURIL    90 tablet    Take 1 tablet (25 mg) by mouth daily for blood pressure and leg swelling.    Benign hypertension with chronic kidney disease, stage III       insulin glargine 100 UNIT/ML injection    LANTUS SOLOSTAR    30 mL    Inject 22 units under the skin daily.    Type 2 diabetes mellitus with hyperglycemia, without long-term current use of insulin (H)       insulin pen needle 32G X 4 MM    BD CAROL ANN U/F    100 each    Use 1 daily as directed.    Type 2 diabetes mellitus with hyperglycemia, without long-term current use of insulin (H)       latanoprost 0.005 % ophthalmic solution    XALATAN    1 Bottle    Place 1 drop into both eyes At Bedtime    Primary open angle glaucoma of both eyes, moderate stage       lisinopril 40 MG tablet    PRINIVIL/ZESTRIL    90 tablet    Take 1 tablet (40 mg) by mouth daily for blood pressure.    Benign hypertension with chronic kidney disease, stage III       terazosin 1 MG capsule    HYTRIN    180 capsule    Take 2 capsules (2 mg) by mouth daily for  blood pressure.    Benign hypertension with chronic kidney disease, stage III       vitamin D 2000 UNITS Caps      Take 1 capsule by mouth daily

## 2017-10-30 NOTE — PROGRESS NOTES
ABG Note:  Completed ABG draw on RA after performing a positive José Miguel's test on the right radial.  SPO2 at time of draw 96%.

## 2017-10-31 LAB
HCO3 BLD-SCNC: NORMAL MMOL/L (ref 21–28)
PCO2 BLD: NORMAL MM HG (ref 35–45)
PH BLD: NORMAL PH (ref 7.35–7.45)
PO2 BLD: NORMAL MM HG (ref 80–105)
SAO2 % BLDA FROM PO2: NORMAL % (ref 92–100)

## 2017-11-01 PROBLEM — R00.1 BRADYCARDIA: Status: ACTIVE | Noted: 2017-11-01

## 2017-11-01 NOTE — PROCEDURES
" SLEEP STUDY INTERPRETATION  POLYSOMNOGRAPHY REPORT      Patient: Manuel Rosales  YOB: 1931  Study Date: 10/30/2017  MRN: [id number]  Referring Provider: Ronaldo Corral  Ordering Provider: Yassine JOHNSON    Indications for Polysomnography: The patient is a 86 y old who is 5' 8\" and 197.0 lbs.   BMI is 30.2, [place] sleepiness scale 7 and neck size is 42. A diagnostic polysomnogram was performed to evaluate for snoring, witnessed apnea, daytime fatigue, non refreshing sleep, crowded oropharynx and co-morbid HTN, HFpEF and possibly pulmonary HTN.    Polysomnogram Data:  A full night polysomnogram recorded the standard physiologic parameters including EEG, EOG, EMG, ECG, nasal and oral airflow.  Respiratory parameters of chest and abdominal movements were recorded with respiratory inductance plethysmography.  Oxygen saturation was recorded by pulse oximetry.      Sleep Architecture:   The total recording time of the polysomnogram was 422.0 minutes.  The total sleep time was 288.5 minutes.  Sleep latency was normal at 15.0 minutes without the use of a sleep aid.  REM latency was 137.5 minutes.  Arousal index was increased at 64.9 arousals per hour.  Sleep efficiency was decreased at 68.4%.  Wake after sleep onset was 111.0 minutes.  The patient spent 40.7% of total sleep time in Stage N1, 37.3% in Stage N2, 13.2% in Stages N3, and 8.8% in REM.  Time in REM supine was 0 minutes.    Respiration:     Events - The polysomnogram revealed a presence of 9 obstructive, 22 central, and 1 mixed apneas resulting in an apnea index of 6.7 events per hour.  There were 58 hypopneas (obstructive and central) resulting in a hypopnea index of 12.1 events per hour.  The combined apnea/hypopnea index was 18.7 events per hour.  The REM AHI was 14.1 events per hour.  The supine AHI was 64.2 events per hour.  The RERA index was 24.1 events per hour.   The RDI was 42.8 events per hour.    Snoring - was reported as moderate and " intermittent.    Respiratory rate and pattern - Most of the respiratory events appeared periodic, though were not completely regular. Periodicity was about 60 seconds, and circulation times about 30 seconds.     Sustained Sleep Associated Hypoventilation - Transcutaneous carbon dioxide monitoring used, however significant hypoventilation was not suggested with a maximum change of 6  mmHg (peak 42 mmHg).    Sleep Associated Hypoxemia - (Greater than 5 minutes O2 sat below 89%) was not present.  Baseline oxygen saturation was 94.0%. Lowest oxygen saturation was 85.4%.  Time spent less than or equal to 88% was 1.3 minutes.  Time spent less than or equal to 89% was 3.2 minutes.  42.8 24.1 18.7     Movement Activity:    Periodic Limb Activity - There were 110 PLMs during the entire study. The PLM index was 22.9 movements per hour.  The PLM Arousal Index was 8.3 per hour.    REM EMG Activity - Excessive transient / sustained muscle activity was not present.    Nocturnal Behavior - Abnormal sleep related behaviors were not noted    Bruxism - None apparent.    Cardiac Summary:  The presenting rhythm while awake is sinus with second degree block and 2:1 conduction with occasional ?high junctional escape beats. This rhythm persisted throughout study.  The average pulse rate was 37.8 bpm.  The minimum pulse rate was 33.9 bpm. The maximum pulse rate was 79.1 bpm and occurred only after awakened at the end of the study when 1:1 conduction occurred.                 Assessment:     Moderate sleep disordered breathing, sever in the supine position.  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     Periodic limb movements    Recommendations:    Patient was contacted and recommended to go in to see his cardiologist and have an EKG performed 11/1/17.      Recommend evaluation by cardiology to maximize heart  failure treatment, consider pacemaker.     If significant changes are implemented would consider repeat baseline study. If no significant clinical changes occur would consider polysomnogram with all night titration of PAP.     Pharmacologic therapy should be used for management of restless legs syndrome only if present and clinically indicated and not based on the presence of periodic limb movements alone.        _____________________________________   Ti Adorno MD 11/1/17             Range(%) Time in range (min) Time in range (%) Time in or below range (min) Time in or below range (%)   0.0 - 89.0 3.2 0.8% 3.2 0.8%   0.0 - 88.0 1.3 0.3% 1.3 0.3%      18.7 64.2 14.1

## 2017-11-02 PROBLEM — R00.1 BRADYCARDIA: Chronic | Status: ACTIVE | Noted: 2017-11-01

## 2017-11-12 ENCOUNTER — HEALTH MAINTENANCE LETTER (OUTPATIENT)
Age: 82
End: 2017-11-12

## 2017-11-13 ENCOUNTER — OFFICE VISIT (OUTPATIENT)
Dept: SLEEP MEDICINE | Facility: CLINIC | Age: 82
End: 2017-11-13
Payer: COMMERCIAL

## 2017-11-13 VITALS
HEIGHT: 67 IN | OXYGEN SATURATION: 97 % | WEIGHT: 202 LBS | HEART RATE: 42 BPM | DIASTOLIC BLOOD PRESSURE: 71 MMHG | SYSTOLIC BLOOD PRESSURE: 158 MMHG | BODY MASS INDEX: 31.71 KG/M2

## 2017-11-13 DIAGNOSIS — G47.30 SLEEP DISORDER BREATHING: Primary | ICD-10-CM

## 2017-11-13 PROCEDURE — 99213 OFFICE O/P EST LOW 20 MIN: CPT | Performed by: PHYSICIAN ASSISTANT

## 2017-11-13 NOTE — PROGRESS NOTES
Sleep Study Follow-Up Visit:    Date on this visit: 11/13/2017    Manuel Rosales comes in today for follow-up of his sleep study done on 10/30/2017 at the [facility] for Snoring, witnessed apnea, daytime fatigue, non refreshing sleep, crowded oropharynx and co-morbid HTN, HFpEF and possibly pulmonary HTN.    Polysomnogram-  Sleep Architecture:   The total recording time of the polysomnogram was 422.0 minutes.  The total sleep time was 288.5 minutes.  Sleep latency was normal at 15.0 minutes without the use of a sleep aid.  REM latency was 137.5 minutes.  Arousal index was increased at 64.9 arousals per hour.  Sleep efficiency was decreased at 68.4%.  Wake after sleep onset was 111.0 minutes.  The patient spent 40.7% of total sleep time in Stage N1, 37.3% in Stage N2, 13.2% in Stages N3, and 8.8% in REM.  Time in REM supine was 0 minutes.     Respiration:     Events - The polysomnogram revealed a presence of 9 obstructive, 22 central, and 1 mixed apneas resulting in an apnea index of 6.7 events per hour.  There were 58 hypopneas (obstructive and central) resulting in a hypopnea index of 12.1 events per hour.  The combined apnea/hypopnea index was 18.7 events per hour.  The REM AHI was 14.1 events per hour.  The supine AHI was 64.2 events per hour.  The RERA index was 24.1 events per hour.   The RDI was 42.8 events per hour.    Snoring - was reported as moderate and intermittent.    Respiratory rate and pattern - Most of the respiratory events appeared periodic, though were not completely regular. Periodicity was about 60 seconds, and circulation times about 30 seconds.     Sustained Sleep Associated Hypoventilation - Transcutaneous carbon dioxide monitoring used, however significant hypoventilation was not suggested with a maximum change of 6  mmHg (peak 42 mmHg).    Sleep Associated Hypoxemia - (Greater than 5 minutes O2 sat below 89%) was not present.  Baseline oxygen saturation was  94.0%. Lowest oxygen saturation was 85.4%.  Time spent less than or equal to 88% was 1.3 minutes.  Time spent less than or equal to 89% was 3.2 minutes.     Movement Activity:    Periodic Limb Activity - There were 110 PLMs during the entire study. The PLM index was 22.9 movements per hour.  The PLM Arousal Index was 8.3 per hour.    REM EMG Activity - Excessive transient / sustained muscle activity was not present.    Nocturnal Behavior - Abnormal sleep related behaviors were not noted    Bruxism - None apparent.     Cardiac Summary:  The presenting rhythm while awake is sinus with second degree block and 2:1 conduction with occasional ?high junctional escape beats. This rhythm persisted throughout study.  The average pulse rate was 37.8 bpm.  The minimum pulse rate was 33.9 bpm. The maximum pulse rate was 79.1 bpm and occurred only after awakened at the end of the study when 1:1 conduction occurred.           These findings were reviewed with patient. A copy of the sleep study was given to the patient for his records.     Past medical/surgical history, family history, social history, medications and allergies were reviewed.      Problem List:  Patient Active Problem List    Diagnosis Date Noted     Sleep disorder breathing 11/01/2017     Priority: Medium     Study Date: 10/30/2017 (197.0 lb) apnea/hypopnea index was 18.7 events per hour.  The REM AHI was 14.1 events per hour.  The supine AHI was 64.2 events per hour. RDI was 42.8 events per hour.  Most of the repiratory events appeared periodic, though werer not completely regular. Periodicity was about 60 seconds, and circulation times about 30 seconds. Significant hypoventilation was not suggested with a maximum change of 6 mmHg (peak 42 mmHg).  Lowest oxygen saturation was 85.4%.  Time spent less than or equal to 88% was 1.3 minutes.  Time spent less than or equal to 89% was 3.2 minutes. PLM index was 22.9 movements per hour. The presenting rhythm while awake  is sinus with second degree block and 2:1 conduction with occasional ?high junctional escape beats. This rhythm persisted throughout study.  The average pulse rate was 37.8 bpm.  The minimum pulse rate was 33.9 bpm. The maximum pulse rate was 79.1 bpm and occurred only after awakened at the end of the study when 1:1 conduction occurred.        Bradycardia 11/01/2017     Priority: Medium     Sleep stydu 10/30/17 Cardiac Summary:  The presenting rhythm while awake is sinus with second degree block and 2:1 conduction with occasional ?high junctional escape beats. This rhythm persisted throughout study.  The average pulse rate was 37.8 bpm.  The minimum pulse rate was 33.9 bpm. The maximum pulse rate was 79.1 bpm and occurred only after awakened at the end of the study when 1:1 conduction occurred.        HF (heart failure), diastolic (H) 10/12/2017     Priority: Medium     Pulmonary hypertension 10/10/2017     Priority: Medium     ECHO Study Date: 07/14/2017   Interpretation Summary  Global and regional left ventricular function is normal with an EF of 55-60%.  Mild right ventricular dilation is present. Global right ventricular function  is normal.  Mild to moderate mitral, mild tricuspid insufficiency and mild aortic stenosis  is present.  PUlmonary hypertension present. Estimated pulmonary artery systolic pressure  is 67 mmHg plus right atrial pressure.       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  Manuel Rosales has no plan or code status on file. Discussed available resources and provided with information on 01/21/2014. Confirmed code status reflects current choices pending further ACP discussions.  Confirmed/documented designated decision maker(s). See permanent comments section of demographics in clinical tab.   Added by Pearl Kaur on 2/24/2014  Discussed advance care planning with patient; information given to patient to review. July 25, 2011  Discussed advance care planning with patient; information given to patient to review. January 21, 2014        Hypertension goal BP (blood pressure) < 130/80 01/25/2011     Priority: Medium     Posterior vitreous detachment, ou 11/16/2010     Priority: Medium     Pseudophakia, ou 11/15/2010     Priority: Medium     Uncontrolled type 2 diabetes mellitus with microalbuminuria, with long-term current use of insulin (H)      Priority: Medium     Type 2 diabetes, HbA1C goal < 8%       Hyperlipidemia LDL goal <100      Priority: Medium     Benign hypertension with chronic kidney disease, stage III      Priority: Medium     Colon cancer (H) 08/01/2002     Priority: Medium     malignant polyp on colonoscopy, next colonoscopy due 2017      /71  Pulse (!) 42  Ht 1.702 m (5' 7\")  Wt 91.6 kg (202 lb)  SpO2 97%  BMI 31.64 kg/m2    Impression:  1. Moderate sleep disordered breathing, severe in the supine position, but minimum events in non-supine positions(AHI ~3).  Primary problem appears to be related to periodic breathing, though at least some  JOYCE is also present.   2. No sleep related hypoxemia.   3. Bradycardia associated with second degree block with 2:1 conduction.   4. Periodic limb movements.  Plan  1. Recommend Sleep Positioning Device to prevent patient from supine " sleeping.   2. Patient was contacted and recommended to go and to see his cardiologist Dr. Lyla Avalos who recommended a Holter Monitor. He is awaiting results.   3. Patient had elevated periodic limp movements during the study. The majority of these were not associated with cortical arousals. Patient denies wakeful motor restlessness. Will monitor.     He will follow up with me in about 2 month(s).     Fifteen minutes spent with patient, all of which were spent face-to-face counseling, consulting, coordinating plan of care regarding sleep disordered breathing.      Yassine Christianson PA-C    CC: Ronaldo Corral

## 2017-11-13 NOTE — NURSING NOTE
"Chief Complaint   Patient presents with     Study Results       Initial There were no vitals taken for this visit. Estimated body mass index is 29.95 kg/(m^2) as calculated from the following:    Height as of 10/12/17: 1.727 m (5' 8\").    Weight as of 10/12/17: 89.4 kg (197 lb).  Medication Reconciliation: complete    "

## 2017-11-13 NOTE — MR AVS SNAPSHOT
After Visit Summary   11/13/2017    Manuel Rosales    MRN: [id number]           Patient Information     Date Of Birth          3/22/1931        Visit Information        Provider Department      11/13/2017 1:00 PM Yassine Christianson PA [facility]        Today's Diagnoses     Sleep disorder breathing    -  1      Care Instructions      Your BMI is Body mass index is 31.64 kg/(m^2).  Weight management is a personal decision.  If you are interested in exploring weight loss strategies, the following discussion covers the approaches that may be successful. Body mass index (BMI) is one way to tell whether you are at a healthy weight, overweight, or obese. It measures your weight in relation to your height.  A BMI of 18.5 to 24.9 is in the healthy range. A person with a BMI of 25 to 29.9 is considered overweight, and someone with a BMI of 30 or greater is considered obese. More than two-thirds of American adults are considered overweight or obese.  Being overweight or obese increases the risk for further weight gain. Excess weight may lead to heart disease and diabetes.  Creating and following plans for healthy eating and physical activity may help you improve your health.  Weight control is part of healthy lifestyle and includes exercise, emotional health, and healthy eating habits. Careful eating habits lifelong are the mainstay of weight control. Though there are significant health benefits from weight loss, long-term weight loss with diet alone may be very difficult to achieve- studies show long-term success with dietary management in less than 10% of people. Attaining a healthy weight may be especially difficult to achieve in those with severe obesity. In some cases, medications, devices and surgical management might be considered.  What can you do?  If you are overweight or obese and are interested in methods for weight loss, you should discuss this with your provider.     Consider  reducing daily calorie intake by 500 calories.     Keep a food journal.     Avoiding skipping meals, consider cutting portions instead.    Diet combined with exercise helps maintain muscle while optimizing fat loss. Strength training is particularly important for building and maintaining muscle mass. Exercise helps reduce stress, increase energy, and improves fitness. Increasing exercise without diet control, however, may not burn enough calories to loose weight.       Start walking three days a week 10-20 minutes at a time    Work towards walking thirty minutes five days a week     Eventually, increase the speed of your walking for 1-2 minutes at time    In addition, we recommend that you review healthy lifestyles and methods for weight loss available through the National Institutes of Health patient information sites:  http://win.niddk.nih.gov/publications/index.htm    And look into health and wellness programs that may be available through your health insurance provider, employer, local community center, or ita club.    Weight management plan: Patient was referred to their PCP to discuss a diet and exercise plan.  To prevent from sleeping on your back:  1. Go to Amazon.com  2. Search for SlumberBump Anti Snore Sleep Belt.             Follow-ups after your visit        Follow-up notes from your care team     Return in about 2 months (around 1/13/2018).      Your next 10 appointments already scheduled     Jan 08, 2018 11:00 AM CST   [facility]    [address] [place] [id number] [phone]           Bring a current list of meds and any records pertaining to this visit. For Physicals, please bring immunization records and any forms needing to be filled out. Please arrive 10 minutes early to complete paperwork.            Jan 12, 2018  1:00 PM CST   Return Sleep Patient with ALEX Matthew  "  [facility]    [address] [address] [address]   [phone]            2018 12:45 PM CST   Return Visit with Willy Cardoso MD   [facility]    [address]  [place] [address]   [phone]              Who to contact     If you have questions or need follow up information about today's clinic visit or your schedule please contact [facility] directly at [phone].  Normal or non-critical lab and imaging results will be communicated to you by MyChart, letter or phone within 4 business days after the clinic has received the results. If you do not hear from us within 7 days, please contact the clinic through MyChart or phone. If you have a critical or abnormal lab result, we will notify you by phone as soon as possible.  Submit refill requests through [facility] or call your pharmacy and they will forward the refill request to us. Please allow 3 business days for your refill to be completed.          Additional Information About Your Visit        [employer] Information     [facility] lets you send messages to your doctor, view your test results, renew your prescriptions, schedule appointments and more. To sign up, go to [email or web address] . Click on \"Log in\" on the left side of the screen, which will take you to the Welcome page. Then click on \"Sign up Now\" on the right side of the page.     You will be asked to enter the access code listed below, as well as some personal information. Please follow the directions to create your username and password.     Your access code is: [email or web address]  Expires: 1/10/2018 12:28 PM     Your access code will  in 90 days. If you need help or a new code, please call your [place] clinic or [phone].        Care EveryWhere ID     This is your Care EveryWhere ID. This could be used by other organizations to access " "your [facility] medical records  [id number]        Your Vitals Were     Pulse Height Pulse Oximetry BMI (Body Mass Index)          42 1.702 m (5' 7\") 97% 31.64 kg/m2         Blood Pressure from Last 3 Encounters:   11/13/17 158/71   10/12/17 161/67   09/07/17 134/48    Weight from Last 3 Encounters:   11/13/17 91.6 kg (202 lb)   10/12/17 89.4 kg (197 lb)   09/07/17 86.5 kg (190 lb 12.8 oz)              We Performed the Following     Sleep Positioning Device  ()        Primary Care Provider Office Phone # Fax #    Ronaldo Corral [phone]       [id number]  [facility] [address]        Equal Access to Services     [facility] : Hadii aad ku hadasho Soomaali, waaxda luqadaha, qaybta kaalmada adeegyada, waxay crispin haysukumar sheppard . So [facility] [phone].    ATENCIÓN: Si habla español, tiene a claire disposición servicios gratuitos de asistencia lingüística. Chad al [phone].    We comply with applicable federal civil rights laws and [place] laws. We do not discriminate on the basis of race, color, national origin, age, disability, sex, sexual orientation, or gender identity.            Thank you!     Thank you for choosing [facility]  for your care. Our goal is always to provide you with excellent care. Hearing back from our patients is one way we can continue to improve our services. Please take a few minutes to complete the written survey that you may receive in the mail after your visit with us. Thank you!             Your Updated Medication List - Protect others around you: Learn how to safely use, store and throw away your medicines at www.disposemymeds.org.          This list is accurate as of: 11/13/17  1:13 PM.  Always use your most recent med list.                   Brand Name Dispense Instructions for use Diagnosis    amLODIPine 10 MG tablet    NORVASC     Take 1 tablet (10 mg) by mouth daily for blood pressure.    Benign hypertension with " chronic kidney disease, stage III       atorvastatin 80 MG tablet    LIPITOR    90 tablet    Take 1 tablet (80 mg) by mouth daily for cholesterol.    Hyperlipidemia LDL goal <100       brimonidine 0.15 % ophthalmic solution    ALPHAGAN-P    1 Bottle    Place 1 drop into both eyes 2 times daily    Primary open angle glaucoma of both eyes, moderate stage       dorzolamide-timolol 2-0.5 % ophthalmic solution    COSOPT    1 Bottle    Place 1 drop into both eyes 2 times daily    Primary open angle glaucoma of both eyes, moderate stage       furosemide 20 MG tablet    LASIX    180 tablet    Take 1 tablet (20 mg) by mouth 2 times daily as needed for leg swelling.    Lower extremity edema       glipiZIDE 10 MG tablet    GLUCOTROL    180 tablet    Take 1 tablet (10 mg) by mouth 2 times daily (with meals) for diabetes.    Uncontrolled type 2 diabetes mellitus with microalbuminuria, with long-term current use of insulin (H)       hydrochlorothiazide 25 MG tablet    HYDRODIURIL    90 tablet    Take 1 tablet (25 mg) by mouth daily for blood pressure and leg swelling.    Benign hypertension with chronic kidney disease, stage III       insulin glargine 100 UNIT/ML injection    LANTUS SOLOSTAR    30 mL    Inject 22 units under the skin daily.    Type 2 diabetes mellitus with hyperglycemia, without long-term current use of insulin (H)       insulin pen needle 32G X 4 MM    BD CAROL ANN U/F    100 each    Use 1 daily as directed.    Type 2 diabetes mellitus with hyperglycemia, without long-term current use of insulin (H)       latanoprost 0.005 % ophthalmic solution    XALATAN    1 Bottle    Place 1 drop into both eyes At Bedtime    Primary open angle glaucoma of both eyes, moderate stage       lisinopril 40 MG tablet    PRINIVIL/ZESTRIL    90 tablet    Take 1 tablet (40 mg) by mouth daily for blood pressure.    Benign hypertension with chronic kidney disease, stage III       terazosin 1 MG capsule    HYTRIN    180 capsule    Take 2  capsules (2 mg) by mouth daily for blood pressure.    Benign hypertension with chronic kidney disease, stage III       vitamin D 2000 UNITS Caps      Take 1 capsule by mouth daily

## 2017-11-13 NOTE — PATIENT INSTRUCTIONS

## 2017-12-17 NOTE — TELEPHONE ENCOUNTER
Requested Prescriptions   Pending Prescriptions Disp Refills     glipiZIDE (GLUCOTROL) 10 MG tablet [Pharmacy Med Name: GlipiZIDE 10MG      TAB] 180 tablet 1    Last Written Prescription Date:  5/22/17  Last Fill Quantity: 180,  # refills: 1   Last Office Visit with FMKARIN, SORAIDA or [facility] prescribing provider:  9/7/2017     Future Office Visit:    Next 5 appointments (look out 90 days)     Jan 08, 2018 11:00 AM CST   Office Visit with Ronaldo Corral MD   [facility]    [address] [place] [address]   [phone]            Feb 06, 2018 12:45 PM CST   Return Visit with Willy Cardoso MD   [facility]    [address] [place] [address]   [phone]                  Sig: TAKE ONE TABLET BY MOUTH TWICE DAILY WITH MEALS FOR DIABETES    Sulfonylurea Agents Passed    12/17/2017 10:09 AM       Passed - Patient's BP is less than 140/90    BP Readings from Last 3 Encounters:   11/13/17 158/71   10/12/17 161/67   09/07/17 134/48                Passed - Patient has documented LDL within the past 12 mos.    Recent Labs   Lab Test  05/22/17   0858   LDL  51            Passed - Patient has had a Microalbumin in the past 12 mos.    Recent Labs   Lab Test  02/02/17   0858   MICROL  130   UMALCR  278.97*            Passed - Patient has documented A1c within the specified period of time.    Recent Labs   Lab Test  07/13/17   1143   A1C  6.8*            Passed - Patient is age 18 or older       Passed - Patient has a recent creatinine (normal) within the past 12 mos.    Recent Labs   Lab Test  07/17/17   1211   CR  1.04            Passed - Patient has had an appointment with authorizing provider within the past 6 mos. or  within next 30 days    Patient had office visit in the last 6 months or has a visit in the next 30 days with authorizing provider.  See chart review.

## 2017-12-21 RX ORDER — GLIPIZIDE 10 MG/1
TABLET ORAL
Qty: 180 TABLET | Refills: 1 | Status: SHIPPED | OUTPATIENT
Start: 2017-12-21 | End: 2018-06-22

## 2017-12-21 NOTE — TELEPHONE ENCOUNTER
glipiZIDE (GLUCOTROL) 10 MG tablet  Routing refill request to provider for review/approval because:  Labs out of range:  Microalbumin    Per LOV 09/07/2017 to follow up in 4 months (01/2018)  Next 5 appointments (look out 90 days)     Jan 08, 2018 11:00 AM CST   Office Visit with Ronaldo [facility]ral MD   [facility]    [address] [place] [address]   [phone]            Feb 06, 2018 12:45 PM CST   Return Visit with Willy Cardoso MD   [facility]    [address] [place] [address]   [phone]                Mena Vitale RN, BSN

## 2017-12-27 ENCOUNTER — TRANSFERRED RECORDS (OUTPATIENT)
Dept: HEALTH INFORMATION MANAGEMENT | Facility: CLINIC | Age: 82
End: 2017-12-27

## 2017-12-28 ENCOUNTER — TELEPHONE (OUTPATIENT)
Dept: FAMILY MEDICINE | Facility: CLINIC | Age: 82
End: 2017-12-28

## 2017-12-28 NOTE — TELEPHONE ENCOUNTER
Reason for Call:  Other call back    Detailed comments: Patient's wife wanted to express concern for patient's earwax build up. Patient's wife Francy states that he needs it cleaned but patient forgets to tell Dr Corral when he is in the clinic.     Phone Number Patient can be reached at: Home number on file [phone] (home)    Best Time: Anytime    Can we leave a detailed message on this number? Not Applicable They do not have an answering machine    Call taken on 12/28/2017 at 1:20 PM by Yessy Perdomo

## 2017-12-28 NOTE — TELEPHONE ENCOUNTER
Wife Francy would like this addressed at the appointment on 1/8/2018. Appointment note updated.    Aaliyah Parsons RN   [facility]

## 2018-01-08 ENCOUNTER — OFFICE VISIT (OUTPATIENT)
Dept: FAMILY MEDICINE | Facility: CLINIC | Age: 83
End: 2018-01-08
Payer: COMMERCIAL

## 2018-01-08 VITALS
OXYGEN SATURATION: 97 % | HEIGHT: 67 IN | TEMPERATURE: 97.1 F | SYSTOLIC BLOOD PRESSURE: 134 MMHG | BODY MASS INDEX: 32.02 KG/M2 | HEART RATE: 48 BPM | WEIGHT: 204 LBS | DIASTOLIC BLOOD PRESSURE: 46 MMHG

## 2018-01-08 DIAGNOSIS — I13.0 BENIGN HYPERTENSIVE HEART AND KIDNEY DISEASE WITH DIASTOLIC CONGESTIVE HEART FAILURE, NYHA CLASS 2 AND CHRONIC KIDNEY DISEASE STAGE 3 (H): ICD-10-CM

## 2018-01-08 DIAGNOSIS — E11.29 TYPE 2 DIABETES MELLITUS WITH MICROALBUMINURIA, WITH LONG-TERM CURRENT USE OF INSULIN (H): Primary | ICD-10-CM

## 2018-01-08 DIAGNOSIS — I50.30 BENIGN HYPERTENSIVE HEART AND KIDNEY DISEASE WITH DIASTOLIC CONGESTIVE HEART FAILURE, NYHA CLASS 2 AND CHRONIC KIDNEY DISEASE STAGE 3 (H): ICD-10-CM

## 2018-01-08 DIAGNOSIS — C18.9 MALIGNANT NEOPLASM OF COLON, UNSPECIFIED PART OF COLON (H): ICD-10-CM

## 2018-01-08 DIAGNOSIS — Z79.4 TYPE 2 DIABETES MELLITUS WITH MICROALBUMINURIA, WITH LONG-TERM CURRENT USE OF INSULIN (H): Primary | ICD-10-CM

## 2018-01-08 DIAGNOSIS — N18.30 BENIGN HYPERTENSIVE HEART AND KIDNEY DISEASE WITH DIASTOLIC CONGESTIVE HEART FAILURE, NYHA CLASS 2 AND CHRONIC KIDNEY DISEASE STAGE 3 (H): ICD-10-CM

## 2018-01-08 DIAGNOSIS — E78.5 HYPERLIPIDEMIA LDL GOAL <100: Chronic | ICD-10-CM

## 2018-01-08 DIAGNOSIS — R80.9 TYPE 2 DIABETES MELLITUS WITH MICROALBUMINURIA, WITH LONG-TERM CURRENT USE OF INSULIN (H): Primary | ICD-10-CM

## 2018-01-08 PROBLEM — I50.32 CHRONIC DIASTOLIC HEART FAILURE (H): Status: ACTIVE | Noted: 2017-10-12

## 2018-01-08 LAB
ALT SERPL W P-5'-P-CCNC: 36 U/L (ref 0–70)
ANION GAP SERPL CALCULATED.3IONS-SCNC: 9 MMOL/L (ref 3–14)
BUN SERPL-MCNC: 24 MG/DL (ref 7–30)
CALCIUM SERPL-MCNC: 9.1 MG/DL (ref 8.5–10.1)
CHLORIDE SERPL-SCNC: 105 MMOL/L (ref 94–109)
CHOLEST SERPL-MCNC: 144 MG/DL
CO2 SERPL-SCNC: 22 MMOL/L (ref 20–32)
CREAT SERPL-MCNC: 1.07 MG/DL (ref 0.66–1.25)
CREAT UR-MCNC: 48 MG/DL
ERYTHROCYTE [DISTWIDTH] IN BLOOD BY AUTOMATED COUNT: 13.1 % (ref 10–15)
GFR SERPL CREATININE-BSD FRML MDRD: 65 ML/MIN/1.7M2
GLUCOSE SERPL-MCNC: 216 MG/DL (ref 70–99)
HBA1C MFR BLD: 7.4 % (ref 4.3–6)
HCT VFR BLD AUTO: 42.8 % (ref 40–53)
HDLC SERPL-MCNC: 62 MG/DL
HGB BLD-MCNC: 13.7 G/DL (ref 13.3–17.7)
LDLC SERPL CALC-MCNC: 60 MG/DL
MCH RBC QN AUTO: 29.7 PG (ref 26.5–33)
MCHC RBC AUTO-ENTMCNC: 32 G/DL (ref 31.5–36.5)
MCV RBC AUTO: 93 FL (ref 78–100)
MICROALBUMIN UR-MCNC: 298 MG/L
MICROALBUMIN/CREAT UR: 623.43 MG/G CR (ref 0–17)
NONHDLC SERPL-MCNC: 82 MG/DL
PLATELET # BLD AUTO: 250 10E9/L (ref 150–450)
POTASSIUM SERPL-SCNC: 5.1 MMOL/L (ref 3.4–5.3)
RBC # BLD AUTO: 4.61 10E12/L (ref 4.4–5.9)
SODIUM SERPL-SCNC: 136 MMOL/L (ref 133–144)
TRIGL SERPL-MCNC: 108 MG/DL
WBC # BLD AUTO: 11.4 10E9/L (ref 4–11)

## 2018-01-08 PROCEDURE — 84460 ALANINE AMINO (ALT) (SGPT): CPT | Performed by: FAMILY MEDICINE

## 2018-01-08 PROCEDURE — 80048 BASIC METABOLIC PNL TOTAL CA: CPT | Performed by: FAMILY MEDICINE

## 2018-01-08 PROCEDURE — 99214 OFFICE O/P EST MOD 30 MIN: CPT | Performed by: FAMILY MEDICINE

## 2018-01-08 PROCEDURE — 85027 COMPLETE CBC AUTOMATED: CPT | Performed by: FAMILY MEDICINE

## 2018-01-08 PROCEDURE — 82043 UR ALBUMIN QUANTITATIVE: CPT | Performed by: FAMILY MEDICINE

## 2018-01-08 PROCEDURE — 36415 COLL VENOUS BLD VENIPUNCTURE: CPT | Performed by: FAMILY MEDICINE

## 2018-01-08 PROCEDURE — 83036 HEMOGLOBIN GLYCOSYLATED A1C: CPT | Performed by: FAMILY MEDICINE

## 2018-01-08 PROCEDURE — 80061 LIPID PANEL: CPT | Performed by: FAMILY MEDICINE

## 2018-01-08 RX ORDER — TERAZOSIN 1 MG/1
2 CAPSULE ORAL DAILY
Qty: 180 CAPSULE | Refills: 1 | Status: SHIPPED | OUTPATIENT
Start: 2018-01-08 | End: 2018-02-08

## 2018-01-08 RX ORDER — LISINOPRIL 40 MG/1
40 TABLET ORAL DAILY
Qty: 90 TABLET | Refills: 1 | Status: SHIPPED | OUTPATIENT
Start: 2018-01-08 | End: 2018-07-09

## 2018-01-08 RX ORDER — ATORVASTATIN CALCIUM 80 MG/1
80 TABLET, FILM COATED ORAL DAILY
Qty: 90 TABLET | Refills: 3 | Status: SHIPPED | OUTPATIENT
Start: 2018-01-08 | End: 2019-02-19

## 2018-01-08 RX ORDER — HYDROCHLOROTHIAZIDE 25 MG/1
25 TABLET ORAL DAILY
Qty: 90 TABLET | Refills: 1 | Status: SHIPPED | OUTPATIENT
Start: 2018-01-08 | End: 2018-07-09

## 2018-01-08 ASSESSMENT — PAIN SCALES - GENERAL: PAINLEVEL: NO PAIN (0)

## 2018-01-08 NOTE — MR AVS SNAPSHOT
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  If you receive a survey in the mail, please send us back your thoughts. We really do value your feedback.    Based on your medical history, these are the current health maintenance/preventive care services that you are due for (some may have been done at this visit.)  Health Maintenance Due   Topic Date Due     HF ACTION PLAN Q3 YR  03/22/1931     CBC Q1 YR  03/23/1931     BMP Q6 MOS  05/29/2017     FOOT EXAM Q1 YEAR  07/28/2017     COLONOSCOPY Q5 YR  08/31/2017     A1C Q6 MO  01/13/2018     MICROALBUMIN Q1 YEAR  02/02/2018         Suggested websites for health information:  Www.SkillSlate.46elks : Up to date and easily searchable information on multiple topics.  Www.medlineplus.gov : medication info, interactive tutorials, watch real surgeries online  Www.familydoctor.org : good info from the Academy of Family Physicians  Www.cdc.gov : public health info, travel advisories, epidemics (H1N1)  Www.aap.org : children's health info, normal development, vaccinations  Www.health.state.[place].us : [place] dept of health, public health issues in [place], N1N1    Your care team:                            Family Medicine Internal Medicine   Ronaldo Corral MD  MD Lanny Man MD Katya Georgiev PA-C Nam Ho, MD Pediatrics   ERNST Calderon, DAVID Arechiga APRMD Dorene Diaz CNP, MD Deborah Mielke, MD Kim Thein, APRN [facility]      Clinic hours: Monday - Thursday 7 am-7 pm; Fridays 7 am-5 pm.   Urgent care: Monday - Friday 11 am-9 pm; Saturday and Sunday 9 am-5 pm.  Pharmacy : Monday -Thursday 8 am-8 pm; Friday 8 am-6 pm; Saturday and Sunday 9 am-5 pm.     Clinic: [phone]   Pharmacy: [phone]              Follow-ups after your visit        Additional Services     GASTROENTEROLOGY ADULT REF PROCEDURE ONLY       Last Lab Result: Creatinine (mg/dL)       Date                     Value                 07/17/2017               1.04             ----------  Body mass index is 31.95 kg/(m^2).     Needed:  No  Language:  English    Patient will be contacted to schedule the colonoscopy, or call [facility] [phone]    Please be aware that coverage of these services is subject to the terms and limitations of your health insurance plan.  Call member services at your health plan with any benefit or coverage questions.  Any procedures must be performed at a [place] facility OR coordinated by your clinic's referral office.    Please bring the following with you to your appointment:    (1) Any X-Rays, CTs or MRIs which have been performed.  Contact the facility where they were done to arrange for  prior to your scheduled appointment.    (2) List of current medications   (3) This referral request   (4) Any documents/labs given to you for this referral                  Follow-up notes from your care team     Return in about 6 months (around 7/8/2018) for full physical, diabetes.      Your next 10 appointments already scheduled     Jan 18, 2018  1:00 PM CST   Return Sleep Patient with ALEX Matthew   [facility] ([facility]  "[facility])    [address] [address]  [place] [address]   [phone]            2018 12:45 PM CST   Return Visit with Willy Cardoso MD   [facility]    [address] [place] [address]  [place] [address] [phone]              Who to contact     If you have questions or need follow up information about today's clinic visit or your schedule please contact [facility] directly at [phone].  Normal or non-critical lab and imaging results will be communicated to you by MyChart, letter or phone within 4 business days after the clinic has received the results. If you do not hear from us within 7 days, please contact the clinic through [employer] or phone. If you have a critical or abnormal lab result, we will notify you by phone as soon as possible.  Submit refill requests through [employer] or call your pharmacy and they will forward the refill request to us. Please allow 3 business days for your refill to be completed.          Additional Information About Your Visit        MyChart Information     [employer] lets you send messages to your doctor, view your test results, renew your prescriptions, schedule appointments and more. To sign up, go to [email or web address] . Click on \"Log in\" on the left side of the screen, which will take you to the Welcome page. Then click on \"Sign up Now\" on the right side of the page.     You will be asked to enter the access code listed below, as well as some personal information. Please follow the directions to create your username and password.     Your access code is: [email or web address]  Expires: 1/10/2018 12:28 PM     Your access code will  in 90 days. If you need help or a new code, please call your [facility] or [phone].        Care EveryWhere ID     This is your Care EveryWhere ID. This could be used by other organizations to access your [facility] medical records  [id number]        Your " "Vitals Were     Pulse Temperature Height Pulse Oximetry BMI (Body Mass Index)       48 97.1  F (36.2  C) (Oral) 5' 7\" (1.702 m) 97% 31.95 kg/m2        Blood Pressure from Last 3 Encounters:   01/08/18 134/46   11/13/17 158/71   10/12/17 161/67    Weight from Last 3 Encounters:   01/08/18 204 lb (92.5 kg)   11/13/17 202 lb (91.6 kg)   10/12/17 197 lb (89.4 kg)              We Performed the Following     ALT     Basic metabolic panel     CBC with platelets     GASTROENTEROLOGY ADULT REF PROCEDURE ONLY     Hemoglobin A1c     Lipid panel reflex to direct LDL Non-fasting     PAF COMPLETED          Where to get your medicines      These medications were sent to [facility] [address] [place] [address] - [address] [facility]  [address] [facility] [address] [address]     Phone:  [phone]     atorvastatin 80 MG tablet    hydrochlorothiazide 25 MG tablet    insulin glargine 100 UNIT/ML injection    lisinopril 40 MG tablet    terazosin 1 MG capsule          Primary Care Provider Office Phone # Fax #    Ronaldo Corral [phone]       [address]  [facility] [address]        Equal Access to Services     EDWINA CUEAVS AH: Hadii aad ku hadasho Soomaali, waaxda luqadaha, qaybta kaalmada adeegyada, waxay idiin hayaan adeeg jayla lakevin jimenez. So [facility] [phone].    ATENCIÓN: Si habla español, tiene a claire disposición servicios gratuitos de asistencia lingüística. Kieraname al [phone].    We comply with applicable federal civil rights laws and [place] laws. We do not discriminate on the basis of race, color, national origin, age, disability, sex, sexual orientation, or gender identity.            Thank you!     Thank you for choosing [facility]  for your care. Our goal is always to provide you with excellent care. Hearing back from our patients is one way we can continue to improve our services. Please take a few minutes to complete the written survey that you may receive in " the mail after your visit with us. Thank you!             Your Updated Medication List - Protect others around you: Learn how to safely use, store and throw away your medicines at www.disposemymeds.org.          This list is accurate as of: 1/8/18 11:27 AM.  Always use your most recent med list.                   Brand Name Dispense Instructions for use Diagnosis    amLODIPine 10 MG tablet    NORVASC     Take 1 tablet (10 mg) by mouth daily for blood pressure.    Benign hypertension with chronic kidney disease, stage III       atorvastatin 80 MG tablet    LIPITOR    90 tablet    Take 1 tablet (80 mg) by mouth daily for cholesterol.    Hyperlipidemia LDL goal <100       brimonidine 0.15 % ophthalmic solution    ALPHAGAN-P    1 Bottle    Place 1 drop into both eyes 2 times daily    Primary open angle glaucoma of both eyes, moderate stage       dorzolamide-timolol 2-0.5 % ophthalmic solution    COSOPT    1 Bottle    Place 1 drop into both eyes 2 times daily    Primary open angle glaucoma of both eyes, moderate stage       furosemide 20 MG tablet    LASIX    180 tablet    Take 1 tablet (20 mg) by mouth 2 times daily as needed for leg swelling.    Lower extremity edema       glipiZIDE 10 MG tablet    GLUCOTROL    180 tablet    TAKE ONE TABLET BY MOUTH TWICE DAILY WITH MEALS FOR DIABETES    Uncontrolled type 2 diabetes mellitus with microalbuminuria, with long-term current use of insulin (H)       hydrochlorothiazide 25 MG tablet    HYDRODIURIL    90 tablet    Take 1 tablet (25 mg) by mouth daily for blood pressure and leg swelling.    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    Type 2 diabetes mellitus with microalbuminuria, with long-term current use of insulin (H)       insulin pen needle 32G X 4 MM    BD CAROL ANN  U/F    100 each    Use 1 daily as directed.    Type 2 diabetes mellitus with hyperglycemia, without long-term current use of insulin (H)       latanoprost 0.005 % ophthalmic solution    XALATAN    1 Bottle    Place 1 drop into both eyes At Bedtime    Primary open angle glaucoma of both eyes, moderate stage       lisinopril 40 MG tablet    PRINIVIL/ZESTRIL    90 tablet    Take 1 tablet (40 mg) by mouth daily for blood pressure.    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

## 2018-01-08 NOTE — LETTER
January 15, 2018      Manuel Rosales  [address]  [place] [address]        Dear ,    We are writing to inform you of your test results.    All of the rest of your test results were within the expected range for you.  Your triglyceride level increases when your blood sugar levels are higher.     Please contact the clinic if you have additional questions.  Thank you.     Resulted Orders   Hemoglobin A1c   Result Value Ref Range    Hemoglobin A1C 7.4 (H) 4.3 - 6.0 %   Albumin Random Urine Quantitative with Creat Ratio   Result Value Ref Range    Creatinine Urine 48 mg/dL    Albumin Urine mg/L 298 mg/L    Albumin Urine mg/g Cr 623.43 (H) 0 - 17 mg/g Cr   Lipid panel reflex to direct LDL Non-fasting   Result Value Ref Range    Cholesterol 144 <200 mg/dL    Triglycerides 108 <150 mg/dL      Comment:      Fasting specimen    HDL Cholesterol 62 >39 mg/dL    LDL Cholesterol Calculated 60 <100 mg/dL      Comment:      Desirable:       <100 mg/dl    Non HDL Cholesterol 82 <130 mg/dL   ALT   Result Value Ref Range    ALT 36 0 - 70 U/L   Basic metabolic panel   Result Value Ref Range    Sodium 136 133 - 144 mmol/L    Potassium 5.1 3.4 - 5.3 mmol/L    Chloride 105 94 - 109 mmol/L    Carbon Dioxide 22 20 - 32 mmol/L    Anion Gap 9 3 - 14 mmol/L    Glucose 216 (H) 70 - 99 mg/dL      Comment:      Fasting specimen    Urea Nitrogen 24 7 - 30 mg/dL    Creatinine 1.07 0.66 - 1.25 mg/dL    GFR Estimate 65 >60 mL/min/1.7m2      Comment:      Non  GFR Calc    GFR Estimate If Black 79 >60 mL/min/1.7m2      Comment:       GFR Calc    Calcium 9.1 8.5 - 10.1 mg/dL   CBC with platelets   Result Value Ref Range    WBC 11.4 (H) 4.0 - 11.0 10e9/L    RBC Count 4.61 4.4 - 5.9 10e12/L    Hemoglobin 13.7 13.3 - 17.7 g/dL    Hematocrit 42.8 40.0 - 53.0 %    MCV 93 78 - 100 fl    MCH 29.7 26.5 - 33.0 pg    MCHC 32.0 31.5 - 36.5 g/dL    RDW 13.1 10.0 - 15.0 %    Platelet Count 250 150 - 450  10z9/L       If you have any questions or concerns, please call the clinic at the number listed above.       Sincerely,        Ronaldo Corral MD

## 2018-01-08 NOTE — PROGRESS NOTES
SUBJECTIVE:   Manuel Rosales is a 86 year old male who presents to clinic today for the following health issues:      Diabetes Follow-up    Patient is checking blood sugars: once daily.  Results are as follows:       am -average about 120        Diabetic concerns: None     Symptoms of hypoglycemia (low blood sugar): none     Paresthesias (numbness or burning in feet) or sores: Yes tingling     Date of last diabetic eye exam: 8/8/17    His insulin dose is 22 units.    Hyperlipidemia Follow-Up      Rate your low fat/cholesterol diet?: fair    Taking statin?  Yes, no muscle aches from statin    Other lipid medications/supplements?:  none    Hypertension Follow-up      Outpatient blood pressures are not being checked.    Low Salt Diet: low salt  BP Readings from Last 2 Encounters:   01/08/18 134/46   11/13/17 158/71     Hemoglobin A1C (%)   Date Value   01/08/2018 7.4 (H)   07/13/2017 6.8 (H)     LDL Cholesterol Calculated (mg/dL)   Date Value   05/22/2017 51   11/29/2016 83         Amount of exercise or physical activity: None    Problems taking medications regularly: No    Medication side effects: none    Diet: low salt and low fat/cholesterol        Past medical, family, and social histories, medications, and allergies are reviewed and updated in Epic.     ROS:  C: NEGATIVE for fever, chills, change in weight  E/M: He is requesting an ear wash because he noticed some hearing changes, despite using an oil regularly to wash his ears out. NEGATIVE for mouth and throat problems  R: NEGATIVE for significant cough or SOB  CV: He is wearing a compression sock on his right foot which has helped with his swelling. Additionally, he mentions that he uses a harness (as instructed by his cardiologist) to use a harness so he does not fall asleep on his back. He last saw the cardiologist at [facility] on 12/27/17.  ROS otherwise negative    This document serves as a record of the services and decisions personally performed  "and made by Dr. Corral. It was created on his behalf by Era Calderon, a trained medical scribe. The creation of this document is based the provider's statements to the medical scribe.  Era Calderon January 8, 2018 11:11 AM     OBJECTIVE:                                                    /46  Pulse (!) 48  Temp 97.1  F (36.2  C) (Oral)  Ht 1.702 m (5' 7\")  Wt 92.5 kg (204 lb)  SpO2 97%  BMI 31.95 kg/m2   Body mass index is 31.95 kg/(m^2).     GENERAL: healthy, alert and no distress  EYES: Eyes grossly normal to inspection, PERRL, EOMI, sclerae white and conjunctivae normal  HENT: ear canals and TM's normal  RESP: lungs clear to auscultation - no crackles or wheezes, no areas of dullness, no tachypnea  CV: Heart regular rate and rhythm without murmur, click or rub. No peripheral edema and peripheral pulses strong  MS: no gross musculoskeletal defects noted, no edema  SKIN: no suspicious lesions or rashes  NEURO: Normal strength and tone, sensory exam grossly normal, mentation intact, oriented times 3 and cranial nerves 2-12 intact  PSYCH: mentation appears normal, affect normal/bright     Diagnostic Test Results:  Results for orders placed or performed in visit on 01/08/18 (from the past 24 hour(s))   Hemoglobin A1c   Result Value Ref Range    Hemoglobin A1C 7.4 (H) 4.3 - 6.0 %   CBC with platelets   Result Value Ref Range    WBC 11.4 (H) 4.0 - 11.0 10e9/L    RBC Count 4.61 4.4 - 5.9 10e12/L    Hemoglobin 13.7 13.3 - 17.7 g/dL    Hematocrit 42.8 40.0 - 53.0 %    MCV 93 78 - 100 fl    MCH 29.7 26.5 - 33.0 pg    MCHC 32.0 31.5 - 36.5 g/dL    RDW 13.1 10.0 - 15.0 %    Platelet Count 250 150 - 450 10e9/L     Lab Results   Component Value Date    A1C 7.4 01/08/2018    A1C 6.8 07/13/2017    A1C 8.4 05/22/2017    A1C 8.8 02/02/2017    A1C 8.4 11/29/2016        ASSESSMENT/PLAN:                                                      (E11.29,  R80.9,  Z79.4) Type 2 diabetes mellitus with microalbuminuria, " with long-term current use of insulin (H)  (primary encounter diagnosis)  Comment: HgbA1c increased but still at goal.  Plan: Hemoglobin A1c, Albumin Random Urine         Quantitative with Creat Ratio, insulin glargine        (LANTUS SOLOSTAR) 100 UNIT/ML injection        Return in about 6 months (around 7/8/2018) for full physical, diabetes.     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 No beta blocker prescribed since his current regimen is managed by his cardiologist, and his heart rate would not tolerate a beta blocker.   Plan: Basic metabolic panel, CBC with platelets,         hydrochlorothiazide (HYDRODIURIL) 25 MG tablet,        lisinopril (PRINIVIL/ZESTRIL) 40 MG tablet,         terazosin (HYTRIN) 1 MG capsule        Follow up in 6 months.    (C18.9) Malignant neoplasm of colon, unspecified part of colon (H)  Comment: Colonoscopy due. He saw Dr. Holland in the past.  Plan: GASTROENTEROLOGY ADULT REF PROCEDURE ONLY          (E78.5) Hyperlipidemia LDL goal <100  Comment: historically at goal.  Plan: Lipid panel reflex to direct LDL Non-fasting,         ALT, atorvastatin (LIPITOR) 80 MG tablet        Follow up in 6 months.      The information in this document, created by the medical scribe for me, accurately reflects the services I personally performed and the decisions made by me. I have reviewed and approved this document for accuracy prior to leaving the patient care area. January 8, 2018 11:11 AM   Ronaldo Corral MD

## 2018-01-08 NOTE — PATIENT INSTRUCTIONS
At [facility], we strive to deliver an exceptional experience to you, every time we see you.  If you receive a survey in the mail, please send us back your thoughts. We really do value your feedback.    Based on your medical history, these are the current health maintenance/preventive care services that you are due for (some may have been done at this visit.)  Health Maintenance Due   Topic Date Due     HF ACTION PLAN Q3 YR  03/22/1931     CBC Q1 YR  03/23/1931     BMP Q6 MOS  05/29/2017     FOOT EXAM Q1 YEAR  07/28/2017     COLONOSCOPY Q5 YR  08/31/2017     A1C Q6 MO  01/13/2018     MICROALBUMIN Q1 YEAR  02/02/2018         Suggested websites for health information:  Www.[facility] Center[facility].fm.org : Up to date and easily searchable information on multiple topics.  Www.medlineplus.gov : medication info, interactive tutorials, watch real surgeries online  Www.familydoctor.org : good info from the Academy of Family Physicians  Www.cdc.gov : public health info, travel advisories, epidemics (H1N1)  Www.aap.org : children's health info, normal development, vaccinations  Www.health.state.[place].us : [place] dept of health, public health issues in [place], N1N1    Your care team:                            Family Medicine Internal Medicine   MD Kaiden Harris MD Shantel Branch-Fleming, MD Katya Georgiev PA-C Nam Ho, MD Pediatrics   ERNST Calderon, MD Dorene Elizabeth CNP, MD Deborah Mielke, MD Kim Thein, APRN CNP      Clinic hours: Monday - Thursday 7 am-7 pm; Fridays 7 am-5 pm.   Urgent care: Monday - Friday 11 am-9 pm; Saturday and Sunday 9 am-5 pm.  Pharmacy : Monday -Thursday 8 am-8 pm; Friday 8 am-6 pm; Saturday and Sunday 9 am-5 pm.     Clinic: [phone]   Pharmacy: [phone]

## 2018-01-10 DIAGNOSIS — H40.1132 PRIMARY OPEN ANGLE GLAUCOMA OF BOTH EYES, MODERATE STAGE: ICD-10-CM

## 2018-01-10 RX ORDER — DORZOLAMIDE HYDROCHLORIDE AND TIMOLOL MALEATE 20; 5 MG/ML; MG/ML
1 SOLUTION/ DROPS OPHTHALMIC 2 TIMES DAILY
Qty: 1 BOTTLE | Refills: 12 | Status: SHIPPED | OUTPATIENT
Start: 2018-01-10 | End: 2018-08-09

## 2018-01-18 ENCOUNTER — OFFICE VISIT (OUTPATIENT)
Dept: SLEEP MEDICINE | Facility: CLINIC | Age: 83
End: 2018-01-18
Payer: COMMERCIAL

## 2018-01-18 VITALS
HEART RATE: 43 BPM | BODY MASS INDEX: 32.65 KG/M2 | SYSTOLIC BLOOD PRESSURE: 159 MMHG | DIASTOLIC BLOOD PRESSURE: 62 MMHG | OXYGEN SATURATION: 96 % | HEIGHT: 67 IN | WEIGHT: 208 LBS

## 2018-01-18 DIAGNOSIS — G47.30 SLEEP DISORDER BREATHING: ICD-10-CM

## 2018-01-18 DIAGNOSIS — G47.33 OBSTRUCTIVE SLEEP APNEA: Primary | ICD-10-CM

## 2018-01-18 PROCEDURE — 99214 OFFICE O/P EST MOD 30 MIN: CPT | Performed by: PHYSICIAN ASSISTANT

## 2018-01-18 NOTE — MR AVS SNAPSHOT
After Visit Summary   1/18/2018    Manuel Rosales    MRN: [id number]           Patient Information     Date Of Birth          3/22/1931        Visit Information        Provider Department      1/18/2018 1:00 PM Yassine Christianson PA [facility]        Today's Diagnoses     Obstructive sleep apnea    -  1    Sleep disorder breathing          Care Instructions      Your BMI is Body mass index is 32.58 kg/(m^2).  Weight management is a personal decision.  If you are interested in exploring weight loss strategies, the following discussion covers the approaches that may be successful. Body mass index (BMI) is one way to tell whether you are at a healthy weight, overweight, or obese. It measures your weight in relation to your height.  A BMI of 18.5 to 24.9 is in the healthy range. A person with a BMI of 25 to 29.9 is considered overweight, and someone with a BMI of 30 or greater is considered obese. More than two-thirds of American adults are considered overweight or obese.  Being overweight or obese increases the risk for further weight gain. Excess weight may lead to heart disease and diabetes.  Creating and following plans for healthy eating and physical activity may help you improve your health.  Weight control is part of healthy lifestyle and includes exercise, emotional health, and healthy eating habits. Careful eating habits lifelong are the mainstay of weight control. Though there are significant health benefits from weight loss, long-term weight loss with diet alone may be very difficult to achieve- studies show long-term success with dietary management in less than 10% of people. Attaining a healthy weight may be especially difficult to achieve in those with severe obesity. In some cases, medications, devices and surgical management might be considered.  What can you do?  If you are overweight or obese and are interested in methods for weight loss, you should discuss this with your  provider.     Consider reducing daily calorie intake by 500 calories.     Keep a food journal.     Avoiding skipping meals, consider cutting portions instead.    Diet combined with exercise helps maintain muscle while optimizing fat loss. Strength training is particularly important for building and maintaining muscle mass. Exercise helps reduce stress, increase energy, and improves fitness. Increasing exercise without diet control, however, may not burn enough calories to loose weight.       Start walking three days a week 10-20 minutes at a time    Work towards walking thirty minutes five days a week     Eventually, increase the speed of your walking for 1-2 minutes at time    In addition, we recommend that you review healthy lifestyles and methods for weight loss available through the National Institutes of Health patient information sites:  http://win.niddk.nih.gov/publications/index.htm    And look into health and wellness programs that may be available through your health insurance provider, employer, local community center, or ita club.    Weight management plan: Patient was referred to their PCP to discuss a diet and exercise plan.              Follow-ups after your visit        Your next 10 appointments already scheduled     Jan 25, 2018  1:00 PM CST   HST  with KELLI BED 5   [facility] [place]    [address] [place] [id number]   [phone]            Jan 26, 2018 10:00 AM CST   HST Drop Off with KELLI SC DME   [facility] [place]    [address] [place] [address]   [phone]            Jan 26, 2018 10:30 AM CST   Return Sleep Patient with ALEX Matthew   [facility] ([facility]    [address] [place] [address]   [phone]            Feb 06, 2018 12:45 PM CST  "  Return Visit with Willy Cardoso MD   [facility]    [address]  [place] [address] [phone]            Jul 09, 2018 11:20 AM CDT   Office Visit with Ronaldo Corral MD   [facility]    [address] [id number] [phone]           Bring a current list of meds and any records pertaining to this visit. For Physicals, please bring immunization records and any forms needing to be filled out. Please arrive 10 minutes early to complete paperwork.              Future tests that were ordered for you today     Open Future Orders        Priority Expected Expires Ordered    HST-Home Sleep Apnea Test Routine  7/20/2018 1/18/2018            Who to contact     If you have questions or need follow up information about today's clinic visit or your schedule please contact [facility] directly at [phone].  Normal or non-critical lab and imaging results will be communicated to you by [employer], letter or phone within 4 business days after the clinic has received the results. If you do not hear from us within 7 days, please contact the clinic through [employer] or phone. If you have a critical or abnormal lab result, we will notify you by phone as soon as possible.  Submit refill requests through [employer] or call your pharmacy and they will forward the refill request to us. Please allow 3 business days for your refill to be completed.          Additional Information About Your Visit        [employer] Information     [employer] lets you send messages to your doctor, view your test results, renew your prescriptions, schedule appointments and more. To sign up, go to [email or web address] . Click on \"Log in\" on the left side of the screen, which will take you to the Welcome page. Then click on \"Sign up Now\" on the right side of the page.     You will be asked to enter the access " "code listed below, as well as some personal information. Please follow the directions to create your username and password.     Your access code is: [email or web address]  Expires: 2018  1:18 PM     Your access code will  in 90 days. If you need help or a new code, please call your [facility] or [phone].        Care EveryWhere ID     This is your Care EveryWhere ID. This could be used by other organizations to access your [facility] medical records  [id number]        Your Vitals Were     Pulse Height Pulse Oximetry BMI (Body Mass Index)          43 1.702 m (5' 7\") 96% 32.58 kg/m2         Blood Pressure from Last 3 Encounters:   18 159/62   18 134/46   17 158/71    Weight from Last 3 Encounters:   18 94.3 kg (208 lb)   18 92.5 kg (204 lb)   17 91.6 kg (202 lb)               Primary Care Provider Office Phone # Fax #    Ronaldo Corral [phone]       [address]  [facility] [address]        Equal Access to Services     [facility] : Hadii sarah ku hadasho Soomaali, waaxda luqadaha, qaybta kaalmada adeegyada, radha sheppard . So [facility] [phone].    ATENCIÓN: Si habla español, tiene a claire disposición servicios gratuitos de asistencia lingüística. Llame al [phone].    We comply with applicable federal civil rights laws and [place] laws. We do not discriminate on the basis of race, color, national origin, age, disability, sex, sexual orientation, or gender identity.            Thank you!     Thank you for choosing [facility]  for your care. Our goal is always to provide you with excellent care. Hearing back from our patients is one way we can continue to improve our services. Please take a few minutes to complete the written survey that you may receive in the mail after your visit with us. Thank you!             Your Updated Medication List - Protect others around you: Learn how to safely use, store " and throw away your medicines at www.disposemymeds.org.          This list is accurate as of: 1/18/18  1:18 PM.  Always use your most recent med list.                   Brand Name Dispense Instructions for use Diagnosis    amLODIPine 10 MG tablet    NORVASC     Take 1 tablet (10 mg) by mouth daily for blood pressure.    Benign hypertension with chronic kidney disease, stage III       atorvastatin 80 MG tablet    LIPITOR    90 tablet    Take 1 tablet (80 mg) by mouth daily for cholesterol.    Hyperlipidemia LDL goal <100       brimonidine 0.15 % ophthalmic solution    ALPHAGAN-P    1 Bottle    Place 1 drop into both eyes 2 times daily    Primary open angle glaucoma of both eyes, moderate stage       dorzolamide-timolol 2-0.5 % ophthalmic solution    COSOPT    1 Bottle    Place 1 drop into both eyes 2 times daily    Primary open angle glaucoma of both eyes, moderate stage       furosemide 20 MG tablet    LASIX    180 tablet    Take 1 tablet (20 mg) by mouth 2 times daily as needed for leg swelling.    Lower extremity edema       glipiZIDE 10 MG tablet    GLUCOTROL    180 tablet    TAKE ONE TABLET BY MOUTH TWICE DAILY WITH MEALS FOR DIABETES    Uncontrolled type 2 diabetes mellitus with microalbuminuria, with long-term current use of insulin (H)       hydrochlorothiazide 25 MG tablet    HYDRODIURIL    90 tablet    Take 1 tablet (25 mg) by mouth daily for blood pressure and leg swelling.    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    Type 2 diabetes mellitus with microalbuminuria, with long-term current use of insulin (H)       insulin pen needle 32G X 4 MM    BD ACROL ANN U/F    100 each    Use 1 daily as directed.    Type 2 diabetes mellitus with hyperglycemia, without long-term current use of insulin (H)        latanoprost 0.005 % ophthalmic solution    XALATAN    1 Bottle    Place 1 drop into both eyes At Bedtime    Primary open angle glaucoma of both eyes, moderate stage       lisinopril 40 MG tablet    PRINIVIL/ZESTRIL    90 tablet    Take 1 tablet (40 mg) by mouth daily for blood pressure.    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

## 2018-01-18 NOTE — PATIENT INSTRUCTIONS

## 2018-01-18 NOTE — NURSING NOTE
"Chief Complaint   Patient presents with     RECHECK     f/u positioning device       Initial There were no vitals taken for this visit. Estimated body mass index is 31.95 kg/(m^2) as calculated from the following:    Height as of 1/8/18: 1.702 m (5' 7\").    Weight as of 1/8/18: 92.5 kg (204 lb).  Medication Reconciliation: complete    "

## 2018-01-18 NOTE — PROGRESS NOTES
Obstructive Sleep Apnea- PAP Follow-Up Visit:    Chief Complaint   Patient presents with     RECHECK     f/u positioning device       Manuel Rosales comes in today for follow-up of their moderate sleep apnea, managed with positional therapy.     Manuel Rosales comes in today for follow-up of his sleep study done on 10/30/2017 at the [facility], witnessed apnea, daytime fatigue, non refreshing sleep, crowded oropharynx and co-morbid HTN, HFpEF and possibly pulmonary HTN.    Study Date: 10/30/2017 (197.0 lb) apnea/hypopnea index was 18.7 events per hour. The REM AHI was 14.1 events per hour. The supine AHI was 64.2 events per hour. RDI was 42.8 events per hour. Most of the repiratory events appeared periodic, though werer not completely regular. Periodicity was about 60 seconds, and circulation times about 30 seconds. Significant hypoventilation was not suggested with a maximum change of 6 mmHg (peak 42 mmHg). Lowest oxygen saturation was 85.4%. Time spent less than or equal to 88% was 1.3 minutes. Time spent less than or equal to 89% was 3.2 minutes. PLM index was 22.9 movements per hour. The presenting rhythm while awake is sinus with second degree block and 2:1 conduction with occasional ?high junctional escape beats. This rhythm persisted throughout study. The average pulse rate was 37.8 bpm. The minimum pulse rate was 33.9 bpm. The maximum pulse rate was 79.1 bpm and occurred only after awakened at the end of the study when 1:1 conduction occurred.    Recommend Sleep Positioning Device to prevent patient from supine sleeping.    He reports positioning device works as he no longer sleeps supine.     Bedtime is typically 10:30 PM. Usually it takes about 5 minutes to fall asleep.  Wake time is typically 7 AM.  The patient is usually getting 8-8.5 hours of sleep per night.    He does feel rested in the morning.    Total score - [place]: 5 (1/18/2018 12:00 PM). Baseline  7/24    Past medical/surgical history, family history, social history, medications and allergies were reviewed.      Problem List:  Patient Active Problem List    Diagnosis Date Noted     Sleep disorder breathing 11/01/2017     Priority: Medium     Study Date: 10/30/2017 (197.0 lb) apnea/hypopnea index was 18.7 events per hour.  The REM AHI was 14.1 events per hour.  The supine AHI was 64.2 events per hour. RDI was 42.8 events per hour.  Most of the repiratory events appeared periodic, though werer not completely regular. Periodicity was about 60 seconds, and circulation times about 30 seconds. Significant hypoventilation was not suggested with a maximum change of 6 mmHg (peak 42 mmHg).  Lowest oxygen saturation was 85.4%.  Time spent less than or equal to 88% was 1.3 minutes.  Time spent less than or equal to 89% was 3.2 minutes. PLM index was 22.9 movements per hour. The presenting rhythm while awake is sinus with second degree block and 2:1 conduction with occasional ?high junctional escape beats. This rhythm persisted throughout study.  The average pulse rate was 37.8 bpm.  The minimum pulse rate was 33.9 bpm. The maximum pulse rate was 79.1 bpm and occurred only after awakened at the end of the study when 1:1 conduction occurred.        Bradycardia 11/01/2017     Priority: Medium     Sleep stydu 10/30/17 Cardiac Summary:  The presenting rhythm while awake is sinus with second degree block and 2:1 conduction with occasional ?high junctional escape beats. This rhythm persisted throughout study.  The average pulse rate was 37.8 bpm.  The minimum pulse rate was 33.9 bpm. The maximum pulse rate was 79.1 bpm and occurred only after awakened at the end of the study when 1:1 conduction occurred.        Chronic diastolic heart failure (H) 10/12/2017     Priority: Medium     Pulmonary hypertension 10/10/2017     Priority: Medium     ECHO Study Date: 07/14/2017   Interpretation Summary  Global and regional left  ventricular function is normal with an EF of 55-60%.  Mild right ventricular dilation is present. Global right ventricular function  is normal.  Mild to moderate mitral, mild tricuspid insufficiency and mild aortic stenosis  is present.  PUlmonary hypertension present. Estimated pulmonary artery systolic pressure  is 67 mmHg plus right atrial pressure.       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  Manuel Rosales has no plan or code status on file. Discussed available resources and provided with information on 01/21/2014. Confirmed code status reflects current choices pending further ACP discussions.  Confirmed/documented designated decision maker(s). See permanent comments section of demographics in clinical tab.   Added by Pearl Kaur on 2/24/2014  Discussed advance care planning with patient; information given to patient to review. July 25, 2011  Discussed advance care planning with patient; information given to patient to review. January 21, 2014        Hypertension goal BP (blood pressure) < 130/80 01/25/2011     Priority: Medium     Posterior vitreous detachment, ou 11/16/2010     Priority: Medium     Pseudophakia, ou 11/15/2010     Priority: Medium     Type 2 diabetes mellitus with microalbuminuria, with  "long-term current use of insulin (H)      Priority: Medium     Type 2 diabetes, HbA1C goal < 8%       Hyperlipidemia LDL goal <100      Priority: Medium     Benign hypertension with chronic kidney disease, stage III      Priority: Medium     Colon cancer (H) 08/01/2002     Priority: Medium     malignant polyp on colonoscopy, next colonoscopy due 2017          /62  Pulse (!) 43  Ht 1.702 m (5' 7\")  Wt 94.3 kg (208 lb)  SpO2 96%  BMI 32.58 kg/m2    Impression/Plan:  Moderate sleep disordered breathing, severe in the supine position, but minimum events in non-supine positions(AHI ~3).  Primary problem appears to be related to periodic breathing, though at least some  JOYCE is also present. No seep related hypoxemia.   --Home sleep apnea test with positional therapy in place.     The patient is instructed to follow up with me after the results of HST are available to discuss the results and treatment options.     Twenty-five minutes spent with patient, all of which were spent face-to-face counseling, consulting, coordinating plan of care regarding sleep disordered breathing.      Yassine Christianson PA-C      CC:  Ronaldo Corral  "

## 2018-01-25 ENCOUNTER — OFFICE VISIT (OUTPATIENT)
Dept: SLEEP MEDICINE | Facility: CLINIC | Age: 83
End: 2018-01-25
Payer: COMMERCIAL

## 2018-01-25 DIAGNOSIS — G47.30 SLEEP DISORDER BREATHING: ICD-10-CM

## 2018-01-25 NOTE — MR AVS SNAPSHOT
After Visit Summary   1/25/2018    Manuel Rosales    MRN: [id number]           Patient Information     Date Of Birth          3/22/1931        Visit Information        Provider Department      1/25/2018 1:00 PM BK BED 5 [facility]        Today's Diagnoses     Sleep disorder breathing           Follow-ups after your visit        Your next 10 appointments already scheduled     Jan 26, 2018 10:00 AM CST   HST Drop Off with BK SC DME   [facility] ([facility])    [address] [address] [address]   [phone]            Jan 26, 2018 10:30 AM CST   Return Sleep Patient with ALEX Matthew   [facility] ([facility])    [address] [address]   [phone]            Feb 06, 2018 12:45 PM CST   Return Visit with Willy Cardoso MD   [facility] ([address] [address]   [phone]            Jul 09, 2018 11:20 AM CDT   Office Visit with Ronaldo Corral MD   [facility] ([facility])    [address] [place] [address]   [phone]           Bring a current list of meds and any records pertaining to this visit. For Physicals, please bring immunization records and any forms needing to be filled out. Please arrive 10 minutes early to complete paperwork.              Who to contact     If you have questions or need follow up information about today's clinic visit or your schedule please contact [facility] directly at [phone].  Normal or non-critical lab and imaging results will be communicated to you by MyChart, letter or phone within 4 business days after the clinic has received the results. If you do not hear from us within 7 days, please contact the clinic through MyChart or phone. If you  "have a critical or abnormal lab result, we will notify you by phone as soon as possible.  Submit refill requests through [employer] or call your pharmacy and they will forward the refill request to us. Please allow 3 business days for your refill to be completed.          Additional Information About Your Visit        [employer] Information     [employer] lets you send messages to your doctor, view your test results, renew your prescriptions, schedule appointments and more. To sign up, go to [email or web address] . Click on \"Log in\" on the left side of the screen, which will take you to the Welcome page. Then click on \"Sign up Now\" on the right side of the page.     You will be asked to enter the access code listed below, as well as some personal information. Please follow the directions to create your username and password.     Your access code is: [email or web address]  Expires: 2018  1:18 PM     Your access code will  in 90 days. If you need help or a new code, please call your [place] clinic or [phone].        Care EveryWhere ID     This is your Care EveryWhere ID. This could be used by other organizations to access your [place] medical records  [id number]         Blood Pressure from Last 3 Encounters:   18 159/62   18 134/46   17 158/71    Weight from Last 3 Encounters:   18 94.3 kg (208 lb)   18 92.5 kg (204 lb)   17 91.6 kg (202 lb)              Today, you had the following     No orders found for display       Primary Care Provider Office Phone # Fax #    Ronaldo Corral [phone]       [address]  [facility] [address]        Equal Access to Services     EDWINA CUEVAS : Chris Nguyen, wasalvador kang, qavaldota kaalmada janine, radha jimenez. So [facility] [phone].    ATENCIÓN: Si habla español, tiene a claire disposición servicios gratuitos de asistencia lingüística. Llame al [phone].    We comply " with applicable federal civil rights laws and [place] laws. We do not discriminate on the basis of race, color, national origin, age, disability, sex, sexual orientation, or gender identity.            Thank you!     Thank you for choosing [facility]  for your care. Our goal is always to provide you with excellent care. Hearing back from our patients is one way we can continue to improve our services. Please take a few minutes to complete the written survey that you may receive in the mail after your visit with us. Thank you!             Your Updated Medication List - Protect others around you: Learn how to safely use, store and throw away your medicines at www.disposemymeds.org.          This list is accurate as of 1/25/18  1:11 PM.  Always use your most recent med list.                   Brand Name Dispense Instructions for use Diagnosis    amLODIPine 10 MG tablet    NORVASC     Take 1 tablet (10 mg) by mouth daily for blood pressure.    Benign hypertension with chronic kidney disease, stage III       atorvastatin 80 MG tablet    LIPITOR    90 tablet    Take 1 tablet (80 mg) by mouth daily for cholesterol.    Hyperlipidemia LDL goal <100       brimonidine 0.15 % ophthalmic solution    ALPHAGAN-P    1 Bottle    Place 1 drop into both eyes 2 times daily    Primary open angle glaucoma of both eyes, moderate stage       dorzolamide-timolol 2-0.5 % ophthalmic solution    COSOPT    1 Bottle    Place 1 drop into both eyes 2 times daily    Primary open angle glaucoma of both eyes, moderate stage       furosemide 20 MG tablet    LASIX    180 tablet    Take 1 tablet (20 mg) by mouth 2 times daily as needed for leg swelling.    Lower extremity edema       glipiZIDE 10 MG tablet    GLUCOTROL    180 tablet    TAKE ONE TABLET BY MOUTH TWICE DAILY WITH MEALS FOR DIABETES    Uncontrolled type 2 diabetes mellitus with microalbuminuria, with long-term current use of insulin (H)       hydrochlorothiazide 25 MG  tablet    HYDRODIURIL    90 tablet    Take 1 tablet (25 mg) by mouth daily for blood pressure and leg swelling.    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    Type 2 diabetes mellitus with microalbuminuria, with long-term current use of insulin (H)       insulin pen needle 32G X 4 MM    BD CAROL ANN U/F    100 each    Use 1 daily as directed.    Type 2 diabetes mellitus with hyperglycemia, without long-term current use of insulin (H)       latanoprost 0.005 % ophthalmic solution    XALATAN    1 Bottle    Place 1 drop into both eyes At Bedtime    Primary open angle glaucoma of both eyes, moderate stage       lisinopril 40 MG tablet    PRINIVIL/ZESTRIL    90 tablet    Take 1 tablet (40 mg) by mouth daily for blood pressure.    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

## 2018-01-25 NOTE — PROGRESS NOTES
Patient is completing a home sleep test for concerns primarily related to snoring and suspected JOYCE.  He was instructed on how to put on the Noxturnal T3 device and associated equipment before going to bed and given the opportunity to practice putting it on before leaving the sleep center.  He was reminded to bring the home sleep test kit back to the center tomorrow for download and reporting.  [facility] 1/25/2018 1:10 PM

## 2018-01-26 ENCOUNTER — OFFICE VISIT (OUTPATIENT)
Dept: SLEEP MEDICINE | Facility: CLINIC | Age: 83
End: 2018-01-26
Payer: COMMERCIAL

## 2018-01-26 ENCOUNTER — APPOINTMENT (OUTPATIENT)
Dept: SLEEP MEDICINE | Facility: CLINIC | Age: 83
End: 2018-01-26
Payer: COMMERCIAL

## 2018-01-26 DIAGNOSIS — G47.30 SLEEP DISORDER BREATHING: ICD-10-CM

## 2018-01-26 DIAGNOSIS — G47.33 OBSTRUCTIVE SLEEP APNEA: ICD-10-CM

## 2018-01-26 DIAGNOSIS — G47.33 OSA (OBSTRUCTIVE SLEEP APNEA): ICD-10-CM

## 2018-01-26 DIAGNOSIS — R09.02 HYPOXEMIA: ICD-10-CM

## 2018-01-26 PROCEDURE — G0399 HOME SLEEP TEST/TYPE 3 PORTA: HCPCS | Performed by: INTERNAL MEDICINE

## 2018-01-26 PROCEDURE — 99214 OFFICE O/P EST MOD 30 MIN: CPT | Performed by: PHYSICIAN ASSISTANT

## 2018-01-26 RX ORDER — HYDRALAZINE HYDROCHLORIDE 10 MG/1
10 TABLET, FILM COATED ORAL 3 TIMES DAILY
COMMUNITY
Start: 2018-01-13 | End: 2020-09-28

## 2018-01-26 NOTE — PATIENT INSTRUCTIONS

## 2018-01-26 NOTE — MR AVS SNAPSHOT
After Visit Summary   1/26/2018    Manuel Rosales    MRN: [id number]           Patient Information     Date Of Birth          3/22/1931        Visit Information        Provider Department      1/26/2018 11:00 AM BK BED 5 [facility]        Today's Diagnoses     Sleep disorder breathing        Obstructive sleep apnea           Follow-ups after your visit        Your next 10 appointments already scheduled     Jan 29, 2018  1:30 PM CST   HST Drop Off with BK SC DME   [facility] ([facility])    [address] [place] [address]   [phone]            Jan 29, 2018  2:20 PM CST   Return Sleep Patient with ALEX Matthew   [facility] ([facility])    [address] [place] [address]   [phone]            Feb 06, 2018 12:45 PM CST   Return Visit with Willy Cardoso MD   [facility] ([address] [address]   [phone]            Jul 09, 2018 11:20 AM CDT   Office Visit with Ronaldo Corral MD   [facility] ([facility])    [address] [place] [address]   [phone]           Bring a current list of meds and any records pertaining to this visit. For Physicals, please bring immunization records and any forms needing to be filled out. Please arrive 10 minutes early to complete paperwork.              Who to contact     If you have questions or need follow up information about today's clinic visit or your schedule please contact [facility] directly at [phone].  Normal or non-critical lab and imaging results will be communicated to you by MyChart, letter or phone within 4 business days after the clinic has received the results. If you do not hear from us within 7 days, please contact the clinic  "through [employer] or phone. If you have a critical or abnormal lab result, we will notify you by phone as soon as possible.  Submit refill requests through [employer] or call your pharmacy and they will forward the refill request to us. Please allow 3 business days for your refill to be completed.          Additional Information About Your Visit        [employer] Information     [employer] lets you send messages to your doctor, view your test results, renew your prescriptions, schedule appointments and more. To sign up, go to [email or web address] . Click on \"Log in\" on the left side of the screen, which will take you to the Welcome page. Then click on \"Sign up Now\" on the right side of the page.     You will be asked to enter the access code listed below, as well as some personal information. Please follow the directions to create your username and password.     Your access code is: [email or web address]  Expires: 2018  1:18 PM     Your access code will  in 90 days. If you need help or a new code, please call your [place] clinic or [phone].        Care EveryWhere ID     This is your Care EveryWhere ID. This could be used by other organizations to access your [place] medical records  [id number]         Blood Pressure from Last 3 Encounters:   18 176/62   18 159/62   18 134/46    Weight from Last 3 Encounters:   18 93.4 kg (206 lb)   18 94.3 kg (208 lb)   18 92.5 kg (204 lb)              We Performed the Following     HST-Home Sleep Apnea Test        Primary Care Provider Office Phone # Fax #    Ronaldo Corral [phone]       [address]  [facility] [address]        Equal Access to Services     [facility] : Chris emanuelo [place], waaxda luqadaha, qaybta kaalmada janine, radha jimenez. So [facility] [phone].    ATENCIÓN: Si habla español, tiene a claire disposición servicios gratuitos de asistencia lingüística. Llame " al [phone].    We comply with applicable federal civil rights laws and [place] laws. We do not discriminate on the basis of race, color, national origin, age, disability, sex, sexual orientation, or gender identity.            Thank you!     Thank you for choosing [facility]  for your care. Our goal is always to provide you with excellent care. Hearing back from our patients is one way we can continue to improve our services. Please take a few minutes to complete the written survey that you may receive in the mail after your visit with us. Thank you!             Your Updated Medication List - Protect others around you: Learn how to safely use, store and throw away your medicines at www.disposemymeds.org.          This list is accurate as of 1/26/18 11:25 AM.  Always use your most recent med list.                   Brand Name Dispense Instructions for use Diagnosis    amLODIPine 10 MG tablet    NORVASC     Take 1 tablet (10 mg) by mouth daily for blood pressure.    Benign hypertension with chronic kidney disease, stage III       atorvastatin 80 MG tablet    LIPITOR    90 tablet    Take 1 tablet (80 mg) by mouth daily for cholesterol.    Hyperlipidemia LDL goal <100       brimonidine 0.15 % ophthalmic solution    ALPHAGAN-P    1 Bottle    Place 1 drop into both eyes 2 times daily    Primary open angle glaucoma of both eyes, moderate stage       dorzolamide-timolol 2-0.5 % ophthalmic solution    COSOPT    1 Bottle    Place 1 drop into both eyes 2 times daily    Primary open angle glaucoma of both eyes, moderate stage       furosemide 20 MG tablet    LASIX    180 tablet    Take 1 tablet (20 mg) by mouth 2 times daily as needed for leg swelling.    Lower extremity edema       glipiZIDE 10 MG tablet    GLUCOTROL    180 tablet    TAKE ONE TABLET BY MOUTH TWICE DAILY WITH MEALS FOR DIABETES    Uncontrolled type 2 diabetes mellitus with microalbuminuria, with long-term current use of insulin (H)        hydrALAZINE 10 MG tablet    APRESOLINE     10 mg 3 times daily        hydrochlorothiazide 25 MG tablet    HYDRODIURIL    90 tablet    Take 1 tablet (25 mg) by mouth daily for blood pressure and leg swelling.    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    Type 2 diabetes mellitus with microalbuminuria, with long-term current use of insulin (H)       insulin pen needle 32G X 4 MM    BD CAROL ANN U/F    100 each    Use 1 daily as directed.    Type 2 diabetes mellitus with hyperglycemia, without long-term current use of insulin (H)       latanoprost 0.005 % ophthalmic solution    XALATAN    1 Bottle    Place 1 drop into both eyes At Bedtime    Primary open angle glaucoma of both eyes, moderate stage       lisinopril 40 MG tablet    PRINIVIL/ZESTRIL    90 tablet    Take 1 tablet (40 mg) by mouth daily for blood pressure.    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

## 2018-01-26 NOTE — MR AVS SNAPSHOT
After Visit Summary   1/26/2018    Manuel Rosales    MRN: [id number]           Patient Information     Date Of Birth          3/22/1931        Today's Diagnoses     Sleep disorder breathing        JOYCE (obstructive sleep apnea)        Hypoxemia          Care Instructions      Your BMI is Body mass index is 32.75 kg/(m^2).  Weight management is a personal decision.  If you are interested in exploring weight loss strategies, the following discussion covers the approaches that may be successful. Body mass index (BMI) is one way to tell whether you are at a healthy weight, overweight, or obese. It measures your weight in relation to your height.  A BMI of 18.5 to 24.9 is in the healthy range. A person with a BMI of 25 to 29.9 is considered overweight, and someone with a BMI of 30 or greater is considered obese. More than two-thirds of American adults are considered overweight or obese.  Being overweight or obese increases the risk for further weight gain. Excess weight may lead to heart disease and diabetes.  Creating and following plans for healthy eating and physical activity may help you improve your health.  Weight control is part of healthy lifestyle and includes exercise, emotional health, and healthy eating habits. Careful eating habits lifelong are the mainstay of weight control. Though there are significant health benefits from weight loss, long-term weight loss with diet alone may be very difficult to achieve- studies show long-term success with dietary management in less than 10% of people. Attaining a healthy weight may be especially difficult to achieve in those with severe obesity. In some cases, medications, devices and surgical management might be considered.  What can you do?  If you are overweight or obese and are interested in methods for weight loss, you should discuss this with your provider.     Consider reducing daily calorie intake by 500 calories.     Keep a food journal.      Avoiding skipping meals, consider cutting portions instead.    Diet combined with exercise helps maintain muscle while optimizing fat loss. Strength training is particularly important for building and maintaining muscle mass. Exercise helps reduce stress, increase energy, and improves fitness. Increasing exercise without diet control, however, may not burn enough calories to loose weight.       Start walking three days a week 10-20 minutes at a time    Work towards walking thirty minutes five days a week     Eventually, increase the speed of your walking for 1-2 minutes at time    In addition, we recommend that you review healthy lifestyles and methods for weight loss available through the National Institutes of Health patient information sites:  http://win.niddk.nih.gov/publications/index.htm    And look into health and wellness programs that may be available through your health insurance provider, employer, local community center, or ita club.    Weight management plan: Patient was referred to their PCP to discuss a diet and exercise plan.          Your Body mass index is 32.75 kg/(m^2).  Weight management is a personal decision.  If you are interested in exploring weight loss strategies, the following discussion covers the approaches that may be successful. Body mass index (BMI) is one way to tell whether you are at a healthy weight, overweight, or obese. It measures your weight in relation to your height.  A BMI of 18.5 to 24.9 is in the healthy range. A person with a BMI of 25 to 29.9 is considered overweight, and someone with a BMI of 30 or greater is considered obese. More than two-thirds of American adults are considered overweight or obese.  Being overweight or obese increases the risk for further weight gain. Excess weight may lead to heart disease and diabetes.  Creating and following plans for healthy eating and physical activity may help you improve your health.  Weight control is part of healthy  lifestyle and includes exercise, emotional health, and healthy eating habits. Careful eating habits lifelong are the mainstay of weight control. Though there are significant health benefits from weight loss, long-term weight loss with diet alone may be very difficult to achieve- studies show long-term success with dietary management in less than 10% of people. Attaining a healthy weight may be especially difficult to achieve in those with severe obesity. In some cases, medications, devices and surgical management might be considered.  What can you do?  If you are overweight or obese and are interested in methods for weight loss, you should discuss this with your provider.     Consider reducing daily calorie intake by 500 calories.     Keep a food journal.     Avoiding skipping meals, consider cutting portions instead.    Diet combined with exercise helps maintain muscle while optimizing fat loss. Strength training is particularly important for building and maintaining muscle mass. Exercise helps reduce stress, increase energy, and improves fitness. Increasing exercise without diet control, however, may not burn enough calories to loose weight.       Start walking three days a week 10-20 minutes at a time    Work towards walking thirty minutes five days a week     Eventually, increase the speed of your walking for 1-2 minutes at time    In addition, we recommend that you review healthy lifestyles and methods for weight loss available through the National Institutes of Health patient information sites:  http://win.niddk.nih.gov/publications/index.htm    And look into health and wellness programs that may be available through your health insurance provider, employer, local community center, or ita club.    Weight management plan: Patient was referred to their PCP to discuss a diet and exercise plan.          Follow-ups after your visit        Your next 10 appointments already scheduled     Jan 29, 2018  1:30 PM CST    HST Drop Off with [employer]   [facility] ([facility])    [address] [address] [address]   [phone]            Jan 29, 2018  2:20 PM CST   Return Sleep Patient with ALEX Matthew   [facility] ([facility])    [address] [address] [address] [facility] [address]  [facility] [address]   [phone]            Feb 06, 2018 12:45 PM CST   Return Visit with Willy Cardoso MD   [facility] ([facility])    [address] [place] [address]  [place] [address]   [phone]            Jul 09, 2018 11:20 AM CDT   Office Visit with Ronaldo Corral MD   [facility] ([facility])    [address] [facility] [address]   [phone]           Bring a current list of meds and any records pertaining to this visit. For Physicals, please bring immunization records and any forms needing to be filled out. Please arrive 10 minutes early to complete paperwork.              Who to contact     If you have questions or need follow up information about today's clinic visit or your schedule please contact [facility] directly at [phone].  Normal or non-critical lab and imaging results will be communicated to you by Social Plushart, letter or phone within 4 business days after the clinic has received the results. If you do not hear from us within 7 days, please contact the clinic through Co[employer]Ke[employer] or phone. If you have a critical or abnormal lab result, we will notify you by phone as soon as possible.  Submit refill requests through Mind Palette or call your pharmacy and they will forward the refill request to us. Please allow 3 business days for your refill to be completed.          Additional Information About Your Visit        Mind Palette Information     [employer] Palette lets you send messages to your doctor, view your test results, renew your  "prescriptions, schedule appointments and more. To sign up, go to [email or web address] . Click on \"Log in\" on the left side of the screen, which will take you to the Welcome page. Then click on \"Sign up Now\" on the right side of the page.     You will be asked to enter the access code listed below, as well as some personal information. Please follow the directions to create your username and password.     Your access code is: [email or web address]  Expires: 2018  1:18 PM     Your access code will  in 90 days. If you need help or a new code, please call your [place] clinic or [phone].        Care EveryWhere ID     This is your Care EveryWhere ID. This could be used by other organizations to access your [place] medical records  [id number]        Your Vitals Were     Pulse Height Pulse Oximetry BMI (Body Mass Index)          41 1.689 m (5' 6.5\") 97% 32.75 kg/m2         Blood Pressure from Last 3 Encounters:   18 176/62   18 159/62   18 134/46    Weight from Last 3 Encounters:   18 93.4 kg (206 lb)   18 94.3 kg (208 lb)   18 92.5 kg (204 lb)              Today, you had the following     No orders found for display       Primary Care Provider Office Phone # Fax #    Ronaldo Corral [phone]       [address]  [facility] [address]        Equal Access to Services     [facility]: Hadii sarah ku hadasho Soomaali, waaxda luqadaha, qaybta kaalmada adeegyaangeli, radha sheppard . So [facility] [phone].    ATENCIÓN: Si habla español, tiene a claire disposición servicios gratuitos de asistencia lingüística. Llame al [phone].    We comply with applicable federal civil rights laws and [place] laws. We do not discriminate on the basis of race, color, national origin, age, disability, sex, sexual orientation, or gender identity.            Thank you!     Thank you for choosing [facility]  for your care. Our goal is " always to provide you with excellent care. Hearing back from our patients is one way we can continue to improve our services. Please take a few minutes to complete the written survey that you may receive in the mail after your visit with us. Thank you!             Your Updated Medication List - Protect others around you: Learn how to safely use, store and throw away your medicines at www.disposemymeds.org.          This list is accurate as of 1/26/18 11:14 AM.  Always use your most recent med list.                   Brand Name Dispense Instructions for use Diagnosis    amLODIPine 10 MG tablet    NORVASC     Take 1 tablet (10 mg) by mouth daily for blood pressure.    Benign hypertension with chronic kidney disease, stage III       atorvastatin 80 MG tablet    LIPITOR    90 tablet    Take 1 tablet (80 mg) by mouth daily for cholesterol.    Hyperlipidemia LDL goal <100       brimonidine 0.15 % ophthalmic solution    ALPHAGAN-P    1 Bottle    Place 1 drop into both eyes 2 times daily    Primary open angle glaucoma of both eyes, moderate stage       dorzolamide-timolol 2-0.5 % ophthalmic solution    COSOPT    1 Bottle    Place 1 drop into both eyes 2 times daily    Primary open angle glaucoma of both eyes, moderate stage       furosemide 20 MG tablet    LASIX    180 tablet    Take 1 tablet (20 mg) by mouth 2 times daily as needed for leg swelling.    Lower extremity edema       glipiZIDE 10 MG tablet    GLUCOTROL    180 tablet    TAKE ONE TABLET BY MOUTH TWICE DAILY WITH MEALS FOR DIABETES    Uncontrolled type 2 diabetes mellitus with microalbuminuria, with long-term current use of insulin (H)       hydrALAZINE 10 MG tablet    APRESOLINE     10 mg 3 times daily        hydrochlorothiazide 25 MG tablet    HYDRODIURIL    90 tablet    Take 1 tablet (25 mg) by mouth daily for blood pressure and leg swelling.    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    Type 2 diabetes mellitus with microalbuminuria, with long-term current use of insulin (H)       insulin pen needle 32G X 4 MM    BD CAROL ANN U/F    100 each    Use 1 daily as directed.    Type 2 diabetes mellitus with hyperglycemia, without long-term current use of insulin (H)       latanoprost 0.005 % ophthalmic solution    XALATAN    1 Bottle    Place 1 drop into both eyes At Bedtime    Primary open angle glaucoma of both eyes, moderate stage       lisinopril 40 MG tablet    PRINIVIL/ZESTRIL    90 tablet    Take 1 tablet (40 mg) by mouth daily for blood pressure.    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

## 2018-01-26 NOTE — PROGRESS NOTES
This HST performed using a Noxturnal T3 device which recorded snore, sound, movement activity, body position, nasal pressure, oronasal thermal airflow, pulse, oximetry and both chest and abdominal respiratory effort. HST data was confined to the time patients states they were in bed.   Patient was score using 4% rules. Patient respiratory events showed an AHI 12.5 with loud snoring. Patient SP02 below 89% was 192.0 minutes. Overall signal quality was good.However, the patient did not use position therapy during HST study.    Pt will follow up with sleep provider to determine appropriate therapy.     Ordering MD, Yassine Christianson, PA

## 2018-01-26 NOTE — PROGRESS NOTES
Patient instructed on HST use. Patient demonstrated and verbalized knowledge of use. Device programmed to start at 10:30pm. Device will be returned tomorrow at 1:50pm.     Stephanie Mondragon MA

## 2018-01-29 ENCOUNTER — APPOINTMENT (OUTPATIENT)
Dept: SLEEP MEDICINE | Facility: CLINIC | Age: 83
End: 2018-01-29
Payer: COMMERCIAL

## 2018-01-29 ENCOUNTER — OFFICE VISIT (OUTPATIENT)
Dept: SLEEP MEDICINE | Facility: CLINIC | Age: 83
End: 2018-01-29
Payer: COMMERCIAL

## 2018-01-29 VITALS
BODY MASS INDEX: 32.18 KG/M2 | HEIGHT: 67 IN | SYSTOLIC BLOOD PRESSURE: 156 MMHG | OXYGEN SATURATION: 97 % | HEART RATE: 51 BPM | WEIGHT: 205 LBS | DIASTOLIC BLOOD PRESSURE: 94 MMHG

## 2018-01-29 DIAGNOSIS — G47.33 OSA (OBSTRUCTIVE SLEEP APNEA): Primary | ICD-10-CM

## 2018-01-29 DIAGNOSIS — G47.30 SLEEP DISORDER BREATHING: ICD-10-CM

## 2018-01-29 DIAGNOSIS — H40.1132 PRIMARY OPEN ANGLE GLAUCOMA OF BOTH EYES, MODERATE STAGE: ICD-10-CM

## 2018-01-29 PROCEDURE — 99214 OFFICE O/P EST MOD 30 MIN: CPT | Performed by: PHYSICIAN ASSISTANT

## 2018-01-29 RX ORDER — LATANOPROST 50 UG/ML
1 SOLUTION/ DROPS OPHTHALMIC AT BEDTIME
Qty: 1 BOTTLE | Refills: 12 | Status: CANCELLED | OUTPATIENT
Start: 2018-01-29

## 2018-01-29 NOTE — PROGRESS NOTES
"Home Sleep Apnea Testing Results Visit:    Chief Complaint   Patient presents with     Study Results     hst results       Manuel Rosales is a 86 year old male who returns to [facility] after having had Home Sleep Apnea Testing.  He presented with moderate sleep disordered breathing, treated with positional therapy.    Prior Sleep study:   Study Date: 10/30/2017 (197.0 lb) apnea/hypopnea index was 18.7 events per hour.  The REM AHI was 14.1 events per hour.  The supine AHI was 64.2 events per hour. RDI was 42.8 events per hour.  Most of the repiratory events appeared periodic, though werer not completely regular. Periodicity was about 60 seconds, and circulation times about 30 seconds. Significant hypoventilation was not suggested with a maximum change of 6 mmHg (peak 42 mmHg).  Lowest oxygen saturation was 85.4%.  Time spent less than or equal to 88% was 1.3 minutes.  Time spent less than or equal to 89% was 3.2 minutes. PLM index was 22.9 movements per hour. The presenting rhythm while awake is sinus with second degree block and 2:1 conduction with occasional ?high junctional escape beats. This rhythm persisted throughout study.  The average pulse rate was 37.8 bpm.  The minimum pulse rate was 33.9 bpm. The maximum pulse rate was 79.1 bpm and occurred only after awakened at the end of the study when 1:1 conduction occurred.       Estimated body mass index is 32.11 kg/(m^2) as calculated from the following:    Height as of this encounter: 1.702 m (5' 7\").    Weight as of this encounter: 93 kg (205 lb).     Home Sleep Apnea Testing - 1/28/18:2 80 lbs 0 oz: AHI 20/hr. Supine AHI NA /hr.   Oxygen Stephane of 81%.  Baseline ~90%.  Sp02 =< 88% for 148 minutes  He slept on his back (2.3%), prone (0.0%), left (97.7) and right (0.0%) sides.   Analysis time: 504.7 minutes.     Signal quality of Oxymeter 100% Good  Nasal Cannula 100% Good  RIP belts 100% Good.     Manuel Rosales reports that he " "slept Fair.     Past medical/surgical history, family history, social history, medications and allergies were reviewed.      BP (!) 156/94  Pulse 51  Ht 1.702 m (5' 7\")  Wt 93 kg (205 lb)  SpO2 97%  BMI 32.11 kg/m2    Impression/Plan:  Moderate Obstructive Sleep Apnea in the lateral position.   Sleep associated hypoxemia was present.     Home Sleep Apnea Testing was reviewed in detail today with Manuel and a copy given to him for his records.     Treatment options discussed today including  auto-CPAP at 6-12 cmH2O, oral appliance therapy or polysomnography with full night PAP titration. Patient does not want to undergo an attended PSG at this time  Elected treatment with auto-CPAP at 6-12 cmH2O.  Overnight oximetry to follow up on hypoxemia once the patient is established on CPAP therapy.     25 minutes spent with patient with >50% spent in counseling and coordination of care regarding JOYCE and hypoxemia.      Yassine Christianson PA-C    CC:  Ronaldo Corral      "

## 2018-01-29 NOTE — NURSING NOTE
"Chief Complaint   Patient presents with     Study Results     hst results       Initial /66  Pulse (!) 45  Ht 1.702 m (5' 7\")  Wt 93 kg (205 lb)  SpO2 97%  BMI 32.11 kg/m2 Estimated body mass index is 32.11 kg/(m^2) as calculated from the following:    Height as of this encounter: 1.702 m (5' 7\").    Weight as of this encounter: 93 kg (205 lb).  Medication Reconciliation: complete    "

## 2018-01-29 NOTE — MR AVS SNAPSHOT
After Visit Summary   1/29/2018    Manuel Rosales    MRN: [id number]           Patient Information     Date Of Birth          3/22/1931        Visit Information        Provider Department      1/29/2018 2:20 PM Yassine Christianson PA [facility]        Today's Diagnoses     JOYCE (obstructive sleep apnea)    -  1    Sleep disorder breathing          Care Instructions      Your BMI is Body mass index is 32.11 kg/(m^2).  Weight management is a personal decision.  If you are interested in exploring weight loss strategies, the following discussion covers the approaches that may be successful. Body mass index (BMI) is one way to tell whether you are at a healthy weight, overweight, or obese. It measures your weight in relation to your height.  A BMI of 18.5 to 24.9 is in the healthy range. A person with a BMI of 25 to 29.9 is considered overweight, and someone with a BMI of 30 or greater is considered obese. More than two-thirds of American adults are considered overweight or obese.  Being overweight or obese increases the risk for further weight gain. Excess weight may lead to heart disease and diabetes.  Creating and following plans for healthy eating and physical activity may help you improve your health.  Weight control is part of healthy lifestyle and includes exercise, emotional health, and healthy eating habits. Careful eating habits lifelong are the mainstay of weight control. Though there are significant health benefits from weight loss, long-term weight loss with diet alone may be very difficult to achieve- studies show long-term success with dietary management in less than 10% of people. Attaining a healthy weight may be especially difficult to achieve in those with severe obesity. In some cases, medications, devices and surgical management might be considered.  What can you do?  If you are overweight or obese and are interested in methods for weight loss, you should discuss this with  your provider.     Consider reducing daily calorie intake by 500 calories.     Keep a food journal.     Avoiding skipping meals, consider cutting portions instead.    Diet combined with exercise helps maintain muscle while optimizing fat loss. Strength training is particularly important for building and maintaining muscle mass. Exercise helps reduce stress, increase energy, and improves fitness. Increasing exercise without diet control, however, may not burn enough calories to loose weight.       Start walking three days a week 10-20 minutes at a time    Work towards walking thirty minutes five days a week     Eventually, increase the speed of your walking for 1-2 minutes at time    In addition, we recommend that you review healthy lifestyles and methods for weight loss available through the National Institutes of Health patient information sites:  http://win.niddk.nih.gov/publications/index.htm    And look into health and wellness programs that may be available through your health insurance provider, employer, local community center, or ita club.    Weight management plan: Patient was referred to their PCP to discuss a diet and exercise plan.          Your Body mass index is 32.11 kg/(m^2).  Weight management is a personal decision.  If you are interested in exploring weight loss strategies, the following discussion covers the approaches that may be successful. Body mass index (BMI) is one way to tell whether you are at a healthy weight, overweight, or obese. It measures your weight in relation to your height.  A BMI of 18.5 to 24.9 is in the healthy range. A person with a BMI of 25 to 29.9 is considered overweight, and someone with a BMI of 30 or greater is considered obese. More than two-thirds of American adults are considered overweight or obese.  Being overweight or obese increases the risk for further weight gain. Excess weight may lead to heart disease and diabetes.  Creating and following plans for  healthy eating and physical activity may help you improve your health.  Weight control is part of healthy lifestyle and includes exercise, emotional health, and healthy eating habits. Careful eating habits lifelong are the mainstay of weight control. Though there are significant health benefits from weight loss, long-term weight loss with diet alone may be very difficult to achieve- studies show long-term success with dietary management in less than 10% of people. Attaining a healthy weight may be especially difficult to achieve in those with severe obesity. In some cases, medications, devices and surgical management might be considered.  What can you do?  If you are overweight or obese and are interested in methods for weight loss, you should discuss this with your provider.     Consider reducing daily calorie intake by 500 calories.     Keep a food journal.     Avoiding skipping meals, consider cutting portions instead.    Diet combined with exercise helps maintain muscle while optimizing fat loss. Strength training is particularly important for building and maintaining muscle mass. Exercise helps reduce stress, increase energy, and improves fitness. Increasing exercise without diet control, however, may not burn enough calories to loose weight.       Start walking three days a week 10-20 minutes at a time    Work towards walking thirty minutes five days a week     Eventually, increase the speed of your walking for 1-2 minutes at time    In addition, we recommend that you review healthy lifestyles and methods for weight loss available through the National Institutes of Health patient information sites:  http://win.niddk.nih.gov/publications/index.htm    And look into health and wellness programs that may be available through your health insurance provider, employer, local community center, or ita club.    Weight management plan: Patient was referred to their PCP to discuss a diet and exercise plan.           Follow-ups after your visit        Follow-up notes from your care team     Return in about 5 weeks (around 3/5/2018).      Your next 10 appointments already scheduled     Jan 31, 2018 10:30 AM CST   PAP SETUP with KELLI HIGGINS   [facility]    [address] [place] [address]   [phone]            Feb 06, 2018 12:45 PM CST   Return Visit with Willy Cardoso MD   [facility]    [address] [facility] [address]   [phone]            Mar 08, 2018 10:30 AM CST   Return Sleep Patient with ALEX Matthew   [facility] [place]    [address] [place] [address]   [phone]            Jul 09, 2018 11:20 AM CDT   Office Visit with Ronaldo Corral MD   [facility] ([facility])    [address] [place] [address]   [phone]           Bring a current list of meds and any records pertaining to this visit. For Physicals, please bring immunization records and any forms needing to be filled out. Please arrive 10 minutes early to complete paperwork.              Who to contact     If you have questions or need follow up information about today's clinic visit or your schedule please contact [facility] directly at [phone].  Normal or non-critical lab and imaging results will be communicated to you by MyChart, letter or phone within 4 business days after the clinic has received the results. If you do not hear from us within 7 days, please contact the clinic through [employer] or phone. If you have a critical or abnormal lab result, we will notify you by phone as soon as possible.  Submit refill requests through [employer] or call your pharmacy and they will forward the refill request to us. Please allow 3 business days for your  "refill to be completed.          Additional Information About Your Visit        [employer] Information     [email or web address] lets you send messages to your doctor, view your test results, renew your prescriptions, schedule appointments and more. To sign up, go to [email or web address] . Click on \"Log in\" on the left side of the screen, which will take you to the Welcome page. Then click on \"Sign up Now\" on the right side of the page.     You will be asked to enter the access code listed below, as well as some personal information. Please follow the directions to create your username and password.     Your access code is: [email or web address]  Expires: 2018  1:18 PM     Your access code will  in 90 days. If you need help or a new code, please call your [place] clinic or [phone].        Care EveryWhere ID     This is your Care EveryWhere ID. This could be used by other organizations to access your [place] medical records  [id number]        Your Vitals Were     Pulse Height Pulse Oximetry BMI (Body Mass Index)          45 1.702 m (5' 7\") 97% 32.11 kg/m2         Blood Pressure from Last 3 Encounters:   18 196/66   18 159/62   18 134/46    Weight from Last 3 Encounters:   18 93 kg (205 lb)   18 94.3 kg (208 lb)   18 92.5 kg (204 lb)              We Performed the Following     Sleep Comprehensive DME        Primary Care Provider Office Phone # Fax #    Ronaldo Corral [phone]       [address]  [facility] [address]        Equal Access to Services     [facility]: Hadii sarah silverman [place], waaxda luqadaha, qaybta kaalmada janine, radha sheppard . So [facility] [phone].    ATENCIÓN: Si habla español, tiene a claire disposición servicios gratuitos de asistencia lingüística. Llame al [phone].    We comply with applicable federal civil rights laws and [place] laws. We do not discriminate on the basis of race, color, " national origin, age, disability, sex, sexual orientation, or gender identity.            Thank you!     Thank you for choosing [facility]  for your care. Our goal is always to provide you with excellent care. Hearing back from our patients is one way we can continue to improve our services. Please take a few minutes to complete the written survey that you may receive in the mail after your visit with us. Thank you!             Your Updated Medication List - Protect others around you: Learn how to safely use, store and throw away your medicines at www.disposemymeds.org.          This list is accurate as of 1/29/18  2:22 PM.  Always use your most recent med list.                   Brand Name Dispense Instructions for use Diagnosis    amLODIPine 10 MG tablet    NORVASC     Take 1 tablet (10 mg) by mouth daily for blood pressure.    Benign hypertension with chronic kidney disease, stage III       atorvastatin 80 MG tablet    LIPITOR    90 tablet    Take 1 tablet (80 mg) by mouth daily for cholesterol.    Hyperlipidemia LDL goal <100       brimonidine 0.15 % ophthalmic solution    ALPHAGAN-P    1 Bottle    Place 1 drop into both eyes 2 times daily    Primary open angle glaucoma of both eyes, moderate stage       dorzolamide-timolol 2-0.5 % ophthalmic solution    COSOPT    1 Bottle    Place 1 drop into both eyes 2 times daily    Primary open angle glaucoma of both eyes, moderate stage       furosemide 20 MG tablet    LASIX    180 tablet    Take 1 tablet (20 mg) by mouth 2 times daily as needed for leg swelling.    Lower extremity edema       glipiZIDE 10 MG tablet    GLUCOTROL    180 tablet    TAKE ONE TABLET BY MOUTH TWICE DAILY WITH MEALS FOR DIABETES    Uncontrolled type 2 diabetes mellitus with microalbuminuria, with long-term current use of insulin (H)       hydrALAZINE 10 MG tablet    APRESOLINE     10 mg 3 times daily        hydrochlorothiazide 25 MG tablet    HYDRODIURIL    90 tablet    Take 1  tablet (25 mg) by mouth daily for blood pressure and leg swelling.    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    Type 2 diabetes mellitus with microalbuminuria, with long-term current use of insulin (H)       insulin pen needle 32G X 4 MM    BD CAROL ANN U/F    100 each    Use 1 daily as directed.    Type 2 diabetes mellitus with hyperglycemia, without long-term current use of insulin (H)       latanoprost 0.005 % ophthalmic solution    XALATAN    1 Bottle    Place 1 drop into both eyes At Bedtime    Primary open angle glaucoma of both eyes, moderate stage       lisinopril 40 MG tablet    PRINIVIL/ZESTRIL    90 tablet    Take 1 tablet (40 mg) by mouth daily for blood pressure.    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

## 2018-01-29 NOTE — PROGRESS NOTES
This HST performed using a Noxturnal T3 device which recorded snore, sound, movement activity, body position, nasal pressure, oronasal thermal airflow, pulse, oximetry and both chest and abdominal respiratory effort. HST data was confined to the time patients states they were in bed.   Patient was score using 4% rules. Patient respiratory events showed an AHI 20.0 with loud snoring. Patient SP02 below 89% was 148.0 minutes. Overall signal quality was good.    Pt will follow up with sleep provider to determine appropriate therapy.

## 2018-01-29 NOTE — PATIENT INSTRUCTIONS

## 2018-01-30 RX ORDER — LATANOPROST 50 UG/ML
1 SOLUTION/ DROPS OPHTHALMIC AT BEDTIME
Qty: 1 BOTTLE | Refills: 12 | Status: SHIPPED | OUTPATIENT
Start: 2018-01-30 | End: 2019-02-20

## 2018-01-31 ENCOUNTER — DOCUMENTATION ONLY (OUTPATIENT)
Dept: SLEEP MEDICINE | Facility: CLINIC | Age: 83
End: 2018-01-31
Payer: COMMERCIAL

## 2018-01-31 DIAGNOSIS — G47.33 OSA (OBSTRUCTIVE SLEEP APNEA): Primary | ICD-10-CM

## 2018-01-31 DIAGNOSIS — G47.30 SLEEP DISORDER BREATHING: ICD-10-CM

## 2018-01-31 NOTE — PROGRESS NOTES
Patient was offered choice of vendor and chose [facility].  Patient Manuel Rosales was set up at [place] on January 31, 2018. Patient received a Resmed AirSense 10 Auto. Pressures were set at 6-12 cm H2O.   Patient s ramp is 5 cm H2O for Auto and FLEX/EPR is EPR, 2.  Patient received a [employer] Mask name: Simplus  Full Face mask Size Medium, heated tubing and heated humidifier.  Patient is enrolled in the STM Program and does need to meet compliance. Patient has a follow up on 3/8/18 with ALEX Gonzales.    Abbie Simon

## 2018-02-05 ENCOUNTER — DOCUMENTATION ONLY (OUTPATIENT)
Dept: SLEEP MEDICINE | Facility: CLINIC | Age: 83
End: 2018-02-05

## 2018-02-05 DIAGNOSIS — G47.30 SLEEP DISORDER BREATHING: ICD-10-CM

## 2018-02-05 NOTE — PROGRESS NOTES
3 DAY STM VISIT    Patient contacted for 3 day STM visit  Subjective measures:  Pt states things are going well and has no issues or complaints.  Pt is benefiting from therapy.     Device type: Auto-CPAP  PAP settings from order:: CPAP min 6 cm  H20              CPAP max 12 cm  H20  Device settings from machine    Min CPAP 6.0          Max CPAP 12.0      Assessment: Nightly usage over four hours.  Action plan: Pt to have f/u 14 day STM visit.  Diagnostic AHI: 18.7 20.0

## 2018-02-05 NOTE — PROCEDURES
"HOME SLEEP STUDY INTERPRETATION    Patient: Manuel Rosales  MRN: [id number]  YOB: 1931  Study Date: 1/26/2018  Referring Provider: Ronaldo Corral  Ordering Provider: ALEX Gonzales     Indications for Home Study: Manuel Rosales is a 86 year old male with a history of obstructive sleep apnea, possible periodic breathing who presents for evaluation of effectiveness of positional therapy. .    Estimated body mass index is 32.11 kg/(m^2) as calculated from the following:    Height as of 1/29/18: 1.702 m (5' 7\").    Weight as of 1/29/18: 93 kg (205 lb).  Total score - Cody: 5 (1/18/2018 12:00 PM)      Data: A full night home sleep study was performed recording the standard physiologic parameters including body position, movement, sound, nasal pressure, thermal oral airflow, chest and abdominal movements with respiratory inductance plethysmography, and oxygen saturation by pulse oximetry. Pulse rate was estimated by oximetry recording. This study was considered adequate based on > 4 hours of quality oximetry and respiratory recording. As specified by the AASM Manual for the Scoring of Sleep and Associated events, version 2.3, Rule VIII.D 1B, 4% oxygen desaturation scoring for hypopneas is used as a standard of care on all home sleep apnea testing.    Analysis Time:  504 minutes    Respiration:   Sleep Associated Hypoxemia: sustained hypoxemia was present. Baseline oxygen saturation was 92-93%.  Time with saturation less than or equal to 88% was 148 minutes. The lowest oxygen saturation was 81%.   Snoring: Snoring was not apreciated.  Respiratory events: The home study revealed a presence of 14 obstructive apneas and 9 mixed and central apneas. There were 145 hypopneas resulting in a combined apnea/hypopnea index [AHI] of 20.0 events per hour.  AHI was 0 per hour supine, n/a per hour prone, 20.4 per hour on left side, and n/a per hour on right side.   Pattern: Respiratory rate and pattern was " frequently periodic in nature.    Position: Percent of time spent: supine - 2%, prone - 0%, on left - 98%, on right - 0%.    Heart Rate: By pulse oximetry normal rate was noted.     Assessment:   Moderate sleep apnea in lateral position. Periodic breathing is suggested  Sleep associated hypoxemia was present, including evidence for hypoxemia without discrete respiratory events    Recommendations:  Consider auto-CPAP at 5-18 cmH2O or polysomnography with full night PAP titration.  Suggest optimizing sleep hygiene and avoiding sleep deprivation.  Weight management.    Diagnosis Code(s): Obstructive Sleep Apnea G47.33, Hypoxemia G47.36    Ti Adorno MD, February 5, 2018   Diplomate, American Board of Internal Medicine, Sleep Medicine

## 2018-02-06 ENCOUNTER — OFFICE VISIT (OUTPATIENT)
Dept: OPHTHALMOLOGY | Facility: CLINIC | Age: 83
End: 2018-02-06
Payer: COMMERCIAL

## 2018-02-06 ENCOUNTER — TELEPHONE (OUTPATIENT)
Dept: FAMILY MEDICINE | Facility: CLINIC | Age: 83
End: 2018-02-06

## 2018-02-06 DIAGNOSIS — H40.1132 PRIMARY OPEN ANGLE GLAUCOMA OF BOTH EYES, MODERATE STAGE: Primary | ICD-10-CM

## 2018-02-06 DIAGNOSIS — H40.1132 PRIMARY OPEN ANGLE GLAUCOMA OF BOTH EYES, MODERATE STAGE: ICD-10-CM

## 2018-02-06 DIAGNOSIS — N18.30 BENIGN HYPERTENSIVE HEART AND KIDNEY DISEASE WITH DIASTOLIC CONGESTIVE HEART FAILURE, NYHA CLASS 2 AND CHRONIC KIDNEY DISEASE STAGE 3 (H): ICD-10-CM

## 2018-02-06 DIAGNOSIS — I50.30 BENIGN HYPERTENSIVE HEART AND KIDNEY DISEASE WITH DIASTOLIC CONGESTIVE HEART FAILURE, NYHA CLASS 2 AND CHRONIC KIDNEY DISEASE STAGE 3 (H): ICD-10-CM

## 2018-02-06 DIAGNOSIS — I13.0 BENIGN HYPERTENSIVE HEART AND KIDNEY DISEASE WITH DIASTOLIC CONGESTIVE HEART FAILURE, NYHA CLASS 2 AND CHRONIC KIDNEY DISEASE STAGE 3 (H): ICD-10-CM

## 2018-02-06 PROCEDURE — 92133 CPTRZD OPH DX IMG PST SGM ON: CPT | Performed by: OPHTHALMOLOGY

## 2018-02-06 PROCEDURE — 92083 EXTENDED VISUAL FIELD XM: CPT | Performed by: OPHTHALMOLOGY

## 2018-02-06 PROCEDURE — 92012 INTRM OPH EXAM EST PATIENT: CPT | Performed by: OPHTHALMOLOGY

## 2018-02-06 RX ORDER — DORZOLAMIDE HYDROCHLORIDE AND TIMOLOL MALEATE 20; 5 MG/ML; MG/ML
1 SOLUTION/ DROPS OPHTHALMIC 2 TIMES DAILY
Qty: 1 BOTTLE | Refills: 12 | Status: CANCELLED | OUTPATIENT
Start: 2018-02-06

## 2018-02-06 RX ORDER — BRIMONIDINE TARTRATE 1.5 MG/ML
1 SOLUTION/ DROPS OPHTHALMIC 2 TIMES DAILY
Qty: 1 BOTTLE | Refills: 12 | Status: SHIPPED | OUTPATIENT
Start: 2018-02-06 | End: 2018-05-01

## 2018-02-06 ASSESSMENT — VISUAL ACUITY
OD_CC: 20/20
OD_CC+: -1
OS_CC: 20/25
OS_CC+: +1
CORRECTION_TYPE: GLASSES
METHOD: SNELLEN - LINEAR

## 2018-02-06 ASSESSMENT — TONOMETRY
OS_IOP_MMHG: 15
OD_IOP_MMHG: 16
IOP_METHOD: APPLANATION

## 2018-02-06 ASSESSMENT — EXTERNAL EXAM - LEFT EYE: OS_EXAM: NORMAL

## 2018-02-06 ASSESSMENT — EXTERNAL EXAM - RIGHT EYE: OD_EXAM: NORMAL

## 2018-02-06 NOTE — LETTER
2/6/2018         RE: Manuel Rosales  [address]  [facility] [address]        Dear Colleague,    Thank you for referring your patient, Manuel Rosales, to the [facility]. Please see a copy of my visit note below.     Current Eye Medications: Brimonidine twice daily both eyes, Cosopt twice daily both eyes, and Latanoprost every evening both eyes.     Subjective:  Here for IOP/HVF and OCT today for glaucoma. Doing well no new issues with the eyes.     No significant irritation.     Objective:  See Ophthalmology Exam.       Assessment:  Stable intraocular pressure, glaucoma OCT, and Rios Visual Field both eyes in patient with moderate open angle glaucoma.      Plan:  Continue Brimonidine twice daily both eyes, Cosopt twice daily both eyes, and Latanoprost every evening both eyes.  Return visit 6 months for a complete exam.    Willy Cardoso M.D.         Again, thank you for allowing me to participate in the care of your patient.        Sincerely,        Willy Cardoso MD

## 2018-02-06 NOTE — PATIENT INSTRUCTIONS
Continue Brimonidine twice daily both eyes, Cosopt twice daily both eyes, and Latanoprost every evening both eyes.  Return visit 6 months for a complete exam.    Willy Cardoso M.D.

## 2018-02-06 NOTE — MR AVS SNAPSHOT
After Visit Summary   2/6/2018    Manuel Rosales    MRN: [id number]           Patient Information     Date Of Birth          3/22/1931        Visit Information        Provider Department      2/6/2018 12:45 PM Willy Cardoso MD [facility]        Today's Diagnoses     Primary open angle glaucoma of both eyes, moderate stage    -  1      Care Instructions    Continue Brimonidine twice daily both eyes, Cosopt twice daily both eyes, and Latanoprost every evening both eyes.  Return visit 6 months for a complete exam.    Willy Cardoso M.D.            Follow-ups after your visit        Your next 10 appointments already scheduled     Mar 08, 2018 10:30 AM CST   Return Sleep Patient with ALEX Matthew   [facility] ([facility])    [address] [place] [id number] [phone]            Jul 09, 2018 11:20 AM CDT   Office Visit with Ronaldo Corral MD   [facility] ([facility]    [address] [place] [address]   [phone]           Bring a current list of meds and any records pertaining to this visit. For Physicals, please bring immunization records and any forms needing to be filled out. Please arrive 10 minutes early to complete paperwork.              Who to contact     If you have questions or need follow up information about today's clinic visit or your schedule please contact [facility] directly at [phone].  Normal or non-critical lab and imaging results will be communicated to you by MyChart, letter or phone within 4 business days after the clinic has received the results. If you do not hear from us within 7 days, please contact the clinic through Zulihart or phone. If you have a critical or abnormal lab result, we will notify you by phone as soon as possible.  Submit refill requests through Razient or call your pharmacy  "and they will forward the refill request to us. Please allow 3 business days for your refill to be completed.          Additional Information About Your Visit        FabriQateharSmart Voicemail Information     MarkaVIP lets you send messages to your doctor, view your test results, renew your prescriptions, schedule appointments and more. To sign up, go to [email or web address] . Click on \"Log in\" on the left side of the screen, which will take you to the Welcome page. Then click on \"Sign up Now\" on the right side of the page.     You will be asked to enter the access code listed below, as well as some personal information. Please follow the directions to create your username and password.     Your access code is: [email or web address]  Expires: 2018  1:18 PM     Your access code will  in 90 days. If you need help or a new code, please call your [place] clinic or [phone].        Care EveryWhere ID     This is your Care EveryWhere ID. This could be used by other organizations to access your [place] medical records  [id number]         Blood Pressure from Last 3 Encounters:   18 (!) 156/94   18 159/62   18 134/46    Weight from Last 3 Encounters:   18 93 kg (205 lb)   18 94.3 kg (208 lb)   18 92.5 kg (204 lb)              We Performed the Following      COMPUTERIZED OPHTHALMIC IMAGING OPTIC NERVE     VISUAL FIELDS EXAM (EXTENDED)          Where to get your medicines      These medications were sent to [facility] [address] [place] [address] [address] [facility]  [address] [facility] [address]     Phone:  [phone]     brimonidine 0.15 % ophthalmic solution          Primary Care Provider Office Phone # Fax #    Ronaldo Corral [phone]       [address]  [facility] [address]        Equal Access to Services     EDWINA CUEVAS AH: Hadii sarah johnson hadasho Soomaali, waaxda luqadaha, qaybta kaalmada adelalyyaangeli, radha jimenez. " So [facility] [phone].    ATENCIÓN: Si en payton, tiene a claire disposición servicios gratuitos de asistencia lingüística. Chad lopez [phone].    We comply with applicable federal civil rights laws and [place] laws. We do not discriminate on the basis of race, color, national origin, age, disability, sex, sexual orientation, or gender identity.            Thank you!     Thank you for choosing [facility]  for your care. Our goal is always to provide you with excellent care. Hearing back from our patients is one way we can continue to improve our services. Please take a few minutes to complete the written survey that you may receive in the mail after your visit with us. Thank you!             Your Updated Medication List - Protect others around you: Learn how to safely use, store and throw away your medicines at www.disposemymeds.org.          This list is accurate as of 2/6/18  1:38 PM.  Always use your most recent med list.                   Brand Name Dispense Instructions for use Diagnosis    amLODIPine 10 MG tablet    NORVASC     Take 1 tablet (10 mg) by mouth daily for blood pressure.    Benign hypertension with chronic kidney disease, stage III       atorvastatin 80 MG tablet    LIPITOR    90 tablet    Take 1 tablet (80 mg) by mouth daily for cholesterol.    Hyperlipidemia LDL goal <100       brimonidine 0.15 % ophthalmic solution    ALPHAGAN-P    1 Bottle    Place 1 drop into both eyes 2 times daily    Primary open angle glaucoma of both eyes, moderate stage       dorzolamide-timolol 2-0.5 % ophthalmic solution    COSOPT    1 Bottle    Place 1 drop into both eyes 2 times daily    Primary open angle glaucoma of both eyes, moderate stage       furosemide 20 MG tablet    LASIX    180 tablet    Take 1 tablet (20 mg) by mouth 2 times daily as needed for leg swelling.    Lower extremity edema       glipiZIDE 10 MG tablet    GLUCOTROL    180 tablet    TAKE ONE TABLET BY MOUTH TWICE DAILY WITH MEALS  FOR DIABETES    Uncontrolled type 2 diabetes mellitus with microalbuminuria, with long-term current use of insulin (H)       hydrALAZINE 10 MG tablet    APRESOLINE     10 mg 3 times daily        hydrochlorothiazide 25 MG tablet    HYDRODIURIL    90 tablet    Take 1 tablet (25 mg) by mouth daily for blood pressure and leg swelling.    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    Type 2 diabetes mellitus with microalbuminuria, with long-term current use of insulin (H)       insulin pen needle 32G X 4 MM    BD CAROL ANN U/F    100 each    Use 1 daily as directed.    Type 2 diabetes mellitus with hyperglycemia, without long-term current use of insulin (H)       latanoprost 0.005 % ophthalmic solution    XALATAN    1 Bottle    Place 1 drop into both eyes At Bedtime    Primary open angle glaucoma of both eyes, moderate stage       lisinopril 40 MG tablet    PRINIVIL/ZESTRIL    90 tablet    Take 1 tablet (40 mg) by mouth daily for blood pressure.    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

## 2018-02-06 NOTE — TELEPHONE ENCOUNTER
[facility] pharmacy faxed a  Request for rx's  that specified that  Individual bottles of  Dorzolamide and timolol to  be substituted for cosopt that is not available at this time, I faxed this rx to them.

## 2018-02-06 NOTE — PROGRESS NOTES
Current Eye Medications: Brimonidine twice daily both eyes, Cosopt twice daily both eyes, and Latanoprost every evening both eyes.     Subjective:  Here for IOP/HVF and OCT today for glaucoma. Doing well no new issues with the eyes.     No significant irritation.     Objective:  See Ophthalmology Exam.       Assessment:  Stable intraocular pressure, glaucoma OCT, and Rios Visual Field both eyes in patient with moderate open angle glaucoma.      Plan:  Continue Brimonidine twice daily both eyes, Cosopt twice daily both eyes, and Latanoprost every evening both eyes.  Return visit 6 months for a complete exam.    Willy Cardoso M.D.

## 2018-02-07 ASSESSMENT — PACHYMETRY
OD_CT(UM): 593
OS_CT(UM): 594

## 2018-02-08 RX ORDER — TERAZOSIN 2 MG/1
2 CAPSULE ORAL DAILY
Qty: 90 CAPSULE | Refills: 1 | Status: SHIPPED | OUTPATIENT
Start: 2018-02-08 | End: 2018-07-09

## 2018-02-08 NOTE — TELEPHONE ENCOUNTER
Please advise the patient that his prescription has been changed to the 2 mg capsule (take 1 daily) because of the quantity restriction.

## 2018-02-08 NOTE — TELEPHONE ENCOUNTER
This writer attempted to contact patient on 02/08/18      Reason for call medication dose changed and left message to return call to the clinic or pharmacy.      If patient calls back:   Relay message from Dr. Corral, (read verbatim), document that pt called and close encounter        Heather Bocanegra, [facility]

## 2018-02-15 ENCOUNTER — DOCUMENTATION ONLY (OUTPATIENT)
Dept: SLEEP MEDICINE | Facility: CLINIC | Age: 83
End: 2018-02-15

## 2018-02-15 DIAGNOSIS — G47.30 SLEEP DISORDER BREATHING: ICD-10-CM

## 2018-02-15 NOTE — PROGRESS NOTES
14 DAY STM VISIT    Subjective measures:   Pt states things are going pretty well.  He's getting used to it.  He is starting to feel a little better.    Assessment: Pt meeting objective benchmarks.  Patient meeting subjective benchmarks.   Action plan: pt to have 30 day STM visit.    Device type: Auto-CPAP  PAP settings: CPAP min 6 cm  H20    CPAP max 12 cm  H20    95th% pressure 8.8 cm   Objective measures: 14 day rolling measures      Compliance  100 %      Leak  52.4 lpm  last  upload      AHI 1.42   last  upload      Average number of minutes 483    Diagnostic AHI: 18.7 20.0    Objective measure goal  Compliance   Goal >70%  Leak   Goal < 24 lpm  AHI  Goal < 5  Usage  Goal >240

## 2018-03-01 ENCOUNTER — TRANSFERRED RECORDS (OUTPATIENT)
Dept: HEALTH INFORMATION MANAGEMENT | Facility: CLINIC | Age: 83
End: 2018-03-01

## 2018-03-02 ENCOUNTER — DOCUMENTATION ONLY (OUTPATIENT)
Dept: SLEEP MEDICINE | Facility: CLINIC | Age: 83
End: 2018-03-02
Payer: COMMERCIAL

## 2018-03-02 NOTE — PROGRESS NOTES
30 DAY STM VISIT    Subjective measures:   Spoke with patients wife and they have no questions or concerns.     Assessment: Pt meeting objective benchmarks.  Patient meeting subjective benchmarks.   Action plan: Pt to have 6 month [facility] visit.  Patient has a follow up visit with ALEX Gonzales on 3/8/2018.   Device type: Auto-CPAP  PAP settings: CPAP min 6 cm  H20     CPAP max 12 cm  H20    95th% pressure 8.9 cm  H20   Objective measures: 14 day rolling measures      Compliance  92 %      Leak  84.17 lpm  last  upload      AHI 1.59   last  upload      Average number of minutes 484    Diagnostic AHI: 18.7 20.0        Objective measure goal  Compliance   Goal >70%  Leak   Goal < 24 lpm  AHI  Goal < 5  Usage  Goal >240

## 2018-03-08 ENCOUNTER — OFFICE VISIT (OUTPATIENT)
Dept: SLEEP MEDICINE | Facility: CLINIC | Age: 83
End: 2018-03-08
Payer: COMMERCIAL

## 2018-03-08 VITALS
SYSTOLIC BLOOD PRESSURE: 158 MMHG | OXYGEN SATURATION: 98 % | DIASTOLIC BLOOD PRESSURE: 52 MMHG | BODY MASS INDEX: 32.49 KG/M2 | HEART RATE: 41 BPM | WEIGHT: 207 LBS | HEIGHT: 67 IN

## 2018-03-08 DIAGNOSIS — G47.30 SLEEP DISORDER BREATHING: ICD-10-CM

## 2018-03-08 DIAGNOSIS — G47.33 OSA (OBSTRUCTIVE SLEEP APNEA): Primary | ICD-10-CM

## 2018-03-08 PROCEDURE — 99214 OFFICE O/P EST MOD 30 MIN: CPT | Performed by: PHYSICIAN ASSISTANT

## 2018-03-08 NOTE — MR AVS SNAPSHOT
After Visit Summary   3/8/2018    Manuel Rosales    MRN: [id number]           Patient Information     Date Of Birth          3/22/1931        Visit Information        Provider Department      3/8/2018 11:00 AM BK BED 5 [facility]        Today's Diagnoses     Sleep disorder breathing        JOYCE (obstructive sleep apnea)           Follow-ups after your visit        Your next 10 appointments already scheduled     Mar 08, 2018 11:00 AM CST   Oximetry  with BK BED 5   [facility] ([facility]    [address] [address]   [phone]            Mar 09, 2018 10:30 AM CST   Oximetry Drop Off with BK SC DME   [facility] ([facility])    [address] [address]   [phone]            Apr 16, 2018   Procedure with Zeb Holland MD   [facility] (--)    [address] [place] [address]   [phone]            Jul 09, 2018 11:20 AM CDT   [facility]    [address] [place] [address]   [phone]           Bring a current list of meds and any records pertaining to this visit. For Physicals, please bring immunization records and any forms needing to be filled out. Please arrive 10 minutes early to complete paperwork.            Aug 09, 2018  1:30 PM CDT   New Visit with Willy Cardoso MD   [facility] ([facility])    [address] [facility]  [place] [address]   [phone]              Who to contact     If you have questions or need follow up information about today's clinic visit or your schedule please contact [facility] directly at [phone].  Normal or non-critical lab and imaging results will be communicated to you by Raul  "letter or phone within 4 business days after the clinic has received the results. If you do not hear from us within 7 days, please contact the clinic through Yast or phone. If you have a critical or abnormal lab result, we will notify you by phone as soon as possible.  Submit refill requests through Yast or call your pharmacy and they will forward the refill request to us. Please allow 3 business days for your refill to be completed.          Additional Information About Your Visit        Yast Information     Yast lets you send messages to your doctor, view your test results, renew your prescriptions, schedule appointments and more. To sign up, go to [email or web address] . Click on \"Log in\" on the left side of the screen, which will take you to the Welcome page. Then click on \"Sign up Now\" on the right side of the page.     You will be asked to enter the access code listed below, as well as some personal information. Please follow the directions to create your username and password.     Your access code is: [email or web address]  Expires: 2018  1:18 PM     Your access code will  in 90 days. If you need help or a new code, please call your [place] clinic or [phone].        Care EveryWhere ID     This is your Care EveryWhere ID. This could be used by other organizations to access your [place] medical records  [id number]         Blood Pressure from Last 3 Encounters:   18 158/52   18 (!) 156/94   18 159/62    Weight from Last 3 Encounters:   18 93.9 kg (207 lb)   18 93 kg (205 lb)   18 94.3 kg (208 lb)              We Performed the Following     Overnight oximetry study        Primary Care Provider Office Phone # Fax #    Ronaldo Corral [phone]       [address]  [place] [facility] [address]        Equal Access to Services     EDWINA CUEVAS AH: Hadii sarah emanuelo [place], waaxda luqadaha, qaybta kaalmada mickeglouise, radha robert " micklaly anjanaluz la'aan ah. So [facility] [phone].    ATENCIÓN: Si en payton, tiene a claire disposición servicios gratuitos de asistencia lingüística. Chad lopez [phone].    We comply with applicable federal civil rights laws and [place] laws. We do not discriminate on the basis of race, color, national origin, age, disability, sex, sexual orientation, or gender identity.            Thank you!     Thank you for choosing [facility]  for your care. Our goal is always to provide you with excellent care. Hearing back from our patients is one way we can continue to improve our services. Please take a few minutes to complete the written survey that you may receive in the mail after your visit with us. Thank you!             Your Updated Medication List - Protect others around you: Learn how to safely use, store and throw away your medicines at www.disposemymeds.org.          This list is accurate as of 3/8/18 10:59 AM.  Always use your most recent med list.                   Brand Name Dispense Instructions for use Diagnosis    amLODIPine 10 MG tablet    NORVASC     Take 1 tablet (10 mg) by mouth daily for blood pressure.    Benign hypertension with chronic kidney disease, stage III       atorvastatin 80 MG tablet    LIPITOR    90 tablet    Take 1 tablet (80 mg) by mouth daily for cholesterol.    Hyperlipidemia LDL goal <100       brimonidine 0.15 % ophthalmic solution    ALPHAGAN-P    1 Bottle    Place 1 drop into both eyes 2 times daily    Primary open angle glaucoma of both eyes, moderate stage       dorzolamide-timolol 2-0.5 % ophthalmic solution    COSOPT    1 Bottle    Place 1 drop into both eyes 2 times daily    Primary open angle glaucoma of both eyes, moderate stage       furosemide 20 MG tablet    LASIX    180 tablet    Take 1 tablet (20 mg) by mouth 2 times daily as needed for leg swelling.    Lower extremity edema       glipiZIDE 10 MG tablet    GLUCOTROL    180 tablet    TAKE ONE TABLET BY  MOUTH TWICE DAILY WITH MEALS FOR DIABETES    Uncontrolled type 2 diabetes mellitus with microalbuminuria, with long-term current use of insulin (H)       hydrALAZINE 10 MG tablet    APRESOLINE     10 mg 3 times daily        hydrochlorothiazide 25 MG tablet    HYDRODIURIL    90 tablet    Take 1 tablet (25 mg) by mouth daily for blood pressure and leg swelling.    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    Type 2 diabetes mellitus with microalbuminuria, with long-term current use of insulin (H)       insulin pen needle 32G X 4 MM    BD CAROL ANN U/F    100 each    Use 1 daily as directed.    Type 2 diabetes mellitus with hyperglycemia, without long-term current use of insulin (H)       latanoprost 0.005 % ophthalmic solution    XALATAN    1 Bottle    Place 1 drop into both eyes At Bedtime    Primary open angle glaucoma of both eyes, moderate stage       lisinopril 40 MG tablet    PRINIVIL/ZESTRIL    90 tablet    Take 1 tablet (40 mg) by mouth daily for blood pressure.    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

## 2018-03-08 NOTE — NURSING NOTE
"Chief Complaint   Patient presents with     CPAP Follow Up       Initial /51  Pulse (!) 41  Ht 1.702 m (5' 7\")  Wt 93.9 kg (207 lb)  SpO2 98%  BMI 32.42 kg/m2 Estimated body mass index is 32.42 kg/(m^2) as calculated from the following:    Height as of this encounter: 1.702 m (5' 7\").    Weight as of this encounter: 93.9 kg (207 lb).  Medication Reconciliation: complete    "

## 2018-03-08 NOTE — PATIENT INSTRUCTIONS

## 2018-03-08 NOTE — MR AVS SNAPSHOT
After Visit Summary   3/8/2018    Manuel Rosales    MRN: [id number]           Patient Information     Date Of Birth          3/22/1931        Visit Information        Provider Department      3/8/2018 10:30 AM Yassine Christianson PA [facility]        Today's Diagnoses     JOYCE (obstructive sleep apnea)    -  1    Sleep disorder breathing          Care Instructions      Your BMI is Body mass index is 32.42 kg/(m^2).  Weight management is a personal decision.  If you are interested in exploring weight loss strategies, the following discussion covers the approaches that may be successful. Body mass index (BMI) is one way to tell whether you are at a healthy weight, overweight, or obese. It measures your weight in relation to your height.  A BMI of 18.5 to 24.9 is in the healthy range. A person with a BMI of 25 to 29.9 is considered overweight, and someone with a BMI of 30 or greater is considered obese. More than two-thirds of American adults are considered overweight or obese.  Being overweight or obese increases the risk for further weight gain. Excess weight may lead to heart disease and diabetes.  Creating and following plans for healthy eating and physical activity may help you improve your health.  Weight control is part of healthy lifestyle and includes exercise, emotional health, and healthy eating habits. Careful eating habits lifelong are the mainstay of weight control. Though there are significant health benefits from weight loss, long-term weight loss with diet alone may be very difficult to achieve- studies show long-term success with dietary management in less than 10% of people. Attaining a healthy weight may be especially difficult to achieve in those with severe obesity. In some cases, medications, devices and surgical management might be considered.  What can you do?  If you are overweight or obese and are interested in methods for weight loss, you should discuss this with  your provider.     Consider reducing daily calorie intake by 500 calories.     Keep a food journal.     Avoiding skipping meals, consider cutting portions instead.    Diet combined with exercise helps maintain muscle while optimizing fat loss. Strength training is particularly important for building and maintaining muscle mass. Exercise helps reduce stress, increase energy, and improves fitness. Increasing exercise without diet control, however, may not burn enough calories to loose weight.       Start walking three days a week 10-20 minutes at a time    Work towards walking thirty minutes five days a week     Eventually, increase the speed of your walking for 1-2 minutes at time    In addition, we recommend that you review healthy lifestyles and methods for weight loss available through the National Institutes of Health patient information sites:  http://win.niddk.nih.gov/publications/index.htm    And look into health and wellness programs that may be available through your health insurance provider, employer, local community center, or ita club.    Weight management plan: Patient was referred to their PCP to discuss a diet and exercise plan.          Your Body mass index is 32.42 kg/(m^2).  Weight management is a personal decision.  If you are interested in exploring weight loss strategies, the following discussion covers the approaches that may be successful. Body mass index (BMI) is one way to tell whether you are at a healthy weight, overweight, or obese. It measures your weight in relation to your height.  A BMI of 18.5 to 24.9 is in the healthy range. A person with a BMI of 25 to 29.9 is considered overweight, and someone with a BMI of 30 or greater is considered obese. More than two-thirds of American adults are considered overweight or obese.  Being overweight or obese increases the risk for further weight gain. Excess weight may lead to heart disease and diabetes.  Creating and following plans for  healthy eating and physical activity may help you improve your health.  Weight control is part of healthy lifestyle and includes exercise, emotional health, and healthy eating habits. Careful eating habits lifelong are the mainstay of weight control. Though there are significant health benefits from weight loss, long-term weight loss with diet alone may be very difficult to achieve- studies show long-term success with dietary management in less than 10% of people. Attaining a healthy weight may be especially difficult to achieve in those with severe obesity. In some cases, medications, devices and surgical management might be considered.  What can you do?  If you are overweight or obese and are interested in methods for weight loss, you should discuss this with your provider.     Consider reducing daily calorie intake by 500 calories.     Keep a food journal.     Avoiding skipping meals, consider cutting portions instead.    Diet combined with exercise helps maintain muscle while optimizing fat loss. Strength training is particularly important for building and maintaining muscle mass. Exercise helps reduce stress, increase energy, and improves fitness. Increasing exercise without diet control, however, may not burn enough calories to loose weight.       Start walking three days a week 10-20 minutes at a time    Work towards walking thirty minutes five days a week     Eventually, increase the speed of your walking for 1-2 minutes at time    In addition, we recommend that you review healthy lifestyles and methods for weight loss available through the National Institutes of Health patient information sites:  http://win.niddk.nih.gov/publications/index.htm    And look into health and wellness programs that may be available through your health insurance provider, employer, local community center, or ita club.    Weight management plan: Patient was referred to their PCP to discuss a diet and exercise plan.           Follow-ups after your visit        Follow-up notes from your care team     Return in 1 year (on 3/8/2019) for PAP follow up.      Your next 10 appointments already scheduled     Mar 08, 2018 11:00 AM CST   Oximetry  with BK BED 5   [facility] [place]    [address]  [place] [address]   [phone]            Mar 09, 2018 10:30 AM CST   Oximetry Drop Off with BK SC DME   [facility]    [address]  [place] [address]   [phone]            Apr 16, 2018   Procedure with Zeb Holland MD   [facility] (--)    [address] [place] [address]   [phone]            Jul 09, 2018 11:20 AM CDT   Office Visit with Ronaldo Corral MD   [facility]    [address] [address]   [phone]           Bring a current list of meds and any records pertaining to this visit. For Physicals, please bring immunization records and any forms needing to be filled out. Please arrive 10 minutes early to complete paperwork.            Aug 09, 2018  1:30 PM CDT   New Visit with Willy Cardoso MD   [facility]    [address]  [place] [address]   [phone]              Future tests that were ordered for you today     Open Future Orders        Priority Expected Expires Ordered    Overnight oximetry study Routine  9/4/2018 3/8/2018            Who to contact     If you have questions or need follow up information about today's clinic visit or your schedule please contact [facility] directly at [phone].  Normal or non-critical lab and imaging results will be communicated to you by MyChart, letter or phone within 4 business days after the clinic has received the results. If you do  "not hear from us within 7 days, please contact the clinic through [employer] or phone. If you have a critical or abnormal lab result, we will notify you by phone as soon as possible.  Submit refill requests through [employer] or call your pharmacy and they will forward the refill request to us. Please allow 3 business days for your refill to be completed.          Additional Information About Your Visit        [facility] [employer] Information     Liqu[employer]anner lets you send messages to your doctor, view your test results, renew your prescriptions, schedule appointments and more. To sign up, go to [email or web address] . Click on \"Log in\" on the left side of the screen, which will take you to the Welcome page. Then click on \"Sign up Now\" on the right side of the page.     You will be asked to enter the access code listed below, as well as some personal information. Please follow the directions to create your username and password.     Your access code is: [email or web address]  Expires: 2018  1:18 PM     Your access code will  in 90 days. If you need help or a new code, please call your [facility] clinic or [phone].        Care EveryWhere ID     This is your Care EveryWhere ID. This could be used by other organizations to access your [facility] medical records  [id number]        Your Vitals Were     Pulse Height Pulse Oximetry BMI (Body Mass Index)          41 1.702 m (5' 7\") 98% 32.42 kg/m2         Blood Pressure from Last 3 Encounters:   18 179/51   18 (!) 156/94   18 159/62    Weight from Last 3 Encounters:   18 93.9 kg (207 lb)   18 93 kg (205 lb)   18 94.3 kg (208 lb)               Primary Care Provider Office Phone # Fax #    Ronaldo Corral [phone] [address]  [facility] [address]        Equal Access to Services     [facility] MASON AH: Chris Nguyen, joanna kang, qaanushka mehta, radha jimenez. So [facility] " [phone].    ATENCIÓN: Si en payton, tiene a claire disposición servicios gratuitos de asistencia lingüística. Chad lopez [phone].    We comply with applicable federal civil rights laws and [place] laws. We do not discriminate on the basis of race, color, national origin, age, disability, sex, sexual orientation, or gender identity.            Thank you!     Thank you for choosing [facility]  for your care. Our goal is always to provide you with excellent care. Hearing back from our patients is one way we can continue to improve our services. Please take a few minutes to complete the written survey that you may receive in the mail after your visit with us. Thank you!             Your Updated Medication List - Protect others around you: Learn how to safely use, store and throw away your medicines at www.disposemymeds.org.          This list is accurate as of 3/8/18 10:55 AM.  Always use your most recent med list.                   Brand Name Dispense Instructions for use Diagnosis    amLODIPine 10 MG tablet    NORVASC     Take 1 tablet (10 mg) by mouth daily for blood pressure.    Benign hypertension with chronic kidney disease, stage III       atorvastatin 80 MG tablet    LIPITOR    90 tablet    Take 1 tablet (80 mg) by mouth daily for cholesterol.    Hyperlipidemia LDL goal <100       brimonidine 0.15 % ophthalmic solution    ALPHAGAN-P    1 Bottle    Place 1 drop into both eyes 2 times daily    Primary open angle glaucoma of both eyes, moderate stage       dorzolamide-timolol 2-0.5 % ophthalmic solution    COSOPT    1 Bottle    Place 1 drop into both eyes 2 times daily    Primary open angle glaucoma of both eyes, moderate stage       furosemide 20 MG tablet    LASIX    180 tablet    Take 1 tablet (20 mg) by mouth 2 times daily as needed for leg swelling.    Lower extremity edema       glipiZIDE 10 MG tablet    GLUCOTROL    180 tablet    TAKE ONE TABLET BY MOUTH TWICE DAILY WITH MEALS FOR  DIABETES    Uncontrolled type 2 diabetes mellitus with microalbuminuria, with long-term current use of insulin (H)       hydrALAZINE 10 MG tablet    APRESOLINE     10 mg 3 times daily        hydrochlorothiazide 25 MG tablet    HYDRODIURIL    90 tablet    Take 1 tablet (25 mg) by mouth daily for blood pressure and leg swelling.    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    Type 2 diabetes mellitus with microalbuminuria, with long-term current use of insulin (H)       insulin pen needle 32G X 4 MM    BD CAROL ANN U/F    100 each    Use 1 daily as directed.    Type 2 diabetes mellitus with hyperglycemia, without long-term current use of insulin (H)       latanoprost 0.005 % ophthalmic solution    XALATAN    1 Bottle    Place 1 drop into both eyes At Bedtime    Primary open angle glaucoma of both eyes, moderate stage       lisinopril 40 MG tablet    PRINIVIL/ZESTRIL    90 tablet    Take 1 tablet (40 mg) by mouth daily for blood pressure.    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

## 2018-03-08 NOTE — Clinical Note
Hi ladies, I am just going through some open orders and it looks like this may have not been routed to you for some reason.  Patient needs pressures changed and f/u GUSTAVO scheduled.  Thank you!! Miroslava

## 2018-03-08 NOTE — PROGRESS NOTES
Obstructive Sleep Apnea- PAP Follow-Up Visit:    Chief Complaint   Patient presents with     CPAP Follow Up       Manuel Rosales comes in today for follow-up of their moderate sleep apnea, managed with CPAP.     Manuel Rosales had a sleep study done on 10/30/2017 at the [facility], witnessed apnea, daytime fatigue, non refreshing sleep, crowded oropharynx and co-morbid HTN, HFpEF and possibly pulmonary HTN.    Study Date: 10/30/2017 (197.0 lb) apnea/hypopnea index was 18.7 events per hour. The REM AHI was 14.1 events per hour. The supine AHI was 64.2 events per hour. RDI was 42.8 events per hour. Most of the repiratory events appeared periodic, though werer not completely regular. Periodicity was about 60 seconds, and circulation times about 30 seconds. Significant hypoventilation was not suggested with a maximum change of 6 mmHg (peak 42 mmHg). Lowest oxygen saturation was 85.4%. Time spent less than or equal to 88% was 1.3 minutes. Time spent less than or equal to 89% was 3.2 minutes. PLM index was 22.9 movements per hour. The presenting rhythm while awake is sinus with second degree block and 2:1 conduction with occasional ?high junctional escape beats. This rhythm persisted throughout study. The average pulse rate was 37.8 bpm. The minimum pulse rate was 33.9 bpm. The maximum pulse rate was 79.1 bpm and occurred only after awakened at the end of the study when 1:1 conduction occurred.    Recommend Sleep Positioning Device to prevent patient from supine sleeping.    1/18/2018 visit:   Moderate sleep disordered breathing, severe in the supine position, but minimum events in non-supine positions(AHI ~3).  Primary problem appears to be related to periodic breathing, though at least some  JOYCE is also present. No seep related hypoxemia.   --Home sleep apnea test with positional therapy in place.    Home Study Date: 1/26/2018 (205 lb). Done with positioning device. Baseline  oxygen saturation was 92-93%.  Time with saturation less than or equal to 88% was 148 minutes. The lowest oxygen saturation was 81%. Sleep associated hypoxemia was present, including evidence for hypoxemia without discrete respiratory eventsApnea/hypopnea index [AHI] of 20.0 events per hour. Respiratory rate and pattern was frequently periodic in nature. Percent of time spent on left - 98%.     January 31, 2018. Patient received a Resmed AirSense 10 Auto. Pressures were set at 6-12 cm H2O.    Overall, he rates the experience with PAP as 5 (0 poor, 10 great). The mask is comfortable.    The mask is not leaking .  He is not snoring with the mask on. He is not having gasp arousals.  He is having significant oral/nasal dryness. The pressure is comfortable.     His PAP interface is Full Face Mask.    Bedtime is typically 2230. Usually it takes about 30 min (unsure) minutes to fall asleep with the mask on. Wake time is typically 0645.  Patient is using PAP therapy 8 hours per night. The patient is usually getting 7.5 hours of sleep per night.    He does feel rested in the morning.    Total score - [place]: 3 (3/8/2018 10:00 AM)    ResMed   Auto-PAP 6.0 - 12.0 cmH2O 30 day usage data:    96% of days with > 4 hours of use. 0/30 days with no use. Average use 479 minutes per day.   Median leak 9 L/min.   CPAP 95% pressure 9 cm.   AHI 1.55 events per hour.       Past medical/surgical history, family history, social history, medications and allergies were reviewed.      Problem List:  Patient Active Problem List    Diagnosis Date Noted     Sleep disorder breathing 11/01/2017     Priority: Medium     Study Date: 10/30/2017 (197.0 lb) apnea/hypopnea index was 18.7 events per hour.  The REM AHI was 14.1 events per hour.  The supine AHI was 64.2 events per hour. RDI was 42.8 events per hour.  Most of the repiratory events appeared periodic, though werer not completely regular. Periodicity was about 60 seconds, and circulation times  about 30 seconds. Significant hypoventilation was not suggested with a maximum change of 6 mmHg (peak 42 mmHg).  Lowest oxygen saturation was 85.4%.  Time spent less than or equal to 88% was 1.3 minutes.  Time spent less than or equal to 89% was 3.2 minutes. PLM index was 22.9 movements per hour. The presenting rhythm while awake is sinus with second degree block and 2:1 conduction with occasional ?high junctional escape beats. This rhythm persisted throughout study.  The average pulse rate was 37.8 bpm.  The minimum pulse rate was 33.9 bpm. The maximum pulse rate was 79.1 bpm and occurred only after awakened at the end of the study when 1:1 conduction occurred.     Home Study Date: 1/26/2018 (205 lb). Done with positioning device. Baseline oxygen saturation was 92-93%.  Time with saturation less than or equal to 88% was 148 minutes. The lowest oxygen saturation was 81%. Sleep associated hypoxemia was present, including evidence for hypoxemia without discrete respiratory eventsApnea/hypopnea index [AHI] of 20.0 events per hour. Respiratory rate and pattern was frequently periodic in nature. Percent of time spent on left - 98%.            Bradycardia 11/01/2017     Priority: Medium     Sleep stydu 10/30/17 Cardiac Summary:  The presenting rhythm while awake is sinus with second degree block and 2:1 conduction with occasional ?high junctional escape beats. This rhythm persisted throughout study.  The average pulse rate was 37.8 bpm.  The minimum pulse rate was 33.9 bpm. The maximum pulse rate was 79.1 bpm and occurred only after awakened at the end of the study when 1:1 conduction occurred.        Chronic diastolic heart failure (H) 10/12/2017     Priority: Medium     Pulmonary hypertension 10/10/2017     Priority: Medium     ECHO Study Date: 07/14/2017   Interpretation Summary  Global and regional left ventricular function is normal with an EF of 55-60%.  Mild right ventricular dilation is present. Global right  ventricular function  is normal.  Mild to moderate mitral, mild tricuspid insufficiency and mild aortic stenosis  is present.  PUlmonary hypertension present. Estimated pulmonary artery systolic pressure  is 67 mmHg plus right atrial pressure.       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  Manuel Rosales has no plan or code status on file. Discussed available resources and provided with information on 01/21/2014. Confirmed code status reflects current choices pending further ACP discussions.  Confirmed/documented designated decision maker(s). See permanent comments section of demographics in clinical tab.   Added by Pearl Kaur on 2/24/2014  Discussed advance care planning with patient; information given to patient to review. July 25, 2011  Discussed advance care planning with patient; information given to patient to review. January 21, 2014        Hypertension goal BP (blood pressure) < 130/80 01/25/2011     Priority: Medium     Posterior vitreous detachment, ou 11/16/2010     Priority: Medium     Pseudophakia, ou 11/15/2010     Priority: Medium     Type 2 diabetes mellitus with microalbuminuria, with long-term current use of insulin (H)      Priority: Medium     Type 2 diabetes, HbA1C goal < 8%        "Hyperlipidemia LDL goal <100      Priority: Medium     Benign hypertension with chronic kidney disease, stage III      Priority: Medium     Colon cancer (H) 08/01/2002     Priority: Medium     malignant polyp on colonoscopy, next colonoscopy due 2017          /52  Pulse (!) 41  Ht 1.702 m (5' 7\")  Wt 93.9 kg (207 lb)  SpO2 98%  BMI 32.42 kg/m2    Impression/Plan:    1. Moderate Sleep apnea with sleep related hypoxemia-   Excellent compliance and appears well controlled on CPAP 6-12 cm/H20.  Continue current treatment.   Overnight oximetry to follow up on hypoxemia. Will call with the result    Manuel Rosales will follow up in about 1 year(s).     Twenty-five minutes spent with patient, all of which were spent face-to-face counseling, consulting, coordinating plan of care regarding hypoxemia and JOYCE.      Yassine Christianson PA-C      CC:  Ronaldo Corral    "

## 2018-03-09 ENCOUNTER — DOCUMENTATION ONLY (OUTPATIENT)
Dept: SLEEP MEDICINE | Facility: CLINIC | Age: 83
End: 2018-03-09
Payer: COMMERCIAL

## 2018-03-13 NOTE — PROGRESS NOTES
Overnight oximetry completed by Patient. Sent to scanning copy to ALEX Gonzales.    Recording Date: 3/8/2018    Duration: 8:12:00    Note: on cpap settings.

## 2018-03-13 NOTE — PROGRESS NOTES
Oximetry results were obtained for the date of 3/8/2018.  The patient was on a treatment of CPAP 6-8 cm/H20.  The study showed a valid recording time of 8 hours and 12 minutes with a 4% desaturation index of 2.8.  The basal oxygen saturation was 90.3% and the lowest SpO2 was 78%.  The patient spent 47 minutes below 88% SpO2.    A/P  Hypoxemia is present.   Change pressures to auto-CPAP 8-12 cm/H20. Orders placed.   If GUSTAVO demonstrates hypoxemia, will discuss use of positional therapy with CPAP or titration polysomnogram.    Yassine Christianson PA-C

## 2018-03-30 ENCOUNTER — TRANSFERRED RECORDS (OUTPATIENT)
Dept: HEALTH INFORMATION MANAGEMENT | Facility: CLINIC | Age: 83
End: 2018-03-30

## 2018-04-11 ENCOUNTER — TELEPHONE (OUTPATIENT)
Dept: OPHTHALMOLOGY | Facility: CLINIC | Age: 83
End: 2018-04-11

## 2018-04-11 NOTE — PROGRESS NOTES
Changed cpap pressures to 8-12cm H2O via modem. patient scheduled overnight oximetry p/u and d/u for end of next week.

## 2018-04-11 NOTE — TELEPHONE ENCOUNTER
Reason for call:  Other   Patient called regarding (reason for call): call back  Additional comments: Manuel called and is a little confused about what he should do about calling in his refill for his eye drops.  Can someone please call him and speak with him about this.    Phone number to reach patient:  Home number on file [phone] (home)    Best Time:  any    Can we leave a detailed message on this number?  YES

## 2018-04-11 NOTE — TELEPHONE ENCOUNTER
Patient has been taking separate bottles of Dorzolamide and Timolol.  He wanted to check and see if he should continue to use two separate bottles or not.  Told him unfortunately the Cosopt is still on backorder, so he is to continue for now. He understands and will do so.

## 2018-04-16 ENCOUNTER — SURGERY (OUTPATIENT)
Age: 83
End: 2018-04-16

## 2018-04-16 ENCOUNTER — HOSPITAL ENCOUNTER (OUTPATIENT)
Facility: AMBULATORY SURGERY CENTER | Age: 83
Discharge: HOME | End: 2018-04-16
Attending: SPECIALIST | Admitting: SPECIALIST
Payer: COMMERCIAL

## 2018-04-16 VITALS
HEART RATE: 38 BPM | OXYGEN SATURATION: 97 % | RESPIRATION RATE: 14 BRPM | SYSTOLIC BLOOD PRESSURE: 188 MMHG | TEMPERATURE: 98.5 F | DIASTOLIC BLOOD PRESSURE: 78 MMHG

## 2018-04-16 LAB
COLONOSCOPY: NORMAL
GLUCOSE BLDC GLUCOMTR-MCNC: 62 MG/DL (ref 70–99)
GLUCOSE BLDC GLUCOMTR-MCNC: 71 MG/DL (ref 70–99)

## 2018-04-16 PROCEDURE — 45385 COLONOSCOPY W/LESION REMOVAL: CPT | Mod: PT

## 2018-04-16 PROCEDURE — G8907 PT DOC NO EVENTS ON DISCHARG: HCPCS

## 2018-04-16 PROCEDURE — 88305 TISSUE EXAM BY PATHOLOGIST: CPT | Performed by: SPECIALIST

## 2018-04-16 PROCEDURE — G8918 PT W/O PREOP ORDER IV AB PRO: HCPCS

## 2018-04-16 RX ORDER — DEXTROSE MONOHYDRATE 50 MG/ML
INJECTION, SOLUTION INTRAVENOUS CONTINUOUS PRN
Status: DISCONTINUED | OUTPATIENT
Start: 2018-04-16 | End: 2018-04-16 | Stop reason: HOSPADM

## 2018-04-16 RX ORDER — LIDOCAINE 40 MG/G
CREAM TOPICAL
Status: DISCONTINUED | OUTPATIENT
Start: 2018-04-16 | End: 2018-04-17 | Stop reason: HOSPADM

## 2018-04-16 RX ORDER — FENTANYL CITRATE 50 UG/ML
INJECTION, SOLUTION INTRAMUSCULAR; INTRAVENOUS PRN
Status: DISCONTINUED | OUTPATIENT
Start: 2018-04-16 | End: 2018-04-16 | Stop reason: HOSPADM

## 2018-04-16 RX ORDER — ONDANSETRON 2 MG/ML
4 INJECTION INTRAMUSCULAR; INTRAVENOUS
Status: DISCONTINUED | OUTPATIENT
Start: 2018-04-16 | End: 2018-04-17 | Stop reason: HOSPADM

## 2018-04-16 RX ADMIN — DEXTROSE MONOHYDRATE 100 ML/HR: 50 INJECTION, SOLUTION INTRAVENOUS at 08:00

## 2018-04-16 RX ADMIN — FENTANYL CITRATE 25 MCG: 50 INJECTION, SOLUTION INTRAMUSCULAR; INTRAVENOUS at 09:02

## 2018-04-16 RX ADMIN — FENTANYL CITRATE 25 MCG: 50 INJECTION, SOLUTION INTRAMUSCULAR; INTRAVENOUS at 08:57

## 2018-04-16 NOTE — BRIEF OP NOTE
[facility] Brief Operative Note    Pre-operative diagnosis: ARON DURHAM/Malignant neoplasm of colon, unspecified part of colon (H)/colonoscopy/bmi 31.95/[place] [facility] Fax# [phone]   Post-operative diagnosis polyps, diverticulsos, second degree heart blcok with bradycardia     Procedure: Procedure(s):  ARON DURHAM/Malignant neoplasm of colon, unspecified part of colon (H)/colonoscopy/bmi 31.95/[place] [facility] Fax# [phone] - Wound Class: II-Clean Contaminated   - Wound Class: II-Clean Contaminated   Surgeon(s): Surgeon(s) and Role:     * Zeb Holland MD - Primary   Estimated blood loss: * No values recorded between 4/16/2018  8:17 AM and 4/16/2018  9:39 AM *    Specimens:   ID Type Source Tests Collected by Time Destination   A : proximal ascending polyps x3, 2 hot snared, 1 cold snare Polyp Large Intestine, Right/Ascending SURGICAL PATHOLOGY EXAM Qi Whaley RN 4/16/2018  9:23 AM       Findings: Please see ProVation procedure note in Chart Review

## 2018-04-16 NOTE — H&P
Pre-Endoscopy History and Physical     Manuel Rosales MRN# [id number]   YOB: 1931 Age: 87 year old     Date of Procedure: 4/16/2018  Primary care provider: Ronaldo Corral  Type of Endoscopy: Colonoscopy with possible biopsy, possible polypectomy  Reason for Procedure: history of polyps  Type of Anesthesia Anticipated: Conscious Sedation    HPI:    Manuel is a 87 year old male who will be undergoing the above procedure.      A history and physical has been performed. The patient's medications and allergies have been reviewed. The risks and benefits of the procedure and the sedation options and risks were discussed with the patient.  All questions were answered and informed consent was obtained.      He denies a personal or family history of anesthesia complications or bleeding disorders.     Patient Active Problem List   Diagnosis     Type 2 diabetes mellitus with microalbuminuria, with long-term current use of insulin (H)     Hyperlipidemia LDL goal <100     Colon cancer (H)     Benign hypertension with chronic kidney disease, stage III     Pseudophakia, ou     Posterior vitreous detachment, ou     Hypertension goal BP (blood pressure) < 130/80     Advanced directives, counseling/discussion     Chronic kidney disease, stage III (moderate)     Health Care Home     Cicatricial ectropion, lower lid, ou     Primary open angle glaucoma of both eyes, moderate stage     Epiretinal membrane, left     Pulmonary hypertension     Chronic diastolic heart failure (H)     Obesity, Class I, BMI 30-34.9     Sleep disorder breathing     Bradycardia        Past Medical History:   Diagnosis Date     Bradycardia     History of bradycardia with 2nd degree AV block Mobitz Type I, resolved with reduction in beta blocker.     Closed fracture of acromial end of clavicle 5/27/2012     Diabetes (H)      Glaucoma (increased eye pressure)      Heart murmur 01/2004    (?MR)     Hypertension         Past Surgical History:    Procedure Laterality Date     CATARACT IOL, RT/LT       HC BIOPSY PROSTATE NEEDLE/PUNCH      BENIGN     HC COLONOSCOPY THRU STOMA, DIAGNOSTIC  ,     due      HC REMOVE TONSILS/ADENOIDS,<13 Y/O       LASER SELECTIVE TRABECULOPLASTY  2011; 2013    right eye temp 180; left eye inf 180     LASER SELECTIVE TRABECULOPLASTY  2007; 2016    left eye 360 (STS); left eye sup 180     PHACOEMULSIFICATION WITH STANDARD INTRAOCULAR LENS IMPLANT  10/2008; 2009    left eye; right eye       Social History   Substance Use Topics     Smoking status: Former Smoker     Quit date: 1970     Smokeless tobacco: Never Used     Alcohol use 4.2 oz/week     7 Standard drinks or equivalent per week      Comment: 1 drink /day (a decrease, as of 16)       Family History   Problem Relation Age of Onset     CANCER Sister      lung     Lipids Mother      ?     DIABETES Sister       of PVD complications     Hypertension No family hx of      Glaucoma No family hx of      Thyroid Disease No family hx of      Macular Degeneration No family hx of        Prior to Admission medications    Medication Sig Start Date End Date Taking? Authorizing Provider   terazosin (HYTRIN) 2 MG capsule Take 1 capsule (2 mg) by mouth daily for blood pressure. 18  Yes Ronaldo Corral MD   brimonidine (ALPHAGAN-P) 0.15 % ophthalmic solution Place 1 drop into both eyes 2 times daily 18  Yes Willy Cardoso MD   latanoprost (XALATAN) 0.005 % ophthalmic solution Place 1 drop into both eyes At Bedtime 18  Yes Willy Cardoso MD   hydrALAZINE (APRESOLINE) 10 MG tablet 10 mg 3 times daily  18  Yes Reported, Patient   dorzolamide-timolol (COSOPT) 2-0.5 % ophthalmic solution Place 1 drop into both eyes 2 times daily 1/10/18  Yes Willy Cardoso MD   atorvastatin (LIPITOR) 80 MG tablet Take 1 tablet (80 mg) by mouth daily for cholesterol. 18  Yes Ronaldo Corral MD   hydrochlorothiazide (HYDRODIURIL) 25 MG  "tablet Take 1 tablet (25 mg) by mouth daily for blood pressure and leg swelling. 1/8/18  Yes Ronaldo Corral MD   insulin glargine (LANTUS SOLOSTAR) 100 UNIT/ML injection Inject 22 units under the skin daily. 1/8/18  Yes Ronaldo Corral MD   lisinopril (PRINIVIL/ZESTRIL) 40 MG tablet Take 1 tablet (40 mg) by mouth daily for blood pressure. 1/8/18  Yes Ronaldo Corral MD   glipiZIDE (GLUCOTROL) 10 MG tablet TAKE ONE TABLET BY MOUTH TWICE DAILY WITH MEALS FOR DIABETES 12/21/17  Yes Ronaldo Corral MD   amLODIPine (NORVASC) 10 MG tablet Take 1 tablet (10 mg) by mouth daily for blood pressure. 8/7/17  Yes Ronaldo Corral MD   furosemide (LASIX) 20 MG tablet Take 1 tablet (20 mg) by mouth 2 times daily as needed for leg swelling. 7/17/17  Yes Ronaldo Corral MD   Cholecalciferol (VITAMIN D) 2000 UNITS CAPS Take 1 capsule by mouth daily   Yes Reported, Patient   order for DME Equipment ordered: RESMED Auto PAP Mask type: Full face  Settings: 6-12 cm H2O    Reported, Patient   insulin pen needle (BD CAROL ANN U/F) 32G X 4 MM Use 1 daily as directed. 2/27/17   Ronaldo Corral MD       Allergies   Allergen Reactions     Metformin      Renal failure     Niaspan [Niacin]      hyperglycemia        REVIEW OF SYSTEMS:   5 point ROS negative except as noted above in HPI, including Gen., Resp., CV, GI &  system review.    PHYSICAL EXAM:   /65  Temp 98.5  F (36.9  C) (Temporal) Estimated body mass index is 32.42 kg/(m^2) as calculated from the following:    Height as of 3/8/18: 1.702 m (5' 7\").    Weight as of 3/8/18: 93.9 kg (207 lb).   GENERAL APPEARANCE: alert, and oriented  MENTAL STATUS: alert  AIRWAY EXAM: Mallampatti Class II (visualization of the soft palate, fauces, and uvula)  RESP: lungs clear to auscultation - no rales, rhonchi or wheezes  CV: regular rates and rhythm  DIAGNOSTICS:    Not indicated    IMPRESSION   ASA Class 2 - Mild systemic disease or 3 - Severe systemic disease, but not " incapacitating    PLAN:   Plan for Colonoscopy with possible biopsy, possible polypectomy. We discussed the risks, benefits and alternatives and the patient wished to proceed.    The above has been forwarded to the consulting provider.      Signed Electronically by: Zeb Holland  April 16, 2018    Addendum: his heart rate is slow; I got an EKG and it shows bradycardia; it looks to me like he has second degree AV block, Mobitz type 2; however, the computer is not reading it that way. There are no cardiologists in clinic this morning.  I'm not able to get through to Dr. Corral at [facility] (no  on the physician line and continued recorded message on the main clinic line).      Addendum 2: 1515 - I called again; after ten minutes of automated telephone triage, I was unable to reach Dr. Corral.

## 2018-04-17 ENCOUNTER — TELEPHONE (OUTPATIENT)
Dept: FAMILY MEDICINE | Facility: CLINIC | Age: 83
End: 2018-04-17

## 2018-04-17 NOTE — TELEPHONE ENCOUNTER
Please assist in scheduling appointment with me, unless he is scheduling a cardiology appointment.

## 2018-04-17 NOTE — TELEPHONE ENCOUNTER
----- Message from Zeb Holland MD sent at 4/16/2018  3:17 PM CDT -----  Regarding: Bradycardia  I saw Don this morning. His pulse was below 40; he had no clinical symptoms. He had a harsh murmur at the lower left sternal border and apex. I obtained an EKG; although the compupter did not read it the same way as me, I thought it showed second degree heart block, Mobitz type 2. I was able to complete his colonoscopy and he did well. I advised him to see his cardiologist and/or you.

## 2018-04-17 NOTE — TELEPHONE ENCOUNTER
Called spoke to wife, patient has not scheduled with cardiology, patient is schedule with Dr. Corral for this Thursday.  Damian Andrade,  For [facility]

## 2018-04-18 LAB — COPATH REPORT: NORMAL

## 2018-04-19 ENCOUNTER — OFFICE VISIT (OUTPATIENT)
Dept: FAMILY MEDICINE | Facility: CLINIC | Age: 83
End: 2018-04-19
Payer: COMMERCIAL

## 2018-04-19 ENCOUNTER — OFFICE VISIT (OUTPATIENT)
Dept: SLEEP MEDICINE | Facility: CLINIC | Age: 83
End: 2018-04-19
Payer: COMMERCIAL

## 2018-04-19 VITALS
DIASTOLIC BLOOD PRESSURE: 51 MMHG | OXYGEN SATURATION: 97 % | WEIGHT: 206 LBS | TEMPERATURE: 98 F | HEART RATE: 47 BPM | SYSTOLIC BLOOD PRESSURE: 181 MMHG | HEIGHT: 67 IN | BODY MASS INDEX: 32.33 KG/M2

## 2018-04-19 DIAGNOSIS — R01.1 HEART MURMUR: ICD-10-CM

## 2018-04-19 DIAGNOSIS — G47.33 OSA (OBSTRUCTIVE SLEEP APNEA): ICD-10-CM

## 2018-04-19 DIAGNOSIS — R00.1 BRADYCARDIA: Primary | Chronic | ICD-10-CM

## 2018-04-19 PROCEDURE — 99214 OFFICE O/P EST MOD 30 MIN: CPT | Performed by: FAMILY MEDICINE

## 2018-04-19 ASSESSMENT — PAIN SCALES - GENERAL: PAINLEVEL: NO PAIN (0)

## 2018-04-19 NOTE — MR AVS SNAPSHOT
After Visit Summary   4/19/2018    Manuel Rosales    MRN: [id number]           Patient Information     Date Of Birth          3/22/1931        Visit Information        Provider Department      4/19/2018 10:00 AM BK BED 5 [place] [facility]        Today's Diagnoses     JOYCE (obstructive sleep apnea)           Follow-ups after your visit        Your next 10 appointments already scheduled     Apr 20, 2018 10:00 AM CDT   Oximetry Drop Off with BK SC DME   [place] [facility] ([facility])    [address] [address] [id number] [phone]            Jul 09, 2018 11:20 AM CDT   [facility]ral MD   [facility]    [address] [facility] [id number] [phone]           Bring a current list of meds and any records pertaining to this visit. For Physicals, please bring immunization records and any forms needing to be filled out. Please arrive 10 minutes early to complete paperwork.            Aug 09, 2018  1:30 PM CDT   New Visit with Willy Cardoso MD   [facility] ([facility])    [address] [place] [id number] [phone]              Who to contact     If you have questions or need follow up information about today's clinic visit or your schedule please contact [facility] directly at [phone].  Normal or non-critical lab and imaging results will be communicated to you by MyChart, letter or phone within 4 business days after the clinic has received the results. If you do not hear from us within 7 days, please contact the clinic through MyChart or phone. If you have a critical or abnormal lab result, we will notify you by phone as soon as possible.  Submit refill requests through [facility] or call your pharmacy and they will forward the refill request to us. Please allow 3 business  "days for your refill to be completed.          Additional Information About Your Visit        MyChart Information     [facility] lets you send messages to your doctor, view your test results, renew your prescriptions, schedule appointments and more. To sign up, go to [email or web address] . Click on \"Log in\" on the left side of the screen, which will take you to the Welcome page. Then click on \"Sign up Now\" on the right side of the page.     You will be asked to enter the access code listed below, as well as some personal information. Please follow the directions to create your username and password.     Your access code is: [email or web address]  Expires: 2018 10:50 AM     Your access code will  in 90 days. If you need help or a new code, please call your [place] clinic or [phone].        Care EveryWhere ID     This is your Care EveryWhere ID. This could be used by other organizations to access your [place] medical records  [id number]         Blood Pressure from Last 3 Encounters:   18 181/51   18 188/78   18 158/52    Weight from Last 3 Encounters:   18 93.4 kg (206 lb)   18 93.9 kg (207 lb)   18 93 kg (205 lb)              We Performed the Following     Overnight oximetry study        Primary Care Provider Office Phone # Fax #    Ronaldo Corral [phone]       [address]  [facility] [address]        Equal Access to Services     [facility]: Hadii aad ku hadasho Soomaali, waaxda luqadaha, qaybta kaalmada adeegyada, waxay crispin sheppard . So [facility] [phone].    ATENCIÓN: Si habla español, tiene a claire disposición servicios gratuitos de asistencia lingüística. Llame al [phone].    We comply with applicable federal civil rights laws and [place] laws. We do not discriminate on the basis of race, color, national origin, age, disability, sex, sexual orientation, or gender identity.            Thank you!     Thank you " for choosing [facility]  for your care. Our goal is always to provide you with excellent care. Hearing back from our patients is one way we can continue to improve our services. Please take a few minutes to complete the written survey that you may receive in the mail after your visit with us. Thank you!             Your Updated Medication List - Protect others around you: Learn how to safely use, store and throw away your medicines at www.disposemymeds.org.          This list is accurate as of 4/19/18 11:05 AM.  Always use your most recent med list.                   Brand Name Dispense Instructions for use Diagnosis    amLODIPine 10 MG tablet    NORVASC     Take 1 tablet (10 mg) by mouth daily for blood pressure.    Benign hypertension with chronic kidney disease, stage III       atorvastatin 80 MG tablet    LIPITOR    90 tablet    Take 1 tablet (80 mg) by mouth daily for cholesterol.    Hyperlipidemia LDL goal <100       brimonidine 0.15 % ophthalmic solution    ALPHAGAN-P    1 Bottle    Place 1 drop into both eyes 2 times daily    Primary open angle glaucoma of both eyes, moderate stage       dorzolamide-timolol 2-0.5 % ophthalmic solution    COSOPT    1 Bottle    Place 1 drop into both eyes 2 times daily    Primary open angle glaucoma of both eyes, moderate stage       furosemide 20 MG tablet    LASIX    180 tablet    Take 1 tablet (20 mg) by mouth 2 times daily as needed for leg swelling.    Lower extremity edema       glipiZIDE 10 MG tablet    GLUCOTROL    180 tablet    TAKE ONE TABLET BY MOUTH TWICE DAILY WITH MEALS FOR DIABETES    Uncontrolled type 2 diabetes mellitus with microalbuminuria, with long-term current use of insulin (H)       hydrALAZINE 10 MG tablet    APRESOLINE     10 mg 3 times daily        hydrochlorothiazide 25 MG tablet    HYDRODIURIL    90 tablet    Take 1 tablet (25 mg) by mouth daily for blood pressure and leg swelling.    Benign hypertensive heart and kidney disease  with diastolic congestive heart failure, NYHA class 2 and chronic kidney disease stage 3 (H)       insulin glargine 100 UNIT/ML injection    LANTUS SOLOSTAR    30 mL    Inject 22 units under the skin daily.    Type 2 diabetes mellitus with microalbuminuria, with long-term current use of insulin (H)       insulin pen needle 32G X 4 MM    BD CAROL ANN U/F    100 each    Use 1 daily as directed.    Type 2 diabetes mellitus with hyperglycemia, without long-term current use of insulin (H)       latanoprost 0.005 % ophthalmic solution    XALATAN    1 Bottle    Place 1 drop into both eyes At Bedtime    Primary open angle glaucoma of both eyes, moderate stage       lisinopril 40 MG tablet    PRINIVIL/ZESTRIL    90 tablet    Take 1 tablet (40 mg) by mouth daily for blood pressure.    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

## 2018-04-19 NOTE — PATIENT INSTRUCTIONS
At [facility], we strive to deliver an exceptional experience to you, every time we see you.  If you receive a survey in the mail, please send us back your thoughts. We really do value your feedback.    Based on your medical history, these are the current health maintenance/preventive care services that you are due for (some may have been done at this visit.)  Health Maintenance Due   Topic Date Due     HF ACTION PLAN Q3 YR  03/22/1931     FOOT EXAM Q1 YEAR  07/28/2017     INFLUENZA VACCINE (1) 09/01/2017         Suggested websites for health information:  Www.[employer] : Up to date and easily searchable information on multiple topics.  Www.[place].gov : medication info, interactive tutorials, watch real surgeries online  Www.familydoctor.org : good info from the Academy of Family Physicians  Www.cdc.gov : public health info, travel advisories, epidemics (H1N1)  Www.aap.org : children's health info, normal development, vaccinations  Www.health.[facility].[place].us : [place] dept of health, public health issues in [place], N1N1    Your care team:                            Family Medicine Internal Medicine   MD Kaiden Harris MD Shantel Branch-Fleming, MD Katya Georgiev PA-C Megan Hill, APRN DAVID Rogers MD Pediatrics   Wiley Muller PAKEISHA Ackerman, DAVID Arechiga APRN CNP   MD Dorene Danielle MD Deborah Mielke, MD Kim Thein, APRN [facility]      Clinic hours: Monday - Thursday 7 am-7 pm; Fridays 7 am-5 pm.   Urgent care: Monday - Friday 11 am-9 pm; Saturday and Sunday 9 am-5 pm.  Pharmacy : Monday -Thursday 8 am-8 pm; Friday 8 am-6 pm; Saturday and Sunday 9 am-5 pm.     Clinic: [phone]   Pharmacy: [phone]

## 2018-04-19 NOTE — PROGRESS NOTES
"  SUBJECTIVE:   Manuel Rosales is a 87 year old male who presents to clinic today for the following health issues:    Over 50% of this 25-minute visit is spent face-to-face with the patient, counseling him on interpretation of study/lab results and on my recommended treatment of his various problems and coordination of his care, as summarized below:    Consult Colonoscopy results       ROS:  CONSTITUTIONAL: NEGATIVE for fever, chills, change in weight  ENT/MOUTH: NEGATIVE for ear, mouth and throat problems  RESP: NEGATIVE for significant cough or SOB  CV: NEGATIVE for chest pain, palpitations or peripheral edema. Patient plans on seeing the cardiologist this coming Monday at [facility]. He was shown to have a low pulse (40) after visit GI doctor for colonoscopy. Patient feels this may have been related to his fasting.     This document serves as a record of the services and decisions personally performed and made by Dr. Corral. It was created on his behalf by Ronaldo Edwards, a trained medical scribe. The creation of this document is based the provider's statements to the medical scribe.  Ronaldo Edwards   April 19, 2018, 10:41 AM      OBJECTIVE:     /51  Pulse (!) 47  Temp 98  F (36.7  C) (Oral)  Ht 1.702 m (5' 7\")  Wt 93.4 kg (206 lb)  SpO2 97%  BMI 32.26 kg/m2  Body mass index is 32.26 kg/(m^2).  GENERAL: healthy, alert and no distress  EYES: Eyes grossly normal to inspection, PERRL, EOMI, sclerae white and conjunctivae normal  RESP: lungs clear to auscultation - no crackles or wheezes, no areas of dullness, no tachypnea  CV: Heart bradycardic rate and regular rhythm with a 3/6 systolic murmur loudest at the apex and lower left sternal border, no click or rub. No peripheral edema and peripheral pulses strong  MS: no gross musculoskeletal defects noted, no edema  SKIN: no suspicious lesions or rashes  NEURO: Normal strength and tone, sensory exam grossly normal, mentation intact, oriented " times 3 and cranial nerves 2-12 intact  PSYCH: mentation appears normal, affect normal/bright      Diagnostic Test Results:    Echocardiogram 7/14/2017  Impression:  Global and regional left ventricular function is normal with an EF of 55-60%.  Mild right ventricular dilation is present. Global right ventricular function  is normal.  Mild to moderate mitral, mild tricuspid insufficiency and mild aortic stenosis  is present.  PUlmonary hypertension present. Estimated pulmonary artery systolic pressure  is 67 mmHg plus right atrial pressure.  Estimated mean right atrial pressure is 8 mmHg.  No pericardial effusion is present.      ASSESSMENT/PLAN:     ASSESSMENT/PLAN:  (R00.1) Bradycardia  (primary encounter diagnosis)  Comment: baseline sinus bradycardia, with a gradual decline over the past year for some reason. He is still asymptomatic, and the only heart rate below 40 that we have recorded was during his colonoscopy.   Plan: Schedule to see his cardiologist from 5/2/18. He should expect a discussion about pacemaker placement.     (R01.1) Heart murmur  Comment: known mild valve disease of several valves on his last echo. I believe his cardiologist is aware of this.   Plan: Per cardiology. I gave him a copy of his 2017 echo report.       Follow up ~3 months around (7/9/2018) for diabetes.     The information in this document, created by the medical scribe for me, accurately reflects the services I personally performed and the decisions made by me. I have reviewed and approved this document for accuracy prior to leaving the patient care area.  Ronaldo Corral MD

## 2018-04-19 NOTE — MR AVS SNAPSHOT
After Visit Summary   4/19/2018    Manuel Rosales    MRN: [id number]           Patient Information     Date Of Birth          3/22/1931        Visit Information        Provider Department      4/19/2018 10:40 AM Ronaldo Corral MD [facility]        Care Instructions    At [facility], we strive to deliver an exceptional experience to you, every time we see you.  If you receive a survey in the mail, please send us back your thoughts. We really do value your feedback.    Based on your medical history, these are the current health maintenance/preventive care services that you are due for (some may have been done at this visit.)  Health Maintenance Due   Topic Date Due     HF ACTION PLAN Q3 YR  03/22/1931     FOOT EXAM Q1 YEAR  07/28/2017     INFLUENZA VACCINE (1) 09/01/2017         Suggested websites for health information:  Www.[employer] : Up to date and easily searchable information on multiple topics.  Www.food.de.gov : medication info, interactive tutorials, watch real surgeries online  Www.familydoctor.org : good info from the Academy of Family Physicians  Www.cdc.gov : public health info, travel advisories, epidemics (H1N1)  Www.aap.org : children's health info, normal development, vaccinations  Www.health.[facility].[place].us : [place] dept of health, public health issues in [place], N1N1    Your care team:                            Family Medicine Internal Medicine   MD Kaiden Harris MD Shantel Branch-Fleming, MD Katya Georgiev PA-C Megan Hill, MAULIK Rogers MD Pediatrics   ERNST Calderon, DAVID Arechiga APRN CNP   MD Dorene Danielle MD Deborah Mielke, MD Kim Thein, APRN CNP      Clinic hours: Monday - Thursday 7 am-7 pm; Fridays 7 am-5 pm.   Urgent care: Monday - Friday 11 am-9 pm; Saturday and Sunday 9 am-5 pm.  Pharmacy : Monday -Thursday 8 am-8 pm; Friday 8 am-6 pm; Saturday and Sunday  9 am-5 pm.     Clinic: [phone]   Pharmacy: [phone]              Follow-ups after your visit        Follow-up notes from your care team     Return in about 3 months (around 7/9/2018) for Diabetes, etc. .      Your next 10 appointments already scheduled     Apr 20, 2018 10:00 AM CDT   Oximetry Drop Off with KELLI HIGGINS   [facility] ([facility])    [address] [address] [address] [phone]            Jul 09, 2018 11:20 AM CDT   Office Visit with Ronaldo Corral MD   [facility] ([facility]    [address] [facility] [address] [phone]           Bring a current list of meds and any records pertaining to this visit. For Physicals, please bring immunization records and any forms needing to be filled out. Please arrive 10 minutes early to complete paperwork.            Aug 09, 2018  1:30 PM CDT   New Visit with Willy Cardoso MD   [facility]    [address] [place] [address] [phone]              Who to contact     If you have questions or need follow up information about today's clinic visit or your schedule please contact [facility] directly at [phone].  Normal or non-critical lab and imaging results will be communicated to you by MyChart, letter or phone within 4 business days after the clinic has received the results. If you do not hear from us within 7 days, please contact the clinic through v2 Ratingshart or phone. If you have a critical or abnormal lab result, we will notify you by phone as soon as possible.  Submit refill requests through Evirx or call your pharmacy and they will forward the refill request to us. Please allow 3 business days for your refill to be completed.          Additional Information About Your Visit        v2 RatingsharOpenRoute Information     Evirx lets you send  "messages to your doctor, view your test results, renew your prescriptions, schedule appointments and more. To sign up, go to [email or web address] . Click on \"Log in\" on the left side of the screen, which will take you to the Welcome page. Then click on \"Sign up Now\" on the right side of the page.     You will be asked to enter the access code listed below, as well as some personal information. Please follow the directions to create your username and password.     Your access code is: [email or web address]  Expires: 2018 10:50 AM     Your access code will  in 90 days. If you need help or a new code, please call your [place] clinic or [phone].        Care EveryWhere ID     This is your Care EveryWhere ID. This could be used by other organizations to access your [place] medical records  [id number]        Your Vitals Were     Pulse Temperature Height Pulse Oximetry BMI (Body Mass Index)       47 98  F (36.7  C) (Oral) 1.702 m (5' 7\") 97% 32.26 kg/m2        Blood Pressure from Last 3 Encounters:   18 181/51   18 188/78   18 158/52    Weight from Last 3 Encounters:   18 93.4 kg (206 lb)   18 93.9 kg (207 lb)   18 93 kg (205 lb)              Today, you had the following     No orders found for display       Primary Care Provider Office Phone # Fax #    Ronaldo Corral [phone]       [address]  [facility] [address]        Equal Access to Services     [facility] : Hadhermila Nguyen, wanickoda pearl, qaybta katiealradha dupont. So [facility] [phone].    ATENCIÓN: Si habla español, tiene a claire disposición servicios gratuitos de asistencia lingüística. Chad al [phone].    We comply with applicable federal civil rights laws and [place] laws. We do not discriminate on the basis of race, color, national origin, age, disability, sex, sexual orientation, or gender identity.            Thank " you!     Thank you for choosing [facility]  for your care. Our goal is always to provide you with excellent care. Hearing back from our patients is one way we can continue to improve our services. Please take a few minutes to complete the written survey that you may receive in the mail after your visit with us. Thank you!             Your Updated Medication List - Protect others around you: Learn how to safely use, store and throw away your medicines at www.disposemymeds.org.          This list is accurate as of 4/19/18 10:51 AM.  Always use your most recent med list.                   Brand Name Dispense Instructions for use Diagnosis    amLODIPine 10 MG tablet    NORVASC     Take 1 tablet (10 mg) by mouth daily for blood pressure.    Benign hypertension with chronic kidney disease, stage III       atorvastatin 80 MG tablet    LIPITOR    90 tablet    Take 1 tablet (80 mg) by mouth daily for cholesterol.    Hyperlipidemia LDL goal <100       brimonidine 0.15 % ophthalmic solution    ALPHAGAN-P    1 Bottle    Place 1 drop into both eyes 2 times daily    Primary open angle glaucoma of both eyes, moderate stage       dorzolamide-timolol 2-0.5 % ophthalmic solution    COSOPT    1 Bottle    Place 1 drop into both eyes 2 times daily    Primary open angle glaucoma of both eyes, moderate stage       furosemide 20 MG tablet    LASIX    180 tablet    Take 1 tablet (20 mg) by mouth 2 times daily as needed for leg swelling.    Lower extremity edema       glipiZIDE 10 MG tablet    GLUCOTROL    180 tablet    TAKE ONE TABLET BY MOUTH TWICE DAILY WITH MEALS FOR DIABETES    Uncontrolled type 2 diabetes mellitus with microalbuminuria, with long-term current use of insulin (H)       hydrALAZINE 10 MG tablet    APRESOLINE     10 mg 3 times daily        hydrochlorothiazide 25 MG tablet    HYDRODIURIL    90 tablet    Take 1 tablet (25 mg) by mouth daily for blood pressure and leg swelling.    Benign hypertensive heart  and kidney disease with diastolic congestive heart failure, NYHA class 2 and chronic kidney disease stage 3 (H)       insulin glargine 100 UNIT/ML injection    LANTUS SOLOSTAR    30 mL    Inject 22 units under the skin daily.    Type 2 diabetes mellitus with microalbuminuria, with long-term current use of insulin (H)       insulin pen needle 32G X 4 MM    BD CAROL ANN U/F    100 each    Use 1 daily as directed.    Type 2 diabetes mellitus with hyperglycemia, without long-term current use of insulin (H)       latanoprost 0.005 % ophthalmic solution    XALATAN    1 Bottle    Place 1 drop into both eyes At Bedtime    Primary open angle glaucoma of both eyes, moderate stage       lisinopril 40 MG tablet    PRINIVIL/ZESTRIL    90 tablet    Take 1 tablet (40 mg) by mouth daily for blood pressure.    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

## 2018-04-20 ENCOUNTER — DOCUMENTATION ONLY (OUTPATIENT)
Dept: SLEEP MEDICINE | Facility: CLINIC | Age: 83
End: 2018-04-20
Payer: COMMERCIAL

## 2018-04-23 NOTE — PROGRESS NOTES
Overnight oximetry completed by patient. Sent to scanning copy to ALEX Gonzales.    Recording Date: 04/19/2018    Duration: 08:21:40    Note: On cpap settings

## 2018-04-24 NOTE — PROGRESS NOTES
Oximetry results were obtained for the date of 4/19/2018.  The patient was on a treatment of CPAP 8-12 cm/H20.  The study showed a valid recording time of 8 hours and 21 minutes with a 4% desaturation index of 0.4.  The basal oxygen saturation was 91.2% and the lowest SpO2 was 88%.  The patient spent 0 minutes below 88% SpO2.   A/P  No significant hypoxemia on CPAP. Continue current treatment and follow up as planned.   Yassine Christianson PA-C

## 2018-05-01 DIAGNOSIS — H40.1132 PRIMARY OPEN ANGLE GLAUCOMA OF BOTH EYES, MODERATE STAGE: ICD-10-CM

## 2018-05-01 RX ORDER — BRIMONIDINE TARTRATE 1.5 MG/ML
1 SOLUTION/ DROPS OPHTHALMIC 2 TIMES DAILY
Qty: 1 BOTTLE | Refills: 12 | Status: SHIPPED | OUTPATIENT
Start: 2018-05-01 | End: 2019-05-31

## 2018-05-08 ENCOUNTER — TRANSFERRED RECORDS (OUTPATIENT)
Dept: HEALTH INFORMATION MANAGEMENT | Facility: CLINIC | Age: 83
End: 2018-05-08

## 2018-05-15 ENCOUNTER — TRANSFERRED RECORDS (OUTPATIENT)
Dept: HEALTH INFORMATION MANAGEMENT | Facility: CLINIC | Age: 83
End: 2018-05-15

## 2018-06-22 DIAGNOSIS — I12.9 BENIGN HYPERTENSION WITH CHRONIC KIDNEY DISEASE, STAGE III (H): ICD-10-CM

## 2018-06-22 DIAGNOSIS — N18.30 BENIGN HYPERTENSION WITH CHRONIC KIDNEY DISEASE, STAGE III (H): ICD-10-CM

## 2018-06-25 NOTE — TELEPHONE ENCOUNTER
Routing refill request to provider for review/approval because:  BP is not at goal  Pauline Díaz RN

## 2018-06-26 RX ORDER — AMLODIPINE BESYLATE 10 MG/1
10 TABLET ORAL
Qty: 180 TABLET | Refills: 1 | Status: SHIPPED | OUTPATIENT
Start: 2018-06-26 | End: 2019-01-10

## 2018-06-26 RX ORDER — GLIPIZIDE 10 MG/1
10 TABLET ORAL 2 TIMES DAILY WITH MEALS
Qty: 180 TABLET | Refills: 1 | Status: SHIPPED | OUTPATIENT
Start: 2018-06-26 | End: 2018-12-26

## 2018-07-09 ENCOUNTER — OFFICE VISIT (OUTPATIENT)
Dept: FAMILY MEDICINE | Facility: CLINIC | Age: 83
End: 2018-07-09
Payer: COMMERCIAL

## 2018-07-09 VITALS
TEMPERATURE: 98.2 F | HEIGHT: 67 IN | BODY MASS INDEX: 31.71 KG/M2 | DIASTOLIC BLOOD PRESSURE: 61 MMHG | SYSTOLIC BLOOD PRESSURE: 124 MMHG | HEART RATE: 88 BPM | WEIGHT: 202 LBS | OXYGEN SATURATION: 97 %

## 2018-07-09 DIAGNOSIS — E11.29 TYPE 2 DIABETES MELLITUS WITH MICROALBUMINURIA, WITH LONG-TERM CURRENT USE OF INSULIN (H): Primary | Chronic | ICD-10-CM

## 2018-07-09 DIAGNOSIS — R80.9 TYPE 2 DIABETES MELLITUS WITH MICROALBUMINURIA, WITH LONG-TERM CURRENT USE OF INSULIN (H): Primary | Chronic | ICD-10-CM

## 2018-07-09 DIAGNOSIS — E78.5 HYPERLIPIDEMIA LDL GOAL <100: Chronic | ICD-10-CM

## 2018-07-09 DIAGNOSIS — H40.1132 PRIMARY OPEN ANGLE GLAUCOMA OF BOTH EYES, MODERATE STAGE: ICD-10-CM

## 2018-07-09 DIAGNOSIS — H61.23 CERUMINOSIS, BILATERAL: ICD-10-CM

## 2018-07-09 DIAGNOSIS — I50.30 BENIGN HYPERTENSIVE HEART AND KIDNEY DISEASE WITH DIASTOLIC CONGESTIVE HEART FAILURE, NYHA CLASS 2 AND CHRONIC KIDNEY DISEASE STAGE 3 (H): ICD-10-CM

## 2018-07-09 DIAGNOSIS — I13.0 BENIGN HYPERTENSIVE HEART AND KIDNEY DISEASE WITH DIASTOLIC CONGESTIVE HEART FAILURE, NYHA CLASS 2 AND CHRONIC KIDNEY DISEASE STAGE 3 (H): ICD-10-CM

## 2018-07-09 DIAGNOSIS — Z79.4 TYPE 2 DIABETES MELLITUS WITH MICROALBUMINURIA, WITH LONG-TERM CURRENT USE OF INSULIN (H): Primary | Chronic | ICD-10-CM

## 2018-07-09 DIAGNOSIS — N18.30 BENIGN HYPERTENSIVE HEART AND KIDNEY DISEASE WITH DIASTOLIC CONGESTIVE HEART FAILURE, NYHA CLASS 2 AND CHRONIC KIDNEY DISEASE STAGE 3 (H): ICD-10-CM

## 2018-07-09 DIAGNOSIS — C18.6 MALIGNANT NEOPLASM OF DESCENDING COLON (H): ICD-10-CM

## 2018-07-09 LAB
ALT SERPL W P-5'-P-CCNC: 36 U/L (ref 0–70)
ANION GAP SERPL CALCULATED.3IONS-SCNC: 7 MMOL/L (ref 3–14)
BUN SERPL-MCNC: 23 MG/DL (ref 7–30)
CALCIUM SERPL-MCNC: 9.4 MG/DL (ref 8.5–10.1)
CHLORIDE SERPL-SCNC: 105 MMOL/L (ref 94–109)
CHOLEST SERPL-MCNC: 187 MG/DL
CO2 SERPL-SCNC: 24 MMOL/L (ref 20–32)
CREAT SERPL-MCNC: 1.07 MG/DL (ref 0.66–1.25)
GFR SERPL CREATININE-BSD FRML MDRD: 65 ML/MIN/1.7M2
GLUCOSE SERPL-MCNC: 153 MG/DL (ref 70–99)
HBA1C MFR BLD: 7.8 % (ref 0–5.6)
HDLC SERPL-MCNC: 60 MG/DL
LDLC SERPL CALC-MCNC: 101 MG/DL
NONHDLC SERPL-MCNC: 127 MG/DL
POTASSIUM SERPL-SCNC: 4.5 MMOL/L (ref 3.4–5.3)
SODIUM SERPL-SCNC: 136 MMOL/L (ref 133–144)
TRIGL SERPL-MCNC: 129 MG/DL

## 2018-07-09 PROCEDURE — 80048 BASIC METABOLIC PNL TOTAL CA: CPT | Performed by: FAMILY MEDICINE

## 2018-07-09 PROCEDURE — 84460 ALANINE AMINO (ALT) (SGPT): CPT | Performed by: FAMILY MEDICINE

## 2018-07-09 PROCEDURE — 80061 LIPID PANEL: CPT | Performed by: FAMILY MEDICINE

## 2018-07-09 PROCEDURE — 83036 HEMOGLOBIN GLYCOSYLATED A1C: CPT | Performed by: FAMILY MEDICINE

## 2018-07-09 PROCEDURE — 99207 C FOOT EXAM  NO CHARGE: CPT | Performed by: FAMILY MEDICINE

## 2018-07-09 PROCEDURE — 36415 COLL VENOUS BLD VENIPUNCTURE: CPT | Performed by: FAMILY MEDICINE

## 2018-07-09 PROCEDURE — 99214 OFFICE O/P EST MOD 30 MIN: CPT | Performed by: FAMILY MEDICINE

## 2018-07-09 RX ORDER — HYDROCHLOROTHIAZIDE 25 MG/1
25 TABLET ORAL DAILY
Qty: 90 TABLET | Refills: 1 | Status: SHIPPED | OUTPATIENT
Start: 2018-07-09 | End: 2019-01-10

## 2018-07-09 RX ORDER — TERAZOSIN 2 MG/1
2 CAPSULE ORAL DAILY
Qty: 90 CAPSULE | Refills: 1 | Status: SHIPPED | OUTPATIENT
Start: 2018-07-09 | End: 2019-01-10

## 2018-07-09 RX ORDER — LISINOPRIL 40 MG/1
40 TABLET ORAL DAILY
Qty: 90 TABLET | Refills: 1 | Status: SHIPPED | OUTPATIENT
Start: 2018-07-09 | End: 2019-01-10

## 2018-07-09 ASSESSMENT — PAIN SCALES - GENERAL: PAINLEVEL: NO PAIN (0)

## 2018-07-09 NOTE — MR AVS SNAPSHOT
After Visit Summary   7/9/2018    Manuel Rosales    MRN: [id number]           Patient Information     Date Of Birth          3/22/1931        Visit Information        Provider Department      7/9/2018 11:20 AM Ronaldo Corral MD 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  If you receive a survey in the mail, please send us back your thoughts. We really do value your feedback.    Based on your medical history, these are the current health maintenance/preventive care services that you are due for (some may have been done at this visit.)  Health Maintenance Due   Topic Date Due     HF ACTION PLAN Q3 YR  03/22/1931     FOOT EXAM Q1 YEAR  07/28/2017     BMP Q6 MOS  07/08/2018     A1C Q6 MO  07/08/2018     EYE EXAM Q1 YEAR  08/08/2018       Suggested websites for health information:  Www.[employer].org : Up to date and easily searchable information on multiple topics.  Www.medlineplus.gov : medication info, interactive tutorials, watch real surgeries online  Www.familydoctor.org : good info from the Academy of Family Physicians  Www.cdc.gov : public health info, travel advisories, epidemics (H1N1)  Www.aap.org : children's health info, normal development, vaccinations  Www.health.state.[place].us : [place] dept of health, public health issues in [place], N1N1    Your care team:                            Family Medicine Internal Medicine   Ronaldo  MD Kaiden Corral MD Shantel Branch-Fleming, MD Katya Georgiev PA-C Megan Hill, APRN [facility]    Dejuan Rogers, MD Pediatrics   Wiley Muller, ERNST Ackerman, CNP MD Silvia Smith APRN CNP   MD Dorene Danielle MD Deborah Mielke, MD Mariia Mariscal, APR[facility]      Clinic hours: Monday - Thursday 7 am-7 pm; Fridays 7 am-5 pm.   Urgent care: Monday - Friday 11 am-9 pm; Saturday and Sunday 9 am-5 pm.  Pharmacy : Monday -Thursday 8 am-8 pm; Friday 8 am-6 pm; Saturday and Sunday 9 am-5 pm.     Clinic: [phone]   Pharmacy: [phone]              Follow-ups after your visit        Follow-up notes from your care team     Return in about 6 months (around 1/9/2019) for full physical, diabetes, recheck medication.      Your next 10 appointments already scheduled     Aug 09, 2018  1:30 PM CDT   New Visit with Willy Cardoso MD   [facility] ([address] [address] [phone]              Who to contact     If you have questions or need follow up information about today's clinic visit or your schedule please contact [facility] directly at [phone].  Normal or non-critical lab and imaging results will be communicated to you by Ecommohart, letter or phone within 4 business days after the clinic has received the results. If you do not hear from us within 7 days, please contact the clinic through Ecommohart or phone. If you have a critical or abnormal lab result, we will notify you by phone as soon as possible.  Submit refill requests through [employer] or call your pharmacy and they will forward the refill request to us. Please allow 3 business days for your refill to be completed.          Additional Information About Your Visit        Ecommohart Information     [employer] lets you send messages to your doctor, view your test results, renew your prescriptions, schedule appointments and more. To sign up, go to  "[email or web address] . Click on \"Log in\" on the left side of the screen, which will take you to the Welcome page. Then click on \"Sign up Now\" on the right side of the page.     You will be asked to enter the access code listed below, as well as some personal information. Please follow the directions to create your username and password.     Your access code is: [email or web address]  Expires: 2018 10:50 AM     Your access code will  in 90 days. If you need help or a new code, please call your [place] clinic or [phone].        Care EveryWhere ID     This is your Care EveryWhere ID. This could be used by other organizations to access your [place] medical records  [id number]        Your Vitals Were     Pulse Temperature Height Pulse Oximetry BMI (Body Mass Index)       88 98.2  F (36.8  C) (Oral) 1.702 m (5' 7\") 97% 31.64 kg/m2        Blood Pressure from Last 3 Encounters:   18 124/61   18 181/51   18 188/78    Weight from Last 3 Encounters:   18 91.6 kg (202 lb)   18 93.4 kg (206 lb)   18 93.9 kg (207 lb)              We Performed the Following     ALT     BASIC METABOLIC PANEL     HEMOGLOBIN A1C     Lipid panel reflex to direct LDL Non-fasting     PAF COMPLETED          Today's Medication Changes          These changes are accurate as of 18 11:36 AM.  If you have any questions, ask your nurse or doctor.               Start taking these medicines.        Dose/Directions    BETA BLOCKER NOT PRESCRIBED (INTENTIONAL)   Used for:  Benign hypertensive heart and kidney disease with diastolic congestive heart failure, NYHA class 2 and chronic kidney disease stage 3 (H)   Started by:  Ronaldo Corral MD        Beta Blocker not prescribed intentionally due to Cardiac Arrythmia (bradycardia, complete heart block)   Refills:  0         These medicines have changed or have updated prescriptions.        Dose/Directions    insulin glargine 100 UNIT/ML injection   Commonly " known as:  CLEMENTINA WARNER   This may have changed:    - how much to take  - how to take this  - when to take this  - additional instructions   Used for:  Type 2 diabetes mellitus with microalbuminuria, with long-term current use of insulin (H)   Changed by:  Ronaldo Corral MD        Dose:  22 Units   Inject 22 Units Subcutaneous every evening   Quantity:  21 mL   Refills:  3            Where to get your medicines      These medications were sent to [facility] [address] [place], [place] - [address] [facility]  [address] [facility] [address]     Phone:  [phone]     hydrochlorothiazide 25 MG tablet    lisinopril 40 MG tablet    terazosin 2 MG capsule         Some of these will need a paper prescription and others can be bought over the counter.  Ask your nurse if you have questions.     Bring a paper prescription for each of these medications     insulin glargine 100 UNIT/ML injection    insulin pen needle 32G X 4 MM       You don't need a prescription for these medications     BETA BLOCKER NOT PRESCRIBED (INTENTIONAL)                Primary Care Provider Office Phone # Fax #    Ronaldo Corral [phone]       [address]  [facility] [address]        Equal Access to Services     [facility] AH: Hadii aad ku hadasho Soomaali, waaxda luqadaha, qaybta kaalmada adeegyada, waxay idiin hayaan adeeg kharash lakevin jimenez. So [facility] [phone].    ATENCIÓN: Si habla español, tiene a claire disposición servicios gratjieos de asistencia lingüística. [facility] of [phone].    We comply with applicable federal civil rights laws and [place] laws. We do not discriminate on the basis of race, color, national origin, age, disability, sex, sexual orientation, or gender identity.            Thank you!     Thank you for choosing [facility]  for your care. Our goal is always to provide you with excellent care. Hearing back from our patients is one way we can continue to  improve our services. Please take a few minutes to complete the written survey that you may receive in the mail after your visit with us. Thank you!             Your Updated Medication List - Protect others around you: Learn how to safely use, store and throw away your medicines at www.disposemymeds.org.          This list is accurate as of 7/9/18 11:36 AM.  Always use your most recent med list.                   Brand Name Dispense Instructions for use Diagnosis    amLODIPine 10 MG tablet    NORVASC    180 tablet    Take 1 tablet (10 mg) by mouth 2 times daily for blood pressure.    Benign hypertension with chronic kidney disease, stage III       atorvastatin 80 MG tablet    LIPITOR    90 tablet    Take 1 tablet (80 mg) by mouth daily for cholesterol.    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    Lower extremity edema       glipiZIDE 10 MG tablet    GLUCOTROL    180 tablet    Take 1 tablet (10 mg) by mouth 2 times daily (with meals) for diabetes.    Uncontrolled type 2 diabetes mellitus with microalbuminuria, with long-term current use of insulin (H)       hydrALAZINE 10 MG tablet    APRESOLINE     10 mg 3 times daily        hydrochlorothiazide 25 MG tablet     HYDRODIURIL    90 tablet    Take 1 tablet (25 mg) by mouth daily for blood pressure and leg swelling.    Benign hypertensive heart and kidney disease with diastolic congestive heart failure, NYHA class 2 and chronic kidney disease stage 3 (H)       insulin glargine 100 UNIT/ML injection    LANTUS SOLOSTAR    21 mL    Inject 22 Units Subcutaneous every evening    Type 2 diabetes mellitus with microalbuminuria, with long-term current use of insulin (H)       insulin pen needle 32G X 4 MM    BD CAROL ANN U/F    100 each    Use 1 daily as directed.    Type 2 diabetes mellitus with microalbuminuria, with long-term current use of insulin (H)       latanoprost 0.005 % ophthalmic solution    XALATAN    1 Bottle    Place 1 drop into both eyes At Bedtime    Primary open angle glaucoma of both eyes, moderate stage       lisinopril 40 MG tablet    PRINIVIL/ZESTRIL    90 tablet    Take 1 tablet (40 mg) by mouth daily for blood pressure.    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

## 2018-07-09 NOTE — PATIENT INSTRUCTIONS
At [facility], we strive to deliver an exceptional experience to you, every time we see you.  If you receive a survey in the mail, please send us back your thoughts. We really do value your feedback.    Based on your medical history, these are the current health maintenance/preventive care services that you are due for (some may have been done at this visit.)  Health Maintenance Due   Topic Date Due     HF ACTION PLAN Q3 YR  03/22/1931     FOOT EXAM Q1 YEAR  07/28/2017     BMP Q6 MOS  07/08/2018     A1C Q6 MO  07/08/2018     EYE EXAM Q1 YEAR  08/08/2018       Suggested websites for health information:  Www.[place].org : Up to date and easily searchable information on multiple topics.  Www.medlineplus.gov : medication info, interactive tutorials, watch real surgeries online  Www.familydoctor.org : good info from the Academy of Family Physicians  Www.cdc.gov : public health info, travel advisories, epidemics (H1N1)  Www.aap.org : children's health info, normal development, vaccinations  Www.health.[facility].[place].us : [place] dept of health, public health issues in [place], N1N1    Your care team:                            Family Medicine Internal Medicine   MD Kaiden Harris MD Shantel Branch-Fleming, MD Katya Georgiev PA-C Megan Hill, APRKYLEIGH Rogers MD Pediatrics   Wiley Muller, ERNST Ackerman, MD Silvia Bergman APRN CNP   MD Dorene Danielle MD Deborah Mielke, MD Kim Thein, APRN [facility]      Clinic hours: Monday - Thursday 7 am-7 pm; Fridays 7 am-5 pm.   Urgent care: Monday - Friday 11 am-9 pm; Saturday and Sunday 9 am-5 pm.  Pharmacy : Monday -Thursday 8 am-8 pm; Friday 8 am-6 pm; Saturday and Sunday 9 am-5 pm.     Clinic: [phone]   Pharmacy: [phone]

## 2018-07-09 NOTE — PROGRESS NOTES
SUBJECTIVE:   Manuel Rosales is a 87 year old male who presents to clinic today for the following health issues:    Diabetes Follow-up    Patient is checking blood sugars: once daily. Results are as follows:    am - per patient average 130's. The vast majority appear to be between .    Diabetic concerns: none     Symptoms of hypoglycemia (low blood sugar): none     Paresthesias (numbness or burning in feet) or sores: YES - numbness in lower legs     Date of last diabetic eye exam: DUE     He is still taking 22 units of insulin daily (around 9 PM).    BP Readings from Last 2 Encounters:   07/09/18 124/61   04/19/18 181/51     Hemoglobin A1C (%)   Date Value   07/09/2018 7.8 (H)   01/08/2018 7.4 (H)     LDL Cholesterol Calculated (mg/dL)   Date Value   01/08/2018 60   05/22/2017 51   Diabetes Management Resources    Hypertension Follow-up      Outpatient blood pressures are not being checked.    Low Salt Diet: low salt      Amount of exercise or physical activity: None    Problems taking medications regularly: No    Medication side effects: none    Diet: regular (no restrictions) and low salt    Past medical, family, and social histories, medications, and allergies are reviewed and updated in [facility].     ROS:  CONSTITUTIONAL: NEGATIVE for fever, chills, change in weight  ENT/MOUTH: NEGATIVE for mouth and throat problem. POSITIVE for hearing changes due to wax buildup. He requests an ear wash for wax removal. He does wear hearing aids.  RESP: NEGATIVE for significant cough or SOB  CV: NEGATIVE for chest pain, palpitations or peripheral edema  ROS otherwise negative    This document serves as a record of the services and decisions personally performed and made by Dr. Corral. It was created on his behalf by Frankie Hart, a trained medical scribe. The creation of this document is based the provider's statements to the medical scribe.  Frankie Hart July 9, 2018 11:17 AM     OBJECTIVE:                                  "                   /61 (BP Location: Left arm, Patient Position: Chair, Cuff Size: Adult Regular)  Pulse 88  Temp 98.2  F (36.8  C) (Oral)  Ht 1.702 m (5' 7\")  Wt 91.6 kg (202 lb)  SpO2 97%  BMI 31.64 kg/m2   Body mass index is 31.64 kg/(m^2).     GENERAL: healthy, alert and no distress  EYES: Eyes grossly normal to inspection, PERRL, EOMI, sclerae white and conjunctivae normal  HENT: in both ear canals there are large amounts of wax, not large enough to completely prevent me from viewing his TMs (normal), however they would block sound from his hearing aids  RESP: lungs clear to auscultation - no crackles or wheezes, no areas of dullness, no tachypnea  CV: Heart regular rate and rhythm with 2/6 systolic murmur (previously identified), no click or rub. No peripheral edema and peripheral pulses strong  FEET: Sensory exam of the foot is normal, tested with the monofilament. Calluses present, no lesions or ulcers, good peripheral pulses.   MS: no gross musculoskeletal defects noted, no edema  SKIN: no suspicious lesions or rashes  NEURO: Normal strength and tone, sensory exam grossly normal, mentation intact, oriented times 3 and cranial nerves 2-12 intact  PSYCH: mentation appears normal, affect normal/bright     Diagnostic Test Results:  Results for orders placed or performed in visit on 07/09/18 (from the past 24 hour(s))   HEMOGLOBIN A1C   Result Value Ref Range    Hemoglobin A1C 7.8 (H) 0 - 5.6 %        Lab Results   Component Value Date    A1C 7.8 07/09/2018    A1C 7.4 01/08/2018    A1C 6.8 07/13/2017    A1C 8.4 05/22/2017    A1C 8.8 02/02/2017     ASSESSMENT/PLAN:                                                      (E11.29,  R80.9,  Z79.4) Type 2 diabetes mellitus with microalbuminuria, with long-term current use of insulin (H)  (primary encounter diagnosis)  Comment: HgbA1c increased but still at goal.  Plan: FOOT EXAM  NO CHARGE [04806.114], HEMOGLOBIN         A1C, insulin glargine (LANTUS " SOLOSTAR) 100         UNIT/ML pen, insulin pen needle (BD CAROL ANN U/F)         32G X 4 MM        HgbA1c graph provided. Return in about 6 months (around 1/9/2019) for full physical, diabetes, recheck medication.     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     (E78.5) Hyperlipidemia LDL goal <100  Comment: historically at goal   Plan: ALT, Lipid panel reflex to direct LDL         Non-fasting        Follow up in 6 months.    (C18.6) Malignant neoplasm of descending colon (H)  Comment: originally diagnosed in 2002. He has had several colonoscopies since, most recently 2 months ago. Polyps were present, but no evidence for cancer recurrent  Plan: his endoscopist recommends against further colon cancer screening based on age and no evidence of cancer since the original diagnosis    (H40.1132) Primary open angle glaucoma of both eyes, moderate stage  Comment: stable on his current eye drop regimen  Plan: he is scheduled to see his eye doctor in 1 month. I expect him to have his diabetic eye exam then as well    (H61.23) Ceruminosis, bilateral  Comment: resolved with normal exam of ear canals afterward  Plan: REMOVE IMPACTED CERUMEN        I removed all of the cerumen from both ear canals using a curette. The patient tolerated the procedure well.      The information in this document, created by the medical scribe for me, accurately reflects the services I personally performed and the decisions made by me. I have reviewed and approved this document for accuracy prior to leaving the patient care area. July 9, 2018 11:17 AM      Ronaldo Corral MD

## 2018-07-09 NOTE — LETTER
July 11, 2018      Manuel Rosales  [address]  [facility] [place] [address]        Mr. Rosales,     All of the rest of your test results were within the expected range for you.     Please contact the clinic if you have additional questions.  Thank you.     Sincerely,       Ronaldo Corral MD     Resulted Orders   BASIC METABOLIC PANEL   Result Value Ref Range    Sodium 136 133 - 144 mmol/L    Potassium 4.5 3.4 - 5.3 mmol/L    Chloride 105 94 - 109 mmol/L    Carbon Dioxide 24 20 - 32 mmol/L    Anion Gap 7 3 - 14 mmol/L    Glucose 153 (H) 70 - 99 mg/dL      Comment:      Fasting specimen    Urea Nitrogen 23 7 - 30 mg/dL    Creatinine 1.07 0.66 - 1.25 mg/dL    GFR Estimate 65 >60 mL/min/1.7m2      Comment:      Non  GFR Calc    GFR Estimate If Black 79 >60 mL/min/1.7m2      Comment:       GFR Calc    Calcium 9.4 8.5 - 10.1 mg/dL   HEMOGLOBIN A1C   Result Value Ref Range    Hemoglobin A1C 7.8 (H) 0 - 5.6 %      Comment:      Normal <5.7% Prediabetes 5.7-6.4%  Diabetes 6.5% or higher - adopted from ADA   consensus guidelines.     ALT   Result Value Ref Range    ALT 36 0 - 70 U/L   Lipid panel reflex to direct LDL Non-fasting   Result Value Ref Range    Cholesterol 187 <200 mg/dL    Triglycerides 129 <150 mg/dL    HDL Cholesterol 60 >39 mg/dL    LDL Cholesterol Calculated 101 (H) <100 mg/dL      Comment:      Above desirable:  100-129 mg/dl  Borderline High:  130-159 mg/dL  High:             160-189 mg/dL  Very high:       >189 mg/dl      Non HDL Cholesterol 127 <130 mg/dL

## 2018-08-01 ENCOUNTER — DOCUMENTATION ONLY (OUTPATIENT)
Dept: SLEEP MEDICINE | Facility: CLINIC | Age: 83
End: 2018-08-01

## 2018-08-01 DIAGNOSIS — G47.30 SLEEP DISORDER BREATHING: ICD-10-CM

## 2018-08-01 NOTE — PROGRESS NOTES
6 month [facility] Recheck Visit     Diagnostic AHI: 18.7 20.0    Data only recheck     Assessment: Pt meeting objective benchmarks.    Action plan: pt to follow up per provider request (1-2 yrs)       Device type: Auto-CPAP  PAP settings: CPAP min 8.0 cm  H20     CPAP max 12.0 cm  H20    95th% pressure 11 cm  H20   Objective measures: 14 day rolling measures      Compliance  100 %      Leak  75.12 lpm  last  upload      AHI 1.57   last  upload      Average number of minutes 417      Objective measure goal  Compliance   Goal >70%  Leak   Goal < 24 lpm  AHI  Goal < 5  Usage  Goal >240

## 2018-08-09 ENCOUNTER — OFFICE VISIT (OUTPATIENT)
Dept: OPHTHALMOLOGY | Facility: CLINIC | Age: 83
End: 2018-08-09
Payer: COMMERCIAL

## 2018-08-09 DIAGNOSIS — Z01.01 ENCOUNTER FOR EXAMINATION OF EYES AND VISION WITH ABNORMAL FINDINGS: Primary | ICD-10-CM

## 2018-08-09 DIAGNOSIS — Z96.1 PSEUDOPHAKIA: ICD-10-CM

## 2018-08-09 DIAGNOSIS — H52.4 PRESBYOPIA: ICD-10-CM

## 2018-08-09 DIAGNOSIS — H02.119 CICATRICIAL ECTROPION, UNSPECIFIED LATERALITY: ICD-10-CM

## 2018-08-09 DIAGNOSIS — H43.813 POSTERIOR VITREOUS DETACHMENT OF BOTH EYES: ICD-10-CM

## 2018-08-09 DIAGNOSIS — H35.372 EPIRETINAL MEMBRANE, LEFT: ICD-10-CM

## 2018-08-09 DIAGNOSIS — H40.1132 PRIMARY OPEN ANGLE GLAUCOMA OF BOTH EYES, MODERATE STAGE: ICD-10-CM

## 2018-08-09 PROCEDURE — 92015 DETERMINE REFRACTIVE STATE: CPT | Performed by: OPHTHALMOLOGY

## 2018-08-09 PROCEDURE — 92014 COMPRE OPH EXAM EST PT 1/>: CPT | Performed by: OPHTHALMOLOGY

## 2018-08-09 ASSESSMENT — REFRACTION_MANIFEST
OS_CYLINDER: +1.50
OD_CYLINDER: +1.75
OD_AXIS: 015
OD_ADD: +2.75
OS_SPHERE: -2.00
OS_ADD: +2.75
OS_AXIS: 167
OD_SPHERE: -1.00

## 2018-08-09 ASSESSMENT — CUP TO DISC RATIO
OS_RATIO: 0.9
OD_RATIO: 0.8

## 2018-08-09 ASSESSMENT — REFRACTION_WEARINGRX
OD_AXIS: 004
SPECS_TYPE: BIFOCAL
OD_SPHERE: -0.50
OS_SPHERE: -2.00
OD_CYLINDER: +0.50
OS_CYLINDER: +1.25
OD_ADD: +3.00
OS_AXIS: 166
OS_ADD: +3.00

## 2018-08-09 ASSESSMENT — VISUAL ACUITY
METHOD: SNELLEN - LINEAR
OS_CC: 20/25
CORRECTION_TYPE: GLASSES
OD_CC: 20/30
OD_CC+: -1

## 2018-08-09 ASSESSMENT — TONOMETRY
OD_IOP_MMHG: 16
OS_IOP_MMHG: 15
IOP_METHOD: APPLANATION

## 2018-08-09 ASSESSMENT — CONF VISUAL FIELD
OD_NORMAL: 1
OS_NORMAL: 1
METHOD: COUNTING FINGERS

## 2018-08-09 ASSESSMENT — EXTERNAL EXAM - RIGHT EYE: OD_EXAM: NORMAL

## 2018-08-09 ASSESSMENT — EXTERNAL EXAM - LEFT EYE: OS_EXAM: NORMAL

## 2018-08-09 NOTE — PROGRESS NOTES
Current Eye Medications:  Brimonidine twice daily both eyes, last took at 7am. Cosopt twice daily both eyes, last took at 7:15am. Latanoprost every evening both eyes, last took at 5pm. Ran out of Brimonidine a couple of months ago, took a couple days to get, taking now again.     Subjective:  Complete eye exam. Vision is doing well both eyes. No eye pain or discomfort in either eye.      Objective:  See Ophthalmology Exam.       Assessment:  Stable intraocular pressure and discs in patient with moderately advanced open angle glaucoma.      ICD-10-CM    1. Encounter for examination of eyes and vision with abnormal findings Z01.01 REFRACTIVE STATUS   2. Presbyopia H52.4 REFRACTIVE STATUS   3. Primary open angle glaucoma of both eyes, moderate stage H40.1132 dorzolamide-timolol (COSOPT) 2-0.5 % ophthalmic solution     EYE EXAM (SIMPLE-NONBILLABLE)   4. Pseudophakia, ou Z96.1    5. Epiretinal membrane, mild, left H35.372    6. 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 Posterior vitreous detachment of both eyes H43.813         Plan:  Glasses Rx given - optional.  Continue using Brimonidine both eyes twice daily,   Dorzolamide/Timolol both eyes twice daily,   And Latanoprost both eyes at bedtime.   Possible clouding of posterior capsule both eyes discussed.  Return visit in 6 months for intraocular pressure check, glaucoma OCT, [facility].     Willy Cardoso M.D.  [phone]

## 2018-08-09 NOTE — LETTER
8/9/2018         RE: Manuel Rosales  [address]  [facility] [address]        Dear Colleague,    Thank you for referring your patient, Manuel Rosales, to the [facility]. Please see a copy of my visit note below.     Current Eye Medications:  Brimonidine twice daily both eyes, last took at 7am. Cosopt twice daily both eyes, last took at 7:15am. Latanoprost every evening both eyes, last took at 5pm. Ran out of Brimonidine a couple of months ago, took a couple days to get, taking now again.     Subjective:  Complete eye exam. Vision is doing well both eyes. No eye pain or discomfort in either eye.      Objective:  See Ophthalmology Exam.       Assessment:  Stable intraocular pressure and discs in patient with moderately advanced open angle glaucoma.      ICD-10-CM    1. Encounter for examination of eyes and vision with abnormal findings Z01.01 REFRACTIVE STATUS   2. Presbyopia H52.4 REFRACTIVE STATUS   3. Primary open angle glaucoma of both eyes, moderate stage H40.1132 dorzolamide-timolol (COSOPT) 2-0.5 % ophthalmic solution     EYE EXAM (SIMPLE-NONBILLABLE)   4. Pseudophakia, ou Z96.1    5. Epiretinal membrane, mild, left H35.372    6. 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 Posterior vitreous detachment of both eyes H43.813         Plan:  Glasses Rx given - optional.  Continue using Brimonidine both eyes twice daily,   Dorzolamide/Timolol both eyes twice daily,   And Latanoprost both eyes at bedtime.   Possible clouding of posterior capsule both eyes discussed.  Return visit in 6 months for intraocular pressure check, glaucoma OCT, [facility].     Willy Cardoso M.D.  [phone]             Again, thank you for allowing me to participate in the care of your patient.        Sincerely,        Willy Cardoso MD

## 2018-08-19 PROBLEM — H02.119: Status: ACTIVE | Noted: 2018-08-19

## 2018-08-19 RX ORDER — DORZOLAMIDE HYDROCHLORIDE AND TIMOLOL MALEATE 20; 5 MG/ML; MG/ML
1 SOLUTION/ DROPS OPHTHALMIC 2 TIMES DAILY
Qty: 1 BOTTLE | Refills: 12 | Status: SHIPPED | OUTPATIENT
Start: 2018-08-19 | End: 2019-05-31

## 2018-09-05 DIAGNOSIS — E11.29 TYPE 2 DIABETES MELLITUS WITH MICROALBUMINURIA, WITH LONG-TERM CURRENT USE OF INSULIN (H): Chronic | ICD-10-CM

## 2018-09-05 DIAGNOSIS — Z79.4 TYPE 2 DIABETES MELLITUS WITH MICROALBUMINURIA, WITH LONG-TERM CURRENT USE OF INSULIN (H): Chronic | ICD-10-CM

## 2018-09-05 DIAGNOSIS — R80.9 TYPE 2 DIABETES MELLITUS WITH MICROALBUMINURIA, WITH LONG-TERM CURRENT USE OF INSULIN (H): Chronic | ICD-10-CM

## 2018-09-05 NOTE — TELEPHONE ENCOUNTER
Patient is now using 30 units daily of GLARGINE  [facility]    Can you notify them of his dose change  He has increased from 22 units daily    He has a good supply on hand     Call if questions  [phone]    He just wants them to know so his next refill will be enough to cover the dose change

## 2018-09-05 NOTE — TELEPHONE ENCOUNTER
Updated the medication list to reflect the 30 units noted below.     Vanessa Bowie RN, [facility]

## 2018-09-06 NOTE — TELEPHONE ENCOUNTER
Called patient and he states that Dr Corral and he discussed at office visit about increasing the insulin glargine. Since then he was been increasing by 1 unit at a time until he is within the normal range. Currently at 30 units daily with blood sugar around 120 in the morning before breakfast.    He needs us to send a new Rx or give a verbal to the [facility](Pharm[facility]) so he can get the correct amount in the next refills to reflect the change of 30units daily.    Tom Sanchez CMA

## 2018-09-06 NOTE — TELEPHONE ENCOUNTER
Manuel returned call    Best number to reach caller: Home number on file [phone] (home)    Is it ok to leave a detailed message: YES   Wants to know if you called the [facility] to update them.  He wants to make sure that they know.  Please call Manuel and let him know if and when you speak to the [facility] to update the change.   Thank you

## 2018-09-06 NOTE — TELEPHONE ENCOUNTER
Medication Detail      Disp Refills Start End PELON   insulin glargine (LANTUS SOLOSTAR) 100 UNIT/ML pen 21 mL 3 9/5/2018  No   Sig - Route: Inject 30 Units Subcutaneous every evening - Subcutaneous   Class: Historical   Notes to Pharmacy: Update on dosing to 30   Order: [id number]     This is technically a historical change to Sig-Route, at last OV writer did not see this increase in dosing specifically discussed.     Updated Rx pended, with preferred pharmacy, provider to please sign Rx.     Florinda Pan RN

## 2018-09-25 ENCOUNTER — TRANSFERRED RECORDS (OUTPATIENT)
Dept: HEALTH INFORMATION MANAGEMENT | Facility: CLINIC | Age: 83
End: 2018-09-25

## 2018-10-15 ENCOUNTER — TELEPHONE (OUTPATIENT)
Dept: SLEEP MEDICINE | Facility: CLINIC | Age: 83
End: 2018-10-15

## 2018-10-15 NOTE — TELEPHONE ENCOUNTER
Pt called in requesting to see if he could stop his CPAP. He states he has had issues with the humidity chamber and would before he gets this fixed he wanted to know if it was possible to stop his CPAP instead. He states that he had a pace maker placed 5/15/18 and has felt a lot better since on it. Please advise.     Sangeeta Duncan MA on 10/15/2018 at 1:28 PM

## 2018-10-19 NOTE — TELEPHONE ENCOUNTER
Called and relayed message to patient. He said he will call back if he wants to schedule an appt.

## 2018-10-25 ENCOUNTER — DOCUMENTATION ONLY (OUTPATIENT)
Dept: SLEEP MEDICINE | Facility: CLINIC | Age: 83
End: 2018-10-25
Payer: COMMERCIAL

## 2018-10-25 DIAGNOSIS — G47.30 SLEEP DISORDER BREATHING: ICD-10-CM

## 2018-10-25 NOTE — PROGRESS NOTES
Patient came into [facility] on October 25,2018 reporting that his CPAP humidifier is not heating.  Humidification error displayed when machine was turned on.  Ran machine for a few minutes and humidifier did not heat up.  Patient received a new machine today under the 's 2-year warranty exchange.  Patient also received all supplies (mask, tubing, disposable filters and water chamber) as none had been replaced since initial setup in January 2018.  Provided cleaning and replacement schedule.  Updated device and serial numbers in [employer].

## 2018-12-26 NOTE — TELEPHONE ENCOUNTER
"Requested Prescriptions   Pending Prescriptions Disp Refills     glipiZIDE (GLUCOTROL) 10 MG tablet [Pharmacy Med Name: GLIPIZIDE 10MG      TAB] 180 tablet 1     Sig: TAKE 1 TABLET BY MOUTH TWICE DAILY WITH MEALS FOR DIABETES    Sulfonylurea Agents Passed - 12/26/2018  5:30 AM       Passed - Blood pressure less than 140/90 in past 6 months    BP Readings from Last 3 Encounters:   07/09/18 124/61   04/19/18 181/51   04/16/18 188/78                Passed - Patient has documented LDL within the past 12 mos.    Recent Labs   Lab Test 07/09/18  1050   *            Passed - Patient has had a Microalbumin in the past 12 mos.    Recent Labs   Lab Test 01/08/18  1122   MICROL 298   UMALCR 623.43*            Passed - Patient has documented A1c within the specified period of time.    If HgbA1C is 8 or greater, it needs to be on file within the past 3 months.  If less than 8, must be on file within the past 6 months.     Recent Labs   Lab Test 07/09/18  1050   A1C 7.8*            Passed - Patient is age 18 or older       Passed - Patient has a recent creatinine (normal) within the past 12 mos.    Recent Labs   Lab Test 07/09/18  1050   CR 1.07            Passed - Recent (6 mo) or future (30 days) visit within the authorizing provider's specialty    Patient had office visit in the last 6 months or has a visit in the next 30 days with authorizing provider or within the authorizing provider's specialty.  See \"Patient Info\" tab in inbasket, or \"Choose Columns\" in Meds & Orders section of the refill encounter.            Last Written Prescription Date:  6/26/18  Last Fill Quantity: 180,  # refills: 1   Last office visit: 7/9/2018 with prescribing provider:  7/9/18   Future Office Visit:   Next 5 appointments (look out 90 days)    Jabari 10, 2019 11:00 AM CST  PHYSICAL with Ronaldo Corral MD  [facility] [address] [place] [id number] [phone]   Feb " 15, 2019  1:15 PM CST  Return Visit with Willy Cardoso MD  [facility] ([facility]) [address] [facility]  [place] [address] [phone]

## 2018-12-28 RX ORDER — GLIPIZIDE 10 MG/1
TABLET ORAL
Qty: 180 TABLET | Refills: 0 | Status: SHIPPED | OUTPATIENT
Start: 2018-12-28 | End: 2019-01-10

## 2018-12-28 NOTE — TELEPHONE ENCOUNTER
Prescription approved per [facility] Refill Protocol.  Sent for 90 day as patient is already scheduled to be seen in January.    Vanessa Bowie RN, [facility] Triage

## 2019-01-10 ENCOUNTER — OFFICE VISIT (OUTPATIENT)
Dept: FAMILY MEDICINE | Facility: CLINIC | Age: 84
End: 2019-01-10
Payer: COMMERCIAL

## 2019-01-10 ENCOUNTER — DOCUMENTATION ONLY (OUTPATIENT)
Dept: OPHTHALMOLOGY | Facility: CLINIC | Age: 84
End: 2019-01-10

## 2019-01-10 VITALS
DIASTOLIC BLOOD PRESSURE: 69 MMHG | WEIGHT: 199 LBS | SYSTOLIC BLOOD PRESSURE: 157 MMHG | HEIGHT: 67 IN | TEMPERATURE: 98.3 F | HEART RATE: 75 BPM | BODY MASS INDEX: 31.23 KG/M2 | OXYGEN SATURATION: 98 %

## 2019-01-10 DIAGNOSIS — Z79.4 TYPE 2 DIABETES MELLITUS WITH MICROALBUMINURIA, WITH LONG-TERM CURRENT USE OF INSULIN (H): Primary | Chronic | ICD-10-CM

## 2019-01-10 DIAGNOSIS — N18.30 BENIGN HYPERTENSIVE HEART AND KIDNEY DISEASE WITH DIASTOLIC CONGESTIVE HEART FAILURE, NYHA CLASS 2 AND CHRONIC KIDNEY DISEASE STAGE 3 (H): ICD-10-CM

## 2019-01-10 DIAGNOSIS — I13.0 BENIGN HYPERTENSIVE HEART AND KIDNEY DISEASE WITH DIASTOLIC CONGESTIVE HEART FAILURE, NYHA CLASS 2 AND CHRONIC KIDNEY DISEASE STAGE 3 (H): ICD-10-CM

## 2019-01-10 DIAGNOSIS — E78.5 HYPERLIPIDEMIA LDL GOAL <100: Chronic | ICD-10-CM

## 2019-01-10 DIAGNOSIS — R80.9 TYPE 2 DIABETES MELLITUS WITH MICROALBUMINURIA, WITH LONG-TERM CURRENT USE OF INSULIN (H): Primary | Chronic | ICD-10-CM

## 2019-01-10 DIAGNOSIS — I50.30 BENIGN HYPERTENSIVE HEART AND KIDNEY DISEASE WITH DIASTOLIC CONGESTIVE HEART FAILURE, NYHA CLASS 2 AND CHRONIC KIDNEY DISEASE STAGE 3 (H): ICD-10-CM

## 2019-01-10 DIAGNOSIS — E11.29 TYPE 2 DIABETES MELLITUS WITH MICROALBUMINURIA, WITH LONG-TERM CURRENT USE OF INSULIN (H): Primary | Chronic | ICD-10-CM

## 2019-01-10 DIAGNOSIS — Z00.00 ENCOUNTER FOR ROUTINE ADULT HEALTH EXAMINATION WITHOUT ABNORMAL FINDINGS: Primary | ICD-10-CM

## 2019-01-10 LAB
ALT SERPL W P-5'-P-CCNC: 34 U/L (ref 0–70)
CHOLEST SERPL-MCNC: 158 MG/DL
CREAT SERPL-MCNC: 1.03 MG/DL (ref 0.66–1.25)
CREAT UR-MCNC: 17 MG/DL
ERYTHROCYTE [DISTWIDTH] IN BLOOD BY AUTOMATED COUNT: 13.2 % (ref 10–15)
GFR SERPL CREATININE-BSD FRML MDRD: 65 ML/MIN/{1.73_M2}
GLUCOSE BLD-MCNC: 222 MG/DL (ref 70–99)
HBA1C MFR BLD: 8 % (ref 0–5.6)
HCT VFR BLD AUTO: 39 % (ref 40–53)
HDLC SERPL-MCNC: 71 MG/DL
HGB BLD-MCNC: 12.7 G/DL (ref 13.3–17.7)
LDLC SERPL CALC-MCNC: 61 MG/DL
MCH RBC QN AUTO: 30.8 PG (ref 26.5–33)
MCHC RBC AUTO-ENTMCNC: 32.6 G/DL (ref 31.5–36.5)
MCV RBC AUTO: 95 FL (ref 78–100)
MICROALBUMIN UR-MCNC: 24 MG/L
MICROALBUMIN/CREAT UR: 145.18 MG/G CR (ref 0–17)
NONHDLC SERPL-MCNC: 87 MG/DL
PLATELET # BLD AUTO: 263 10E9/L (ref 150–450)
POTASSIUM SERPL-SCNC: 4.9 MMOL/L (ref 3.4–5.3)
RBC # BLD AUTO: 4.12 10E12/L (ref 4.4–5.9)
TRIGL SERPL-MCNC: 128 MG/DL
TSH SERPL DL<=0.005 MIU/L-ACNC: 1.06 MU/L (ref 0.4–4)
WBC # BLD AUTO: 10.4 10E9/L (ref 4–11)

## 2019-01-10 PROCEDURE — 82043 UR ALBUMIN QUANTITATIVE: CPT | Performed by: FAMILY MEDICINE

## 2019-01-10 PROCEDURE — 82947 ASSAY GLUCOSE BLOOD QUANT: CPT | Performed by: FAMILY MEDICINE

## 2019-01-10 PROCEDURE — 84443 ASSAY THYROID STIM HORMONE: CPT | Performed by: FAMILY MEDICINE

## 2019-01-10 PROCEDURE — 85027 COMPLETE CBC AUTOMATED: CPT | Performed by: FAMILY MEDICINE

## 2019-01-10 PROCEDURE — G0439 PPPS, SUBSEQ VISIT: HCPCS | Performed by: FAMILY MEDICINE

## 2019-01-10 PROCEDURE — 83036 HEMOGLOBIN GLYCOSYLATED A1C: CPT | Performed by: FAMILY MEDICINE

## 2019-01-10 PROCEDURE — 84460 ALANINE AMINO (ALT) (SGPT): CPT | Performed by: FAMILY MEDICINE

## 2019-01-10 PROCEDURE — 82565 ASSAY OF CREATININE: CPT | Performed by: FAMILY MEDICINE

## 2019-01-10 PROCEDURE — 84132 ASSAY OF SERUM POTASSIUM: CPT | Performed by: FAMILY MEDICINE

## 2019-01-10 PROCEDURE — 80061 LIPID PANEL: CPT | Performed by: FAMILY MEDICINE

## 2019-01-10 PROCEDURE — 36415 COLL VENOUS BLD VENIPUNCTURE: CPT | Performed by: FAMILY MEDICINE

## 2019-01-10 RX ORDER — TERAZOSIN 5 MG/1
5 CAPSULE ORAL DAILY
Qty: 90 CAPSULE | Refills: 1 | Status: SHIPPED | OUTPATIENT
Start: 2019-01-10 | End: 2019-06-26

## 2019-01-10 RX ORDER — LISINOPRIL 40 MG/1
40 TABLET ORAL DAILY
Qty: 90 TABLET | Refills: 1 | Status: SHIPPED | OUTPATIENT
Start: 2019-01-10 | End: 2019-07-29

## 2019-01-10 RX ORDER — AMLODIPINE BESYLATE 5 MG/1
5 TABLET ORAL DAILY
COMMUNITY
Start: 2018-12-24 | End: 2019-06-26 | Stop reason: DRUGHIGH

## 2019-01-10 RX ORDER — GLIPIZIDE 10 MG/1
10 TABLET ORAL 2 TIMES DAILY WITH MEALS
Qty: 180 TABLET | Refills: 1 | Status: SHIPPED | OUTPATIENT
Start: 2019-01-10 | End: 2019-07-29

## 2019-01-10 RX ORDER — HYDROCHLOROTHIAZIDE 25 MG/1
25 TABLET ORAL DAILY
Qty: 90 TABLET | Refills: 1 | Status: SHIPPED | OUTPATIENT
Start: 2019-01-10 | End: 2019-06-26

## 2019-01-10 ASSESSMENT — MIFFLIN-ST. JEOR: SCORE: 1536.29

## 2019-01-10 ASSESSMENT — PAIN SCALES - GENERAL: PAINLEVEL: NO PAIN (0)

## 2019-01-10 NOTE — PROGRESS NOTES
"  SUBJECTIVE:   Manuel Rosales is a 87 year old male who presents for Preventive Visit.    Are you in the first 12 months of your Medicare Part B coverage?  No    Physical Health:    In general, how would you rate your overall physical health? fair    Outside of work, how many days during the week do you exercise? Keep busy take care of his wife     Outside of work, approximately how many minutes a day do you exercise?not applicable    If you drink alcohol do you typically have >3 drinks per day or >7 drinks per week? No    Do you usually eat at least 4 servings of fruit and vegetables a day, include whole grains & fiber and avoid regularly eating high fat or \"junk\" foods? Yes    Do you have any problems taking medications regularly?  No    Do you have any side effects from medications? none    Needs assistance for the following daily activities: no assistance needed    Which of the following safety concerns are present in your home?  none identified     Hearing impairment: Yes, Hearing Aid    In the past 6 months, have you been bothered by leaking of urine? no    Mental Health:    In general, how would you rate your overall mental or emotional health? good  PHQ-2 Score:      Do you feel safe in your environment? YES    Do you have a Health Care Directive? Yes: Advance Directive has been received and scanned.    Additional concerns to address?  No    Fall risk:  Fallen 2 or more times in the past year?: No  Any fall with injury in the past year?: No    Cognitive Screenin) Repeat 3 items (Leader, Season, Table)  YES  2) Clock draw: NORMAL  3) 3 item recall: Recalls NO objects   Results: NORMAL clock, 0 items recalled: COGNITIVE IMPAIRMENT LESS LIKELY    Mini-CogTM Copyright SARA Kulkarni. Licensed by the author for use in [facility]; reprinted with permission (azra@.[facility]). All rights reserved.      Do you have sleep apnea, excessive snoring or daytime drowsiness?: yes    Past medical, family, and " "social histories, medications, and allergies are reviewed and updated in Epic.           Social History     Tobacco Use     Smoking status: Former Smoker     Last attempt to quit: 1970     Years since quittin.0     Smokeless tobacco: Never Used   Substance Use Topics     Alcohol use: Yes     Alcohol/week: 4.2 oz     Types: 7 Standard drinks or equivalent per week     Comment: 1 drink /day (a decrease, as of 16)                           Current providers sharing in care for this patient include:   Patient Care Team:  Ronaldo Corral MD as PCP - General  Ronaldo Corral MD as PCP - Assigned PCP    The following health maintenance items are reviewed in Epic and correct as of today:  Health Maintenance   Topic Date Due     HF ACTION PLAN Q3 YR  [birth year]31     ZOSTER IMMUNIZATION (2 of 3) 2008     INFLUENZA VACCINE (1) 2018     FALL RISK ASSESSMENT  2018     MICROALBUMIN Q1 YEAR  2019     CBC Q1 YR  2019     BMP Q6 MOS  2019     A1C Q6 MO  2019     TSH W/ FREE T4 REFLEX Q2 YEAR  2019     ADVANCE DIRECTIVE PLANNING Q5 YRS  2019     PHQ-2 Q1 YR  2019     FOOT EXAM Q1 YEAR  2019     ALT Q1 YR  2019     CREATININE Q1 YEAR  2019     LIPID MONITORING Q1 YEAR  2019     EYE EXAM Q1 YEAR  2019     COLONOSCOPY Q5 YR  2023     DTAP/TDAP/TD IMMUNIZATION (4 - Td) 10/01/2023     IPV IMMUNIZATION  Aged Out     MENINGITIS IMMUNIZATION  Aged Out     ROS:  Constitutional, HEENT, cardiovascular, pulmonary, GI, , musculoskeletal, neuro, skin, endocrine and psych systems are negative, except as otherwise noted.    Glucose log reviewed. Pt increased his Lantus from 32 to 34 units 2 months ago (about ).    OBJECTIVE:   /65 (BP Location: Left arm, Patient Position: Chair, Cuff Size: Adult Regular)   Pulse 86   Temp 98.3  F (36.8  C) (Oral)   Ht 1.702 m (5' 7\")   Wt 90.3 kg (199 lb)   SpO2 98%   BMI 31.17 " "kg/m   Estimated body mass index is 31.17 kg/m  as calculated from the following:    Height as of this encounter: 1.702 m (5' 7\").    Weight as of this encounter: 90.3 kg (199 lb).  EXAM:   GENERAL: healthy, alert and no distress  EYES: Eyes grossly normal to inspection, PERRL and conjunctivae and sclerae normal  HENT: ear canals and TM's normal, nose and mouth without ulcers or lesions  NECK: no adenopathy, no asymmetry, masses, or scars and thyroid normal to palpation  RESP: lungs clear to auscultation - no rales, rhonchi or wheezes  CV: regular rate and rhythm, normal S1 S2, no S3 or S4, no murmur, click or rub, no peripheral edema and peripheral pulses strong  ABDOMEN: soft, nontender, no hepatosplenomegaly, no masses and bowel sounds normal   (male): normal male genitalia without lesions or urethral discharge, no hernia  MS: no gross musculoskeletal defects noted, no edema  SKIN: no suspicious lesions or rashes  NEURO: Normal strength and tone, mentation intact and speech normal  PSYCH: mentation appears normal, affect normal/bright    Diagnostic Test Results:  Results for orders placed or performed in visit on 01/10/19 (from the past 24 hour(s))   CBC with platelets   Result Value Ref Range    WBC 10.4 4.0 - 11.0 10e9/L    RBC Count 4.12 (L) 4.4 - 5.9 10e12/L    Hemoglobin 12.7 (L) 13.3 - 17.7 g/dL    Hematocrit 39.0 (L) 40.0 - 53.0 %    MCV 95 78 - 100 fl    MCH 30.8 26.5 - 33.0 pg    MCHC 32.6 31.5 - 36.5 g/dL    RDW 13.2 10.0 - 15.0 %    Platelet Count 263 150 - 450 10e9/L   Glucose whole blood   Result Value Ref Range    Glucose Whole Blood 222 (H) 70 - 99 mg/dL   Hemoglobin A1c   Result Value Ref Range    Hemoglobin A1C 8.0 (H) 0 - 5.6 %     Lab Results   Component Value Date    A1C 8.0 01/10/2019    A1C 7.8 07/09/2018    A1C 7.4 01/08/2018    A1C 6.8 07/13/2017    A1C 8.4 05/22/2017       ASSESSMENT / PLAN:   (Z00.00) Encounter for routine adult health examination without abnormal findings  Comment: " "Negative screening exam; up-to-date on preventive services.  Plan: Follow up in 1 year.     (E11.29,  E11.65,  R80.9,  Z79.4) Uncontrolled type 2 diabetes mellitus with microalbuminuria, with long-term current use of insulin (H)  (primary encounter diagnosis)  Comment:   Plan: glipiZIDE (GLUCOTROL) 10 MG tablet, insulin         glargine (LANTUS SOLOSTAR PEN) 100 UNIT/ML pen        Lantus dose increased from 30 to 35 units. Follow-up in 2.5 months.       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   Plan: hydrochlorothiazide (HYDRODIURIL) 25 MG tablet,        lisinopril (PRINIVIL/ZESTRIL) 40 MG tablet,         terazosin (HYTRIN) 5 MG capsule        Terazosin increased from 2 to 5 mg.     (E78.5) Hyperlipidemia LDL goal <100  Comment: Historically at goal.   Plan: lipid panel. He has refills x1 year.       End of Life Planning:  Patient currently has an advanced directive: Yes.  Practitioner is supportive of decision.    COUNSELING:  Reviewed preventive health counseling, as reflected in patient instructions       Regular exercise       Healthy diet/nutrition    BP Readings from Last 1 Encounters:   01/10/19 152/65     Estimated body mass index is 31.17 kg/m  as calculated from the following:    Height as of this encounter: 1.702 m (5' 7\").    Weight as of this encounter: 90.3 kg (199 lb).    Weight management plan: does all house chores since wife is homebound and he is caregiver, has HF but tolerates the stairs     reports that he quit smoking about 49 years ago. he has never used smokeless tobacco.      Appropriate preventive services were discussed with this patient, including applicable screening as appropriate for cardiovascular disease, diabetes, osteopenia/osteoporosis, and glaucoma.  As appropriate for age/gender, discussed screening for colorectal cancer, prostate " cancer, breast cancer, and cervical cancer. Checklist reviewing preventive services available has been given to the patient.    Reviewed patients plan of care and provided an AVS. The Basic Care Plan (routine screening as documented in Health Maintenance) for Manuel meets the Care Plan requirement. This Care Plan has been established and reviewed with the Patient.    Counseling Resources:  ATP IV Guidelines  Pooled Cohorts Equation Calculator  Breast Cancer Risk Calculator  FRAX Risk Assessment  ICSI Preventive Guidelines  Dietary Guidelines for Americans, 2010  USDA's MyPlate  ASA Prophylaxis  Lung CA Screening    Ronaldo Corral MD  [facility]

## 2019-01-10 NOTE — PROGRESS NOTES
Faxed Rx for the Insulin Glargine to [facility], [phone], right fax confirmed at 2:05 pm today, 1/10/19.  Muriel Gonsales MA/  For [facility]

## 2019-01-10 NOTE — LETTER
January 17, 2019      Manuel Rosales  [address]  [place] [address]        Dear Manuel Rosales    All of the rest of your test results were within the expected range for you.     Please contact the clinic if you have additional questions.  Thank you.  I will see you later this month.      Enclosed is a copy of the results.  It was a pleasure to see you at your last appointment.      Sincerely,      Ronaldo Corral MD/PAWEL Arrieta MA      Results for orders placed or performed in visit on 01/10/19   CBC with platelets   Result Value Ref Range    WBC 10.4 4.0 - 11.0 10e9/L    RBC Count 4.12 (L) 4.4 - 5.9 10e12/L    Hemoglobin 12.7 (L) 13.3 - 17.7 g/dL    Hematocrit 39.0 (L) 40.0 - 53.0 %    MCV 95 78 - 100 fl    MCH 30.8 26.5 - 33.0 pg    MCHC 32.6 31.5 - 36.5 g/dL    RDW 13.2 10.0 - 15.0 %    Platelet Count 263 150 - 450 10e9/L   Creatinine   Result Value Ref Range    Creatinine 1.03 0.66 - 1.25 mg/dL    GFR Estimate 65 >60 mL/min/[1.73_m2]    GFR Estimate If Black 75 >60 mL/min/[1.73_m2]   Potassium   Result Value Ref Range    Potassium 4.9 3.4 - 5.3 mmol/L   ALT   Result Value Ref Range    ALT 34 0 - 70 U/L   Lipid panel reflex to direct LDL Non-fasting   Result Value Ref Range    Cholesterol 158 <200 mg/dL    Triglycerides 128 <150 mg/dL    HDL Cholesterol 71 >39 mg/dL    LDL Cholesterol Calculated 61 <100 mg/dL    Non HDL Cholesterol 87 <130 mg/dL   TSH with free T4 reflex   Result Value Ref Range    TSH 1.06 0.40 - 4.00 mU/L   Albumin Random Urine Quantitative with Creat Ratio   Result Value Ref Range    Creatinine Urine 17 mg/dL    Albumin Urine mg/L 24 mg/L    Albumin Urine mg/g Cr 145.18 (H) 0 - 17 mg/g Cr   Glucose whole blood   Result Value Ref Range    Glucose Whole Blood 222 (H) 70 - 99 mg/dL   Hemoglobin A1c   Result Value Ref Range    Hemoglobin A1C 8.0 (H) 0 - 5.6 %

## 2019-01-10 NOTE — LETTER
Lantus dose adjustment instructions    Check glucose every morning before eating. Record all seven values:    __________      __________      __________      __________      __________      __________      __________  =========    ----> Add up the total and divide by 7 to calculate your average morning glucose: __________       If your average is less than 90, decrease your Lantus dose by 1 unit.    If your average is between 90 and 130, do not chage your Lantus dose.    If your average is more than 130, increase your Lantus by 2 units.      Repeat this calculation and adjustment every week. Do NOT adjust your Lantus daily.

## 2019-01-10 NOTE — PATIENT INSTRUCTIONS
================================================================================  Normal Values   Blood pressure  <140/90 for most adults    <130/80 for some chronic diseases (ask your care team about yours)    BMI (body mass index)  18.5-25 kg/m2 (based on height and weight)     Thank you for visiting [facility]    Normal or non-critical lab and imaging results will be communicated to you by MyChart, letter or phone within 7 days.  If you do not hear from us within 10 days, please call the clinic. If you have a critical or abnormal lab result, we will notify you by phone as soon as possible.     If you have any questions regarding your visit please contact:     Team Comfort:   Clinic Hours Telephone Number   Dr. Ronaldo Rogers Dr. Vocal 7am-5pm  Monday - Friday [phone]  Chavez RN  Fernanda Mora RN   Pharmacy 8:00am-8pm Monday-Friday    9am-5pm Saturday-Sunday [phone]   Urgent Care 11am-9pm Monday-Friday        9am-5pm Saturday-Sunday [phone]     After hours, weekend or if you need to make an appointment with your primary provider please call [phone].   After Hours nurse advise: call [facility]: [phone]    Medication Refills:  Call your pharmacy and they will forward the refill to us. Please allow 3 business days for your refills to be completed.          Preventive Health Recommendations:     See your health care provider every year to    Review health changes.     Discuss preventive care.      Review your medicines if your doctor has prescribed any.    Talk with your health care provider about whether you should have a test to screen for prostate cancer (PSA).    Every 3 years, have a diabetes test (fasting glucose). If you are at risk for diabetes, you should have this test more often.    Every 5 years, have a cholesterol test. Have this test more often if you are at risk for high cholesterol or heart  disease.     Every 10 years, have a colonoscopy. Or, have a yearly FIT test (stool test). These exams will check for colon cancer.    Talk to with your health care provider about screening for Abdominal Aortic Aneurysm if you have a family history of AAA or have a history of smoking.  Shots:     Get a flu shot each year.     Get a tetanus shot every 10 years.     Talk to your doctor about your pneumonia vaccines. There are now two you should receive - Pneumovax (PPSV 23) and Prevnar (PCV 13).    Talk to your pharmacist about a shingles vaccine.     Talk to your doctor about the hepatitis B vaccine.  Nutrition:     Eat at least 5 servings of fruits and vegetables each day.     Eat whole-grain bread, whole-wheat pasta and brown rice instead of white grains and rice.     Get adequate Calcium and Vitamin D.   Lifestyle    Exercise for at least 150 minutes a week (30 minutes a day, 5 days a week). This will help you control your weight and prevent disease.     Limit alcohol to one drink per day.     No smoking.     Wear sunscreen to prevent skin cancer.     See your dentist every six months for an exam and cleaning.     See your eye doctor every 1 to 2 years to screen for conditions such as glaucoma, macular degeneration and cataracts.    Personalized Prevention Plan  You are due for the preventive services outlined below.  Your care team is available to assist you in scheduling these services.  If you have already completed any of these items, please share that information with your care team to update in your medical record.    Health Maintenance Due   Topic Date Due     Heart Failure Action Plan Reviewed Every 3 Years  [birth year]31     Zoster (Chicken Pox) Vaccine (2 of 3) 11/25/2008     Flu Vaccine (1) 09/01/2018     FALL RISK ASSESSMENT  09/07/2018     Microalbumin Lab - yearly  01/08/2019     Complete Blood Count Every Year  01/08/2019     Basic Metabolic Lab - every 6 months  01/09/2019     A1C (Diabetes) Lab -  every 6 months  01/09/2019     Thyroid Function Lab (TSH) - every 2 years  02/02/2019

## 2019-02-19 ENCOUNTER — TELEPHONE (OUTPATIENT)
Dept: FAMILY MEDICINE | Facility: CLINIC | Age: 84
End: 2019-02-19

## 2019-02-19 DIAGNOSIS — E78.5 HYPERLIPIDEMIA LDL GOAL <100: Chronic | ICD-10-CM

## 2019-02-19 RX ORDER — ATORVASTATIN CALCIUM 80 MG/1
TABLET, FILM COATED ORAL
Qty: 90 TABLET | Refills: 2 | Status: SHIPPED | OUTPATIENT
Start: 2019-02-19 | End: 2019-07-29

## 2019-02-19 NOTE — TELEPHONE ENCOUNTER
Reason for Call:  Other call back    Detailed comments: Pt's blood sugar is now down to 226 and Pt feels a lot better.      Phone Number Patient can be reached at: Home number on file [phone] (home)    Best Time: anytime    Can we leave a detailed message on this number? YES    Call taken on 2/19/2019 at 2:19 PM by Jorge Canales

## 2019-02-19 NOTE — TELEPHONE ENCOUNTER
Reason for Call:  Other call back    Detailed comments: Manuel called concerned about his blood sugars. Declined triage. 02/13 blood sugar was 35, he passed out paramedics arrived and got him under control. 02/14 in the AM BS was 262 in the PM BS was 293. 02/15 BS 92, 02/16 Blood Sugar 87, 02/17 Blood Sugar 63.  02/18 in the AM Blood Sugar was 154, 02/19 8am 260, 02/19 11:10am blood sugar 398. Again declined triage just wanted to relay to Dr Corral to see what he suggests he do. Please call to advise. Thank you    Phone Number Patient can be reached at: Home number on file [phone] (home)    Best Time: Any    Can we leave a detailed message on this number? YES    Call taken on 2/19/2019 at 11:24 AM by Elver Srivastava

## 2019-02-19 NOTE — TELEPHONE ENCOUNTER
2/13  35    Date  A.m.  P.m.  2/14 262  293  2/15 92  2/16 87  2/17 63  2/18 154  2/19 260,398 226

## 2019-02-19 NOTE — TELEPHONE ENCOUNTER
"Spoke with Dr. Corral. He included patient's blood sugars in below previous message.    Dr. Corral would like RN to speak to patient regarding the followin. Patient should decrease Lantus by 2 units daily for the next couple of weeks.  2. Triage patient- if he is symptomatic in anyway have him come into clinic.  3. Ask patient how he is taking his medication including lantus and what times of the day.  4. Ask patient how his diet has been and educate him on diabetic diet if needed.      This RN called and got patient on the phone but he could not hear this writer. He took out his hearing aid and we tried having a conversation that way but he still could not hear. Patient then put hearing aide back in and he still could not hear this writer. He then said \"please talk to my wife I cannot hear you\". We then got disconnected. This writer tried calling patient back several times but only receive a busy signal.      Patient called the clinic back. He reports that he has been taking his Lantus as prescribed and his medications. He reports that on  he had a an episode while sitting on the toilet where he passed out and wife could not arouse him. She called 911 paramedics came. When they arrived they took his blood sugar and it was 30. They gave him IV but he is not sure exactly what they gave him. They had him eat raisin bread and his blood sugar went back up and then according to patient paramedics did not transfer him anywhere. Patient states he did not feel right most of the day on . He did not have much of an appetite on  therefore he did not eat much.   The next day he reports his blood sugar were in the 200s.     He since then had developed a cold that is now almost resolved. He had nasal congestion, phlegm, was sweaty feeling. He denies having a fever, chills, body aches, diarrhea.     RN educated patient to have protein snacks every couple of hours and we discussed what types " "of foods would be good for a diabetic diet. He was not aware that he should stay away from processed foods like velveeta cheese. He is interested in seeing a diabetic educator.  This writer also suggested visiting the \"American Diabetes Association\" web site and join the association to receive materials through the mail that educate on diet and diabetes management. Wife was on the phone also during entire conversation as patient could not hear well.     Provider- patient is scheduled in clinic on 2/21/19 at 1100. RN told patient to keep this appointment. It was the soonest appointment with PCP. RN told patient that if he becomes  ill feeling, or has high or low blood sugars previous to his appointment 2 days from now, he should call the clinic to be advised. Patient would like to discuss the possibility of a diabetic educator referral at Thursdays appointment.       Anne Champagne RN    "

## 2019-02-19 NOTE — TELEPHONE ENCOUNTER
Prescription approved per [facility] Refill Protocol.      Chrali Mckeon RN, BSN

## 2019-02-19 NOTE — TELEPHONE ENCOUNTER
"Requested Prescriptions   Pending Prescriptions Disp Refills     atorvastatin (LIPITOR) 80 MG tablet [Pharmacy Med Name: ATORVASTATIN 80MG   TAB]  Last Written Prescription Date:  01/08/18  Last Fill Quantity: 90,  # refills: 3   Last Office Visit with [facility], P or [facility] prescribing provider:  01/10/19Narcisa   Future Office Visit:    Next 5 appointments (look out 90 days)    Feb 21, 2019 11:00 AM CST  [facility] [address] [place] [id number]  [phone]   Mar 07, 2019  2:15 PM CST  Return Visit with Willy Cardoso MD  [facility] ([address] [address]  [phone]        90 tablet 3     Sig: TAKE ONE TABLET BY MOUTH ONCE DAILY FOR CHOLESTEROL    [facility] Protocol Passed - 2/19/2019  7:56 AM       Passed - LDL on file in past 12 months    Recent Labs   Lab Test 01/10/19  1023   LDL 61            Passed - No abnormal creatine kinase in past 12 months    No lab results found.            Passed - Recent (12 mo) or future (30 days) visit within the authorizing provider's specialty    Patient had office visit in the last 12 months or has a visit in the next 30 days with authorizing provider or within the authorizing provider's specialty.  See \"Patient Info\" tab in inbasket, or \"Choose Columns\" in Meds & Orders section of the refill encounter.             Passed - Medication is active on med list       Passed - Patient is age 18 or older          "

## 2019-02-20 DIAGNOSIS — H40.1132 PRIMARY OPEN ANGLE GLAUCOMA OF BOTH EYES, MODERATE STAGE: ICD-10-CM

## 2019-02-20 RX ORDER — LATANOPROST 50 UG/ML
1 SOLUTION/ DROPS OPHTHALMIC AT BEDTIME
Qty: 1 BOTTLE | Refills: 12 | Status: SHIPPED | OUTPATIENT
Start: 2019-02-20 | End: 2020-02-25

## 2019-02-21 ENCOUNTER — OFFICE VISIT (OUTPATIENT)
Dept: FAMILY MEDICINE | Facility: CLINIC | Age: 84
End: 2019-02-21
Payer: COMMERCIAL

## 2019-02-21 VITALS
DIASTOLIC BLOOD PRESSURE: 65 MMHG | TEMPERATURE: 98 F | HEIGHT: 67 IN | HEART RATE: 105 BPM | SYSTOLIC BLOOD PRESSURE: 129 MMHG | OXYGEN SATURATION: 96 % | BODY MASS INDEX: 30.29 KG/M2 | WEIGHT: 193 LBS

## 2019-02-21 DIAGNOSIS — E11.29 TYPE 2 DIABETES MELLITUS WITH MICROALBUMINURIA, WITH LONG-TERM CURRENT USE OF INSULIN (H): ICD-10-CM

## 2019-02-21 DIAGNOSIS — I50.30 BENIGN HYPERTENSIVE HEART AND KIDNEY DISEASE WITH DIASTOLIC CONGESTIVE HEART FAILURE, NYHA CLASS 2 AND CHRONIC KIDNEY DISEASE STAGE 3 (H): Primary | ICD-10-CM

## 2019-02-21 DIAGNOSIS — I13.0 BENIGN HYPERTENSIVE HEART AND KIDNEY DISEASE WITH DIASTOLIC CONGESTIVE HEART FAILURE, NYHA CLASS 2 AND CHRONIC KIDNEY DISEASE STAGE 3 (H): Primary | ICD-10-CM

## 2019-02-21 DIAGNOSIS — N18.30 BENIGN HYPERTENSIVE HEART AND KIDNEY DISEASE WITH DIASTOLIC CONGESTIVE HEART FAILURE, NYHA CLASS 2 AND CHRONIC KIDNEY DISEASE STAGE 3 (H): Primary | ICD-10-CM

## 2019-02-21 DIAGNOSIS — Z79.4 TYPE 2 DIABETES MELLITUS WITH MICROALBUMINURIA, WITH LONG-TERM CURRENT USE OF INSULIN (H): ICD-10-CM

## 2019-02-21 DIAGNOSIS — R80.9 TYPE 2 DIABETES MELLITUS WITH MICROALBUMINURIA, WITH LONG-TERM CURRENT USE OF INSULIN (H): ICD-10-CM

## 2019-02-21 PROCEDURE — 99214 OFFICE O/P EST MOD 30 MIN: CPT | Performed by: FAMILY MEDICINE

## 2019-02-21 ASSESSMENT — PAIN SCALES - GENERAL: PAINLEVEL: NO PAIN (0)

## 2019-02-21 ASSESSMENT — MIFFLIN-ST. JEOR: SCORE: 1509.07

## 2019-02-21 NOTE — PATIENT INSTRUCTIONS
================================================================================  Normal Values   Blood pressure  <140/90 for most adults    <130/80 for some chronic diseases (ask your care team about yours)    BMI (body mass index)  18.5-25 kg/m2 (based on height and weight)     Thank you for visiting [facility]    Normal or non-critical lab and imaging results will be communicated to you by MyChart, letter or phone within 7 days.  If you do not hear from us within 10 days, please call the clinic. If you have a critical or abnormal lab result, we will notify you by phone as soon as possible.     If you have any questions regarding your visit please contact:     Team Comfort:   Clinic Hours Telephone Number   Dr. Ronaldo Rogers Dr. Vocal 7am-5pm  Monday - Friday [phone]  Chavez RN  Fernanda Mora RN   Pharmacy 8:00am-8pm Monday-Friday    9am-5pm Saturday-Sunday [phone]   Urgent Care 11am-9pm Monday-Friday        9am-5pm Saturday-Sunday [phone]     After hours, weekend or if you need to make an appointment with your primary provider please call [phone].   After Hours nurse advise: call [place] [facility]: [phone]    Medication Refills:  Call your pharmacy and they will forward the refill to us. Please allow 3 business days for your refills to be completed.          Patient Education     Potassium-Rich Foods  The normal adult diet usually contains 2,000 mg to 4,000 mg of potassium per day. More potassium is needed when you lose too much potassium from your body. This can happen if you have diarrhea or vomiting. It can also happen if you take a medicine to make you urinate more (diuretic). To increase the amount of potassium in your diet, include these high-potassium foods.     [The (*) indicates foods highest in potassium.]  Vegetables  Artichokes. Cooked 1/2 cup, 200 mg to 300 mg*  Asparagus. Cooked 1/2 cup, 200 mg to  300 mg  Beans. White, red, seo cooked 1/2 cup, 300 mg to 500 mg*  Beets. Cooked 1/2 cup, 200 mg to 300 mg  Broccoli. Cooked or raw 1 cup, 200 mg to 500 mg*  [place] sprouts. Cooked 1/2 cup, 200 mg to 300 mg  Cabbage. Raw 1 cup, 100 mg to 200 mg  Carrots. Raw or cooked 1/2 cup, 100 mg to 200 mg  Celery. Raw 1 cup, 200 mg to 300 mg  Lima beans. Fresh or frozen 1/2 cup, 300 mg to 500 mg*   Mushrooms. Raw or cooked 1/2 cup, 100 mg to 300 mg  Peas. Cooked 1/2 cup, 150 mg to 250 mg   Potatoes. Baked 1 medium, 500 mg to 900 mg*   Spinach. Cooked 1 cup, 800 mg to 900 mg*   Spinach. Raw 2 cups, 300 mg to 400 mg *  Squash, winter. Fresh, frozen, or cooked 1/2 cup, 200 mg to 400 mg   Tomato. Fresh 1 medium, 200 mg to 300 mg   Tomato juice. Canned 1/2 cup, 200 mg to 300 mg   Fruits  Apple juice. Unsweetened 1 cup, 200 mg to 300 mg   Apricots. Canned 1/2 cup, 200 mg to 300 mg   Apricots. Dried 4 pieces, 100 mg to 200 mg   Avocado. Raw 1/2 cup, 300 mg to 400 mg*  Banana. Fresh 1 small, 300 mg to 400 mg*   Cantaloupe. Fresh 1 cup diced, 300 mg to 400 mg*   Grape juice. Unsweetened 1 cup, 200 mg to 300 mg   Honeydew melon. Fresh 1 cup diced, 300 mg to 400 mg*   Orange. Fresh 1 medium, 200 mg to 300 mg    Orange juice. Unsweetened, fresh or frozen 1/2 cup, 200 mg to 300 mg  Pineapple juice. Unsweetened 1 cup, 300 mg to 400 mg   Prune juice. Unsweetened 1/2 cup, 300 mg to 400 mg*   Prunes. Dried 5 pieces, 300 mg to 400 mg*   Strawberries. Fresh or frozen 1 cup, 200 mg to 300 mg  Meat  Red meat. Cooked 3 ounces, 100 mg to 300 mg   Seafood  Cod, flounder, halibut. Cooked 3 ounces, 100 mg to 300 mg*  [place]. Cooked, 3 ounces 300 mg to 400 mg*   Scallops. Cooked 3 ounces, 200 mg to 300 mg*  Shrimp. Cooked 3/4 cup, 100 mg to 200 mg   Tuna. Fresh or canned 3/4 cup, 200 mg to 500 mg   Date Last Reviewed: 10/1/2016    5562-9806 [employer]. [address] [place] [address], [place] [address]. All rights reserved. This information is  not intended as a substitute for professional medical care. Always follow your healthcare professional's instructions.

## 2019-02-21 NOTE — PROGRESS NOTES
"  SUBJECTIVE:   Manuel Rosales is a 87 year old male who presents to clinic today for the following health issues:      Hypertension Follow-up      Outpatient blood pressures are not being checked.    Low Salt Diet: low salt      Amount of exercise or physical activity: take care of wife & house    Problems taking medications regularly: No    Medication side effects: none    Diet: regular (no restrictions)      Past medical, family, and social histories, medications, and allergies are reviewed and updated in [facility]. At last visit, 1/10/19, BP uncontrolled so we increased the terazosin dose from 2mg to 5 mg. Since then, no new side effects.    ROS:  Constitutional, HEENT, cardiovascular, pulmonary, gi and gu systems are negative, except as otherwise noted.  Diabetes control has been off lately (see telephone encounter 2/1/19). Since then he decreased Lantus from 38 units down to 36, then 34, and now feels that 35 units is the sweet spot (which happens to be the original Rx, therefore unchanged from 1/10/19 visit.  Had a cold from a few days before he LOC, and it is almost resolved. He stopped drinking alcohol 3 days ago at the suggestion of his daughter. He thinks he might quit for good. He is also starting 2 oz apple cider vinegar daily to help with diabetes. (http://care.diabetesjournals.org/content/27/1/281)    OBJECTIVE:     /65 (BP Location: Left arm, Patient Position: Chair, Cuff Size: Adult Regular)   Pulse 105   Temp 98  F (36.7  C) (Oral)   Ht 1.702 m (5' 7\")   Wt 87.5 kg (193 lb)   SpO2 96%   BMI 30.23 kg/m    Body mass index is 30.23 kg/m .  GENERAL: healthy, alert and no distress  EYES: Eyes grossly normal to inspection, PERRL, EOMI, sclerae white and conjunctivae normal  RESP: lungs clear to auscultation - no crackles or wheezes, no areas of dullness, no tachypnea  CV: Heart regular rate and rhythm without murmur, click or rub. No peripheral edema and peripheral pulses strong  MS: no gross " musculoskeletal defects noted, no edema  SKIN: no suspicious lesions or rashes  NEURO: Normal strength and tone, sensory exam grossly normal, mentation intact, oriented times 3 and cranial nerves 2-12 intact  PSYCH: mentation appears normal, affect normal/bright     Diagnostic Test Results:  Results for orders placed or performed in visit on 01/10/19   CBC with platelets   Result Value Ref Range    WBC 10.4 4.0 - 11.0 10e9/L    RBC Count 4.12 (L) 4.4 - 5.9 10e12/L    Hemoglobin 12.7 (L) 13.3 - 17.7 g/dL    Hematocrit 39.0 (L) 40.0 - 53.0 %    MCV 95 78 - 100 fl    MCH 30.8 26.5 - 33.0 pg    MCHC 32.6 31.5 - 36.5 g/dL    RDW 13.2 10.0 - 15.0 %    Platelet Count 263 150 - 450 10e9/L   Creatinine   Result Value Ref Range    Creatinine 1.03 0.66 - 1.25 mg/dL    GFR Estimate 65 >60 mL/min/[1.73_m2]    GFR Estimate If Black 75 >60 mL/min/[1.73_m2]   Potassium   Result Value Ref Range    Potassium 4.9 3.4 - 5.3 mmol/L   ALT   Result Value Ref Range    ALT 34 0 - 70 U/L   Lipid panel reflex to direct LDL Non-fasting   Result Value Ref Range    Cholesterol 158 <200 mg/dL    Triglycerides 128 <150 mg/dL    HDL Cholesterol 71 >39 mg/dL    LDL Cholesterol Calculated 61 <100 mg/dL    Non HDL Cholesterol 87 <130 mg/dL   TSH with free T4 reflex   Result Value Ref Range    TSH 1.06 0.40 - 4.00 mU/L   Albumin Random Urine Quantitative with Creat Ratio   Result Value Ref Range    Creatinine Urine 17 mg/dL    Albumin Urine mg/L 24 mg/L    Albumin Urine mg/g Cr 145.18 (H) 0 - 17 mg/g Cr   Glucose whole blood   Result Value Ref Range    Glucose Whole Blood 222 (H) 70 - 99 mg/dL   Hemoglobin A1c   Result Value Ref Range    Hemoglobin A1C 8.0 (H) 0 - 5.6 %       ASSESSMENT/PLAN:     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 Return in about 5 weeks (around 3/25/2019) for diabetes (recheck HgbA1c), which is 2.5 months from his last HgbA1c check.    Ronaldo Corral MD

## 2019-03-07 ENCOUNTER — OFFICE VISIT (OUTPATIENT)
Dept: OPHTHALMOLOGY | Facility: CLINIC | Age: 84
End: 2019-03-07
Payer: COMMERCIAL

## 2019-03-07 DIAGNOSIS — H40.1132 PRIMARY OPEN ANGLE GLAUCOMA OF BOTH EYES, MODERATE STAGE: Primary | ICD-10-CM

## 2019-03-07 PROCEDURE — 92133 CPTRZD OPH DX IMG PST SGM ON: CPT | Performed by: OPHTHALMOLOGY

## 2019-03-07 PROCEDURE — 92083 EXTENDED VISUAL FIELD XM: CPT | Performed by: OPHTHALMOLOGY

## 2019-03-07 PROCEDURE — 92012 INTRM OPH EXAM EST PATIENT: CPT | Performed by: OPHTHALMOLOGY

## 2019-03-07 ASSESSMENT — TONOMETRY
OS_IOP_MMHG: 16
OD_IOP_MMHG: 17
IOP_METHOD: APPLANATION

## 2019-03-07 ASSESSMENT — VISUAL ACUITY
OS_CC: 20/25
CORRECTION_TYPE: GLASSES
OD_CC+: -2
OS_CC+: -2
METHOD: SNELLEN - LINEAR
OD_CC: 20/25

## 2019-03-07 NOTE — LETTER
3/7/2019         RE: Manuel Rosales  [address]  [facility] [address]        Dear Colleague,    Thank you for referring your patient, Manuel Rosales, to the [facility]. Please see a copy of my visit note below.     Current Eye Medications:  Cosopt and brimonidine both eyes twice a day, Latanoprost both eyes every evening.  Last drops:  7am.      Subjective:  Follow up Open Angle Glaucoma.  Patient is here for a pressure check, OCT, and visual field.  No vision changes or concerns - vision is good in each eye with correction.      Objective:  See Ophthalmology Exam.       Assessment:  Stable intraocular pressure, glaucoma OCT, and Rios Visual Field both eyes in patient with moderate open angle glaucoma.      Plan:  Continue using Dorzolamide/Timolol (dark blue top) both eyes twice daily (about 12 hours apart),  Brimonidine (purple top) both eyes twice daily (about 12 hours apart),  And Latanoprost (green top) both eyes at bedtime.   Wait at least 5 minutes between drops if using more than one at a time.  Return visit in about 5 months for complete exam.     Willy Cardoso M.D.  [phone]         Again, thank you for allowing me to participate in the care of your patient.        Sincerely,        Willy Cardoso MD

## 2019-03-07 NOTE — PATIENT INSTRUCTIONS
Continue using Dorzolamide/Timolol (dark blue top) both eyes twice daily (about 12 hours apart),  Brimonidine (purple top) both eyes twice daily (about 12 hours apart),  And Latanoprost (green top) both eyes at bedtime.   Wait at least 5 minutes between drops if using more than one at a time.  Return visit in about 5 months for complete exam.     Willy Cardoso M.D.  [phone]

## 2019-03-07 NOTE — PROGRESS NOTES
Current Eye Medications:  Cosopt and brimonidine both eyes twice a day, Latanoprost both eyes every evening.  Last drops:  7am.      Subjective:  Follow up Open Angle Glaucoma.  Patient is here for a pressure check, OCT, and visual field.  No vision changes or concerns - vision is good in each eye with correction.      Objective:  See Ophthalmology Exam.       Assessment:  Stable intraocular pressure, glaucoma OCT, and Rios Visual Field both eyes in patient with moderate open angle glaucoma.      Plan:  Continue using Dorzolamide/Timolol (dark blue top) both eyes twice daily (about 12 hours apart),  Brimonidine (purple top) both eyes twice daily (about 12 hours apart),  And Latanoprost (green top) both eyes at bedtime.   Wait at least 5 minutes between drops if using more than one at a time.  Return visit in about 5 months for complete exam.     Willy Cardoso M.D.  [phone]

## 2019-03-09 ASSESSMENT — PACHYMETRY
OD_CT(UM): 593
OS_CT(UM): 594

## 2019-03-09 ASSESSMENT — EXTERNAL EXAM - LEFT EYE: OS_EXAM: NORMAL

## 2019-03-09 ASSESSMENT — EXTERNAL EXAM - RIGHT EYE: OD_EXAM: NORMAL

## 2019-03-28 ENCOUNTER — OFFICE VISIT (OUTPATIENT)
Dept: FAMILY MEDICINE | Facility: CLINIC | Age: 84
End: 2019-03-28
Payer: COMMERCIAL

## 2019-03-28 VITALS
DIASTOLIC BLOOD PRESSURE: 64 MMHG | TEMPERATURE: 97.6 F | BODY MASS INDEX: 30.45 KG/M2 | OXYGEN SATURATION: 96 % | HEART RATE: 92 BPM | HEIGHT: 67 IN | RESPIRATION RATE: 16 BRPM | SYSTOLIC BLOOD PRESSURE: 131 MMHG | WEIGHT: 194 LBS

## 2019-03-28 DIAGNOSIS — R80.9 TYPE 2 DIABETES MELLITUS WITH MICROALBUMINURIA, WITH LONG-TERM CURRENT USE OF INSULIN (H): Primary | ICD-10-CM

## 2019-03-28 DIAGNOSIS — E11.29 TYPE 2 DIABETES MELLITUS WITH MICROALBUMINURIA, WITH LONG-TERM CURRENT USE OF INSULIN (H): Primary | ICD-10-CM

## 2019-03-28 DIAGNOSIS — Z79.4 TYPE 2 DIABETES MELLITUS WITH MICROALBUMINURIA, WITH LONG-TERM CURRENT USE OF INSULIN (H): Primary | ICD-10-CM

## 2019-03-28 LAB
GLUCOSE BLD-MCNC: 240 MG/DL (ref 70–99)
HBA1C MFR BLD: 7.4 % (ref 0–5.6)

## 2019-03-28 PROCEDURE — 99214 OFFICE O/P EST MOD 30 MIN: CPT | Performed by: FAMILY MEDICINE

## 2019-03-28 PROCEDURE — 82947 ASSAY GLUCOSE BLOOD QUANT: CPT | Performed by: FAMILY MEDICINE

## 2019-03-28 PROCEDURE — 83036 HEMOGLOBIN GLYCOSYLATED A1C: CPT | Performed by: FAMILY MEDICINE

## 2019-03-28 PROCEDURE — 36415 COLL VENOUS BLD VENIPUNCTURE: CPT | Performed by: FAMILY MEDICINE

## 2019-03-28 ASSESSMENT — PAIN SCALES - GENERAL: PAINLEVEL: NO PAIN (0)

## 2019-03-28 ASSESSMENT — MIFFLIN-ST. JEOR: SCORE: 1508.61

## 2019-03-28 NOTE — PATIENT INSTRUCTIONS
================================================================================  Normal Values   Blood pressure  <140/90 for most adults    <130/80 for some chronic diseases (ask your care team about yours)    BMI (body mass index)  18.5-25 kg/m2 (based on height and weight)     Thank you for visiting [facility]    Normal or non-critical lab and imaging results will be communicated to you by MyChart, letter or phone within 7 days.  If you do not hear from us within 10 days, please call the clinic. If you have a critical or abnormal lab result, we will notify you by phone as soon as possible.     If you have any questions regarding your visit please contact:     Team Comfort:   Clinic Hours Telephone Number   Dr. Ronaldo Rogers Dr. Vocal 7am-5pm  Monday - Friday [phone]  Chavez RN  Fernanda RN  Abby RN   Pharmacy 8:00am-8pm Monday-Friday    9am-5pm Saturday-Sunday [phone]   Urgent Care 11am-9pm Monday-Friday        9am-5pm Saturday-Sunday [phone]     After hours, weekend or if you need to make an appointment with your primary provider please call [phone].   After Hours nurse advise: call [place] [facility]: [phone]    Medication Refills:  Call your pharmacy and they will forward the refill to us. Please allow 3 business days for your refills to be completed.

## 2019-03-28 NOTE — PROGRESS NOTES
SUBJECTIVE:   Manuel Rosales is a 88 year old male who presents to clinic today for the following health issues:    Diabetes Follow-up    Patient is checking blood sugars: once daily.  Results are as follows:         am - 128    Diabetic concerns: other - A1C being high      Symptoms of hypoglycemia (low blood sugar): none     Paresthesias (numbness or burning in feet) or sores: Yes neuropathy      Date of last diabetic eye exam: UTD    Review of the glucose log shows that the majority of his AM glucose levels are less than 140, with many of them less than 100    He may need to move soon as his wife is currently in a rehab nursing home, but is not doing well. He may need to sell his house and move in with his daughter in the next year, and would switch providers to the [facility] near there.     BP Readings from Last 2 Encounters:   03/28/19 131/64   02/21/19 129/65     Hemoglobin A1C (%)   Date Value   03/28/2019 7.4 (H)   01/10/2019 8.0 (H)     LDL Cholesterol Calculated (mg/dL)   Date Value   01/10/2019 61   07/09/2018 101 (H)     Diabetes Management Resources    Amount of exercise or physical activity: house chores    Problems taking medications regularly: No    Medication side effects: none    Diet: low salt and smaller meals      Past medical, family, and social histories, medications, and allergies are reviewed and updated in Epic.     ROS:  Constitutional, HEENT, cardiovascular, pulmonary, GI and  systems are negative, except as otherwise noted.    This document serves as a record of the services and decisions personally performed and made by Dr. Corral. It was created on his behalf by Jenifer Alex, a trained medical scribe. The creation of this document is based the provider's statements to the medical scribe.  Jenifer Alex March 28, 2019 10:58 AM     OBJECTIVE:                                                    /64 (BP Location: Left arm, Patient Position: Chair, Cuff Size: Adult Regular)    "Pulse 92   Temp 97.6  F (36.4  C) (Oral)   Resp 16   Ht 1.702 m (5' 7\")   Wt 88 kg (194 lb)   SpO2 96%   BMI 30.38 kg/m     Body mass index is 30.38 kg/m .   GENERAL: healthy, alert and no distress  EYES: Eyes grossly normal to inspection, PERRL, EOMI, sclerae white and conjunctivae normal  RESP: lungs clear to auscultation - no crackles or wheezes, no areas of dullness, no tachypnea  CV: Heart regular rate and rhythm without murmur, click or rub. No peripheral edema and peripheral pulses strong  MS: no gross musculoskeletal defects noted, no edema  SKIN: no suspicious lesions or rashes  NEURO: Normal strength and tone, sensory exam grossly normal, mentation intact, oriented times 3 and cranial nerves 2-12 intact  PSYCH: mentation appears normal, affect normal/bright    Diagnostic Test Results:  Results for orders placed or performed in visit on 03/28/19 (from the past 24 hour(s))   Hemoglobin A1c   Result Value Ref Range    Hemoglobin A1C 7.4 (H) 0 - 5.6 %   Glucose whole blood   Result Value Ref Range    Glucose Whole Blood 240 (H) 70 - 99 mg/dL        ASSESSMENT/PLAN:                                                      (E11.29,  R80.9,  Z79.4) Type 2 diabetes mellitus with microalbuminuria, with long-term current use of insulin (H)  (primary encounter diagnosis)  Comment: Well controlled with the increase in Lantus  Plan: Hemoglobin A1c, Glucose whole blood        Return in about 3 months (around 7/10/2019) for Cholesterol, Blood Pressure, Diabetes, wither with me or his new provider if he moves.       The information in this document, created by the medical scribe for me, accurately reflects the services I personally performed and the decisions made by me. I have reviewed and approved this document for accuracy prior to leaving the patient care area. March 28, 2019 10:58 AM     Ronaldo Corral MD    "

## 2019-05-31 DIAGNOSIS — H40.1132 PRIMARY OPEN ANGLE GLAUCOMA OF BOTH EYES, MODERATE STAGE: ICD-10-CM

## 2019-05-31 RX ORDER — DORZOLAMIDE HYDROCHLORIDE AND TIMOLOL MALEATE 20; 5 MG/ML; MG/ML
1 SOLUTION/ DROPS OPHTHALMIC 2 TIMES DAILY
Qty: 1 BOTTLE | Refills: 12 | Status: SHIPPED | OUTPATIENT
Start: 2019-05-31 | End: 2019-10-23

## 2019-05-31 RX ORDER — BRIMONIDINE TARTRATE 1.5 MG/ML
1 SOLUTION/ DROPS OPHTHALMIC 2 TIMES DAILY
Qty: 1 BOTTLE | Refills: 12 | Status: SHIPPED | OUTPATIENT
Start: 2019-05-31 | End: 2020-02-25

## 2019-05-31 NOTE — TELEPHONE ENCOUNTER
Received refill request for Brimonidine. Also almost due to have Cosopt needing a refill, so will send both to [facility] for patient.

## 2019-07-01 ENCOUNTER — TELEPHONE (OUTPATIENT)
Dept: FAMILY MEDICINE | Facility: CLINIC | Age: 84
End: 2019-07-01

## 2019-07-01 NOTE — TELEPHONE ENCOUNTER
Reason for Call:  Other     Detailed comments: Since patient wife was alive, she had a handicap handicap parking permit, and now Don has diabetes neuropathy he wants to get a handicap license plate and asking if he needs to be seen by Dr Corral to start the process?    Phone Number Patient can be reached at: Other phone number:  [phone] (H) daughter phone    Best Time: any    Can we leave a detailed message on this number? YES    Call taken on 7/1/2019 at 4:13 PM by Sonali Artis

## 2019-07-01 NOTE — TELEPHONE ENCOUNTER
Patient states wife if  and handicap sticker and plates is under her name. He wants handicap form completed for under his name.    Routing to Dr Corral for review if able to completed before next upcoming appointment 19.    Tom Sanchez CMA

## 2019-07-11 NOTE — TELEPHONE ENCOUNTER
Called and left msg for Oxana regarding this writer's note below.  aDmian Andrade,  For [facility] Comfort [facility]

## 2019-07-11 NOTE — TELEPHONE ENCOUNTER
Patient's form will be deliver to the  this afternoon for pickup after 3p.  Damian Andrade,  For [facility] Comfort [facility]    Please notify patient or Oxana to let them know the above info.  And remind the person who is picking up to bring their photo ID.  Damian Andrade,  For [facility] Comfort [facility] Heart     NOTE: If patient and or guardians calls the clinic before the care teams gets a chance to call them, please notify the caller the above information regarding pickup times, remind them to bring a photo ID, document and close the encounter.  Damian Andrade,  For [facility] Comfort and Heart    FYI:  Anything completed after 2:00p will not be delivered until the next business day after 3p.   Damian Andrade,  for Team's Comfort and Heart.

## 2019-07-18 ENCOUNTER — DOCUMENTATION ONLY (OUTPATIENT)
Dept: SLEEP MEDICINE | Facility: CLINIC | Age: 84
End: 2019-07-18

## 2019-07-18 DIAGNOSIS — G47.30 SLEEP DISORDER BREATHING: ICD-10-CM

## 2019-07-18 NOTE — PROGRESS NOTES
STM Recheck Visit     Subjective measures:   Pt states that he's not using it because he it bothered him a lot.  His wife has passed away and so he stopped using it.  He feels that he's sleeping better now.     Assessment: Pt not meeting objective benchmarks for compliance  Patient failing following subjective benchmarks: pressure issues  Action plan: follow up per provider request    Device type: Auto CPAP   PAP settings: CPAP min 8 cm  H20     CPAP max 12 cm  H20  Objective measures: No use since 5/13/19

## 2019-07-29 ENCOUNTER — TELEPHONE (OUTPATIENT)
Dept: FAMILY MEDICINE | Facility: CLINIC | Age: 84
End: 2019-07-29

## 2019-07-29 ENCOUNTER — OFFICE VISIT (OUTPATIENT)
Dept: FAMILY MEDICINE | Facility: CLINIC | Age: 84
End: 2019-07-29
Payer: COMMERCIAL

## 2019-07-29 VITALS
SYSTOLIC BLOOD PRESSURE: 136 MMHG | HEIGHT: 67 IN | WEIGHT: 189 LBS | DIASTOLIC BLOOD PRESSURE: 58 MMHG | BODY MASS INDEX: 29.66 KG/M2 | HEART RATE: 68 BPM | OXYGEN SATURATION: 96 % | TEMPERATURE: 97.8 F

## 2019-07-29 DIAGNOSIS — R80.9 TYPE 2 DIABETES MELLITUS WITH MICROALBUMINURIA, WITH LONG-TERM CURRENT USE OF INSULIN (H): Chronic | ICD-10-CM

## 2019-07-29 DIAGNOSIS — Z79.4 TYPE 2 DIABETES MELLITUS WITH MICROALBUMINURIA, WITH LONG-TERM CURRENT USE OF INSULIN (H): Chronic | ICD-10-CM

## 2019-07-29 DIAGNOSIS — Z79.4 TYPE 2 DIABETES MELLITUS WITH MICROALBUMINURIA, WITH LONG-TERM CURRENT USE OF INSULIN (H): Primary | Chronic | ICD-10-CM

## 2019-07-29 DIAGNOSIS — R80.9 TYPE 2 DIABETES MELLITUS WITH MICROALBUMINURIA, WITH LONG-TERM CURRENT USE OF INSULIN (H): Primary | Chronic | ICD-10-CM

## 2019-07-29 DIAGNOSIS — I13.0 BENIGN HYPERTENSIVE HEART AND KIDNEY DISEASE WITH DIASTOLIC CONGESTIVE HEART FAILURE, NYHA CLASS 2 AND CHRONIC KIDNEY DISEASE STAGE 3 (H): ICD-10-CM

## 2019-07-29 DIAGNOSIS — E78.5 HYPERLIPIDEMIA LDL GOAL <100: Chronic | ICD-10-CM

## 2019-07-29 DIAGNOSIS — E11.29 TYPE 2 DIABETES MELLITUS WITH MICROALBUMINURIA, WITH LONG-TERM CURRENT USE OF INSULIN (H): Primary | Chronic | ICD-10-CM

## 2019-07-29 DIAGNOSIS — N18.30 BENIGN HYPERTENSIVE HEART AND KIDNEY DISEASE WITH DIASTOLIC CONGESTIVE HEART FAILURE, NYHA CLASS 2 AND CHRONIC KIDNEY DISEASE STAGE 3 (H): ICD-10-CM

## 2019-07-29 DIAGNOSIS — I50.30 BENIGN HYPERTENSIVE HEART AND KIDNEY DISEASE WITH DIASTOLIC CONGESTIVE HEART FAILURE, NYHA CLASS 2 AND CHRONIC KIDNEY DISEASE STAGE 3 (H): ICD-10-CM

## 2019-07-29 DIAGNOSIS — E11.29 TYPE 2 DIABETES MELLITUS WITH MICROALBUMINURIA, WITH LONG-TERM CURRENT USE OF INSULIN (H): Chronic | ICD-10-CM

## 2019-07-29 LAB
ALT SERPL W P-5'-P-CCNC: 29 U/L (ref 0–70)
ANION GAP SERPL CALCULATED.3IONS-SCNC: 6 MMOL/L (ref 3–14)
BUN SERPL-MCNC: 14 MG/DL (ref 7–30)
CALCIUM SERPL-MCNC: 9.4 MG/DL (ref 8.5–10.1)
CHLORIDE SERPL-SCNC: 98 MMOL/L (ref 94–109)
CHOLEST SERPL-MCNC: 127 MG/DL
CO2 SERPL-SCNC: 24 MMOL/L (ref 20–32)
CREAT SERPL-MCNC: 1.05 MG/DL (ref 0.66–1.25)
GFR SERPL CREATININE-BSD FRML MDRD: 63 ML/MIN/{1.73_M2}
GLUCOSE BLD-MCNC: 313 MG/DL (ref 70–99)
GLUCOSE SERPL-MCNC: 328 MG/DL (ref 70–99)
HBA1C MFR BLD: 7.5 % (ref 0–5.6)
HDLC SERPL-MCNC: 62 MG/DL
LDLC SERPL CALC-MCNC: 39 MG/DL
NONHDLC SERPL-MCNC: 65 MG/DL
POTASSIUM SERPL-SCNC: 5.4 MMOL/L (ref 3.4–5.3)
SODIUM SERPL-SCNC: 128 MMOL/L (ref 133–144)
TRIGL SERPL-MCNC: 129 MG/DL

## 2019-07-29 PROCEDURE — 99214 OFFICE O/P EST MOD 30 MIN: CPT | Performed by: FAMILY MEDICINE

## 2019-07-29 PROCEDURE — 83036 HEMOGLOBIN GLYCOSYLATED A1C: CPT | Performed by: FAMILY MEDICINE

## 2019-07-29 PROCEDURE — 80048 BASIC METABOLIC PNL TOTAL CA: CPT | Performed by: FAMILY MEDICINE

## 2019-07-29 PROCEDURE — 36415 COLL VENOUS BLD VENIPUNCTURE: CPT | Performed by: FAMILY MEDICINE

## 2019-07-29 PROCEDURE — 82947 ASSAY GLUCOSE BLOOD QUANT: CPT | Mod: 59 | Performed by: FAMILY MEDICINE

## 2019-07-29 PROCEDURE — 84460 ALANINE AMINO (ALT) (SGPT): CPT | Performed by: FAMILY MEDICINE

## 2019-07-29 PROCEDURE — 80061 LIPID PANEL: CPT | Performed by: FAMILY MEDICINE

## 2019-07-29 RX ORDER — AMLODIPINE BESYLATE 10 MG/1
10 TABLET ORAL DAILY
COMMUNITY
Start: 2019-06-26 | End: 2019-10-14 | Stop reason: DRUGHIGH

## 2019-07-29 RX ORDER — ATORVASTATIN CALCIUM 80 MG/1
80 TABLET, FILM COATED ORAL DAILY
Qty: 90 TABLET | Refills: 1 | Status: SHIPPED | OUTPATIENT
Start: 2019-07-29 | End: 2020-01-30

## 2019-07-29 RX ORDER — GLIPIZIDE 10 MG/1
10 TABLET ORAL 2 TIMES DAILY WITH MEALS
Qty: 180 TABLET | Refills: 1 | Status: SHIPPED | OUTPATIENT
Start: 2019-07-29 | End: 2020-01-30

## 2019-07-29 RX ORDER — ASPIRIN 81 MG/1
81 TABLET, CHEWABLE ORAL DAILY
COMMUNITY
Start: 2019-06-27 | End: 2020-09-28

## 2019-07-29 RX ORDER — LISINOPRIL 40 MG/1
40 TABLET ORAL DAILY
Qty: 90 TABLET | Refills: 1 | Status: SHIPPED | OUTPATIENT
Start: 2019-07-29 | End: 2020-01-30

## 2019-07-29 RX ORDER — FUROSEMIDE 20 MG
1 TABLET ORAL EVERY MORNING
Refills: 0 | COMMUNITY
Start: 2019-05-20 | End: 2021-04-14

## 2019-07-29 ASSESSMENT — PAIN SCALES - GENERAL: PAINLEVEL: NO PAIN (0)

## 2019-07-29 ASSESSMENT — MIFFLIN-ST. JEOR: SCORE: 1485.93

## 2019-07-29 NOTE — TELEPHONE ENCOUNTER
Reason for call:  Other   Patient called regarding (reason for call): prescription  Additional comments: [facility]   [facility]  Phone number to reach patient:  Home number on file [phone] (home)    Best Time:  Any    Can we leave a detailed message on this number?  YES

## 2019-07-29 NOTE — PATIENT INSTRUCTIONS
================================================================================  Normal Values   Blood pressure  <140/90 for most adults    <130/80 for some chronic diseases (ask your care team about yours)    BMI (body mass index)  18.5-25 kg/m2 (based on height and weight)     Thank you for visiting [facility]    Normal or non-critical lab and imaging results will be communicated to you by MyChart, letter or phone within 7 days.  If you do not hear from us within 10 days, please call the clinic. If you have a critical or abnormal lab result, we will notify you by phone as soon as possible.     If you have any questions regarding your visit please contact:     Team Comfort:   Clinic Hours Telephone Number   Dr. Ronaldo Rogers Dr. Vocal 7am-5pm  Monday - Friday [phone]  Chavez RN  Fernanda RN  Abby RN   Pharmacy 8:00am-8pm Monday-Friday    9am-5pm Saturday-Sunday [phone]   Urgent Care 11am-9pm Monday-Friday        9am-5pm Saturday-Sunday [phone]     After hours, weekend or if you need to make an appointment with your primary provider please call [phone].   After Hours nurse advise: call [place] [facility]: [phone]    Medication Refills:  Call your pharmacy and they will forward the refill to us. Please allow 3 business days for your refills to be completed.

## 2019-07-29 NOTE — PROGRESS NOTES
Subjective     Manuel Rosales is a 88 year old male who presents to clinic today for the following health issues:    HPI   Diabetes Follow-up      How often are you checking your blood sugar? One time daily    What time of day are you checking your blood sugars (select all that apply)?  Before meals    Have you had any blood sugars above 200?  No    Have you had any blood sugars below 70?  Yes down to 45 he fell (had taken 40 units Lantus prior)    What symptoms do you notice when your blood sugar is low?  Weak and Other: fell    What concerns do you have today about your diabetes? Low blood sugar     Do you have any of these symptoms? (Select all that apply)  No numbness or tingling in feet.  No redness, sores or blisters on feet.  No complaints of excessive thirst.  No reports of blurry vision.  No significant changes to weight.     Have you had a diabetic eye exam in the last 12 months? Yes- Date of last eye exam: 8/9/18    Diabetes Management Resources    Hyperlipidemia Follow-Up      Are you having any of the following symptoms? (Select all that apply)  No complaints of shortness of breath, chest pain or pressure.  No increased sweating or nausea with activity.  No left-sided neck or arm pain.  No complaints of pain in calves when walking 1-2 blocks.    Are you regularly taking any medication or supplement to lower your cholesterol?   Yes- Lipitor    Are you having muscle aches or other side effects that you think could be caused by your cholesterol lowering medication?  No    Hypertension Follow-up      Do you check your blood pressure regularly outside of the clinic? No     Are you following a low salt diet? Yes    Are your blood pressures ever more than 140 on the top number (systolic) OR more   than 90 on the bottom number (diastolic), for example 140/90? N/A    BP Readings from Last 2 Encounters:   07/29/19 136/58   03/28/19 131/64     Hemoglobin A1C (%)   Date Value   07/29/2019 7.5 (H)   03/28/2019 7.4  "(H)     LDL Cholesterol Calculated (mg/dL)   Date Value   07/29/2019 39   01/10/2019 61       Amount of exercise or physical activity: walking    Problems taking medications regularly: No    Medication side effects: none    Diet: regular (no restrictions) and low salt          Hospital Follow-up Visit:    Hospital/Nursing Home/IP Rehab Facility: [place]  Date of Admission: 6/24/19  Date of Discharge: 6/26/19  Reason(s) for Admission: Paralysis & Hypertension : 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 Was noted to have elevated BPs during hosp            Problems taking medications regularly:  None       Medication changes since discharge: YES STOP TAKING hydrochlorothiazide & HYTRIN. INCREASED AMLODIPINE TO 10MG ADDED BABY ASA.         Problems adhering to non-medication therapy:  None    Summary of hospitalization:  [facility] hospital discharge summary reviewed  Diagnostic Tests/Treatments reviewed.  Follow up needed: none  Other Healthcare Providers Involved in Patient s Care:         None  Update since discharge: improved. No symptoms since moving out of that assisted living location    Post Discharge Medication Reconciliation: discharge medications reconciled and changed, per note/orders (see AVS).  Plan of care communicated with patient     Coding guidelines for this visit:  Type of Medical   Decision Making Face-to-Face Visit       within 7 Days of discharge Face-to-Face Visit        within 14 days of discharge   Moderate Complexity [id number]   High Complexity [id number]          Past medical, family, and social histories, medications, and allergies are reviewed and updated in [facility].     Review of Systems   ROS COMP: Constitutional, HEENT, cardiovascular, pulmonary, gi and gu systems are negative, except as otherwise noted.      Objective    /58   Pulse 68   Temp 97.8  F (36.6  C) (Oral)   Ht 1.702 m (5' 7\")   Wt 85.7 kg (189 lb)   SpO2 96%   BMI 29.60 " kg/m    Body mass index is 29.6 kg/m .  Physical Exam   GENERAL: healthy, alert and no distress  EYES: Eyes grossly normal to inspection, PERRL, EOMI, sclerae white and conjunctivae normal  RESP: lungs clear to auscultation - no crackles or wheezes, no areas of dullness, no tachypnea  CV: Heart regular rate and rhythm without murmur, click or rub. No peripheral edema and peripheral pulses strong  MS: no gross musculoskeletal defects noted, no edema  SKIN: no suspicious lesions or rashes to visible skin  NEURO: Normal strength and tone, sensory exam grossly normal, mentation intact, oriented times 3 and cranial nerves 2-12 intact  PSYCH: mentation appears normal, affect normal/bright     Lab Results   Component Value Date    A1C 7.5 07/29/2019    A1C 7.4 03/28/2019    A1C 8.0 01/10/2019    A1C 7.8 07/09/2018    A1C 7.4 01/08/2018              Assessment & Plan     (E11.29,  R80.9,  Z79.4) Type 2 diabetes mellitus with microalbuminuria, with long-term current use of insulin (H)  (primary encounter diagnosis)  Comment: well controlled.  Plan: Hemoglobin A1c, Glucose whole blood, glipiZIDE         (GLUCOTROL) 10 MG tablet, DISCONTINUED: insulin        glargine (LANTUS SOLOSTAR) 100 UNIT/ML pen        Lantus dose updated. Return in about 6 months (around 1/29/2020) for full physical, diabetes, lab tests, recheck medications.     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, NYHA class 2 and chronic kidney disease stage 3 (H)  Comment: controlled (high normal BP today) with changes in med regimen  Plan: Basic metabolic panel, lisinopril         (PRINIVIL/ZESTRIL) 40 MG tablet        Consider resuming terazosin if BPs increase or if pt develops LUTS    (E78.5) Hyperlipidemia LDL goal <100  Comment: historically at goal  Plan: Lipid panel reflex to direct LDL Non-fasting,         ALT, atorvastatin (LIPITOR) 80 MG tablet        F/u 6 mo       BMI:   Estimated  "body mass index is 29.6 kg/m  as calculated from the following:    Height as of this encounter: 1.702 m (5' 7\").    Weight as of this encounter: 85.7 kg (189 lb).     Return in about 6 months (around 1/29/2020) for full physical, diabetes, lab tests, recheck medications.    Ronaldo Corral MD  [facility]  "

## 2019-07-30 NOTE — TELEPHONE ENCOUNTER
2 Written rx faxed to the pharmacy, Pharmacy will contact pt when medication is ready for pickup.  Damian Andrade,  [facility] [employer] [facility]

## 2019-08-08 NOTE — TELEPHONE ENCOUNTER
Faxed last office notes on 7/29/19, to Tenisha Abraham LPN, [phone], right fax confirmed at 3:43 pm today, 8/8/19.  Muriel Gonsales MA/  [facility] [employer] [facility]

## 2019-08-22 ENCOUNTER — OFFICE VISIT (OUTPATIENT)
Dept: OPHTHALMOLOGY | Facility: CLINIC | Age: 84
End: 2019-08-22
Payer: COMMERCIAL

## 2019-08-22 DIAGNOSIS — H40.1132 PRIMARY OPEN ANGLE GLAUCOMA OF BOTH EYES, MODERATE STAGE: ICD-10-CM

## 2019-08-22 DIAGNOSIS — E11.29 TYPE 2 DIABETES MELLITUS WITH MICROALBUMINURIA, WITH LONG-TERM CURRENT USE OF INSULIN (H): Chronic | ICD-10-CM

## 2019-08-22 DIAGNOSIS — R80.9 TYPE 2 DIABETES MELLITUS WITH MICROALBUMINURIA, WITH LONG-TERM CURRENT USE OF INSULIN (H): Chronic | ICD-10-CM

## 2019-08-22 DIAGNOSIS — H02.119 CICATRICIAL ECTROPION, UNSPECIFIED LATERALITY: ICD-10-CM

## 2019-08-22 DIAGNOSIS — H35.372 EPIRETINAL MEMBRANE, LEFT: ICD-10-CM

## 2019-08-22 DIAGNOSIS — H43.813 POSTERIOR VITREOUS DETACHMENT OF BOTH EYES: ICD-10-CM

## 2019-08-22 DIAGNOSIS — H52.4 PRESBYOPIA: ICD-10-CM

## 2019-08-22 DIAGNOSIS — Z96.1 PSEUDOPHAKIA: ICD-10-CM

## 2019-08-22 DIAGNOSIS — Z01.01 ENCOUNTER FOR EXAMINATION OF EYES AND VISION WITH ABNORMAL FINDINGS: Primary | ICD-10-CM

## 2019-08-22 DIAGNOSIS — Z79.4 TYPE 2 DIABETES MELLITUS WITH MICROALBUMINURIA, WITH LONG-TERM CURRENT USE OF INSULIN (H): Chronic | ICD-10-CM

## 2019-08-22 PROCEDURE — 92014 COMPRE OPH EXAM EST PT 1/>: CPT | Performed by: OPHTHALMOLOGY

## 2019-08-22 PROCEDURE — 92015 DETERMINE REFRACTIVE STATE: CPT | Performed by: OPHTHALMOLOGY

## 2019-08-22 ASSESSMENT — TONOMETRY
IOP_METHOD: APPLANATION
OD_IOP_MMHG: 15
OS_IOP_MMHG: 15

## 2019-08-22 ASSESSMENT — REFRACTION_MANIFEST
OD_ADD: +3.00
OS_SPHERE: -2.00
OD_SPHERE: -0.25
OS_AXIS: 170
OD_CYLINDER: +0.75
OS_ADD: +3.00
OS_CYLINDER: +1.00
OD_AXIS: 020

## 2019-08-22 ASSESSMENT — VISUAL ACUITY
OS_CC+: -2
OS_CC: 20/20
METHOD: SNELLEN - LINEAR
CORRECTION_TYPE: GLASSES
OD_CC+: -1
OD_CC: 1
OD_CC: 20/20
OS_CC: 1

## 2019-08-22 ASSESSMENT — REFRACTION_WEARINGRX
OS_SPHERE: -2.00
OS_ADD: +3.25
OD_ADD: +3.00
OD_CYLINDER: +0.50
OS_CYLINDER: +1.25
SPECS_TYPE: BIFOCAL
OD_AXIS: 003
OD_SPHERE: -0.50
OS_AXIS: 166

## 2019-08-22 ASSESSMENT — CUP TO DISC RATIO
OS_RATIO: 0.9
OD_RATIO: 0.8

## 2019-08-22 ASSESSMENT — CONF VISUAL FIELD
OD_NORMAL: 1
OS_NORMAL: 1

## 2019-08-22 ASSESSMENT — EXTERNAL EXAM - LEFT EYE: OS_EXAM: NORMAL

## 2019-08-22 ASSESSMENT — EXTERNAL EXAM - RIGHT EYE: OD_EXAM: NORMAL

## 2019-08-22 NOTE — LETTER
8/22/2019         RE: Manuel Rosales  [address]  [facility] [address]        Dear Colleague,    Thank you for referring your patient, Manuel Rosales, to the [facility]. Please see a copy of my visit note below.     Current Eye Medications:  cosopt and brimonidine both eyes twice a day, Latanoprost both eyes every evening.  Last drops:  7:30am.     Subjective:  Patient is here for a Diabetic Eye Exam.   No vision changes or concerns.  Glasses are working well.      Lab Results   Component Value Date    A1C 7.5 07/29/2019    A1C 7.4 03/28/2019    A1C 8.0 01/10/2019    A1C 7.8 07/09/2018    A1C 7.4 01/08/2018        Objective:  See Ophthalmology Exam.       Assessment:  Stable intraocular pressure and discs in patient with moderate open angle glaucoma.  No diabetic retinopathy.      ICD-10-CM    1. Encounter for examination of eyes and vision with abnormal findings Z01.01 REFRACTIVE STATUS   2. Presbyopia H52.4 REFRACTIVE STATUS   3. Type 2 diabetes mellitus with microalbuminuria, with long-term current use of insulin (H) E11.29 EYE EXAM (SIMPLE-NONBILLABLE)    R80.9     Z79.4    4. Pseudophakia, ou Z96.1    5. Primary open angle glaucoma of both eyes, moderate stage H40.1132    6. Epiretinal membrane, mild, left H35.372    7. 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 Posterior vitreous detachment of both eyes H43.813         Plan:  Continue Cosopt and Brimonidine both eyes twice a day and Latanoprost both eyes every evening.  Possible clouding of posterior capsule both eyes discussed.   Glasses Rx given - optional   Return visit 6 months for intraocular pressure check, glaucoma OCT and Rios Visual Field.    Willy Cardoso M.D.  [phone]      Again, thank you for allowing me to participate in the care of your patient.        Sincerely,        Willy Cardoso MD

## 2019-08-22 NOTE — PATIENT INSTRUCTIONS
Continue Cosopt and Brimonidine both eyes twice a day and Latanoprost both eyes every evening.  Possible clouding of posterior capsule both eyes discussed.   Glasses Rx given - optional   Return visit 6 months for intraocular pressure check, glaucoma OCT and Riso Visual Field.    Willy Cardoso M.D.  [phone]    Patient Education   Diabetes weakens the blood vessels all over the body, including the eyes. Damage to the blood vessels in the eyes can cause swelling or bleeding into part of the eye (called the retina). This is called diabetic retinopathy (FERNANDO-tin--pu-thee). If not treated, this disease can cause vision loss or blindness.   Symptoms may include blurred or distorted vision, but many people have no symptoms. It's important to see your eye doctor regularly to check for problems.   Early treatment and good control can help protect your vision. Here are the things you can do to help prevent vision loss:      1. Keep your blood sugar levels under tight control.      2. Bring high blood pressure under control.      3. No smoking.      4. Have yearly dilated eye exams.

## 2019-08-22 NOTE — PROGRESS NOTES
Current Eye Medications:  cosopt and brimonidine both eyes twice a day, Latanoprost both eyes every evening.  Last drops:  7:30am.     Subjective:  Patient is here for a Diabetic Eye Exam.   No vision changes or concerns.  Glasses are working well.      Lab Results   Component Value Date    A1C 7.5 07/29/2019    A1C 7.4 03/28/2019    A1C 8.0 01/10/2019    A1C 7.8 07/09/2018    A1C 7.4 01/08/2018        Objective:  See Ophthalmology Exam.       Assessment:  Stable intraocular pressure and discs in patient with moderate open angle glaucoma.  No diabetic retinopathy.      ICD-10-CM    1. Encounter for examination of eyes and vision with abnormal findings Z01.01 REFRACTIVE STATUS   2. Presbyopia H52.4 REFRACTIVE STATUS   3. Type 2 diabetes mellitus with microalbuminuria, with long-term current use of insulin (H) E11.29 EYE EXAM (SIMPLE-NONBILLABLE)    R80.9     Z79.4    4. Pseudophakia, ou Z96.1    5. Primary open angle glaucoma of both eyes, moderate stage H40.1132    6. Epiretinal membrane, mild, left H35.372    7. 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 Posterior vitreous detachment of both eyes H43.813         Plan:  Continue Cosopt and Brimonidine both eyes twice a day and Latanoprost both eyes every evening.  Possible clouding of posterior capsule both eyes discussed.   Glasses Rx given - optional   Return visit 6 months for intraocular pressure check, glaucoma OCT and Rios Visual Field.    Willy Cardoso M.D.  [phone]

## 2019-09-19 ENCOUNTER — TRANSFERRED RECORDS (OUTPATIENT)
Dept: HEALTH INFORMATION MANAGEMENT | Facility: CLINIC | Age: 84
End: 2019-09-19

## 2019-10-09 DIAGNOSIS — I12.9 BENIGN HYPERTENSION WITH CHRONIC KIDNEY DISEASE, STAGE III (H): ICD-10-CM

## 2019-10-09 DIAGNOSIS — N18.30 BENIGN HYPERTENSION WITH CHRONIC KIDNEY DISEASE, STAGE III (H): ICD-10-CM

## 2019-10-09 NOTE — TELEPHONE ENCOUNTER
"Requested Prescriptions   Pending Prescriptions Disp Refills     amLODIPine (NORVASC) 10 MG tablet [Pharmacy Med Name: AMLODIPINE 10MG TAB]        Last Written Prescription Date:  06/26/18  Last Fill Quantity: 180,   # refills: 1  Last Office Visit: 07/29/19Narcisa  Future Office visit:       Routing refill request to provider for review/approval because:  Drug not active on patient's medication list  180 tablet 1     Sig: TAKE 1 TABLET BY MOUTH TWICE DAILY FOR BLOOD PRESSURE       Calcium Channel Blockers Protocol  Passed - 10/9/2019  9:02 AM        Passed - Blood pressure under 140/90 in past 12 months     BP Readings from Last 3 Encounters:   07/29/19 136/58   03/28/19 131/64   02/21/19 129/65                 Passed - Recent (12 mo) or future (30 days) visit within the authorizing provider's specialty     Patient has had an office visit with the authorizing provider or a provider within the authorizing providers department within the previous 12 mos or has a future within next 30 days. See \"Patient Info\" tab in inbasket, or \"Choose Columns\" in Meds & Orders section of the refill encounter.              Passed - Medication is active on med list        Passed - Patient is age 18 or older        Passed - Normal serum creatinine on file in past 12 months     Recent Labs   Lab Test 07/29/19  1048   CR 1.05               "

## 2019-10-14 RX ORDER — AMLODIPINE BESYLATE 10 MG/1
10 TABLET ORAL 2 TIMES DAILY
Qty: 180 TABLET | Refills: 0 | Status: SHIPPED | OUTPATIENT
Start: 2019-10-14 | End: 2020-01-30

## 2019-10-14 NOTE — TELEPHONE ENCOUNTER
Routing refill request to provider for review/approval because:  Medication is reported/historical.    Vanessa Bowie RN, [facility]

## 2019-10-21 DIAGNOSIS — H40.1132 PRIMARY OPEN ANGLE GLAUCOMA OF BOTH EYES, MODERATE STAGE: ICD-10-CM

## 2019-10-21 NOTE — TELEPHONE ENCOUNTER
Requested Prescriptions   Pending Prescriptions Disp Refills     dorzolamide-timolol (COSOPT) 2-0.5 % ophthalmic solution 1 Bottle 12     Sig: Place 1 drop into both eyes 2 times daily       There is no refill protocol information for this order        Last Written Prescription Date:  05/31/2019  Last Fill Quantity: 1,  # refills: 12   Last office visit: 8/22/2019 with prescribing provider:     Future Office Visit:

## 2019-10-22 NOTE — TELEPHONE ENCOUNTER
Routing refill request to provider for review/approval because:  Drug not on the [facility] refill protocol     Brittanie Fink RN - [facility]

## 2019-10-23 RX ORDER — DORZOLAMIDE HYDROCHLORIDE AND TIMOLOL MALEATE 20; 5 MG/ML; MG/ML
1 SOLUTION/ DROPS OPHTHALMIC 2 TIMES DAILY
Qty: 1 BOTTLE | Refills: 12 | Status: SHIPPED | OUTPATIENT
Start: 2019-10-23 | End: 2020-02-25

## 2019-10-23 NOTE — TELEPHONE ENCOUNTER
Recommend refilling per Dr. Cardoso's last visit note on 8/22/19, Continue Cosopt and Brimonidine both eyes twice a day and Latanoprost both eyes every evening.

## 2020-01-30 ENCOUNTER — OFFICE VISIT (OUTPATIENT)
Dept: FAMILY MEDICINE | Facility: CLINIC | Age: 85
End: 2020-01-30
Payer: COMMERCIAL

## 2020-01-30 VITALS
DIASTOLIC BLOOD PRESSURE: 58 MMHG | WEIGHT: 201 LBS | SYSTOLIC BLOOD PRESSURE: 128 MMHG | TEMPERATURE: 97.8 F | HEIGHT: 67 IN | BODY MASS INDEX: 31.55 KG/M2 | RESPIRATION RATE: 16 BRPM | HEART RATE: 86 BPM | OXYGEN SATURATION: 96 %

## 2020-01-30 DIAGNOSIS — I50.30 BENIGN HYPERTENSIVE HEART AND KIDNEY DISEASE WITH DIASTOLIC CONGESTIVE HEART FAILURE, NYHA CLASS 2 AND CHRONIC KIDNEY DISEASE STAGE 3 (H): ICD-10-CM

## 2020-01-30 DIAGNOSIS — E78.5 HYPERLIPIDEMIA LDL GOAL <100: Chronic | ICD-10-CM

## 2020-01-30 DIAGNOSIS — I13.0 BENIGN HYPERTENSIVE HEART AND KIDNEY DISEASE WITH DIASTOLIC CONGESTIVE HEART FAILURE, NYHA CLASS 2 AND CHRONIC KIDNEY DISEASE STAGE 3 (H): ICD-10-CM

## 2020-01-30 DIAGNOSIS — R80.9 TYPE 2 DIABETES MELLITUS WITH MICROALBUMINURIA, WITH LONG-TERM CURRENT USE OF INSULIN (H): Primary | Chronic | ICD-10-CM

## 2020-01-30 DIAGNOSIS — E11.29 TYPE 2 DIABETES MELLITUS WITH MICROALBUMINURIA, WITH LONG-TERM CURRENT USE OF INSULIN (H): Primary | Chronic | ICD-10-CM

## 2020-01-30 DIAGNOSIS — Z79.4 TYPE 2 DIABETES MELLITUS WITH MICROALBUMINURIA, WITH LONG-TERM CURRENT USE OF INSULIN (H): Primary | Chronic | ICD-10-CM

## 2020-01-30 DIAGNOSIS — N18.30 BENIGN HYPERTENSIVE HEART AND KIDNEY DISEASE WITH DIASTOLIC CONGESTIVE HEART FAILURE, NYHA CLASS 2 AND CHRONIC KIDNEY DISEASE STAGE 3 (H): ICD-10-CM

## 2020-01-30 LAB
ALT SERPL W P-5'-P-CCNC: 32 U/L (ref 0–70)
ANION GAP SERPL CALCULATED.3IONS-SCNC: 7 MMOL/L (ref 3–14)
BUN SERPL-MCNC: 15 MG/DL (ref 7–30)
CALCIUM SERPL-MCNC: 10 MG/DL (ref 8.5–10.1)
CHLORIDE SERPL-SCNC: 100 MMOL/L (ref 94–109)
CHOLEST SERPL-MCNC: 171 MG/DL
CO2 SERPL-SCNC: 25 MMOL/L (ref 20–32)
CREAT SERPL-MCNC: 1.05 MG/DL (ref 0.66–1.25)
GFR SERPL CREATININE-BSD FRML MDRD: 63 ML/MIN/{1.73_M2}
GLUCOSE BLD-MCNC: 230 MG/DL (ref 70–99)
GLUCOSE SERPL-MCNC: 248 MG/DL (ref 70–99)
HBA1C MFR BLD: 8.2 % (ref 0–5.6)
HDLC SERPL-MCNC: 72 MG/DL
LDLC SERPL CALC-MCNC: 63 MG/DL
NONHDLC SERPL-MCNC: 99 MG/DL
POTASSIUM SERPL-SCNC: 4.9 MMOL/L (ref 3.4–5.3)
SODIUM SERPL-SCNC: 132 MMOL/L (ref 133–144)
TRIGL SERPL-MCNC: 180 MG/DL

## 2020-01-30 PROCEDURE — 99214 OFFICE O/P EST MOD 30 MIN: CPT | Performed by: FAMILY MEDICINE

## 2020-01-30 PROCEDURE — 83036 HEMOGLOBIN GLYCOSYLATED A1C: CPT | Performed by: FAMILY MEDICINE

## 2020-01-30 PROCEDURE — 36415 COLL VENOUS BLD VENIPUNCTURE: CPT | Performed by: FAMILY MEDICINE

## 2020-01-30 PROCEDURE — 82947 ASSAY GLUCOSE BLOOD QUANT: CPT | Mod: 59 | Performed by: FAMILY MEDICINE

## 2020-01-30 PROCEDURE — 80061 LIPID PANEL: CPT | Performed by: FAMILY MEDICINE

## 2020-01-30 PROCEDURE — 84460 ALANINE AMINO (ALT) (SGPT): CPT | Performed by: FAMILY MEDICINE

## 2020-01-30 PROCEDURE — 80048 BASIC METABOLIC PNL TOTAL CA: CPT | Performed by: FAMILY MEDICINE

## 2020-01-30 RX ORDER — GLIPIZIDE 10 MG/1
10 TABLET ORAL 2 TIMES DAILY WITH MEALS
Qty: 180 TABLET | Refills: 1 | Status: SHIPPED | OUTPATIENT
Start: 2020-01-30 | End: 2020-09-28

## 2020-01-30 RX ORDER — ATORVASTATIN CALCIUM 80 MG/1
80 TABLET, FILM COATED ORAL DAILY
Qty: 90 TABLET | Refills: 1 | Status: SHIPPED | OUTPATIENT
Start: 2020-01-30 | End: 2020-08-19

## 2020-01-30 RX ORDER — LISINOPRIL 40 MG/1
40 TABLET ORAL DAILY
Qty: 90 TABLET | Refills: 1 | Status: SHIPPED | OUTPATIENT
Start: 2020-01-30 | End: 2020-08-11

## 2020-01-30 RX ORDER — AMLODIPINE BESYLATE 10 MG/1
10 TABLET ORAL 2 TIMES DAILY
Qty: 180 TABLET | Refills: 1 | Status: SHIPPED | OUTPATIENT
Start: 2020-01-30 | End: 2020-01-30

## 2020-01-30 RX ORDER — AMLODIPINE BESYLATE 10 MG/1
10 TABLET ORAL DAILY
Qty: 90 TABLET | Refills: 1 | Status: SHIPPED | OUTPATIENT
Start: 2020-01-30 | End: 2020-09-28

## 2020-01-30 ASSESSMENT — PAIN SCALES - GENERAL: PAINLEVEL: NO PAIN (0)

## 2020-01-30 ASSESSMENT — MIFFLIN-ST. JEOR: SCORE: 1540.36

## 2020-01-30 NOTE — PROGRESS NOTES
Subjective     Manuel Rosales is a 88 year old male who presents to clinic today for the following health issues:    HPI   Diabetes Follow-up    How often are you checking your blood sugar? One time daily - this morning 132 before breakfast  What time of day are you checking your blood sugars (select all that apply)?  Before meals  Have you had any blood sugars above 200?  Yes  Once, had pizza the night before  Have you had any blood sugars below 70?  Yes     What symptoms do you notice when your blood sugar is low?  Yes, when bs around 40    What concerns do you have today about your diabetes? None     Do you have any of these symptoms? (Select all that apply)  No numbness or tingling in feet.  No redness, sores or blisters on feet.  No complaints of excessive thirst.  No reports of blurry vision.  No significant changes to weight.      Hyperlipidemia Follow-Up      Are you regularly taking any medication or supplement to lower your cholesterol?   Yes- atorvastatin    Are you having muscle aches or other side effects that you think could be caused by your cholesterol lowering medication?  No    Hypertension Follow-up      Do you check your blood pressure regularly outside of the clinic? No     Are you following a low salt diet? Yes    Are your blood pressures ever more than 140 on the top number (systolic) OR more   than 90 on the bottom number (diastolic), for example 140/90? No    BP Readings from Last 2 Encounters:   01/30/20 128/58   07/29/19 136/58     Hemoglobin A1C (%)   Date Value   01/30/2020 8.2 (H)   07/29/2019 7.5 (H)     LDL Cholesterol Calculated (mg/dL)   Date Value   07/29/2019 39   01/10/2019 61       Heart Failure Follow-up    Are you experiencing any shortness of breath? No    Are you experiencing any swelling in your legs or feet?  No    Are you using more pillows than usual? No    Do you cough at night?  No    Do you check your weight daily?  No    Have you had a weight change recently?   "No    Are you having any of the following side effects from your medications? (Select all that apply)  The patient does not report symptoms of dizziness, fatigue, cough, swelling, or slow heart beat.    Since your last visit, how many times have you gone to the cardiologist, urgent care, emergency room, or hospital because of your heart failure?   Yes once seen heart doctor.    Chronic Kidney Disease Follow-up      Do you take any over the counter pain medicine?: No      How many servings of fruits and vegetables do you eat daily?  0-1    On average, how many sweetened beverages do you drink each day (Examples: soda, juice, sweet tea, etc.  Do NOT count diet or artificially sweetened beverages)?   0 occasional cranberry juice    How many days per week do you exercise enough to make your heart beat faster? 3 or less    How many minutes a day do you exercise enough to make your heart beat faster? 9 or less    How many days per week do you miss taking your medication? 0    Past medical, family, and social histories, medications, and allergies are reviewed and updated in [facility].    Review of Systems   ROS COMP: Constitutional, HEENT, cardiovascular, pulmonary, gi and gu systems are negative, except as otherwise noted.      This document serves as a record of the services and decisions personally performed and made by Dr. Corral. It was created on his behalf by Angy Montanez, a trained medical scribe. The creation of this document is based the provider's statements to the medical scribe.  Angy Montanez,  11:33 AM     Objective    /58 (BP Location: Left arm, Patient Position: Sitting, Cuff Size: Adult Large)   Pulse 86   Temp 97.8  F (36.6  C) (Oral)   Resp 16   Ht 1.702 m (5' 7\")   Wt 91.2 kg (201 lb)   SpO2 96%   BMI 31.48 kg/m    Body mass index is 31.48 kg/m .     Physical Exam   GENERAL: healthy, alert and no distress  EYES: Eyes grossly normal to inspection, PERRL, EOMI, sclerae white and conjunctivae " normal  MS: no gross musculoskeletal defects noted, no edema  SKIN: no suspicious lesions or rashes to visible skin  NEURO: Normal strength and tone, sensory exam grossly normal, mentation intact, oriented times 3 and cranial nerves 2-12 intact  PSYCH: mentation appears normal, affect normal/bright    Diagnostic Test Results:  Results for orders placed or performed in visit on 01/30/20 (from the past 24 hour(s))   Glucose whole blood   Result Value Ref Range    Glucose Whole Blood 230 (H) 70 - 99 mg/dL   Hemoglobin A1c   Result Value Ref Range    Hemoglobin A1C 8.2 (H) 0 - 5.6 %   Basic metabolic panel   Result Value Ref Range    Sodium 132 (L) 133 - 144 mmol/L    Potassium 4.9 3.4 - 5.3 mmol/L    Chloride 100 94 - 109 mmol/L    Carbon Dioxide PENDING 20 - 32 mmol/L    Anion Gap PENDING 3 - 14 mmol/L    Glucose PENDING 70 - 99 mg/dL    Urea Nitrogen PENDING 7 - 30 mg/dL    Creatinine PENDING 0.66 - 1.25 mg/dL    GFR Estimate PENDING >60 mL/min/[1.73_m2]    GFR Estimate If Black PENDING >60 mL/min/[1.73_m2]    Calcium PENDING 8.5 - 10.1 mg/dL           Assessment & Plan   (E11.29,  R80.9,  Z79.4) Type 2 diabetes mellitus with microalbuminuria, with long-term current use of insulin (H)  (primary encounter diagnosis)  Comment: uncontrolled. Review of his glucose log (scanned) seems to show that his Lantus dose is appropriate, but his HgbA1c therefore reflects significant post-prandial glucose elevations requiring an additional medication. He has been on Januvia in the past.  Plan: Glucose whole blood, Hemoglobin A1c, glipiZIDE         (GLUCOTROL) 10 MG tablet, insulin glargine         (LANTUS SOLOSTAR) 100 UNIT/ML pen, linagliptin         (TRADJENTA) 5 MG TABS tablet, Hemoglobin A1c        Start Tradjenta. Return in about 10 weeks (around 4/9/2020) for diabetes.      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 I have reviewed and approved this document for accuracy prior to leaving the patient care area.  Ronaldo Corral MD

## 2020-01-30 NOTE — LETTER
2020      Manuel Rosales  [address]  [place] [address]              Dear Manuel LAYNE Elaximena        All of your test results were normal or within the usual/expected range for you.     Enclosed is a copy of the results.  It was a pleasure to see you at your last appointment.    If you have any questions or concerns, please call myself or my nurse at [phone].      Sincerely,      Ronaldo Corral MD/YOLANDA Galdamez MA  [employer]      Results for orders placed or performed in visit on 20   Glucose whole blood     Status: Abnormal   Result Value Ref Range    Glucose Whole Blood 230 (H) 70 - 99 mg/dL   Hemoglobin A1c     Status: Abnormal   Result Value Ref Range    Hemoglobin A1C 8.2 (H) 0 - 5.6 %   Lipid panel reflex to direct LDL Non-fasting     Status: Abnormal   Result Value Ref Range    Cholesterol 171 <200 mg/dL    Triglycerides 180 (H) <150 mg/dL    HDL Cholesterol 72 >39 mg/dL    LDL Cholesterol Calculated 63 <100 mg/dL    Non HDL Cholesterol 99 <130 mg/dL   ALT     Status: None   Result Value Ref Range    ALT 32 0 - 70 U/L   Basic metabolic panel     Status: Abnormal   Result Value Ref Range    Sodium 132 (L) 133 - 144 mmol/L    Potassium 4.9 3.4 - 5.3 mmol/L    Chloride 100 94 - 109 mmol/L    Carbon Dioxide 25 20 - 32 mmol/L    Anion Gap 7 3 - 14 mmol/L    Glucose 248 (H) 70 - 99 mg/dL    Urea Nitrogen 15 7 - 30 mg/dL    Creatinine 1.05 0.66 - 1.25 mg/dL    GFR Estimate 63 >60 mL/min/[1.73_m2]    GFR Estimate If Black 73 >60 mL/min/[1.73_m2]    Calcium 10.0 8.5 - 10.1 mg/dL                 RE: Manuel Rosales   MRN: [id number]  : 3/22/1931  ENC DATE: 2020

## 2020-02-25 ENCOUNTER — OFFICE VISIT (OUTPATIENT)
Dept: OPHTHALMOLOGY | Facility: CLINIC | Age: 85
End: 2020-02-25
Payer: COMMERCIAL

## 2020-02-25 DIAGNOSIS — H40.1132 PRIMARY OPEN ANGLE GLAUCOMA (POAG) OF BOTH EYES, MODERATE STAGE: Primary | ICD-10-CM

## 2020-02-25 PROCEDURE — 92083 EXTENDED VISUAL FIELD XM: CPT | Performed by: OPHTHALMOLOGY

## 2020-02-25 PROCEDURE — 92012 INTRM OPH EXAM EST PATIENT: CPT | Performed by: OPHTHALMOLOGY

## 2020-02-25 PROCEDURE — 92133 CPTRZD OPH DX IMG PST SGM ON: CPT | Performed by: OPHTHALMOLOGY

## 2020-02-25 RX ORDER — DORZOLAMIDE HYDROCHLORIDE AND TIMOLOL MALEATE 20; 5 MG/ML; MG/ML
1 SOLUTION/ DROPS OPHTHALMIC 2 TIMES DAILY
Qty: 3 BOTTLE | Refills: 4 | Status: SHIPPED | OUTPATIENT
Start: 2020-02-25 | End: 2021-08-10

## 2020-02-25 RX ORDER — LATANOPROST 50 UG/ML
1 SOLUTION/ DROPS OPHTHALMIC AT BEDTIME
Qty: 3 BOTTLE | Refills: 4 | Status: SHIPPED | OUTPATIENT
Start: 2020-02-25 | End: 2021-04-08

## 2020-02-25 RX ORDER — BRIMONIDINE TARTRATE 1.5 MG/ML
1 SOLUTION/ DROPS OPHTHALMIC 2 TIMES DAILY
Qty: 3 BOTTLE | Refills: 4 | Status: SHIPPED | OUTPATIENT
Start: 2020-02-25 | End: 2021-04-08

## 2020-02-25 ASSESSMENT — EXTERNAL EXAM - LEFT EYE: OS_EXAM: NORMAL

## 2020-02-25 ASSESSMENT — TONOMETRY
OD_IOP_MMHG: 15
OS_IOP_MMHG: 16
IOP_METHOD: APPLANATION

## 2020-02-25 ASSESSMENT — VISUAL ACUITY
OD_CC: 20/30
CORRECTION_TYPE: GLASSES
METHOD: SNELLEN - LINEAR
OS_CC: 20/25

## 2020-02-25 ASSESSMENT — EXTERNAL EXAM - RIGHT EYE: OD_EXAM: NORMAL

## 2020-02-25 NOTE — LETTER
2/25/2020         RE: Mnauel Rosales  [address]  [place] [facility] [place] [address]        Dear Colleague,    Thank you for referring your patient, Manuel Rosales, to the [facility]. Please see a copy of my visit note below.     Current Eye Medications:  Dorz/timolol and brimonidine twice a day both eyes, last took at 7:45 am. Latanoprost at bedtime both eyes, last took at 6 pm.  Would like refills of all drops for 90 day supply so he is on the same schedule.      Subjective:  6 month follow up for intraocular pressure check, glaucoma OCT and Rios Visual Field.  Diabetic for 25 years. Blood sugar was was unstable, so they changed medications.     Objective:  See Ophthalmology Exam.       Assessment:  Stable intraocular pressure, glaucoma OCT, and Rios Visual Field both eyes in patient with moderate open angle glaucoma.      Plan:  Continue using Dorzolamide/Timolol (Cosopt - dark blue top) both eyes twice daily,  Brimonidine (purple top) both eyes twice daily,  And Latanoprost (green top) both eyes at bedtime.  Return visit in 6 months for complete exam.     Willy Cardoso M.D.  [phone]           Again, thank you for allowing me to participate in the care of your patient.        Sincerely,        Willy Cardoso MD

## 2020-02-25 NOTE — PROGRESS NOTES
Current Eye Medications:  Dorz/timolol and brimonidine twice a day both eyes, last took at 7:45 am. Latanoprost at bedtime both eyes, last took at 6 pm.  Would like refills of all drops for 90 day supply so he is on the same schedule.      Subjective:  6 month follow up for intraocular pressure check, glaucoma OCT and Rios Visual Field.  Diabetic for 25 years. Blood sugar was was unstable, so they changed medications.     Objective:  See Ophthalmology Exam.       Assessment:  Stable intraocular pressure, glaucoma OCT, and Rios Visual Field both eyes in patient with moderate open angle glaucoma.      Plan:  Continue using Dorzolamide/Timolol (Cosopt - dark blue top) both eyes twice daily,  Brimonidine (purple top) both eyes twice daily,  And Latanoprost (green top) both eyes at bedtime.  Return visit in 6 months for complete exam.     Willy Cardoso M.D.  [phone]

## 2020-02-27 ASSESSMENT — PACHYMETRY
OD_CT(UM): 593
OS_CT(UM): 594

## 2020-04-09 ENCOUNTER — OFFICE VISIT (OUTPATIENT)
Dept: FAMILY MEDICINE | Facility: CLINIC | Age: 85
End: 2020-04-09
Payer: COMMERCIAL

## 2020-04-09 VITALS
TEMPERATURE: 97.5 F | HEART RATE: 85 BPM | SYSTOLIC BLOOD PRESSURE: 134 MMHG | DIASTOLIC BLOOD PRESSURE: 60 MMHG | BODY MASS INDEX: 31.71 KG/M2 | OXYGEN SATURATION: 97 % | WEIGHT: 202 LBS | HEIGHT: 67 IN

## 2020-04-09 DIAGNOSIS — Z79.4 TYPE 2 DIABETES MELLITUS WITH MICROALBUMINURIA, WITH LONG-TERM CURRENT USE OF INSULIN (H): Primary | ICD-10-CM

## 2020-04-09 DIAGNOSIS — I50.30 BENIGN HYPERTENSIVE HEART AND KIDNEY DISEASE WITH DIASTOLIC CONGESTIVE HEART FAILURE, NYHA CLASS 2 AND CHRONIC KIDNEY DISEASE STAGE 3 (H): ICD-10-CM

## 2020-04-09 DIAGNOSIS — R80.9 TYPE 2 DIABETES MELLITUS WITH MICROALBUMINURIA, WITH LONG-TERM CURRENT USE OF INSULIN (H): Primary | ICD-10-CM

## 2020-04-09 DIAGNOSIS — I13.0 BENIGN HYPERTENSIVE HEART AND KIDNEY DISEASE WITH DIASTOLIC CONGESTIVE HEART FAILURE, NYHA CLASS 2 AND CHRONIC KIDNEY DISEASE STAGE 3 (H): ICD-10-CM

## 2020-04-09 DIAGNOSIS — L57.0 ACTINIC KERATOSIS: ICD-10-CM

## 2020-04-09 DIAGNOSIS — N18.30 BENIGN HYPERTENSIVE HEART AND KIDNEY DISEASE WITH DIASTOLIC CONGESTIVE HEART FAILURE, NYHA CLASS 2 AND CHRONIC KIDNEY DISEASE STAGE 3 (H): ICD-10-CM

## 2020-04-09 DIAGNOSIS — E11.29 TYPE 2 DIABETES MELLITUS WITH MICROALBUMINURIA, WITH LONG-TERM CURRENT USE OF INSULIN (H): Primary | ICD-10-CM

## 2020-04-09 LAB
CREAT UR-MCNC: 15 MG/DL
ERYTHROCYTE [DISTWIDTH] IN BLOOD BY AUTOMATED COUNT: 12.8 % (ref 10–15)
GLUCOSE BLD-MCNC: 267 MG/DL (ref 70–99)
HBA1C MFR BLD: 7.5 % (ref 0–5.6)
HCT VFR BLD AUTO: 43.1 % (ref 40–53)
HGB BLD-MCNC: 14.4 G/DL (ref 13.3–17.7)
MCH RBC QN AUTO: 30 PG (ref 26.5–33)
MCHC RBC AUTO-ENTMCNC: 33.4 G/DL (ref 31.5–36.5)
MCV RBC AUTO: 90 FL (ref 78–100)
MICROALBUMIN UR-MCNC: 30 MG/L
MICROALBUMIN/CREAT UR: 208.9 MG/G CR (ref 0–17)
PLATELET # BLD AUTO: 302 10E9/L (ref 150–450)
RBC # BLD AUTO: 4.8 10E12/L (ref 4.4–5.9)
WBC # BLD AUTO: 10.8 10E9/L (ref 4–11)

## 2020-04-09 PROCEDURE — 85027 COMPLETE CBC AUTOMATED: CPT | Performed by: FAMILY MEDICINE

## 2020-04-09 PROCEDURE — 83036 HEMOGLOBIN GLYCOSYLATED A1C: CPT | Performed by: FAMILY MEDICINE

## 2020-04-09 PROCEDURE — 82947 ASSAY GLUCOSE BLOOD QUANT: CPT | Performed by: FAMILY MEDICINE

## 2020-04-09 PROCEDURE — 17000 DESTRUCT PREMALG LESION: CPT | Performed by: FAMILY MEDICINE

## 2020-04-09 PROCEDURE — 82043 UR ALBUMIN QUANTITATIVE: CPT | Performed by: FAMILY MEDICINE

## 2020-04-09 PROCEDURE — 36415 COLL VENOUS BLD VENIPUNCTURE: CPT | Performed by: FAMILY MEDICINE

## 2020-04-09 PROCEDURE — 99214 OFFICE O/P EST MOD 30 MIN: CPT | Mod: 25 | Performed by: FAMILY MEDICINE

## 2020-04-09 ASSESSMENT — MIFFLIN-ST. JEOR: SCORE: 1539.9

## 2020-04-09 NOTE — RESULT ENCOUNTER NOTE
Mr. Rosales,    All of the rest of your test results were normal or within the expected range for you.    Please contact the clinic if you have additional questions.  Thank you.    Sincerely,      Ronaldo Corral MD

## 2020-04-09 NOTE — PROGRESS NOTES
"Subjective     Manuel Rosales is a 89 year old male who presents to clinic today for the following health issues:    HPI   Diabetes Follow-up    How often are you checking your blood sugar? One time daily  What time of day are you checking your blood sugars (select all that apply)?  Before meals  Have you had any blood sugars above 200?  Typically 70 to 170 in the morning before breakfast   Have you had any blood sugars below 70?  Yes sometimes     What symptoms do you notice when your blood sugar is low?  None    What concerns do you have today about your diabetes? None     Do you have any of these symptoms? 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  2-3    On average, how many sweetened beverages do you drink each day (Examples: soda, juice, sweet tea, etc.  Do NOT count diet or artificially sweetened beverages)?   0    How many days per week do you exercise enough to make your heart beat faster? 3 or less    How many minutes a day do you exercise enough to make your heart beat faster? 9 or less    How many days per week do you miss taking your medication? 0      Past medical, family, and social histories, medications, and allergies are reviewed and updated in [facility].     Review of Systems   ROS COMP: Constitutional, HEENT, cardiovascular, pulmonary, gi and gu systems are negative, except as otherwise noted.      Objective    BP (!) 150/76 (BP Location: Right arm, Patient Position: Chair, Cuff Size: Adult Large)   Pulse 85   Temp 97.5  F (36.4  C) (Oral)   Ht 1.702 m (5' 7\")   Wt 91.6 kg (202 " lb)   SpO2 97%   BMI 31.64 kg/m    Body mass index is 31.64 kg/m .  Physical Exam   GENERAL: healthy, alert and no distress  EYES: Eyes grossly normal to inspection, PERRL, EOMI, sclerae white and conjunctivae normal  RESP: lungs clear to auscultation - no crackles or wheezes, no areas of dullness, no tachypnea  CV: Heart regular rate and rhythm without murmur, click or rub. No peripheral edema and peripheral pulses strong  MS: no gross musculoskeletal defects noted, no edema  SKIN: AK on LT forearm  NEURO: Normal strength and tone, sensory exam grossly normal, mentation intact, oriented times 3 and cranial nerves 2-12 intact  PSYCH: mentation appears normal, affect normal/bright     Diagnostic Test Results:  Results for orders placed or performed in visit on 04/09/20 (from the past 24 hour(s))   Hemoglobin A1c   Result Value Ref Range    Hemoglobin A1C 7.5 (H) 0 - 5.6 %   Glucose whole blood   Result Value Ref Range    Glucose Whole Blood 267 (H) 70 - 99 mg/dL   CBC with Platelets     Result Value Ref Range    WBC 10.8 4.0 - 11.0 10e9/L    RBC Count 4.80 4.4 - 5.9 10e12/L    Hemoglobin 14.4 13.3 - 17.7 g/dL    Hematocrit 43.1 40.0 - 53.0 %    MCV 90 78 - 100 fl    MCH 30.0 26.5 - 33.0 pg    MCHC 33.4 31.5 - 36.5 g/dL    RDW 12.8 10.0 - 15.0 %    Platelet Count 302 150 - 450 10e9/L      Lab Results   Component Value Date    A1C 8.2 01/30/2020    A1C 7.5 07/29/2019    A1C 7.4 03/28/2019    A1C 8.0 01/10/2019    A1C 7.8 07/09/2018              Assessment & Plan     (E11.29,  R80.9,  Z79.4) Type 2 diabetes mellitus with microalbuminuria, with long-term current use of insulin (H)  (primary encounter diagnosis)  Comment: well controlled now, with the addition of Tradjenta at last visit January. Pt does note that it costs more than most of his medications (>$300/3 mo)  Plan: Hemoglobin A1c, Glucose whole blood, Albumin         Random Urine Quantitative with Creat Ratio,         linagliptin (TRADJENTA) 5 MG TABS tablet         Return in about 6 months (around 10/9/2020) for full physical, diabetes.  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 The patient tolerated the procedure well. Follow up (call) in 3+ weeks for recheck if any residual remains.       Return in about 6 months (around 10/9/2020) for full physical, diabetes.    Ronaldo Corral MD  [facility]

## 2020-04-14 ENCOUNTER — TELEPHONE (OUTPATIENT)
Dept: FAMILY MEDICINE | Facility: CLINIC | Age: 85
End: 2020-04-14

## 2020-04-14 DIAGNOSIS — Z79.4 TYPE 2 DIABETES MELLITUS WITH MICROALBUMINURIA, WITH LONG-TERM CURRENT USE OF INSULIN (H): ICD-10-CM

## 2020-04-14 DIAGNOSIS — E11.29 TYPE 2 DIABETES MELLITUS WITH MICROALBUMINURIA, WITH LONG-TERM CURRENT USE OF INSULIN (H): ICD-10-CM

## 2020-04-14 DIAGNOSIS — R80.9 TYPE 2 DIABETES MELLITUS WITH MICROALBUMINURIA, WITH LONG-TERM CURRENT USE OF INSULIN (H): ICD-10-CM

## 2020-04-14 NOTE — TELEPHONE ENCOUNTER
Don returned call    Best number to reach caller: Home number on file [phone] (home)    Is it ok to leave a detailed message: YES     Just wanted to relay that he does have 2 weeks of prescriptions on hand so he should be ok while he waits for original request. If he has any other concerns he will give us a call back.

## 2020-04-14 NOTE — TELEPHONE ENCOUNTER
[facility]  [address] [address] [facility]  [address]  [place]     Toll-Free Tel: [phone]  Toll-Free Fax: [phone]     Not able to find on pharmacy listing in [employer].  May have to print RX and mail to patient or pharmacy.    Abbie Simon MA

## 2020-04-14 NOTE — TELEPHONE ENCOUNTER
Reason for Call:  Other prescription    Detailed comments: Patient would like medication Tradjenta 5 MG sent to [facility] for a 90 day supply he said that's one of the options he was given by Dr. Corral he said it was the option that he would like to go with and it is the cheapest for him he is willing to pay for the medication $139 was the total due for the med he wants  to send in the order to the [facility] please advise patient with any further questions thanks.    Phone Number Patient can be reached at: Cell number on file:    Telephone Information:   Mobile [phone]       Best Time: Any    Can we leave a detailed message on this number? YES    Call taken on 4/14/2020 at 8:55 AM by Liban Edmondson

## 2020-04-14 NOTE — TELEPHONE ENCOUNTER
Please call patient. We need more information then this. Get as much info as possible. Address, phone number, zip code.    Please pend med and pharmacy after then route to refill pool please.    Anne Champagne RN

## 2020-04-14 NOTE — TELEPHONE ENCOUNTER
Spoke with pt and informed him of message below. He has an active script that if need be he will take to [facility]. He will call us towards the end of this week    Rosalinda Edwards MA

## 2020-04-14 NOTE — TELEPHONE ENCOUNTER
[place] how it works to send prescription. May have to take care of this next week. Please let patient know that Dr. Corral is out of the office this week.  Yi Mathew MD

## 2020-04-20 NOTE — TELEPHONE ENCOUNTER
.Reason for Call:  Tradjenta prescription    Detailed comments: Patient is opting out of using mail order through [facility] for now, maybe later that can be pursued if [facility] denies;  Is going to use the [facility] in [place] this time for his refill    Phone Number Patient can be reached at: Home number on file [phone] (home)    Best Time: anytime    Can we leave a detailed message on this number? YES    Call taken on 4/20/2020 at 8:38 AM by Joyce Bright

## 2020-06-17 ENCOUNTER — TELEPHONE (OUTPATIENT)
Dept: FAMILY MEDICINE | Facility: CLINIC | Age: 85
End: 2020-06-17

## 2020-06-17 DIAGNOSIS — R00.1 BRADYCARDIA: Chronic | ICD-10-CM

## 2020-06-17 DIAGNOSIS — I50.32 CHRONIC DIASTOLIC HEART FAILURE (H): Primary | ICD-10-CM

## 2020-06-17 NOTE — TELEPHONE ENCOUNTER
larisa for Call:  Other call back    Detailed comments: Oxana (daughter) called in regards to her dads health. Feels he might have had an episode of a mini stroke or something last Tuesday. He has had about 3 episodes last year and nothing since.   Happened in the evening around 6 or 7pm. His speech became slurred a little bit. Not sure what to do, if he should be seen soon or not. Also asking if you could write prescription for a Rollator Walker.  Please call to advise.  Thank you     Phone Number Patient can be reached at: Home number on file [phone] (home)    Best Time: Any    Can we leave a detailed message on this number? YES    Call taken on 6/17/2020 at 2:14 PM by Elver Srivastava

## 2020-06-18 NOTE — TELEPHONE ENCOUNTER
DME printed at [facility]. Order will be faxed to [facility] per daughter request.    RN - okay to wait until Monday or schedule face to face this week? No opening on schedule this week at [place]?    Please call daughter's phone on file. Thanks.    Tom Sanchez CMA

## 2020-06-18 NOTE — TELEPHONE ENCOUNTER
Clarified with provider.  It is recommended patient be seen in  if unable to get a scheduled primary care visit tomorrow.  At this time, primary care is completely booked.    Called and spoke with patient. Discussed provider plan and urged patient to seek care either this evening or by tomorrow, in [facility]. Symptoms need face-to-face evaluation, to make sure larger stroke isn't happening.  Patient agreed with going to  tomorrow, arriving at opening time (11:00 am). Did not want to go this evening.   Patient asked if writer would call his daughter, Oxana and communicate recommendations.    Writer called and spoke with patient's daughter. Discussed provider plan. Oxana agreed, she does want patient to be seen by someone in person. Notes that patient has had these very similar symptom about 1 year ago and patient went in to hospital and was evaluated and nothing was found to be wrong. Didn't have any symptoms for a year, and now having same symptoms again. Daughter feels something must be going on, maybe something that can't be seen (or at least was not seen last year when evaluated).  Writer unable to offer a primary care visit tomorrow as provider is completely booked and PCP is working virtual visits only. Writer recommended for daughter to call clinic in the am and see if there were any cancellations for clinic visit, otherwise ask to be scheduled with  per Dr. Corral request that patient be seen in person this week. Did mention that patient could be seen at any  clinic so if able to get another appointment at different location and willing to drive, can try this option as well. Stressed the importance that patient be seen tomorrow, above else.  Patient to go to ED or call 911 if has these symptoms occur again, in mean time.   Daughter is worried about patient having symptoms and not being able to contact anyone since he lives alone. She will call patient tonight and discuss plan with him and she  will take care of patient being seen asap.  No other questions at this time.    Lissa Ramon RN  [facility]

## 2020-06-18 NOTE — TELEPHONE ENCOUNTER
Yes, he can and should have a face-to-face visit regarding the possible stroke symptoms.      I ordered the DME prescription for the walker.  I believe it printed somewhere.

## 2020-06-18 NOTE — TELEPHONE ENCOUNTER
Spoke with patient on the phone. His daughter thinks he had a mini stroke on Tuesday. He had a an episode of where speech was slurred and he could not move his hands or arms. He stood up and it went away and he reports he went back to normal functioning quickly. This has happened 2-3 other times. Writer told him next time this happens he needs to seek medical care immediately.    Provider can patient be seen for face to face for this?      Also He wants a 4 wheeled walker one that has a seat on it.  Can this be ordered by PCP?      Provider please review and advise.    Anne Champagne RN

## 2020-06-22 ENCOUNTER — TELEPHONE (OUTPATIENT)
Dept: FAMILY MEDICINE | Facility: CLINIC | Age: 85
End: 2020-06-22

## 2020-06-22 NOTE — TELEPHONE ENCOUNTER
Routing to provider to please advise on form.     Tran Kirkland RN  [facility]

## 2020-06-22 NOTE — TELEPHONE ENCOUNTER
Reason for Call:  Other Form    Detailed comments: [facility] states that [facility] regarding the medication below and he would like to request that it be filled out as soon as possible. Thank you.      linagliptin (TRADJENTA) 5 MG TABS tablet    Phone Number Patient can be reached at: Home number on file [phone] (home)    Best Time: Any    Can we leave a detailed message on this number? YES    Call taken on 6/22/2020 at 10:54 AM by Analilia Harvey

## 2020-06-24 NOTE — TELEPHONE ENCOUNTER
There is no form, the fax is requesting medical records regarding patient's diabetes and most recent A1C lab faxed to [facility] at fax # [phone].  Damian Andrade,  For [facility]st Floor Primary [facility]    [facility] not [facility], they need an alternative sent to their pharmacy to fill for patient.  Alternatives are ozempic, acarbose, alogliptin, empagliflozin, glimepride, glipizide, metformin, and pioglitazone.     Requested record is faxed, provider please address the medication in bold above.  Damian Andrade,  For [facility]st Floor [facility]

## 2020-06-29 ENCOUNTER — OFFICE VISIT (OUTPATIENT)
Dept: FAMILY MEDICINE | Facility: CLINIC | Age: 85
End: 2020-06-29
Payer: COMMERCIAL

## 2020-06-29 VITALS
DIASTOLIC BLOOD PRESSURE: 63 MMHG | SYSTOLIC BLOOD PRESSURE: 128 MMHG | BODY MASS INDEX: 30.13 KG/M2 | WEIGHT: 192 LBS | TEMPERATURE: 97.8 F | HEART RATE: 63 BPM | RESPIRATION RATE: 20 BRPM | HEIGHT: 67 IN | OXYGEN SATURATION: 96 %

## 2020-06-29 DIAGNOSIS — I65.29 STENOSIS OF CAROTID ARTERY, UNSPECIFIED LATERALITY: ICD-10-CM

## 2020-06-29 DIAGNOSIS — R80.9 TYPE 2 DIABETES MELLITUS WITH MICROALBUMINURIA, WITH LONG-TERM CURRENT USE OF INSULIN (H): Primary | Chronic | ICD-10-CM

## 2020-06-29 DIAGNOSIS — C18.6 MALIGNANT NEOPLASM OF DESCENDING COLON (H): ICD-10-CM

## 2020-06-29 DIAGNOSIS — Z79.4 TYPE 2 DIABETES MELLITUS WITH MICROALBUMINURIA, WITH LONG-TERM CURRENT USE OF INSULIN (H): Primary | Chronic | ICD-10-CM

## 2020-06-29 DIAGNOSIS — E11.29 TYPE 2 DIABETES MELLITUS WITH MICROALBUMINURIA, WITH LONG-TERM CURRENT USE OF INSULIN (H): Primary | Chronic | ICD-10-CM

## 2020-06-29 DIAGNOSIS — R53.1 WEAKNESS: ICD-10-CM

## 2020-06-29 PROCEDURE — 99214 OFFICE O/P EST MOD 30 MIN: CPT | Performed by: FAMILY MEDICINE

## 2020-06-29 RX ORDER — ALOGLIPTIN 25 MG/1
25 TABLET, FILM COATED ORAL DAILY
Qty: 90 TABLET | Refills: 0 | Status: SHIPPED | OUTPATIENT
Start: 2020-06-29 | End: 2021-10-14

## 2020-06-29 ASSESSMENT — MIFFLIN-ST. JEOR: SCORE: 1494.54

## 2020-06-29 ASSESSMENT — PAIN SCALES - GENERAL: PAINLEVEL: NO PAIN (0)

## 2020-06-29 NOTE — PROGRESS NOTES
"Subjective     Manuel Rosales is a 89 year old male who presents to clinic today for the following health issues. He is accompanied by his daughter. :    HPI   Concern - Suspected stroke symptoms  Onset: 6 weeks    Description:   Patient's daughter Tenisha states father called her and told her that he thinks he Is having a stroke. Paramedics was called and came to help patient. Patient was sleeping and woke up in chair suddenly with symptoms, last 20-25 minutes.  Further discussion reveals that his episodes tend to occur upon awakening, where the patient feels like he cannot speak and cannot move.  The limbs (bilaterally) will feel very heavy.  Other than the initial shock or disorientation (\"it feels like I might be dead\"), he tends to be lucid, and eventually will start to regain his ability to move and speak, thus he is able to call his daughter with difficulty 1 time.  He has not checked his glucose during these episodes.    Intensity: severe    Progression of Symptoms:  improving    Accompanying Signs & Symptoms:  Slurred speech,  Couldn't move body, vertigo    Previous history of similar problem:   Yes 3 years ago-stress from moving and wife passed away    Precipitating factors:   Worsened by: none    Alleviating factors:  Improved by: none    Therapies Tried and outcome: aspirin      Colon Cancer:  Patient has a history of malignant polyps and 2002, technically qualifying as colon cancer.  He has had several normal colonoscopies since, including in 2018.  No symptoms now that would be referred to colon cancer.      Past medical, family, and social histories, medications, and allergies are reviewed and updated in Epic.         Review of Systems   Constitutional, HEENT, cardiovascular, pulmonary, gi and gu systems are negative, except as otherwise noted.    OBJECTIVE:   /63 (BP Location: Left arm, Patient Position: Chair, Cuff Size: Adult Large)   Pulse 63   Temp 97.8  F (36.6  C) (Oral)   Resp 20   " "Ht 1.702 m (5' 7\")   Wt 87.1 kg (192 lb)   SpO2 96%   BMI 30.07 kg/m    Body mass index is 30.07 kg/m .  Physical Exam   GENERAL: healthy, alert and no distress  EYES: Eyes grossly normal to inspection, PERRL, EOMI, sclerae white and conjunctivae normal  RESP: lungs clear to auscultation - no crackles or wheezes, no areas of dullness, no tachypnea  CV: Heart regular rate and rhythm without murmur, click or rub. No peripheral edema and peripheral pulses strong  MS: no gross musculoskeletal defects noted, no edema  SKIN: no suspicious lesions or rashes to visible skin  NEURO: Normal strength and tone, sensory exam grossly normal, mentation intact, oriented times 3 and cranial nerves 2-12 intact  PSYCH: mentation appears normal, affect normal/bright     Diagnostic Test Results:  Lab Results   Component Value Date    A1C 7.5 04/09/2020    A1C 8.2 01/30/2020    A1C 7.5 07/29/2019    A1C 7.4 03/28/2019    A1C 8.0 01/10/2019          ASSESSMENT / PLAN:    (E11.29,  R80.9,  Z79.4) Type 2 diabetes mellitus with microalbuminuria, with long-term current use of insulin (H)  (primary encounter diagnosis)  Comment: Well-controlled based on most recent hemoglobin A1c.  The symptoms described above could be episodes of hypoglycemia.  For the past few months, the patient in our office have [facility] Tradjenta.  They tell me that alogliptin is available to him, so I will send a prescription for that.  Plan: AMBULATORY ADULT DIABETES EDUCATOR REFERRAL,         alogliptin (NESINA) 25 MG tablet       Patient will continue on gradually decreasing his Lantus dose.  I would like the patient and/or his daughter to attempt to check his glucose immediately when he is having these morning) symptoms, because of my suspicion of hypoglycemia, especially since he has had a significant improvement in his diabetes control from January until now.  Since the problem is somewhat " unpredictable, I think a diagnostic CGM may be helpful as well. Return in about 3 months (around 10/9/2020) for full physical, diabetes.  Consider decreasing or discontinuing glipizide if he does have hypoglycemia.    (I65.29) Stenosis of carotid artery, unspecified laterality  Comment: Mild stenosis has been noted on imaging previously.  Since there is a general suspicion of him experiencing TIAs, I would like to have the carotid arteries reimaged prior to seeing a neurologist  Plan: US Carotid Bilateral, NEUROLOGY ADULT REFERRAL          (R53.1) Weakness  Comment: It is my suspicion that his episodes are due to hypoglycemia does not pan out, I will want him to follow-up with a neurologist next regarding his spells (which resemble TIAs, cataplexy, etc.)  Plan: NEUROLOGY ADULT REFERRAL          (C18.6) Malignant neoplasm of descending colon (H)  Comment: Remote history from 2002, probably cured  Plan: Colonoscopy every 5 years    Ronaldo Corral MD

## 2020-06-29 NOTE — Clinical Note
Jessi Nevarez I do not have a Diabetes Education pool that I can for this chart to, but has hoping someone from your department will reach out to the patient or his daughter to set up an appointment for diagnostic CGM.  It looks like nothing has been scheduled yet (they may be trying to figure out the possible hypoglycemia on their own for a week or so).  Thanks.  Dr. Corral

## 2020-06-29 NOTE — PATIENT INSTRUCTIONS
At [facility], we strive to deliver an exceptional experience to you, every time we see you.  If you receive a survey in the mail, please send us back your thoughts. We really do value your feedback.    Based on your medical history, these are the current health maintenance/preventive care services that you are due for (some may have been done at this visit.)  Health Maintenance Due   Topic Date Due     HF ACTION PLAN  03/22/1931     ZOSTER IMMUNIZATION (2 of 3) 11/25/2008     ADVANCE CARE PLANNING  02/24/2019     DIABETIC FOOT EXAM  07/09/2019     PHQ-2  01/01/2020     MEDICARE ANNUAL WELLNESS VISIT  01/10/2020     FALL RISK ASSESSMENT  01/10/2020         Suggested websites for health information:  Www.[employer].com.org : Up to date and easily searchable information on multiple topics.  Www.medlineplus.gov : medication info, interactive tutorials, watch real surgeries online  Www.familydoctor.org : good info from the Academy of Family Physicians  Www.cdc.gov : public health info, travel advisories, epidemics (H1N1)  Www.aap.org : children's health info, normal development, vaccinations  Www.health.state.[place].us : [place] dept of health, public health issues in [place], N1N1    Your care team:                            Family Medicine Internal Medicine   MD Kaiden Harris MD Shantel Branch-Fleming, MD Katya Georgiev PA-C Nam Ho, MD Pediatrics   ERNST Calderon, DAVID Arechiga APRN CNP   MD Dorene Danielle MD Deborah Mielke, MD Kim Thein, APRN CNP      Clinic hours: Monday - Thursday 7 am-7 pm; Fridays 7 am-5 pm.   Urgent care: Monday - Friday 11 am-9 pm; Saturday and Sunday 9 am-5 pm.  Pharmacy : Monday -Thursday 8 am-8 pm; Friday 8 am-6 pm; Saturday and Sunday 9 am-5 pm.     Clinic: [phone]   Pharmacy: [phone]      Please call [facility]: [phone] to schedule your carotid (neck) artery ultrasound.

## 2020-06-30 ENCOUNTER — TELEPHONE (OUTPATIENT)
Dept: FAMILY MEDICINE | Facility: CLINIC | Age: 85
End: 2020-06-30

## 2020-06-30 NOTE — TELEPHONE ENCOUNTER
Daughter would like Dr. Corral to be alerted to patient's cardiology appointments yesterday. Cardiology ended up putting patient on Eliquis. Daughter didn't know that patient spent 24 hours in A-fib and that is why they needed two appointments with cardiology yesterday. They needed to reset the wires and meet with Cardiology. She also learned patient's right carotid is completely occluded and left carotid they hear some whooshing.    She will be trying to accompany him to visits now. She thinks with his [place] and people wearing masks it is hard for him to connect the dots and understand all that is going on.     Fernanda Wakefield RN  [facility]

## 2020-06-30 NOTE — TELEPHONE ENCOUNTER
Reason for Call:  Other call back    Detailed comments: Patient's daughter calling to relay information to Dr. Corral following his last appointment.    Patient was seen at [facility] regarding his Pacemaker and told he was in A Fib.  Please call back to get detailed information.    Phone Number Patient can be reached at: Other phone number daughter Oxana Kumar [phone]    Best Time: Any    Can we leave a detailed message on this number? YES    Call taken on 6/30/2020 at 10:23 AM by Josh Clark

## 2020-07-03 ENCOUNTER — TELEPHONE (OUTPATIENT)
Dept: FAMILY MEDICINE | Facility: CLINIC | Age: 85
End: 2020-07-03

## 2020-07-03 DIAGNOSIS — E11.29 TYPE 2 DIABETES MELLITUS WITH MICROALBUMINURIA, WITH LONG-TERM CURRENT USE OF INSULIN (H): Primary | Chronic | ICD-10-CM

## 2020-07-03 DIAGNOSIS — R80.9 TYPE 2 DIABETES MELLITUS WITH MICROALBUMINURIA, WITH LONG-TERM CURRENT USE OF INSULIN (H): Primary | Chronic | ICD-10-CM

## 2020-07-03 DIAGNOSIS — Z79.4 TYPE 2 DIABETES MELLITUS WITH MICROALBUMINURIA, WITH LONG-TERM CURRENT USE OF INSULIN (H): Primary | Chronic | ICD-10-CM

## 2020-07-03 NOTE — TELEPHONE ENCOUNTER
Pt needs a CGM  preferred by Dr. Corral.   Currently the CGM are being scheduled  In September.   Dr Corral wants to rule out stoke vs. Low blood sugars.   daughter this unacceptable  ro have to wait until September.  Please advise.      [facility] Specialist  Diabetes & Nutrition Scheduling  [facility] [place] [address] [phone]

## 2020-07-06 ENCOUNTER — TELEPHONE (OUTPATIENT)
Dept: FAMILY MEDICINE | Facility: CLINIC | Age: 85
End: 2020-07-06

## 2020-07-06 NOTE — TELEPHONE ENCOUNTER
[facility] not [facility] Tradjenta. Alternative includes Algoliptin. Provider sent alternative 6/29/2020. Provider request team to check with pharmacy.     Writer called pharmacy and confirms pharmacy has processed and mailed alternative to patient already. No further action needed. Fax put into scanning.    Tom Sanchez CMA

## 2020-07-06 NOTE — TELEPHONE ENCOUNTER
Call to Oxana (daughter) regarding scheduling professional CGM. Diabetes education is not in clinic for inperson visits. An alternative would be to try and get Don a personal CGM for monitoring glucose.  She would like CGM done as soon as possible and she states she would be able to help him with the device.  I will send a request to PCP for personal CGM prescription. If prescribed Don will need a telephone/video visit to get started on the sensor. Oxana agrees with recommended plan.       ,    Since we don't yet have a return to clinic date to place a professional CGM, another option is to prescribe a personal CGM.  We can provide the education on how to use the device via telephone/video visit and Oxana agrees to assist him with the device.      If you agree please accept orders in  and route back to me    Mariza Hay RN, [facility]

## 2020-07-08 RX ORDER — FLASH GLUCOSE SCANNING READER
1 EACH MISCELLANEOUS CONTINUOUS
Qty: 1 DEVICE | Refills: 0 | Status: SHIPPED | OUTPATIENT
Start: 2020-07-08 | End: 2020-07-11

## 2020-07-08 RX ORDER — FLASH GLUCOSE SENSOR
1 KIT MISCELLANEOUS CONTINUOUS
Qty: 2 EACH | Refills: 11 | Status: SHIPPED | OUTPATIENT
Start: 2020-07-08 | End: 2020-07-11

## 2020-07-08 NOTE — TELEPHONE ENCOUNTER
Informed aníbal.    She states this needed to go to [facility]. Pharmacy selected    Rosalinda Edwards MA

## 2020-07-09 ENCOUNTER — ANCILLARY PROCEDURE (OUTPATIENT)
Dept: ULTRASOUND IMAGING | Facility: CLINIC | Age: 85
End: 2020-07-09
Attending: FAMILY MEDICINE
Payer: COMMERCIAL

## 2020-07-09 DIAGNOSIS — I65.29 STENOSIS OF CAROTID ARTERY, UNSPECIFIED LATERALITY: ICD-10-CM

## 2020-07-09 PROCEDURE — 93880 EXTRACRANIAL BILAT STUDY: CPT | Performed by: RADIOLOGY

## 2020-07-11 RX ORDER — FLASH GLUCOSE SENSOR
1 KIT MISCELLANEOUS CONTINUOUS
Qty: 2 EACH | Refills: 11 | Status: SHIPPED | OUTPATIENT
Start: 2020-07-11 | End: 2022-04-21

## 2020-07-11 RX ORDER — FLASH GLUCOSE SCANNING READER
1 EACH MISCELLANEOUS CONTINUOUS
Qty: 1 DEVICE | Refills: 0 | Status: SHIPPED | OUTPATIENT
Start: 2020-07-11 | End: 2022-04-21

## 2020-07-15 ENCOUNTER — TRANSFERRED RECORDS (OUTPATIENT)
Dept: HEALTH INFORMATION MANAGEMENT | Facility: CLINIC | Age: 85
End: 2020-07-15

## 2020-07-29 ENCOUNTER — TELEPHONE (OUTPATIENT)
Dept: FAMILY MEDICINE | Facility: CLINIC | Age: 85
End: 2020-07-29

## 2020-07-29 ENCOUNTER — TRANSFERRED RECORDS (OUTPATIENT)
Dept: HEALTH INFORMATION MANAGEMENT | Facility: CLINIC | Age: 85
End: 2020-07-29

## 2020-07-29 NOTE — TELEPHONE ENCOUNTER
Diabetes Education Scheduling Outreach #1:    Call to patient to schedule. No answer/busy.    Plan for 2nd outreach attempt within 1 week.    Shyla Chiu [facility]  Diabetes and Nutrition Scheduling

## 2020-08-04 NOTE — TELEPHONE ENCOUNTER
Discharge Planning Assessment  [facility]     Patient Name: Avani Argueta  MRN: [id number]  Today's Date: 8/4/2020    Admit Date: 8/3/2020    Discharge Needs Assessment     Row Name 08/04/20 1155       Living Environment    Name(s) of Who Lives With Patient  Noe Le    Current Living Arrangements  home/apartment/condo    Primary Care Provided by  self    Provides Primary Care For  no one, unable/limited ability to care for self    Family Caregiver if Needed  spouse    Family Caregiver Names  SpouseNoe    Quality of Family Relationships  supportive;involved;helpful    Able to Return to Prior Arrangements  yes    Living Arrangement Comments  Lives in [place] with spouse in house with 1 step at back.       Resource/Environmental Concerns    Resource/Environmental Concerns  none    Transportation Concerns  car, none       Transition Planning    Patient/Family Anticipates Transition to  home    Patient/Family Anticipated Services at Transition  none    Transportation Anticipated  family or friend will provide       Discharge Needs Assessment    Readmission Within the Last 30 Days  no previous admission in last 30 days    Concerns to be Addressed  discharge planning    Equipment Currently Used at Home  other (see comments) BP cuff, O2 sensor    Anticipated Changes Related to Illness  none    Equipment Needed After Discharge  none    Provided Post Acute Provider List?  N/A    N/A Provider List Comment  No post discharge services anticipated        Discharge Plan     Row Name 08/04/20 115       Plan    Plan  Home with spouse    Patient/Family in Agreement with Plan  yes    Plan Comments  Met with patient and spouse in the room for initial discharge planning.  Lives in [place] with spouse in house with 1 step at entrance.  Independent with meals and meds. Denies discharge needs at present.  Following for discharge needs.    Final Discharge Disposition Code  01 - home or self-care        Destination    Fax received from pharmacy requesting refill of  latanoprost (XALATAN) 0.005 % ophthalmic solution.        Coordination has not been started for this encounter.      Durable Medical Equipment      Coordination has not been started for this encounter.      Dialysis/Infusion      Coordination has not been started for this encounter.      Home Medical Care      Coordination has not been started for this encounter.      Therapy      Coordination has not been started for this encounter.      Community Resources      Coordination has not been started for this encounter.          Demographic Summary     Row Name 08/04/20 1154       General Information    Admission Type  observation    Arrived From  emergency department    Referral Source  admission list    Reason for Consult  discharge planning    Preferred Language  English        Functional Status     Row Name 08/04/20 1154       Functional Status    Usual Activity Tolerance  good    Current Activity Tolerance  good       Functional Status, IADL    Medications  independent    Meal Preparation  independent    Housekeeping  assistive person Spouse, Noe Nickel provides    Laundry  assistive person Spouse, Noe Nickel provides    Shopping  assistive person Spouse, Noe Nickel provides        Psychosocial    No documentation.       Abuse/Neglect    No documentation.       Legal    No documentation.       Substance Abuse    No documentation.       Patient Forms    No documentation.           Swetha Jeronimo RN

## 2020-08-08 DIAGNOSIS — I50.30 BENIGN HYPERTENSIVE HEART AND KIDNEY DISEASE WITH DIASTOLIC CONGESTIVE HEART FAILURE, NYHA CLASS 2 AND CHRONIC KIDNEY DISEASE STAGE 3 (H): ICD-10-CM

## 2020-08-08 DIAGNOSIS — I13.0 BENIGN HYPERTENSIVE HEART AND KIDNEY DISEASE WITH DIASTOLIC CONGESTIVE HEART FAILURE, NYHA CLASS 2 AND CHRONIC KIDNEY DISEASE STAGE 3 (H): ICD-10-CM

## 2020-08-08 DIAGNOSIS — N18.30 BENIGN HYPERTENSIVE HEART AND KIDNEY DISEASE WITH DIASTOLIC CONGESTIVE HEART FAILURE, NYHA CLASS 2 AND CHRONIC KIDNEY DISEASE STAGE 3 (H): ICD-10-CM

## 2020-08-11 RX ORDER — LISINOPRIL 40 MG/1
TABLET ORAL
Qty: 90 TABLET | Refills: 0 | Status: SHIPPED | OUTPATIENT
Start: 2020-08-11 | End: 2020-09-28

## 2020-08-11 NOTE — TELEPHONE ENCOUNTER
Prescription approved per [facility] Refill Protocol.  Fernanda Wakefield RN  [facility]

## 2020-08-17 DIAGNOSIS — E78.5 HYPERLIPIDEMIA LDL GOAL <100: Chronic | ICD-10-CM

## 2020-08-19 RX ORDER — ATORVASTATIN CALCIUM 80 MG/1
TABLET, FILM COATED ORAL
Qty: 90 TABLET | Refills: 1 | Status: SHIPPED | OUTPATIENT
Start: 2020-08-19 | End: 2020-09-28

## 2020-08-25 ENCOUNTER — OFFICE VISIT (OUTPATIENT)
Dept: OPHTHALMOLOGY | Facility: CLINIC | Age: 85
End: 2020-08-25
Payer: COMMERCIAL

## 2020-08-25 DIAGNOSIS — Z01.00 EXAMINATION OF EYES AND VISION: Primary | ICD-10-CM

## 2020-08-25 DIAGNOSIS — H52.4 PRESBYOPIA: ICD-10-CM

## 2020-08-25 DIAGNOSIS — H02.119 CICATRICIAL ECTROPION, UNSPECIFIED LATERALITY: ICD-10-CM

## 2020-08-25 DIAGNOSIS — Z96.1 PSEUDOPHAKIA: ICD-10-CM

## 2020-08-25 DIAGNOSIS — H35.372 EPIRETINAL MEMBRANE, LEFT: ICD-10-CM

## 2020-08-25 DIAGNOSIS — H40.1132 PRIMARY OPEN ANGLE GLAUCOMA OF BOTH EYES, MODERATE STAGE: ICD-10-CM

## 2020-08-25 DIAGNOSIS — Z79.4 TYPE 2 DIABETES MELLITUS WITH MICROALBUMINURIA, WITH LONG-TERM CURRENT USE OF INSULIN (H): ICD-10-CM

## 2020-08-25 DIAGNOSIS — H43.813 POSTERIOR VITREOUS DETACHMENT OF BOTH EYES: ICD-10-CM

## 2020-08-25 DIAGNOSIS — R80.9 TYPE 2 DIABETES MELLITUS WITH MICROALBUMINURIA, WITH LONG-TERM CURRENT USE OF INSULIN (H): ICD-10-CM

## 2020-08-25 DIAGNOSIS — E11.29 TYPE 2 DIABETES MELLITUS WITH MICROALBUMINURIA, WITH LONG-TERM CURRENT USE OF INSULIN (H): ICD-10-CM

## 2020-08-25 PROCEDURE — 92015 DETERMINE REFRACTIVE STATE: CPT | Performed by: OPHTHALMOLOGY

## 2020-08-25 PROCEDURE — 92014 COMPRE OPH EXAM EST PT 1/>: CPT | Performed by: OPHTHALMOLOGY

## 2020-08-25 ASSESSMENT — VISUAL ACUITY
OS_CC: J1+
METHOD: SNELLEN - LINEAR
OD_CC: J1+
CORRECTION_TYPE: GLASSES
OD_CC: 20/25
OS_CC: 20/20

## 2020-08-25 ASSESSMENT — REFRACTION_WEARINGRX
OD_ADD: +3.00
OS_ADD: +3.25
OD_AXIS: 003
OS_AXIS: 166
OD_CYLINDER: +0.50
OS_CYLINDER: +1.25
OS_SPHERE: -2.00
SPECS_TYPE: BIFOCAL
OD_SPHERE: -0.50

## 2020-08-25 ASSESSMENT — REFRACTION_MANIFEST
OS_CYLINDER: +0.75
OD_ADD: +3.00
OS_AXIS: 175
OD_SPHERE: -0.50
OS_ADD: +3.00
OD_CYLINDER: +1.50
OS_SPHERE: -1.50
OD_AXIS: 026

## 2020-08-25 ASSESSMENT — EXTERNAL EXAM - LEFT EYE: OS_EXAM: NORMAL

## 2020-08-25 ASSESSMENT — CUP TO DISC RATIO
OD_RATIO: 0.8
OS_RATIO: 0.9

## 2020-08-25 ASSESSMENT — TONOMETRY
OD_IOP_MMHG: 15
IOP_METHOD: APPLANATION
OS_IOP_MMHG: 15

## 2020-08-25 ASSESSMENT — EXTERNAL EXAM - RIGHT EYE: OD_EXAM: NORMAL

## 2020-08-25 ASSESSMENT — CONF VISUAL FIELD
OS_NORMAL: 1
OD_NORMAL: 1

## 2020-08-25 NOTE — LETTER
"    8/25/2020         RE: Manuel Rosales  [address]  [place] [address]        Dear Colleague,    Thank you for referring your patient, Manuel Rosales, to the [facility]. Please see a copy of my visit note below.     Current Eye Medications:  Dorzolamide/Timolol bid both eyes (), Latanoprost every evening both eyes ()     Subjective:  Complete eye exam   No problems with vision noted.  On the way here today, while driving, patient had foreign body sensation in the left eye \"maybe it was an eyelash\".  Patient wants us to know that he recently was started on a blood thinner (Eloquis?) for clogged carotid arteries (left and right)      Lab Results   Component Value Date    A1C 7.5 04/09/2020    A1C 8.2 01/30/2020    A1C 7.5 07/29/2019    A1C 7.4 03/28/2019    A1C 8.0 01/10/2019      Objective:  See Ophthalmology Exam.       Assessment:  Stable intraocular pressure and discs in patient with moderate open angle glaucoma.  No diabetic retinopathy.      ICD-10-CM    1. Examination of eyes and vision  Z01.00    2. Presbyopia  H52.4 REFRACTIVE STATUS     EYE EXAM (SIMPLE-NONBILLABLE)   3. Type 2 diabetes mellitus with microalbuminuria, with long-term current use of insulin (H)  E11.29     R80.9     Z79.4    4. Pseudophakia, ou  Z96.1    5. Primary open angle glaucoma of both eyes, moderate stage  H40.1132    6. Cicatricial ectropion, lower lid, ou  H02.119    7. Epiretinal membrane, mild, left  H35.372    8. Posterior vitreous detachment of both eyes  H43.813         Plan:  Glasses prescription given - optional  Continue using Cosopt (dorzolamide-timolol -- dark blue top) both eyes twice daily,  Brimonidine (purple top) both eyes twice daily.  And Latanoprost (green top) both eyes at bedtime.   Wait at least 5 minutes between drops if using more than one at a time.  Possible clouding of posterior capsule both eyes discussed.  Return Visit in 6 months for IOP check, Glaucoma OCT, retinal OCT,  " and Rios Visual Field.     Willy Cardoso M.D.  [phone]           Again, thank you for allowing me to participate in the care of your patient.        Sincerely,        Willy Cardoso MD

## 2020-08-25 NOTE — PATIENT INSTRUCTIONS
Glasses prescription given - optional  Continue using Cosopt (dorzolamide-timolol -- dark blue top) both eyes twice daily,  Brimonidine (purple top) both eyes twice daily.  And Latanoprost (green top) both eyes at bedtime.   Wait at least 5 minutes between drops if using more than one at a time.  Possible clouding of posterior capsule both eyes discussed.  Return Visit in 6 months for IOP check, Glaucoma OCT, retinal OCT,  and Rios Visual Field.     Willy Cardoso M.D.  [phone]    Patient Education   Diabetes weakens the blood vessels all over the body, including the eyes. Damage to the blood vessels in the eyes can cause swelling or bleeding into part of the eye (called the retina). This is called diabetic retinopathy ([facility]). If not treated, this disease can cause vision loss or blindness.   Symptoms may include blurred or distorted vision, but many people have no symptoms. It's important to see your eye doctor regularly to check for problems.   Early treatment and good control can help protect your vision. Here are the things you can do to help prevent vision loss:      1. Keep your blood sugar levels under tight control.      2. Bring high blood pressure under control.      3. No smoking.      4. Have yearly dilated eye exams.

## 2020-08-25 NOTE — PROGRESS NOTES
" Current Eye Medications:  Dorzolamide/Timolol bid both eyes (), Latanoprost every evening both eyes ()     Subjective:  Complete eye exam   No problems with vision noted.  On the way here today, while driving, patient had foreign body sensation in the left eye \"maybe it was an eyelash\".  Patient wants us to know that he recently was started on a blood thinner (Eloquis?) for clogged carotid arteries (left and right)      Lab Results   Component Value Date    A1C 7.5 04/09/2020    A1C 8.2 01/30/2020    A1C 7.5 07/29/2019    A1C 7.4 03/28/2019    A1C 8.0 01/10/2019      Objective:  See Ophthalmology Exam.       Assessment:  Stable intraocular pressure and discs in patient with moderate open angle glaucoma.  No diabetic retinopathy.      ICD-10-CM    1. Examination of eyes and vision  Z01.00    2. Presbyopia  H52.4 REFRACTIVE STATUS     EYE EXAM (SIMPLE-NONBILLABLE)   3. Type 2 diabetes mellitus with microalbuminuria, with long-term current use of insulin (H)  E11.29     R80.9     Z79.4    4. Pseudophakia, ou  Z96.1    5. Primary open angle glaucoma of both eyes, moderate stage  H40.1132    6. Cicatricial ectropion, lower lid, ou  H02.119    7. Epiretinal membrane, mild, left  H35.372    8. Posterior vitreous detachment of both eyes  H43.813         Plan:  Glasses prescription given - optional  Continue using Cosopt (dorzolamide-timolol -- dark blue top) both eyes twice daily,  Brimonidine (purple top) both eyes twice daily.  And Latanoprost (green top) both eyes at bedtime.   Wait at least 5 minutes between drops if using more than one at a time.  Possible clouding of posterior capsule both eyes discussed.  Return Visit in 6 months for IOP check, Glaucoma OCT, retinal OCT,  and Rios Visual Field.     Willy Cardoso M.D.  [phone]         "

## 2020-09-28 ENCOUNTER — OFFICE VISIT (OUTPATIENT)
Dept: FAMILY MEDICINE | Facility: CLINIC | Age: 85
End: 2020-09-28
Payer: COMMERCIAL

## 2020-09-28 VITALS
BODY MASS INDEX: 29.73 KG/M2 | OXYGEN SATURATION: 97 % | HEIGHT: 67 IN | DIASTOLIC BLOOD PRESSURE: 75 MMHG | RESPIRATION RATE: 15 BRPM | SYSTOLIC BLOOD PRESSURE: 146 MMHG | WEIGHT: 189.4 LBS | TEMPERATURE: 97.7 F | HEART RATE: 87 BPM

## 2020-09-28 DIAGNOSIS — I13.0 BENIGN HYPERTENSIVE HEART AND KIDNEY DISEASE WITH DIASTOLIC CONGESTIVE HEART FAILURE, NYHA CLASS 2 AND CHRONIC KIDNEY DISEASE STAGE 3 (H): ICD-10-CM

## 2020-09-28 DIAGNOSIS — E11.29 TYPE 2 DIABETES MELLITUS WITH MICROALBUMINURIA, WITH LONG-TERM CURRENT USE OF INSULIN (H): Chronic | ICD-10-CM

## 2020-09-28 DIAGNOSIS — Z00.00 ENCOUNTER FOR MEDICARE ANNUAL WELLNESS EXAM: Primary | ICD-10-CM

## 2020-09-28 DIAGNOSIS — E78.5 HYPERLIPIDEMIA LDL GOAL <100: Chronic | ICD-10-CM

## 2020-09-28 DIAGNOSIS — N18.30 BENIGN HYPERTENSIVE HEART AND KIDNEY DISEASE WITH DIASTOLIC CONGESTIVE HEART FAILURE, NYHA CLASS 2 AND CHRONIC KIDNEY DISEASE STAGE 3 (H): ICD-10-CM

## 2020-09-28 DIAGNOSIS — Z79.4 TYPE 2 DIABETES MELLITUS WITH MICROALBUMINURIA, WITH LONG-TERM CURRENT USE OF INSULIN (H): Chronic | ICD-10-CM

## 2020-09-28 DIAGNOSIS — I50.30 BENIGN HYPERTENSIVE HEART AND KIDNEY DISEASE WITH DIASTOLIC CONGESTIVE HEART FAILURE, NYHA CLASS 2 AND CHRONIC KIDNEY DISEASE STAGE 3 (H): ICD-10-CM

## 2020-09-28 DIAGNOSIS — I65.29 STENOSIS OF CAROTID ARTERY, UNSPECIFIED LATERALITY: ICD-10-CM

## 2020-09-28 DIAGNOSIS — R80.9 TYPE 2 DIABETES MELLITUS WITH MICROALBUMINURIA, WITH LONG-TERM CURRENT USE OF INSULIN (H): Chronic | ICD-10-CM

## 2020-09-28 LAB
ALT SERPL W P-5'-P-CCNC: 37 U/L (ref 0–70)
CHOLEST SERPL-MCNC: 173 MG/DL
CREAT SERPL-MCNC: 1.43 MG/DL (ref 0.66–1.25)
GFR SERPL CREATININE-BSD FRML MDRD: 43 ML/MIN/{1.73_M2}
HBA1C MFR BLD: 6.6 % (ref 0–5.6)
HDLC SERPL-MCNC: 89 MG/DL
LDLC SERPL CALC-MCNC: 58 MG/DL
NONHDLC SERPL-MCNC: 84 MG/DL
POTASSIUM SERPL-SCNC: 5.5 MMOL/L (ref 3.4–5.3)
TRIGL SERPL-MCNC: 130 MG/DL

## 2020-09-28 PROCEDURE — 84460 ALANINE AMINO (ALT) (SGPT): CPT | Performed by: FAMILY MEDICINE

## 2020-09-28 PROCEDURE — 99214 OFFICE O/P EST MOD 30 MIN: CPT | Mod: 25 | Performed by: FAMILY MEDICINE

## 2020-09-28 PROCEDURE — 83036 HEMOGLOBIN GLYCOSYLATED A1C: CPT | Performed by: FAMILY MEDICINE

## 2020-09-28 PROCEDURE — 36415 COLL VENOUS BLD VENIPUNCTURE: CPT | Performed by: FAMILY MEDICINE

## 2020-09-28 PROCEDURE — 80061 LIPID PANEL: CPT | Performed by: FAMILY MEDICINE

## 2020-09-28 PROCEDURE — 99397 PER PM REEVAL EST PAT 65+ YR: CPT | Performed by: FAMILY MEDICINE

## 2020-09-28 PROCEDURE — 82565 ASSAY OF CREATININE: CPT | Performed by: FAMILY MEDICINE

## 2020-09-28 PROCEDURE — 84132 ASSAY OF SERUM POTASSIUM: CPT | Performed by: FAMILY MEDICINE

## 2020-09-28 RX ORDER — ATORVASTATIN CALCIUM 80 MG/1
80 TABLET, FILM COATED ORAL DAILY
Qty: 90 TABLET | Refills: 1 | Status: SHIPPED | OUTPATIENT
Start: 2020-09-28 | End: 2021-04-08

## 2020-09-28 RX ORDER — LISINOPRIL 40 MG/1
TABLET ORAL
Qty: 90 TABLET | Refills: 1 | Status: SHIPPED | OUTPATIENT
Start: 2020-09-28 | End: 2021-04-08

## 2020-09-28 RX ORDER — AMLODIPINE BESYLATE 10 MG/1
10 TABLET ORAL DAILY
Qty: 90 TABLET | Refills: 1 | Status: SHIPPED | OUTPATIENT
Start: 2020-09-28 | End: 2021-04-08

## 2020-09-28 RX ORDER — GLIPIZIDE 10 MG/1
10 TABLET ORAL 2 TIMES DAILY WITH MEALS
Qty: 180 TABLET | Refills: 1 | Status: SHIPPED | OUTPATIENT
Start: 2020-09-28 | End: 2021-03-31

## 2020-09-28 ASSESSMENT — MIFFLIN-ST. JEOR: SCORE: 1482.74

## 2020-09-28 NOTE — PROGRESS NOTES
"Subjective   SUBJECTIVE:   Manuel Rosales is a 89 year old male who presents for a Preventive Visit and diabetes check.    HPI   Annual Wellness Visit    Patient has been advised of split billing requirements and indicates understanding: Yes     Are you in the first 12 months of your Medicare Part B coverage?  No    Physical Health:    In general, how would you rate your overall physical health? fair    Outside of work, how many days during the week do you exercise?1 day/week    Outside of work, approximately how many minutes a day do you exercise?15-30 minutes    If you drink alcohol do you typically have >3 drinks per day or >7 drinks per week? No    Do you usually eat at least 4 servings of fruit and vegetables a day, include whole grains & fiber and avoid regularly eating high fat or \"junk\" foods? NO    Do you have any problems taking medications regularly? No    Do you have any side effects from medications? none    Needs assistance for the following daily activities: no assistance needed    Which of the following safety concerns are present in your home?  none identified     Hearing impairment: Yes, Difficulty following a conversation in a noisy restaurant or crowded room.    Need to ask people to speak up or repeat themselves.    In the past 6 months, have you been bothered by leaking of urine? No      How many servings of fruits and vegetables do you eat daily?  0-1    On average, how many sweetened beverages do you drink each day (Examples: soda, juice, sweet tea, etc.  Do NOT count diet or artificially sweetened beverages)?   2    How many days per week do you exercise enough to make your heart beat faster? 3 or less    How many minutes a day do you exercise enough to make your heart beat faster? 10 - 19    How many days per week do you miss taking your medication? 0            Mental Health:    In general, how would you rate your overall mental or emotional health? good  PHQ-2 Score:      Do you feel " safe in your environment? Yes    Have you ever done Advance Care Planning? (For example, a Health Directive, POLST, or a discussion with a medical provider or your loved ones about your wishes)? Yes, patient states has an Advance Care Planning document and will bring a copy to the clinic.    Fall risk:       Cognitive Screenin) Repeat 3 items (Leader, Season, Table)    2) Clock draw: ABNORMAL did not add numbers  3) 3 item recall: Recalls 2 objects   Results: ABNORMAL clock, 1-2 items recalled: PROBABLE COGNITIVE IMPAIRMENT, **INFORM PROVIDER**    Mini-CogTM Copyright SARA Kulkarni. Licensed by the author for use in [facility]; reprinted with permission (soyeni@[facility]). All rights reserved.      Do you have sleep apnea, excessive snoring or daytime drowsiness?: no    Current providers sharing in care for this patient include:   Patient Care Team:  Ronaldo Corral MD as PCP - General  Ronaldo Corral MD as Assigned PCP  Willy Cardoso MD as Assigned Surgical Provider        Diabetes Follow-up    How often are you checking your blood sugar? One time daily, qam  What time of day are you checking your blood sugars (select all that apply)?  Before meals (log reviewed, virtually all <140, occ less than 70)  Have you had any blood sugars above 200?  No  Have you had any blood sugars below 70?  Yes    What symptoms do you notice when your blood sugar is low?  Shaky    What concerns do you have today about your diabetes? None     Do you have any of these symptoms? (Select all that apply)  Numbness in feet          BP Readings from Last 2 Encounters:   20 (!) 146/75   20 128/63     Hemoglobin A1C (%)   Date Value   2020 6.6 (H)   2020 7.5 (H)     LDL Cholesterol Calculated (mg/dL)   Date Value   2020 58   2020 63         Hyperlipidemia Follow-Up      Are you regularly taking any medication or supplement to lower your cholesterol?   Yes- Atorvastatin    Are you having  muscle aches or other side effects that you think could be caused by your cholesterol lowering medication?  No    Past medical, family, and social histories, medications, and allergies are reviewed and updated in Epic.     Social History     Tobacco Use     Smoking status: Former Smoker     Quit date: 1970     Years since quittin.8     Smokeless tobacco: Never Used   Substance Use Topics     Alcohol use: Yes     Alcohol/week: 7.0 standard drinks     Types: 7 Standard drinks or equivalent per week     Comment: 1 drink /day (a decrease, as of 16)                           Current providers sharing in care for this patient include:   Patient Care Team:  Ronaldo Corral MD as PCP - General  Ronaldo Corral MD as Assigned PCP  Willy Cardoso MD as Assigned Surgical Provider    The following health maintenance items are reviewed in Epic and correct as of today:  Health Maintenance   Topic Date Due     HF ACTION PLAN  1931     HEPATITIS B IMMUNIZATION (1 of 3 - Risk 3-dose series) 1950     ZOSTER IMMUNIZATION (2 of 3) 2008     DIABETIC FOOT EXAM  2019     BMP  2020     A1C  2021     MICROALBUMIN  2021     CBC  2021     FALL RISK ASSESSMENT  2021     EYE EXAM  2021     MEDICARE ANNUAL WELLNESS VISIT  2021     ALT  2021     LIPID  2021     COLORECTAL CANCER SCREENING  2023     DTAP/TDAP/TD IMMUNIZATION (4 - Td) 10/01/2023     ADVANCE CARE PLANNING  2025     TSH W/FREE T4 REFLEX  Completed     PHQ-2  Completed     INFLUENZA VACCINE  Completed     Pneumococcal Vaccine: 65+ Years  Completed     Pneumococcal Vaccine: Pediatrics (0 to 5 Years) and At-Risk Patients (6 to 64 Years)  Aged Out     IPV IMMUNIZATION  Aged Out     MENINGITIS IMMUNIZATION  Aged Out       ROS:  Constitutional, HEENT, cardiovascular, pulmonary, GI, , musculoskeletal, neuro, skin, endocrine and psych systems are negative, except as otherwise  "noted.    OBJECTIVE:   BP (!) 146/75   Pulse 87   Temp 97.7  F (36.5  C) (Oral)   Resp 15   Ht 1.702 m (5' 7\")   Wt 85.9 kg (189 lb 6.4 oz)   SpO2 97%   BMI 29.66 kg/m   Estimated body mass index is 29.66 kg/m  as calculated from the following:    Height as of this encounter: 1.702 m (5' 7\").    Weight as of this encounter: 85.9 kg (189 lb 6.4 oz).  EXAM:   GENERAL: healthy, alert and no distress  EYES: Eyes grossly normal to inspection, PERRL and conjunctivae and sclerae normal  HENT: ear canals and TM's normal, nose and mouth without ulcers or lesions  NECK: no adenopathy, no asymmetry, masses, or scars and thyroid normal to palpation  RESP: lungs clear to auscultation - no rales, rhonchi or wheezes  CV: regular rate and rhythm, normal S1 S2, no S3 or S4, no murmur, click or rub, trace lower extremity edema and peripheral pulses strong  ABDOMEN: soft, nontender, no hepatosplenomegaly, no masses and bowel sounds normal  MS: no gross musculoskeletal defects noted, no edema  SKIN: no suspicious lesions or rashes  NEURO: Normal strength and tone, mentation intact and speech normal  PSYCH: mentation appears normal, affect normal/bright    Diagnostic Test Results:  Labs reviewed in Epic  Lab Results   Component Value Date    A1C 6.6 09/28/2020    A1C 7.5 04/09/2020    A1C 8.2 01/30/2020    A1C 7.5 07/29/2019    A1C 7.4 03/28/2019          ASSESSMENT / PLAN:   (Z00.00) Encounter for Medicare annual wellness exam  (primary encounter diagnosis)  Comment: Negative screening exam; up-to-date on preventive services.  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  Heart failure seems to be stable.  Plan: amLODIPine (NORVASC) 10 MG tablet, lisinopril         (ZESTRIL) 40 MG tabletPotassium, Creatinine, ,         Cardiology appointment scheduled in 1 month    (I65.29) Stenosis of carotid artery, unspecified laterality  (E78.5) Hyperlipidemia LDL goal <100  Comment: Clinically stable.  He is on a high intensity statin.  His LDL has been at goal historically.  Plan: atorvastatin (LIPITOR) 80 MG tablet ,Lipid panel reflex to direct         LDL Non-fasting, ALT        Recheck lipids every 6 to 12 months    COUNSELING:  Reviewed preventive health counseling, as reflected in patient instructions  Special attention given to:       Regular exercise       Vision screening       Immunizations    Vaccinated for: Influenza and encouraged to receive the vaccine for zoster at a/his pharmacy        Estimated body mass index is 29.66 kg/m  as calculated from the following:    Height as of this encounter: 1.702 m (5' 7\").    Weight as of this encounter: 85.9 kg (189 lb 6.4 oz).        He reports that he quit smoking about 50 years ago. He has never used smokeless tobacco.    Appropriate preventive services were discussed with this patient, including applicable screening as appropriate for cardiovascular disease, diabetes, osteopenia/osteoporosis, and glaucoma.  As appropriate for age/gender, discussed screening for colorectal cancer, prostate cancer, breast cancer, and cervical cancer. Checklist reviewing preventive services available has been given to the patient.    Reviewed patients plan of care and provided an AVS. The Basic Care Plan (routine screening as documented in Health Maintenance) for Manuel meets the Care Plan requirement. This Care Plan has been " established and reviewed with the Patient.    Counseling Resources:  ATP IV Guidelines  Pooled Cohorts Equation Calculator  Breast Cancer Risk Calculator  BRCA-Related Cancer Risk Assessment: FHS-7 Tool  FRAX Risk Assessment  ICSI Preventive Guidelines  Dietary Guidelines for Americans, 2010  USDA's MyPlate  ASA Prophylaxis  Lung CA Screening    Ronaldo Corral MD  [facility]

## 2020-10-29 ENCOUNTER — TRANSFERRED RECORDS (OUTPATIENT)
Dept: HEALTH INFORMATION MANAGEMENT | Facility: CLINIC | Age: 85
End: 2020-10-29

## 2020-11-02 NOTE — TELEPHONE ENCOUNTER
Diabetes Education Scheduling Outreach #2:    Call to patient to schedule. Patient declined to schedule.    Shyla Simon  [place] [facility]  Diabetes and Nutrition Scheduling

## 2020-12-05 NOTE — PATIENT INSTRUCTIONS
At [facility], we strive to deliver an exceptional experience to you, every time we see you.  If you receive a survey in the mail, please send us back your thoughts. We really do value your feedback.    Based on your medical history, these are the current health maintenance/preventive care services that you are due for (some may have been done at this visit.)  Health Maintenance Due   Topic Date Due     HF ACTION PLAN  03/22/1931     ZOSTER IMMUNIZATION (2 of 3) 11/25/2008     ADVANCE CARE PLANNING  02/24/2019     DIABETIC FOOT EXAM  07/09/2019     INFLUENZA VACCINE (1) 09/01/2019     PHQ-2  01/01/2020     MICROALBUMIN  01/10/2020     FALL RISK ASSESSMENT  01/10/2020     CBC  01/10/2020     A1C  01/29/2020     BMP  01/29/2020     MEDICARE ANNUAL WELLNESS VISIT  01/10/2020         Suggested websites for health information:  Www."[employer]Mayi Zhaopin : Up to date and easily searchable information on multiple topics.  Www.[employer].gov : medication info, interactive tutorials, watch real surgeries online  Www.familydoctor.org : good info from the Academy of Family Physicians  Www.cdc.gov : public health info, travel advisories, epidemics (H1N1)  Www.aap.org : children's health info, normal development, vaccinations  Www.health.state.[place].us : [place] dept of health, public health issues in [place], N1N1    Your care team:                            Family Medicine Internal Medicine   MD Kaiden Harris MD Shantel Branch-Fleming, MD Katya Georgiev PA-C Nam Ho, MD Pediatrics   ERNST Calderon, DAVID Arechiga APRN MD Dorene Kang MD Deborah Mielke, MD Kim Thein, APRN CNP      Clinic hours: Monday - Thursday 7 am-7 pm; Fridays 7 am-5 pm.   Urgent care: Monday - Friday 11 am-9 pm; Saturday and Sunday 9 am-5 pm.  Pharmacy : Monday -Thursday 8 am-8 pm; Friday 8 am-6 pm; Saturday and Sunday 9 am-5 pm.     Clinic: [phone]   Pharmacy: [phone]     PAST MEDICAL HISTORY:  Depression     Hepatitis "as a child" unsure of type    Left knee pain

## 2021-01-13 ENCOUNTER — TRANSFERRED RECORDS (OUTPATIENT)
Dept: HEALTH INFORMATION MANAGEMENT | Facility: CLINIC | Age: 86
End: 2021-01-13

## 2021-01-13 LAB
CREAT SERPL-MCNC: 1.8 MG/DL (ref 0.7–1.2)
GFR SERPL CREATININE-BSD FRML MDRD: 36 ML/MIN/1.73M2
GLUCOSE SERPL-MCNC: 173 MG/DL (ref 74–106)
POTASSIUM SERPL-SCNC: 5.6 MMOL/L (ref 3.5–5)

## 2021-01-14 ENCOUNTER — TELEPHONE (OUTPATIENT)
Dept: FAMILY MEDICINE | Facility: CLINIC | Age: 86
End: 2021-01-14

## 2021-01-14 NOTE — TELEPHONE ENCOUNTER
[facility] and [facility], patient's potassium is too high and the [facility] provider stated it might be from patient's medication lisinopril, maybe that med needs to be adjusted. Also patient's white blood count is high and patient needs to see a specialist regarding the high white blood count. Please call Oxana at the above # to discuss and advise because patient is hard of hearing and she can relay the message to patient. If Dr[facility] Corral needs to [facility] with the [facility] please call [phone]. Damian Andrade,  For 1st Floor Primary Care    [facility] spoke with Fernanda to fax patient's labs results for Dr. Corral to review.  Damian Andrade,  For 1st Floor Primary Care

## 2021-01-14 NOTE — TELEPHONE ENCOUNTER
[facility] and put in Dr. Ellis espinoza mail bin to pickup, review and advise.  Damian Andrade,  For 1st Floor Primary Care

## 2021-04-08 ENCOUNTER — TELEPHONE (OUTPATIENT)
Dept: OPHTHALMOLOGY | Facility: CLINIC | Age: 86
End: 2021-04-08

## 2021-04-08 ENCOUNTER — OFFICE VISIT (OUTPATIENT)
Dept: FAMILY MEDICINE | Facility: CLINIC | Age: 86
End: 2021-04-08
Payer: COMMERCIAL

## 2021-04-08 VITALS
BODY MASS INDEX: 29.66 KG/M2 | HEIGHT: 67 IN | SYSTOLIC BLOOD PRESSURE: 120 MMHG | OXYGEN SATURATION: 98 % | TEMPERATURE: 97.9 F | WEIGHT: 189 LBS | DIASTOLIC BLOOD PRESSURE: 65 MMHG | HEART RATE: 75 BPM

## 2021-04-08 DIAGNOSIS — I13.0 BENIGN HYPERTENSIVE HEART AND KIDNEY DISEASE WITH DIASTOLIC CONGESTIVE HEART FAILURE, NYHA CLASS 2 AND CHRONIC KIDNEY DISEASE STAGE 3 (H): ICD-10-CM

## 2021-04-08 DIAGNOSIS — E78.5 HYPERLIPIDEMIA LDL GOAL <100: Chronic | ICD-10-CM

## 2021-04-08 DIAGNOSIS — N18.30 BENIGN HYPERTENSIVE HEART AND KIDNEY DISEASE WITH DIASTOLIC CONGESTIVE HEART FAILURE, NYHA CLASS 2 AND CHRONIC KIDNEY DISEASE STAGE 3 (H): ICD-10-CM

## 2021-04-08 DIAGNOSIS — H40.1132 PRIMARY OPEN ANGLE GLAUCOMA (POAG) OF BOTH EYES, MODERATE STAGE: ICD-10-CM

## 2021-04-08 DIAGNOSIS — Z79.4 TYPE 2 DIABETES MELLITUS WITH MICROALBUMINURIA, WITH LONG-TERM CURRENT USE OF INSULIN (H): Primary | Chronic | ICD-10-CM

## 2021-04-08 DIAGNOSIS — E11.29 TYPE 2 DIABETES MELLITUS WITH MICROALBUMINURIA, WITH LONG-TERM CURRENT USE OF INSULIN (H): Primary | Chronic | ICD-10-CM

## 2021-04-08 DIAGNOSIS — I50.30 BENIGN HYPERTENSIVE HEART AND KIDNEY DISEASE WITH DIASTOLIC CONGESTIVE HEART FAILURE, NYHA CLASS 2 AND CHRONIC KIDNEY DISEASE STAGE 3 (H): ICD-10-CM

## 2021-04-08 DIAGNOSIS — R80.9 TYPE 2 DIABETES MELLITUS WITH MICROALBUMINURIA, WITH LONG-TERM CURRENT USE OF INSULIN (H): Primary | Chronic | ICD-10-CM

## 2021-04-08 DIAGNOSIS — C18.6 MALIGNANT NEOPLASM OF DESCENDING COLON (H): ICD-10-CM

## 2021-04-08 LAB
ALT SERPL W P-5'-P-CCNC: 27 U/L (ref 0–70)
ANION GAP SERPL CALCULATED.3IONS-SCNC: 8 MMOL/L (ref 3–14)
BUN SERPL-MCNC: 25 MG/DL (ref 7–30)
CALCIUM SERPL-MCNC: 10.2 MG/DL (ref 8.5–10.1)
CHLORIDE SERPL-SCNC: 93 MMOL/L (ref 94–109)
CHOLEST SERPL-MCNC: 139 MG/DL
CO2 SERPL-SCNC: 22 MMOL/L (ref 20–32)
CREAT SERPL-MCNC: 1.62 MG/DL (ref 0.66–1.25)
CREAT UR-MCNC: 50 MG/DL
GFR SERPL CREATININE-BSD FRML MDRD: 37 ML/MIN/{1.73_M2}
GLUCOSE SERPL-MCNC: 224 MG/DL (ref 70–99)
HBA1C MFR BLD: 6.6 % (ref 0–5.6)
HDLC SERPL-MCNC: 96 MG/DL
LDLC SERPL CALC-MCNC: 24 MG/DL
MICROALBUMIN UR-MCNC: 53 MG/L
MICROALBUMIN/CREAT UR: 106.87 MG/G CR (ref 0–17)
NONHDLC SERPL-MCNC: 43 MG/DL
POTASSIUM SERPL-SCNC: 5 MMOL/L (ref 3.4–5.3)
SODIUM SERPL-SCNC: 123 MMOL/L (ref 133–144)
TRIGL SERPL-MCNC: 97 MG/DL

## 2021-04-08 PROCEDURE — 84460 ALANINE AMINO (ALT) (SGPT): CPT | Performed by: FAMILY MEDICINE

## 2021-04-08 PROCEDURE — 82043 UR ALBUMIN QUANTITATIVE: CPT | Performed by: FAMILY MEDICINE

## 2021-04-08 PROCEDURE — 99214 OFFICE O/P EST MOD 30 MIN: CPT | Performed by: FAMILY MEDICINE

## 2021-04-08 PROCEDURE — 80048 BASIC METABOLIC PNL TOTAL CA: CPT | Performed by: FAMILY MEDICINE

## 2021-04-08 PROCEDURE — 83036 HEMOGLOBIN GLYCOSYLATED A1C: CPT | Performed by: FAMILY MEDICINE

## 2021-04-08 PROCEDURE — 36415 COLL VENOUS BLD VENIPUNCTURE: CPT | Performed by: FAMILY MEDICINE

## 2021-04-08 PROCEDURE — 80061 LIPID PANEL: CPT | Performed by: FAMILY MEDICINE

## 2021-04-08 RX ORDER — LISINOPRIL 40 MG/1
TABLET ORAL
Qty: 90 TABLET | Refills: 1 | Status: SHIPPED | OUTPATIENT
Start: 2021-04-08 | End: 2021-10-14

## 2021-04-08 RX ORDER — GLIPIZIDE 10 MG/1
10 TABLET ORAL 2 TIMES DAILY WITH MEALS
Qty: 180 TABLET | Refills: 0 | Status: SHIPPED | OUTPATIENT
Start: 2021-04-08 | End: 2021-09-23

## 2021-04-08 RX ORDER — AMLODIPINE BESYLATE 10 MG/1
10 TABLET ORAL DAILY
Qty: 90 TABLET | Refills: 1 | Status: SHIPPED | OUTPATIENT
Start: 2021-04-08 | End: 2021-10-14

## 2021-04-08 RX ORDER — BRIMONIDINE TARTRATE 1.5 MG/ML
1 SOLUTION/ DROPS OPHTHALMIC 2 TIMES DAILY
Qty: 15 ML | Refills: 3 | Status: SHIPPED | OUTPATIENT
Start: 2021-04-08 | End: 2022-02-16

## 2021-04-08 RX ORDER — ATORVASTATIN CALCIUM 80 MG/1
80 TABLET, FILM COATED ORAL DAILY
Qty: 90 TABLET | Refills: 1 | Status: SHIPPED | OUTPATIENT
Start: 2021-04-08 | End: 2021-10-14

## 2021-04-08 RX ORDER — LATANOPROST 50 UG/ML
1 SOLUTION/ DROPS OPHTHALMIC AT BEDTIME
Qty: 7.5 ML | Refills: 3 | Status: SHIPPED | OUTPATIENT
Start: 2021-04-08 | End: 2022-04-28

## 2021-04-08 ASSESSMENT — MIFFLIN-ST. JEOR: SCORE: 1475.93

## 2021-04-08 ASSESSMENT — PAIN SCALES - GENERAL: PAINLEVEL: NO PAIN (0)

## 2021-04-08 NOTE — PROGRESS NOTES
Assessment & Plan     (E11.29,  R80.9,  Z79.4) Type 2 diabetes mellitus with microalbuminuria, with long-term current use of insulin (H)  (primary encounter diagnosis)  Comment: well controlled as usual  Plan: glipiZIDE (GLUCOTROL) 10 MG tablet        Return in about 6 months (around 9/28/2021) for Medicare annual wellness exam, diabetes.      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    Ronaldo Corral MD  [facility] INA Hernandez is a 89 year old who presents for the following health issues  accompanied by his daughter:    HPI     Diabetes Follow-up    How often are you checking your blood sugar? One time daily  What time of day are you checking your blood sugars (select all that apply)?  Before meals  Have you had any blood sugars above 200?  Yes  Have you had any blood sugars below 70?  Yes    What symptoms do you notice when your blood sugar is low?  Weak    What concerns do you have today about your diabetes? Blood sugar is often over 200     Do you have any of these symptoms? (Select all that apply)  Numbness in feet      BP Readings from Last 2 Encounters:   04/08/21 120/65   09/28/20 (!) 146/75     Hemoglobin A1C (%)   Date Value   09/28/2020 6.6 (H)   04/09/2020 7.5 (H)     LDL Cholesterol Calculated (mg/dL)   Date Value   09/28/2020 58  "  01/30/2020 63                 How many servings of fruits and vegetables do you eat daily?  0-1    On average, how many sweetened beverages do you drink each day (Examples: soda, juice, sweet tea, etc.  Do NOT count diet or artificially sweetened beverages)?   0    How many days per week do you exercise enough to make your heart beat faster? 3 or less    How many minutes a day do you exercise enough to make your heart beat faster? 9 or less    How many days per week do you miss taking your medication? 0    Hyperlipidemia Follow-Up      Are you regularly taking any medication or supplement to lower your cholesterol?   Yes- atorvastatiun    Are you having muscle aches or other side effects that you think could be caused by your cholesterol lowering medication?  No    Hypertension Follow-up      Do you check your blood pressure regularly outside of the clinic? No     Are you following a low salt diet? Yes    Are your blood pressures ever more than 140 on the top number (systolic) OR more   than 90 on the bottom number (diastolic), for example 140/90? n/a            Review of Systems   Constitutional, HEENT, cardiovascular, pulmonary, gi and gu systems are negative, except as otherwise noted.      Objective    /65   Pulse 75   Temp 97.9  F (36.6  C) (Oral)   Ht 1.702 m (5' 7\")   Wt 85.7 kg (189 lb)   SpO2 98%   BMI 29.60 kg/m    Body mass index is 29.6 kg/m .  Physical Exam   GENERAL: healthy, alert and no distress  EYES: Eyes grossly normal to inspection, PERRL, EOMI, sclerae white and conjunctivae normal  RESP: lungs clear to auscultation - no crackles or wheezes, no areas of dullness, no tachypnea  CV: Heart regular rate and rhythm without murmur, click or rub. No peripheral edema and peripheral pulses strong  MS: no gross musculoskeletal defects noted, no edema  SKIN: no suspicious lesions or rashes to visible skin  NEURO: Normal strength and tone, sensory exam grossly normal, mentation intact, " oriented times 3 and cranial nerves 2-12 intact  PSYCH: mentation appears normal, affect normal/bright     Lab Results   Component Value Date    A1C 6.6 04/08/2021    A1C 6.6 09/28/2020    A1C 7.5 04/09/2020    A1C 8.2 01/30/2020    A1C 7.5 07/29/2019          `

## 2021-04-08 NOTE — PATIENT INSTRUCTIONS
At [facility], we strive to deliver an exceptional experience to you, every time we see you. If you receive a survey, please complete it as we do value your feedback.  If you have MyChart, you can expect to receive results automatically within 24 hours of their completion.  Your provider will send a note interpreting your results as well.   If you do not have MyChart, you should receive your results in about a week by mail.    Your care team:                            Family Medicine Internal Medicine   MD Kaiden Harris MD Shantel Branch-Fleming, MD Srinivasa Vaka, MD Katya Belousova, PAKEISHA Bell, APRN CNP    Dejuan Rogers, MD Pediatrics   Wiley Muller, PAKEISHA Ackerman, CNP MD Silvia Smith APRN CNP   MD Dorene Danielle MD Deborah Mielke, MD Mariia Mariscal, APRN [facility]      Clinic hours: Monday - Thursday 7 am-6 pm; Fridays 7 am-5 pm.   Urgent care: Monday - Friday 10 am- 8 pm; Saturday and Sunday 9 am-5 pm.    Clinic: [phone]       [facility]: Monday - Thursday 8 am - 7 pm; Friday 8 am - 6 pm  [facility]: [phone]     Use [email or web address] for 24/7 diagnosis and treatment of dozens of conditions.

## 2021-04-08 NOTE — TELEPHONE ENCOUNTER
Will send for patient to [facility] the two eye drops requested. Sent to Dr. Cardoso to sign and send.

## 2021-04-08 NOTE — TELEPHONE ENCOUNTER
Patient's daughter stated that the patient is in need for the latanoprost (XALATAN) 0.005 % ophthalmic solution and the brimonidine (ALPHAGAN-P) 0.15 % ophthalmic solution and needs this refilled as soon as possible. They would like this to be sent over to the [facility] [address] [place] [address] when able. If needed they can call the daughter at: [phone].    Thank You,    Danial Anderson  Patient Rep

## 2021-04-09 ENCOUNTER — TELEPHONE (OUTPATIENT)
Dept: FAMILY MEDICINE | Facility: CLINIC | Age: 86
End: 2021-04-09

## 2021-04-09 DIAGNOSIS — E87.1 HYPONATREMIA: Primary | ICD-10-CM

## 2021-04-09 NOTE — TELEPHONE ENCOUNTER
Called the patient, there is no voicemail box set up for this patient.   Also called the patient daughter, Oxana.  There is no permission to speak to her on file regarding PHI for the patient but she did accompany the patient at the office visit.      Please call the patient or his daughter with the following information:  Don,     Here in the rest of your lab results.  The most notable result is your sodium level which is low.  You have had low sodium levels before, but this is the lowest we have on record, so I need you to return for a recheck and for additional tests to make sure there is nothing new going on.     The rest of your test results are normal or at their usual/expected levels.     Dr. Ellis Reis RN, [facility]

## 2021-04-12 NOTE — TELEPHONE ENCOUNTER
This writer attempted to contact patient and his daughter on 04/12/21      Reason for call RESULTS and unable to leave message on patient's phone as he does not have a VM box set up. LM on daughter, Oxana's phone requesting that she call the [facility] back at [phone].      If patient calls back:   Relay message from provider as written below, (read verbatim), document that pt called and close encounter        Cynthia Ocampo RN

## 2021-04-13 ENCOUNTER — TELEPHONE (OUTPATIENT)
Dept: FAMILY MEDICINE | Facility: CLINIC | Age: 86
End: 2021-04-13

## 2021-04-13 NOTE — TELEPHONE ENCOUNTER
Called and spoke with Don, read provider's message as written. Scheduled Don for 4/22 for an appointment with Dr. Corral at 11am. No other concerns or complaints at this time of call.    Phuong Alexandre RN

## 2021-04-13 NOTE — TELEPHONE ENCOUNTER
This writer attempted to contact Don on 04/13/21 for the second time today.         Reason for call To help schedule a lab appointment the day before his in clinic visit with Dr Corral and unable to leave message his VM has not been set up yet.        If patient calls back:              Please help patient set up a lab appointment for 4/21 to have labs drawn before his appointment the following day with Dr. Corral.            Phuong Alexandre RN

## 2021-04-13 NOTE — TELEPHONE ENCOUNTER
This writer attempted to contact Don on 04/13/21      Reason for call To help schedule a lab appointment the day before his in clinic visit with Dr Corral and unable to leave message.      If patient calls back:   Please help patient set up a lab appointment for 4/21 to have labs drawn before his appointment the following day with Dr. Corral.         Phuong Alexandre RN

## 2021-04-14 NOTE — TELEPHONE ENCOUNTER
Called and talked to David, was able to set up a Lab appointment for him on 4/21 at 1045a.    Phuong Alexandre RN

## 2021-04-21 DIAGNOSIS — E87.1 HYPONATREMIA: ICD-10-CM

## 2021-04-21 LAB
ALBUMIN SERPL-MCNC: 3.8 G/DL (ref 3.4–5)
BILIRUB DIRECT SERPL-MCNC: 0.2 MG/DL (ref 0–0.2)
BILIRUB SERPL-MCNC: 0.8 MG/DL (ref 0.2–1.3)
CREAT SERPL-MCNC: 1.45 MG/DL (ref 0.66–1.25)
GFR SERPL CREATININE-BSD FRML MDRD: 42 ML/MIN/{1.73_M2}
GLUCOSE SERPL-MCNC: 281 MG/DL (ref 70–99)
OSMOLALITY SERPL: 291 MMOL/KG (ref 280–301)
OSMOLALITY UR: 241 MMOL/KG (ref 100–1200)
PROT SERPL-MCNC: 7.8 G/DL (ref 6.8–8.8)
SODIUM SERPL-SCNC: 124 MMOL/L (ref 133–144)

## 2021-04-21 PROCEDURE — 83930 ASSAY OF BLOOD OSMOLALITY: CPT | Performed by: FAMILY MEDICINE

## 2021-04-21 PROCEDURE — 36415 COLL VENOUS BLD VENIPUNCTURE: CPT | Performed by: FAMILY MEDICINE

## 2021-04-21 PROCEDURE — 82248 BILIRUBIN DIRECT: CPT | Performed by: FAMILY MEDICINE

## 2021-04-21 PROCEDURE — 82565 ASSAY OF CREATININE: CPT | Performed by: FAMILY MEDICINE

## 2021-04-21 PROCEDURE — 99000 SPECIMEN HANDLING OFFICE-LAB: CPT | Performed by: FAMILY MEDICINE

## 2021-04-21 PROCEDURE — 82947 ASSAY GLUCOSE BLOOD QUANT: CPT | Performed by: FAMILY MEDICINE

## 2021-04-21 PROCEDURE — 84155 ASSAY OF PROTEIN SERUM: CPT | Performed by: FAMILY MEDICINE

## 2021-04-21 PROCEDURE — 82040 ASSAY OF SERUM ALBUMIN: CPT | Performed by: FAMILY MEDICINE

## 2021-04-21 PROCEDURE — 83935 ASSAY OF URINE OSMOLALITY: CPT | Mod: 90 | Performed by: FAMILY MEDICINE

## 2021-04-21 PROCEDURE — 82247 BILIRUBIN TOTAL: CPT | Performed by: FAMILY MEDICINE

## 2021-04-21 PROCEDURE — 84295 ASSAY OF SERUM SODIUM: CPT | Performed by: FAMILY MEDICINE

## 2021-04-22 ENCOUNTER — OFFICE VISIT (OUTPATIENT)
Dept: FAMILY MEDICINE | Facility: CLINIC | Age: 86
End: 2021-04-22
Payer: COMMERCIAL

## 2021-04-22 VITALS
WEIGHT: 177 LBS | BODY MASS INDEX: 27.78 KG/M2 | DIASTOLIC BLOOD PRESSURE: 65 MMHG | SYSTOLIC BLOOD PRESSURE: 96 MMHG | HEIGHT: 67 IN | HEART RATE: 89 BPM | OXYGEN SATURATION: 98 % | TEMPERATURE: 97.5 F

## 2021-04-22 DIAGNOSIS — E87.1 HYPONATREMIA: Primary | ICD-10-CM

## 2021-04-22 LAB
CORTIS SERPL-MCNC: 25.2 UG/DL (ref 4–22)
OSMOLALITY SERPL: 279 MMOL/KG (ref 280–301)
OSMOLALITY UR: 255 MMOL/KG (ref 100–1200)
SODIUM SERPL-SCNC: 125 MMOL/L (ref 133–144)
SODIUM UR-SCNC: 50 MMOL/L
TSH SERPL DL<=0.005 MIU/L-ACNC: 0.9 MU/L (ref 0.4–4)

## 2021-04-22 PROCEDURE — 83935 ASSAY OF URINE OSMOLALITY: CPT | Mod: 90 | Performed by: FAMILY MEDICINE

## 2021-04-22 PROCEDURE — 99000 SPECIMEN HANDLING OFFICE-LAB: CPT | Performed by: FAMILY MEDICINE

## 2021-04-22 PROCEDURE — 84443 ASSAY THYROID STIM HORMONE: CPT | Performed by: FAMILY MEDICINE

## 2021-04-22 PROCEDURE — 84300 ASSAY OF URINE SODIUM: CPT | Performed by: FAMILY MEDICINE

## 2021-04-22 PROCEDURE — 99214 OFFICE O/P EST MOD 30 MIN: CPT | Performed by: FAMILY MEDICINE

## 2021-04-22 PROCEDURE — 84295 ASSAY OF SERUM SODIUM: CPT | Performed by: FAMILY MEDICINE

## 2021-04-22 PROCEDURE — 36415 COLL VENOUS BLD VENIPUNCTURE: CPT | Performed by: FAMILY MEDICINE

## 2021-04-22 PROCEDURE — 82533 TOTAL CORTISOL: CPT | Performed by: FAMILY MEDICINE

## 2021-04-22 PROCEDURE — 83930 ASSAY OF BLOOD OSMOLALITY: CPT | Performed by: FAMILY MEDICINE

## 2021-04-22 ASSESSMENT — MIFFLIN-ST. JEOR: SCORE: 1421.5

## 2021-04-22 ASSESSMENT — PAIN SCALES - GENERAL: PAINLEVEL: NO PAIN (0)

## 2021-04-22 NOTE — PROGRESS NOTES
"    Assessment & Plan     (E87.1) Hyponatremia  (primary encounter diagnosis)  Comment: Chronic recurrent, apparently asymptomatic.  I have suspected SIADH or reset osmostat,, but now I am completing a more thorough work-up.  His blood pressure is lower today, suggesting possible hypovolemia, however the patient feels fine.    Plan: Labs drawn today include sodium random urine, Osmolality urine,         Osmolality, Sodium, TSH, Cortisol, Sodium        We discussed the differential diagnosis of hyponatremia, I showed him a diagnostic algorithm where so far multiple possible underlying diagnoses seem to been ruled out already.      I want him to decrease furosemide to 10mg per day (or 20 mg every other day), then recheck non-fasting sodium level 2 weeks after he makes the change.      Return in about 2 weeks (around 5/6/2021) for lab tests.    35 minutes spent on the date of the encounter doing review of test results, interpretation of tests, patient visit and documentation, including a considerable amount of time discussing hyponatremia.       Ronaldo Corral MD  [facility] INA Hernandez is a 89 year old who presents for the following health issues  accompanied by his Daughter:    HPI     Patient here today for lab recheck sodium    Review of Systems   Constitutional, HEENT, cardiovascular, pulmonary, gi and gu systems are negative, except as otherwise noted.  He has chronic lower extremity edema, associated with chronic diastolic heart failure, for which he takes furosemide daily.  He has not taken a thiazide diuretic since 2019.      Objective    BP 96/65 (BP Location: Left arm, Patient Position: Sitting, Cuff Size: Adult Regular)   Pulse 89   Temp 97.5  F (36.4  C) (Tympanic)   Ht 1.702 m (5' 7\")   Wt 80.3 kg (177 lb)   SpO2 98%   BMI 27.72 kg/m    Body mass index is 27.72 kg/m .  Physical Exam   GENERAL: healthy, alert and no distress  EYES: Eyes grossly normal to " inspection, PERRL, EOMI, sclerae white and conjunctivae normal  SKIN: no suspicious lesions or rashes to visible skin  NEURO: Normal strength and tone, sensory exam grossly normal, mentation intact, oriented times 3 and cranial nerves 2-12 intact  PSYCH: mentation appears normal, affect normal/bright     Orders Only on 04/21/2021   Component Date Value Ref Range Status     Creatinine 04/21/2021 1.45* 0.66 - 1.25 mg/dL Final     GFR Estimate 04/21/2021 42* >60 mL/min/[1.73_m2] Final    Comment: Non  GFR Calc  Starting 12/18/2018, serum creatinine based estimated GFR (eGFR) will be   calculated using the Chronic Kidney Disease Epidemiology Collaboration   (CKD-EPI) equation.       GFR Estimate If Black 04/21/2021 49* >60 mL/min/[1.73_m2] Final    Comment:  GFR Calc  Starting 12/18/2018, serum creatinine based estimated GFR (eGFR) will be   calculated using the Chronic Kidney Disease Epidemiology Collaboration   (CKD-EPI) equation.       Bilirubin Direct 04/21/2021 0.2  0.0 - 0.2 mg/dL Final     Bilirubin Total 04/21/2021 0.8  0.2 - 1.3 mg/dL Final     Glucose 04/21/2021 281* 70 - 99 mg/dL Final     Urine Osmolality 04/21/2021 241  100 - 1,200 mmol/kg Final     Sodium 04/21/2021 124* 133 - 144 mmol/L Final     Osmolality 04/21/2021 291  280 - 301 mmol/kg Final     Albumin 04/21/2021 3.8  3.4 - 5.0 g/dL Final     Protein Total 04/21/2021 7.8  6.8 - 8.8 g/dL Final

## 2021-04-22 NOTE — PATIENT INSTRUCTIONS
At [facility], we strive to deliver an exceptional experience to you, every time we see you. If you receive a survey, please complete it as we do value your feedback.  If you have MyChart, you can expect to receive results automatically within 24 hours of their completion.  Your provider will send a note interpreting your results as well.   If you do not have MyChart, you should receive your results in about a week by mail.    Your care team:                            Family Medicine Internal Medicine   MD Kaiden Harris MD Shantel Branch-Fleming, MD Srinivasa Vaka, MD Katya Belousova, PAKEISHA Bell, APRN CNP    Dejuan Rogers, MD Pediatrics   Wiley Muller, PAKEISHA Ackerman, CNP MD Silvia Smith APRN CNP   MD Dorene Danielle MD Deborah Mielke, MD Mariia Mariscal, APRN [facility]      Clinic hours: Monday - Thursday 7 am-6 pm; Fridays 7 am-5 pm.   Urgent care: Monday - Friday 10 am- 8 pm; Saturday and Sunday 9 am-5 pm.    Clinic: [phone]       [facility]: Monday - Thursday 8 am - 7 pm; Friday 8 am - 6 pm  [facility]: [phone]     Use www.[email or web address] for 24/7 diagnosis and treatment of dozens of conditions.      Decrease furosemide to 10mg per day (or 20 mg every other day), then recheck non-fasting sodium level 2 weeks after the change.

## 2021-04-27 ENCOUNTER — OFFICE VISIT (OUTPATIENT)
Dept: OPHTHALMOLOGY | Facility: CLINIC | Age: 86
End: 2021-04-27
Payer: COMMERCIAL

## 2021-04-27 DIAGNOSIS — H35.372 EPIRETINAL MEMBRANE, LEFT: ICD-10-CM

## 2021-04-27 DIAGNOSIS — H40.1132 PRIMARY OPEN ANGLE GLAUCOMA (POAG) OF BOTH EYES, MODERATE STAGE: Primary | ICD-10-CM

## 2021-04-27 PROCEDURE — 92083 EXTENDED VISUAL FIELD XM: CPT | Performed by: OPHTHALMOLOGY

## 2021-04-27 PROCEDURE — 92012 INTRM OPH EXAM EST PATIENT: CPT | Performed by: OPHTHALMOLOGY

## 2021-04-27 PROCEDURE — 92134 CPTRZ OPH DX IMG PST SGM RTA: CPT | Performed by: OPHTHALMOLOGY

## 2021-04-27 ASSESSMENT — TONOMETRY
OS_IOP_MMHG: 15
OD_IOP_MMHG: 14
IOP_METHOD: APPLANATION

## 2021-04-27 ASSESSMENT — REFRACTION_WEARINGRX
OD_CYLINDER: +0.50
OS_ADD: +3.25
OD_AXIS: 003
OS_SPHERE: -2.00
OS_CYLINDER: +1.25
SPECS_TYPE: BIFOCAL
OD_SPHERE: -0.50
OD_ADD: +3.00
OS_AXIS: 166

## 2021-04-27 ASSESSMENT — VISUAL ACUITY
OS_CC+: -2
OD_CC+: -3
OD_CC: 20/25
CORRECTION_TYPE: GLASSES
OS_CC: 20/25
METHOD: SNELLEN - LINEAR

## 2021-04-27 ASSESSMENT — PACHYMETRY
OS_CT(UM): 594
OD_CT(UM): 593

## 2021-04-27 ASSESSMENT — EXTERNAL EXAM - RIGHT EYE: OD_EXAM: NORMAL

## 2021-04-27 ASSESSMENT — EXTERNAL EXAM - LEFT EYE: OS_EXAM: NORMAL

## 2021-04-27 NOTE — LETTER
4/27/2021         RE: Manuel Rosales  [address]  [place] [place] [address]        Dear Colleague,    Thank you for referring your patient, Manuel Rosales, to the [facility]. Please see a copy of my visit note below.     Current Eye Medications:  Cosopt (dorzolamide-timolol -- dark blue top) both eyes twice daily, last took at 8 am.  Brimonidine (purple top) both eyes twice daily, last took at 8:10 am.  And Latanoprost (green top) both eyes at bedtime, last took at 6 pm.      Subjective:   6 month follow up for IOP check, Glaucoma OCT, retinal OCT,  and Rios Visual Field.  Vision is doing well distance and near both eyes. No eye pain or discomfort in either eye.      Objective:  See Ophthalmology Exam.       Assessment:  Glaucoma OCT and Rios Visual Field shows slight worsening both eyes in patient with moderate glaucoma.  Stable retinal OCT both eyes.      Plan:  Continue same medications.  Return visit for 6 months complete exam.  Could consider addition of Rhopressa.  Willy Cardoso M.D.  [phone]                 Again, thank you for allowing me to participate in the care of your patient.        Sincerely,        Willy Cardoso MD

## 2021-04-27 NOTE — PATIENT INSTRUCTIONS
Continue same medications.  Return visit for 6 months complete exam.  Willy Cardoso M.D.  [phone]

## 2021-04-27 NOTE — PROGRESS NOTES
Current Eye Medications:  Cosopt (dorzolamide-timolol -- dark blue top) both eyes twice daily, last took at 8 am.  Brimonidine (purple top) both eyes twice daily, last took at 8:10 am.  And Latanoprost (green top) both eyes at bedtime, last took at 6 pm.      Subjective:   6 month follow up for IOP check, Glaucoma OCT, retinal OCT,  and Rios Visual Field.  Vision is doing well distance and near both eyes. No eye pain or discomfort in either eye.      Objective:  See Ophthalmology Exam.       Assessment:  Glaucoma OCT and Rios Visual Field shows slight worsening both eyes in patient with moderate glaucoma.  Stable retinal OCT both eyes.      Plan:  Continue same medications.  Return visit for 6 months complete exam.  Could consider addition of Rhopressa.  Willy Cardoso M.D.  [phone]

## 2021-05-06 DIAGNOSIS — E87.1 HYPONATREMIA: ICD-10-CM

## 2021-05-06 LAB — SODIUM SERPL-SCNC: 125 MMOL/L (ref 133–144)

## 2021-05-06 PROCEDURE — 36415 COLL VENOUS BLD VENIPUNCTURE: CPT | Performed by: FAMILY MEDICINE

## 2021-05-06 PROCEDURE — 84295 ASSAY OF SERUM SODIUM: CPT | Performed by: FAMILY MEDICINE

## 2021-05-18 ENCOUNTER — TELEPHONE (OUTPATIENT)
Dept: FAMILY MEDICINE | Facility: CLINIC | Age: 86
End: 2021-05-18

## 2021-05-18 NOTE — TELEPHONE ENCOUNTER
.What type of form? DMV   What day did you drop off your forms? 05/18/2021  Is there a due date? 08/02/2021 (7-10 business day to compete forms)   How would you like to receive these forms? Please fax to [phone]  Which clinic was the form dropped off at? [facility]    What is the best number to contact you? Cell [phone]  What time works best to contact you with in 4 hrs? any  Is it okay to leave a message? Yes    Adrianna Desir

## 2021-08-09 NOTE — TELEPHONE ENCOUNTER
Patient's daughter is calling stating that this form was never sent in to the DMV and now they are threating  to take Don's drivers license away on 8/18/2021.  Please call patient to advise where this form is.  I was unable to located it in the Media tab.  Thank you  Mariangel Triplett

## 2021-08-10 DIAGNOSIS — H40.1132 PRIMARY OPEN ANGLE GLAUCOMA (POAG) OF BOTH EYES, MODERATE STAGE: ICD-10-CM

## 2021-08-10 RX ORDER — DORZOLAMIDE HYDROCHLORIDE AND TIMOLOL MALEATE 20; 5 MG/ML; MG/ML
1 SOLUTION/ DROPS OPHTHALMIC 2 TIMES DAILY
Qty: 30 ML | Refills: 3 | Status: SHIPPED | OUTPATIENT
Start: 2021-08-10 | End: 2022-04-28

## 2021-08-10 NOTE — TELEPHONE ENCOUNTER
Patient's daughter is calling back about this. Forms from may still have yet to be completed. They would like these done ASAP.

## 2021-08-11 NOTE — TELEPHONE ENCOUNTER
Patient's daughter calling to check on the status of the form as Patient will have his license revoked on 8/18/2021.  Muriel Gonsales [facility]  2nd Floor  Primary Care

## 2021-08-12 NOTE — TELEPHONE ENCOUNTER
Form found. Form faxed to [facility] [phone]. Copy placed in tc bin and copy placed in abstracting.

## 2021-08-24 ENCOUNTER — TELEPHONE (OUTPATIENT)
Dept: FAMILY MEDICINE | Facility: CLINIC | Age: 86
End: 2021-08-24

## 2021-08-24 NOTE — TELEPHONE ENCOUNTER
Patient's daughter, Oxana calling on the status of the Diabetic DMV form completed and faxed on 8/12/2021. Notation states a copy was also sent to abstracting.TC does not see it in the patient's chart. She needs a copy because the DMV has now relinquished the patient's driving privileges stating they never received it. She would like a call back. Verbal consent given by the patient to speak with her.  Uziel Mendieta

## 2021-09-18 DIAGNOSIS — Z79.4 TYPE 2 DIABETES MELLITUS WITH MICROALBUMINURIA, WITH LONG-TERM CURRENT USE OF INSULIN (H): Chronic | ICD-10-CM

## 2021-09-18 DIAGNOSIS — R80.9 TYPE 2 DIABETES MELLITUS WITH MICROALBUMINURIA, WITH LONG-TERM CURRENT USE OF INSULIN (H): Chronic | ICD-10-CM

## 2021-09-18 DIAGNOSIS — E11.29 TYPE 2 DIABETES MELLITUS WITH MICROALBUMINURIA, WITH LONG-TERM CURRENT USE OF INSULIN (H): Chronic | ICD-10-CM

## 2021-09-20 NOTE — TELEPHONE ENCOUNTER
Routing refill request to provider for review/approval because:  Labs not current:  Creatinine    Priscilla Nichole RN

## 2021-09-23 RX ORDER — GLIPIZIDE 10 MG/1
TABLET ORAL
Qty: 180 TABLET | Refills: 1 | Status: SHIPPED | OUTPATIENT
Start: 2021-09-23 | End: 2022-03-31

## 2021-10-12 NOTE — PATIENT INSTRUCTIONS
At [facility], we strive to deliver an exceptional experience to you, every time we see you. If you receive a survey, please complete it as we do value your feedback.  If you have MyChart, you can expect to receive results automatically within 24 hours of their completion.  Your provider will send a note interpreting your results as well.   If you do not have MyChart, you should receive your results in about a week by mail.    Your care team:                            Family Medicine Internal Medicine   MD Kaiden Harris MD Shantel Branch-Fleming, MD Srinivasa Vaka, MD Katya Belousova, PAKEISHA Bell, APRN CNP    Dejuan Rogers, MD Pediatrics   Wiley Muller, PAKEISHA Ackerman, CNP MD Silvia Smith APRN CNP   MD Dorene Danielle MD Deborah Mielke, MD Mariia Mariscal, APRN [facility]      Clinic hours: Monday - Thursday 7 am-6 pm; Fridays 7 am-5 pm.   Urgent care: Monday - Friday 10 am- 8 pm; Saturday and Sunday 9 am-5 pm.    Clinic: [phone]       [facility]: Monday - Thursday 8 am - 7 pm; Friday 8 am - 6 pm  [facility]: [phone]     Use [email or web address] for 24/7 diagnosis and treatment of dozens of conditions.

## 2021-10-12 NOTE — PROGRESS NOTES
Assessment & Plan     (E11.29,  R80.9,  Z79.4) Type 2 diabetes mellitus with microalbuminuria, with long-term current use of insulin (H)  (primary encounter diagnosis)  Comment: well controlled, as usual  Plan: CBC with Platelets  , HEMOGLOBIN A1C, BASIC         METABOLIC PANEL, Lipid panel reflex to direct         LDL Non-fasting, alogliptin (NESINA) 25 MG         tablet        Return in about 6 months (around 4/8/2022) for Medicare annual wellness exam, diabetes.      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, NYHA class 2 and chronic kidney disease stage 3 (H)  Comment: well controlled  Plan: amLODIPine (NORVASC) 10 MG tablet, lisinopril         (ZESTRIL) 40 MG tablet          (E78.5) Hyperlipidemia LDL goal <100  Comment: historically at goal, on a high-intensity statin (he has been on this for several years, and there is no need to stop it)  Plan: atorvastatin (LIPITOR) 80 MG tablet        Recheck lipids in 6-12 months    (Z23) High priority for 2019-nCoV vaccine  (Z23) Need for vaccination  Comment: booster  Plan: COVID-19,PF,PFIZER                Return in about 6 months (around 4/8/2022) for Medicare annual wellness exam, diabetes.    Ronaldo Corral MD  [facility]    Idalmis Hernandez is a 89 year old who presents for the following health issues  accompanied by his daughter.    HPI     Diabetes Follow-up    How often are you checking your blood sugar? One time daily  What time of day are you checking your blood sugars (select all that apply)?  qam  Have you had any blood sugars above 200?  No  Have you had any blood sugars below 70?  Yes occ    What symptoms do you notice when your blood sugar is low?  Dizzy (below 60)    What concerns do you have today about your diabetes? None     Do you have any of these symptoms? (Select all that apply)  No numbness or tingling in feet.  No redness, sores or blisters  "on feet.  No complaints of excessive thirst.  No reports of blurry vision.  No significant changes to weight.    Have you had a diabetic eye exam in the last 12 months? Scheduled for 10/28/21 (Janae)          Hyperlipidemia Follow-Up      Are you regularly taking any medication or supplement to lower your cholesterol?   Yes- atorvastatin    Are you having muscle aches or other side effects that you think could be caused by your cholesterol lowering medication?  No    Hypertension Follow-up      Do you check your blood pressure regularly outside of the clinic? No     Are you following a low salt diet? Yes    Are your blood pressures ever more than 140 on the top number (systolic) OR more   than 90 on the bottom number (diastolic), for example 140/90?     BP Readings from Last 2 Encounters:   10/14/21 113/69   04/22/21 96/65     Hemoglobin A1C (%)   Date Value   10/14/2021 6.8 (H)   04/08/2021 6.6 (H)   09/28/2020 6.6 (H)     LDL Cholesterol Calculated (mg/dL)   Date Value   04/08/2021 24   09/28/2020 58               Review of Systems   Constitutional, HEENT, cardiovascular, pulmonary, gi and gu systems are negative, except as otherwise noted.      Objective    /69 (BP Location: Left arm, Patient Position: Sitting, Cuff Size: Adult Regular)   Pulse 76   Ht 1.67 m (5' 5.75\")   Wt 76.7 kg (169 lb)   SpO2 98%   BMI 27.49 kg/m    Body mass index is 27.49 kg/m .  Physical Exam   GENERAL: healthy, alert and no distress  EYES: Eyes grossly normal to inspection, PERRL, EOMI, sclerae white and conjunctivae normal  RESP: lungs clear to auscultation - no crackles or wheezes, no areas of dullness, no tachypnea  CV: Heart regular rate and rhythm without murmur, click or rub. No peripheral edema and peripheral pulses strong  MS: no gross musculoskeletal defects noted, no edema  SKIN: no suspicious lesions or rashes to visible skin  NEURO: Normal strength and tone, sensory exam grossly normal, mentation intact, " oriented times 3 and cranial nerves 2-12 intact  PSYCH: mentation appears normal, affect normal/bright     Lab Results   Component Value Date    A1C 6.8 10/14/2021    A1C 6.6 04/08/2021    A1C 6.6 09/28/2020    A1C 7.5 04/09/2020    A1C 8.2 01/30/2020    A1C 7.5 07/29/2019

## 2021-10-14 ENCOUNTER — TELEPHONE (OUTPATIENT)
Dept: FAMILY MEDICINE | Facility: CLINIC | Age: 86
End: 2021-10-14

## 2021-10-14 ENCOUNTER — OFFICE VISIT (OUTPATIENT)
Dept: FAMILY MEDICINE | Facility: CLINIC | Age: 86
End: 2021-10-14
Payer: COMMERCIAL

## 2021-10-14 VITALS
OXYGEN SATURATION: 98 % | SYSTOLIC BLOOD PRESSURE: 113 MMHG | WEIGHT: 169 LBS | BODY MASS INDEX: 27.16 KG/M2 | HEIGHT: 66 IN | HEART RATE: 76 BPM | DIASTOLIC BLOOD PRESSURE: 69 MMHG

## 2021-10-14 DIAGNOSIS — R80.9 TYPE 2 DIABETES MELLITUS WITH MICROALBUMINURIA, WITH LONG-TERM CURRENT USE OF INSULIN (H): Primary | ICD-10-CM

## 2021-10-14 DIAGNOSIS — N18.30 BENIGN HYPERTENSIVE HEART AND KIDNEY DISEASE WITH DIASTOLIC CONGESTIVE HEART FAILURE, NYHA CLASS 2 AND CHRONIC KIDNEY DISEASE STAGE 3 (H): ICD-10-CM

## 2021-10-14 DIAGNOSIS — Z23 NEED FOR VACCINATION: ICD-10-CM

## 2021-10-14 DIAGNOSIS — I50.30 BENIGN HYPERTENSIVE HEART AND KIDNEY DISEASE WITH DIASTOLIC CONGESTIVE HEART FAILURE, NYHA CLASS 2 AND CHRONIC KIDNEY DISEASE STAGE 3 (H): ICD-10-CM

## 2021-10-14 DIAGNOSIS — E78.5 HYPERLIPIDEMIA LDL GOAL <100: Chronic | ICD-10-CM

## 2021-10-14 DIAGNOSIS — Z23 HIGH PRIORITY FOR 2019-NCOV VACCINE: ICD-10-CM

## 2021-10-14 DIAGNOSIS — Z79.4 TYPE 2 DIABETES MELLITUS WITH MICROALBUMINURIA, WITH LONG-TERM CURRENT USE OF INSULIN (H): Primary | ICD-10-CM

## 2021-10-14 DIAGNOSIS — E11.29 TYPE 2 DIABETES MELLITUS WITH MICROALBUMINURIA, WITH LONG-TERM CURRENT USE OF INSULIN (H): Primary | ICD-10-CM

## 2021-10-14 DIAGNOSIS — I13.0 BENIGN HYPERTENSIVE HEART AND KIDNEY DISEASE WITH DIASTOLIC CONGESTIVE HEART FAILURE, NYHA CLASS 2 AND CHRONIC KIDNEY DISEASE STAGE 3 (H): ICD-10-CM

## 2021-10-14 LAB
ANION GAP SERPL CALCULATED.3IONS-SCNC: 8 MMOL/L (ref 3–14)
BUN SERPL-MCNC: 8 MG/DL (ref 7–30)
CALCIUM SERPL-MCNC: 9.5 MG/DL (ref 8.5–10.1)
CHLORIDE BLD-SCNC: 98 MMOL/L (ref 94–109)
CHOLEST SERPL-MCNC: 127 MG/DL
CO2 SERPL-SCNC: 22 MMOL/L (ref 20–32)
CREAT SERPL-MCNC: 1.01 MG/DL (ref 0.66–1.25)
ERYTHROCYTE [DISTWIDTH] IN BLOOD BY AUTOMATED COUNT: 12.4 % (ref 10–15)
GFR SERPL CREATININE-BSD FRML MDRD: 65 ML/MIN/1.73M2
GLUCOSE BLD-MCNC: 264 MG/DL (ref 70–99)
HBA1C MFR BLD: 6.8 % (ref 0–5.6)
HCT VFR BLD AUTO: 40.3 % (ref 40–53)
HDLC SERPL-MCNC: 81 MG/DL
HGB BLD-MCNC: 13.3 G/DL (ref 13.3–17.7)
HOLD SPECIMEN: NORMAL
HOLD SPECIMEN: NORMAL
LDLC SERPL CALC-MCNC: 28 MG/DL
MCH RBC QN AUTO: 30.7 PG (ref 26.5–33)
MCHC RBC AUTO-ENTMCNC: 33 G/DL (ref 31.5–36.5)
MCV RBC AUTO: 93 FL (ref 78–100)
NONHDLC SERPL-MCNC: 46 MG/DL
PLATELET # BLD AUTO: 327 10E3/UL (ref 150–450)
POTASSIUM BLD-SCNC: 4.9 MMOL/L (ref 3.4–5.3)
RBC # BLD AUTO: 4.33 10E6/UL (ref 4.4–5.9)
SODIUM SERPL-SCNC: 128 MMOL/L (ref 133–144)
TRIGL SERPL-MCNC: 90 MG/DL
WBC # BLD AUTO: 11 10E3/UL (ref 4–11)

## 2021-10-14 PROCEDURE — 80048 BASIC METABOLIC PNL TOTAL CA: CPT | Performed by: FAMILY MEDICINE

## 2021-10-14 PROCEDURE — 80061 LIPID PANEL: CPT | Performed by: FAMILY MEDICINE

## 2021-10-14 PROCEDURE — 83036 HEMOGLOBIN GLYCOSYLATED A1C: CPT | Performed by: FAMILY MEDICINE

## 2021-10-14 PROCEDURE — 85027 COMPLETE CBC AUTOMATED: CPT | Performed by: FAMILY MEDICINE

## 2021-10-14 PROCEDURE — 0004A COVID-19,PF,PFIZER (12+ YRS): CPT | Performed by: FAMILY MEDICINE

## 2021-10-14 PROCEDURE — 99214 OFFICE O/P EST MOD 30 MIN: CPT | Mod: 25 | Performed by: FAMILY MEDICINE

## 2021-10-14 PROCEDURE — 36415 COLL VENOUS BLD VENIPUNCTURE: CPT | Performed by: FAMILY MEDICINE

## 2021-10-14 PROCEDURE — 91300 COVID-19,PF,PFIZER (12+ YRS): CPT | Performed by: FAMILY MEDICINE

## 2021-10-14 RX ORDER — AMLODIPINE BESYLATE 10 MG/1
10 TABLET ORAL DAILY
Qty: 90 TABLET | Refills: 1 | Status: SHIPPED | OUTPATIENT
Start: 2021-10-14 | End: 2022-04-21

## 2021-10-14 RX ORDER — LISINOPRIL 40 MG/1
TABLET ORAL
Qty: 90 TABLET | Refills: 1 | Status: SHIPPED | OUTPATIENT
Start: 2021-10-14 | End: 2022-04-21

## 2021-10-14 RX ORDER — ALOGLIPTIN 25 MG/1
25 TABLET, FILM COATED ORAL DAILY
Qty: 90 TABLET | Refills: 1 | Status: SHIPPED | OUTPATIENT
Start: 2021-10-14 | End: 2022-04-21

## 2021-10-14 RX ORDER — ATORVASTATIN CALCIUM 80 MG/1
80 TABLET, FILM COATED ORAL DAILY
Qty: 90 TABLET | Refills: 1 | Status: SHIPPED | OUTPATIENT
Start: 2021-10-14 | End: 2022-04-21

## 2021-10-14 ASSESSMENT — MIFFLIN-ST. JEOR: SCORE: 1365.36

## 2021-10-14 ASSESSMENT — PAIN SCALES - GENERAL: PAINLEVEL: NO PAIN (0)

## 2021-10-14 NOTE — TELEPHONE ENCOUNTER
Plan does not cover alogliptin (NESINA) 25 MG tablet.  Please go to [email or web address] to initiate Prior Auth or change med.    Insurance type and ID number: Key:  [id number]      Additional Information:     Holly Gonzalez

## 2021-10-14 NOTE — LETTER
October 31, 2021      Manuel Rosales  [address]   [facility] [address]        Dear ,    We are writing to inform you of your test results.    All of the rest of your test results were within the expected range for you.     Please contact the clinic if you have additional questions.  Thank you.        Resulted Orders   Extra Green Top (Lithium Heparin) Tube   Result Value Ref Range    Hold Specimen JIC    Extra Purple Top Tube   Result Value Ref Range    Hold Specimen JIC    BASIC METABOLIC PANEL   Result Value Ref Range    Sodium 128 (L) 133 - 144 mmol/L    Potassium 4.9 3.4 - 5.3 mmol/L    Chloride 98 94 - 109 mmol/L    Carbon Dioxide (CO2) 22 20 - 32 mmol/L    Anion Gap 8 3 - 14 mmol/L    Urea Nitrogen 8 7 - 30 mg/dL    Creatinine 1.01 0.66 - 1.25 mg/dL    Calcium 9.5 8.5 - 10.1 mg/dL    Glucose 264 (H) 70 - 99 mg/dL    GFR Estimate 65 >60 mL/min/1.73m2      Comment:      As of July 11, 2021, eGFR is calculated by the CKD-EPI creatinine equation, without race adjustment. eGFR can be influenced by muscle mass, exercise, and diet. The reported eGFR is an estimation only and is only applicable if the renal function is stable.   CBC with Platelets     Result Value Ref Range    WBC Count 11.0 4.0 - 11.0 10e3/uL    RBC Count 4.33 (L) 4.40 - 5.90 10e6/uL    Hemoglobin 13.3 13.3 - 17.7 g/dL    Hematocrit 40.3 40.0 - 53.0 %    MCV 93 78 - 100 fL    MCH 30.7 26.5 - 33.0 pg    MCHC 33.0 31.5 - 36.5 g/dL    RDW 12.4 10.0 - 15.0 %    Platelet Count 327 150 - 450 10e3/uL   HEMOGLOBIN A1C   Result Value Ref Range    Hemoglobin A1C 6.8 (H) 0.0 - 5.6 %      Comment:      Normal <5.7%   Prediabetes 5.7-6.4%    Diabetes 6.5% or higher     Note: Adopted from ADA consensus guidelines.   Lipid panel reflex to direct LDL Non-fasting   Result Value Ref Range    Cholesterol 127 <200 mg/dL    Triglycerides 90 <150 mg/dL    Direct Measure HDL 81 >=40 mg/dL    LDL Cholesterol Calculated 28 <=100 mg/dL    Non HDL  Cholesterol 46 <130 mg/dL    Narrative    Cholesterol  Desirable:  <200 mg/dL    Triglycerides  Normal:  Less than 150 mg/dL  Borderline High:  150-199 mg/dL  High:  200-499 mg/dL  Very High:  Greater than or equal to 500 mg/dL    Direct Measure HDL  Female:  Greater than or equal to 50 mg/dL   Male:  Greater than or equal to 40 mg/dL    LDL Cholesterol  Desirable:  <100mg/dL  Above Desirable:  100-129 mg/dL   Borderline High:  130-159 mg/dL   High:  160-189 mg/dL   Very High:  >= 190 mg/dL    Non HDL Cholesterol  Desirable:  130 mg/dL  Above Desirable:  130-159 mg/dL  Borderline High:  160-189 mg/dL  High:  190-219 mg/dL  Very High:  Greater than or equal to 220 mg/dL       If you have any questions or concerns, please call the clinic at the number listed above.       Sincerely,      Ronaldo Corral MD

## 2021-10-28 ENCOUNTER — OFFICE VISIT (OUTPATIENT)
Dept: OPHTHALMOLOGY | Facility: CLINIC | Age: 86
End: 2021-10-28
Payer: COMMERCIAL

## 2021-10-28 DIAGNOSIS — R80.9 TYPE 2 DIABETES MELLITUS WITH MICROALBUMINURIA, WITH LONG-TERM CURRENT USE OF INSULIN (H): Chronic | ICD-10-CM

## 2021-10-28 DIAGNOSIS — H52.4 PRESBYOPIA: ICD-10-CM

## 2021-10-28 DIAGNOSIS — H43.813 POSTERIOR VITREOUS DETACHMENT OF BOTH EYES: ICD-10-CM

## 2021-10-28 DIAGNOSIS — H35.372 EPIRETINAL MEMBRANE, LEFT: ICD-10-CM

## 2021-10-28 DIAGNOSIS — H40.1132 PRIMARY OPEN ANGLE GLAUCOMA OF BOTH EYES, MODERATE STAGE: ICD-10-CM

## 2021-10-28 DIAGNOSIS — E11.29 TYPE 2 DIABETES MELLITUS WITH MICROALBUMINURIA, WITH LONG-TERM CURRENT USE OF INSULIN (H): Chronic | ICD-10-CM

## 2021-10-28 DIAGNOSIS — Z96.1 PSEUDOPHAKIA: ICD-10-CM

## 2021-10-28 DIAGNOSIS — Z79.4 TYPE 2 DIABETES MELLITUS WITH MICROALBUMINURIA, WITH LONG-TERM CURRENT USE OF INSULIN (H): Chronic | ICD-10-CM

## 2021-10-28 DIAGNOSIS — Z01.01 ENCOUNTER FOR EXAMINATION OF EYES AND VISION WITH ABNORMAL FINDINGS: Primary | ICD-10-CM

## 2021-10-28 PROBLEM — Z95.0 CARDIAC PACEMAKER IN SITU: Status: ACTIVE | Noted: 2018-05-24

## 2021-10-28 PROBLEM — I48.91 ATRIAL FIBRILLATION (H): Status: ACTIVE | Noted: 2020-06-29

## 2021-10-28 PROBLEM — N28.9 RENAL INSUFFICIENCY SYNDROME: Status: ACTIVE | Noted: 2019-06-25

## 2021-10-28 PROBLEM — I65.21 OCCLUSION OF RIGHT CAROTID ARTERY: Status: ACTIVE | Noted: 2020-07-15

## 2021-10-28 PROBLEM — E87.1 HYPONATREMIA: Status: ACTIVE | Noted: 2019-06-26

## 2021-10-28 PROBLEM — I44.2 COMPLETE HEART BLOCK (H): Status: ACTIVE | Noted: 2018-05-14

## 2021-10-28 PROBLEM — I65.21 CAROTID STENOSIS, RIGHT: Status: ACTIVE | Noted: 2019-06-24

## 2021-10-28 PROBLEM — G83.9 PARALYSIS (H): Status: ACTIVE | Noted: 2019-06-24

## 2021-10-28 PROCEDURE — 92015 DETERMINE REFRACTIVE STATE: CPT | Performed by: OPHTHALMOLOGY

## 2021-10-28 PROCEDURE — 92014 COMPRE OPH EXAM EST PT 1/>: CPT | Performed by: OPHTHALMOLOGY

## 2021-10-28 RX ORDER — METOPROLOL SUCCINATE 50 MG/1
50 TABLET, EXTENDED RELEASE ORAL
COMMUNITY
Start: 2021-09-21 | End: 2022-04-21

## 2021-10-28 ASSESSMENT — EXTERNAL EXAM - RIGHT EYE: OD_EXAM: NORMAL

## 2021-10-28 ASSESSMENT — REFRACTION_WEARINGRX
OS_CYLINDER: +1.25
OD_CYLINDER: +0.50
OD_AXIS: 001
SPECS_TYPE: BIFOCAL
OS_ADD: +3.00
OS_AXIS: 170
OS_SPHERE: -2.00
OD_ADD: +3.25
OD_SPHERE: -0.75

## 2021-10-28 ASSESSMENT — CUP TO DISC RATIO
OS_RATIO: 0.9
OD_RATIO: 0.8

## 2021-10-28 ASSESSMENT — VISUAL ACUITY
OS_CC: 1
OD_CC: 2
OS_CC+: -1
METHOD: SNELLEN - LINEAR
OS_CC: 20/40
CORRECTION_TYPE: GLASSES
OD_CC: 20/40
OD_CC+: -2

## 2021-10-28 ASSESSMENT — TONOMETRY
OD_IOP_MMHG: 15
OS_IOP_MMHG: 15
IOP_METHOD: APPLANATION

## 2021-10-28 ASSESSMENT — CONF VISUAL FIELD
OS_NORMAL: 1
OD_NORMAL: 1

## 2021-10-28 ASSESSMENT — EXTERNAL EXAM - LEFT EYE: OS_EXAM: NORMAL

## 2021-10-28 ASSESSMENT — REFRACTION_MANIFEST
OD_ADD: +3.00
OS_CYLINDER: +1.00
OD_CYLINDER: +1.00
OS_ADD: +3.00
OD_AXIS: 017
OS_SPHERE: -2.00
OS_AXIS: 154
OD_SPHERE: -0.50

## 2021-10-28 NOTE — PROGRESS NOTES
Current Eye Medications:  Cosopt and Brimonidine both eyes twice a day,  Cosopt and Brimonidine both eyes twice a day.  Last drops: 8am.      Subjective:  Patient is here for a Diabetic Eye Exam.  No vision changes or concerns.  Glasses are working adequately, but are bothering the back of his ear.   His daughter Tenisha is with him today.    Lab Results   Component Value Date    A1C 6.8 10/14/2021    A1C 6.6 04/08/2021    A1C 6.6 09/28/2020    A1C 7.5 04/09/2020    A1C 8.2 01/30/2020    A1C 7.5 07/29/2019        Objective:  See Ophthalmology Exam.       Assessment:  Stable intraocular pressure and discs both eyes in patient with advanced open angle glaucoma.  No diabetic retinopathy.      ICD-10-CM    1. Encounter for examination of eyes and vision with abnormal findings  Z01.01    2. Presbyopia  H52.4 REFRACTIVE STATUS   3. Type 2 diabetes mellitus with microalbuminuria, with long-term current use of insulin (H)  E11.29 EYE EXAM (SIMPLE-NONBILLABLE)    R80.9     Z79.4    4. Pseudophakia, ou  Z96.1    5. Primary open angle glaucoma of both eyes, moderate stage  H40.1132    6. Epiretinal membrane, mild, left  H35.372    7. Posterior vitreous detachment of both eyes  H43.813         Plan:  Continue Cosopt and Brimonidine both eyes twice a day  Continue Latanoprost at bedtime both eyes.  Possible clouding of posterior capsule both eyes discussed.  Glasses Rx given - optional   Return visit 6 months for intraocular pressure check, glaucoma OCT, retinal OCT and Rios Visual Field.    Willy Cardoso M.D.  [phone]

## 2021-10-28 NOTE — PATIENT INSTRUCTIONS
Continue Cosopt and Brimonidine both eyes twice a day  Continue Latanoprost at bedtime both eyes.  Possible clouding of posterior capsule both eyes discussed.  Glasses Rx given - optional   Return visit 6 months for intraocular pressure check, glaucoma OCT, retinal OCT and Rios Visual Field.    Willy Cardoso M.D.  [phone]    Patient Education   Diabetes weakens the blood vessels all over the body, including the eyes. Damage to the blood vessels in the eyes can cause swelling or bleeding into part of the eye (called the retina). This is called diabetic retinopathy (FERNANDO-tin--puh-thee). If not treated, this disease can cause vision loss or blindness.   Symptoms may include blurred or distorted vision, but many people have no symptoms. It's important to see your eye doctor regularly to check for problems.   Early treatment and good control can help protect your vision. Here are the things you can do to help prevent vision loss:      1. Keep your blood sugar levels under tight control.      2. Bring high blood pressure under control.      3. No smoking.      4. Have yearly dilated eye exams.

## 2021-10-28 NOTE — LETTER
10/28/2021         RE: Manuel Rosales  [address]  [facility] [address]        Dear Colleague,    Thank you for referring your patient, Manuel Rosales, to the [facility]. Please see a copy of my visit note below.     Current Eye Medications:  Cosopt and Brimonidine both eyes twice a day,  Cosopt and Brimonidine both eyes twice a day.  Last drops: 8am.      Subjective:  Patient is here for a Diabetic Eye Exam.  No vision changes or concerns.  Glasses are working adequately, but are bothering the back of his ear.   His daughter Tenisha is with him today.    Lab Results   Component Value Date    A1C 6.8 10/14/2021    A1C 6.6 04/08/2021    A1C 6.6 09/28/2020    A1C 7.5 04/09/2020    A1C 8.2 01/30/2020    A1C 7.5 07/29/2019        Objective:  See Ophthalmology Exam.       Assessment:  Stable intraocular pressure and discs both eyes in patient with advanced open angle glaucoma.  No diabetic retinopathy.      ICD-10-CM    1. Encounter for examination of eyes and vision with abnormal findings  Z01.01    2. Presbyopia  H52.4 REFRACTIVE STATUS   3. Type 2 diabetes mellitus with microalbuminuria, with long-term current use of insulin (H)  E11.29 EYE EXAM (SIMPLE-NONBILLABLE)    R80.9     Z79.4    4. Pseudophakia, ou  Z96.1    5. Primary open angle glaucoma of both eyes, moderate stage  H40.1132    6. Epiretinal membrane, mild, left  H35.372    7. Posterior vitreous detachment of both eyes  H43.813         Plan:  Continue Cosopt and Brimonidine both eyes twice a day  Continue Latanoprost at bedtime both eyes.  Possible clouding of posterior capsule both eyes discussed.  Glasses Rx given - optional   Return visit 6 months for intraocular pressure check, glaucoma OCT, retinal OCT and Rios Visual Field.    Willy Cardoso M.D.  [phone]           Again, thank you for allowing me to participate in the care of your patient.        Sincerely,        Willy Cardoso MD

## 2021-10-31 NOTE — TELEPHONE ENCOUNTER
Please verify with patient that he is unable to fill alogliptin (Nesina) prescription.      If that is true, start PA or find out which DPP-4 inhibitor his insurance prefers.    If that is not true (he filled his Nesina prescription without a problem), close the encounter.

## 2022-02-16 ENCOUNTER — TELEPHONE (OUTPATIENT)
Dept: OPHTHALMOLOGY | Facility: CLINIC | Age: 87
End: 2022-02-16
Payer: COMMERCIAL

## 2022-02-16 DIAGNOSIS — H40.1132 PRIMARY OPEN ANGLE GLAUCOMA (POAG) OF BOTH EYES, MODERATE STAGE: ICD-10-CM

## 2022-02-16 RX ORDER — BRIMONIDINE TARTRATE 1.5 MG/ML
1 SOLUTION/ DROPS OPHTHALMIC 2 TIMES DAILY
Qty: 15 ML | Refills: 3 | Status: SHIPPED | OUTPATIENT
Start: 2022-02-16 | End: 2022-04-28

## 2022-02-16 NOTE — TELEPHONE ENCOUNTER
Will refill Brimonidine for patient to [facility]. Will send to Dr. Cardoso to sign and send.

## 2022-02-16 NOTE — TELEPHONE ENCOUNTER
Daughter was [id number] to refill medication Brimonidine. He uses the  [facility] in [place]. Any questions please call daughter Oxana Kumar at [phone].

## 2022-02-23 ENCOUNTER — MEDICAL CORRESPONDENCE (OUTPATIENT)
Dept: FAMILY MEDICINE | Facility: CLINIC | Age: 87
End: 2022-02-23

## 2022-03-31 DIAGNOSIS — Z79.4 TYPE 2 DIABETES MELLITUS WITH MICROALBUMINURIA, WITH LONG-TERM CURRENT USE OF INSULIN (H): Chronic | ICD-10-CM

## 2022-03-31 DIAGNOSIS — E11.29 TYPE 2 DIABETES MELLITUS WITH MICROALBUMINURIA, WITH LONG-TERM CURRENT USE OF INSULIN (H): Chronic | ICD-10-CM

## 2022-03-31 DIAGNOSIS — R80.9 TYPE 2 DIABETES MELLITUS WITH MICROALBUMINURIA, WITH LONG-TERM CURRENT USE OF INSULIN (H): Chronic | ICD-10-CM

## 2022-03-31 RX ORDER — GLIPIZIDE 10 MG/1
TABLET ORAL
Qty: 180 TABLET | Refills: 0 | Status: SHIPPED | OUTPATIENT
Start: 2022-03-31 | End: 2022-04-21

## 2022-04-20 DIAGNOSIS — I50.30 BENIGN HYPERTENSIVE HEART AND KIDNEY DISEASE WITH DIASTOLIC CONGESTIVE HEART FAILURE, NYHA CLASS 2 AND CHRONIC KIDNEY DISEASE STAGE 3 (H): ICD-10-CM

## 2022-04-20 DIAGNOSIS — I13.0 BENIGN HYPERTENSIVE HEART AND KIDNEY DISEASE WITH DIASTOLIC CONGESTIVE HEART FAILURE, NYHA CLASS 2 AND CHRONIC KIDNEY DISEASE STAGE 3 (H): ICD-10-CM

## 2022-04-20 DIAGNOSIS — N18.30 BENIGN HYPERTENSIVE HEART AND KIDNEY DISEASE WITH DIASTOLIC CONGESTIVE HEART FAILURE, NYHA CLASS 2 AND CHRONIC KIDNEY DISEASE STAGE 3 (H): ICD-10-CM

## 2022-04-21 ENCOUNTER — OFFICE VISIT (OUTPATIENT)
Dept: FAMILY MEDICINE | Facility: CLINIC | Age: 87
End: 2022-04-21
Payer: COMMERCIAL

## 2022-04-21 ENCOUNTER — TELEPHONE (OUTPATIENT)
Dept: FAMILY MEDICINE | Facility: CLINIC | Age: 87
End: 2022-04-21

## 2022-04-21 VITALS
DIASTOLIC BLOOD PRESSURE: 78 MMHG | RESPIRATION RATE: 20 BRPM | HEART RATE: 84 BPM | TEMPERATURE: 98.6 F | SYSTOLIC BLOOD PRESSURE: 128 MMHG | WEIGHT: 159.13 LBS | OXYGEN SATURATION: 98 % | HEIGHT: 66 IN | BODY MASS INDEX: 25.57 KG/M2

## 2022-04-21 DIAGNOSIS — Z79.4 TYPE 2 DIABETES MELLITUS WITH MICROALBUMINURIA, WITH LONG-TERM CURRENT USE OF INSULIN (H): Chronic | ICD-10-CM

## 2022-04-21 DIAGNOSIS — E11.29 TYPE 2 DIABETES MELLITUS WITH MICROALBUMINURIA, WITH LONG-TERM CURRENT USE OF INSULIN (H): Chronic | ICD-10-CM

## 2022-04-21 DIAGNOSIS — I48.19 PERSISTENT ATRIAL FIBRILLATION (H): ICD-10-CM

## 2022-04-21 DIAGNOSIS — E78.5 HYPERLIPIDEMIA LDL GOAL <100: Chronic | ICD-10-CM

## 2022-04-21 DIAGNOSIS — Z23 NEED FOR VACCINATION: ICD-10-CM

## 2022-04-21 DIAGNOSIS — I73.9 PVD (PERIPHERAL VASCULAR DISEASE) (H): ICD-10-CM

## 2022-04-21 DIAGNOSIS — N18.30 BENIGN HYPERTENSIVE HEART AND KIDNEY DISEASE WITH DIASTOLIC CONGESTIVE HEART FAILURE, NYHA CLASS 2 AND CHRONIC KIDNEY DISEASE STAGE 3 (H): ICD-10-CM

## 2022-04-21 DIAGNOSIS — R80.9 TYPE 2 DIABETES MELLITUS WITH MICROALBUMINURIA, WITH LONG-TERM CURRENT USE OF INSULIN (H): Chronic | ICD-10-CM

## 2022-04-21 DIAGNOSIS — C18.6 MALIGNANT NEOPLASM OF DESCENDING COLON (H): ICD-10-CM

## 2022-04-21 DIAGNOSIS — Z00.00 ENCOUNTER FOR MEDICARE ANNUAL WELLNESS EXAM: Primary | ICD-10-CM

## 2022-04-21 DIAGNOSIS — I50.30 BENIGN HYPERTENSIVE HEART AND KIDNEY DISEASE WITH DIASTOLIC CONGESTIVE HEART FAILURE, NYHA CLASS 2 AND CHRONIC KIDNEY DISEASE STAGE 3 (H): ICD-10-CM

## 2022-04-21 DIAGNOSIS — I13.0 BENIGN HYPERTENSIVE HEART AND KIDNEY DISEASE WITH DIASTOLIC CONGESTIVE HEART FAILURE, NYHA CLASS 2 AND CHRONIC KIDNEY DISEASE STAGE 3 (H): ICD-10-CM

## 2022-04-21 PROBLEM — G83.9 PARALYSIS (H): Status: RESOLVED | Noted: 2019-06-24 | Resolved: 2022-04-21

## 2022-04-21 LAB
ALT SERPL W P-5'-P-CCNC: 33 U/L (ref 0–70)
ANION GAP SERPL CALCULATED.3IONS-SCNC: 9 MMOL/L (ref 3–14)
BUN SERPL-MCNC: 10 MG/DL (ref 7–30)
CALCIUM SERPL-MCNC: 10.4 MG/DL (ref 8.5–10.1)
CHLORIDE BLD-SCNC: 92 MMOL/L (ref 94–109)
CHOLEST SERPL-MCNC: 125 MG/DL
CO2 SERPL-SCNC: 21 MMOL/L (ref 20–32)
CREAT SERPL-MCNC: 1.21 MG/DL (ref 0.66–1.25)
CREAT UR-MCNC: 52 MG/DL
FASTING STATUS PATIENT QL REPORTED: NO
GFR SERPL CREATININE-BSD FRML MDRD: 57 ML/MIN/1.73M2
GLUCOSE BLD-MCNC: 217 MG/DL (ref 70–99)
HBA1C MFR BLD: 7.3 % (ref 0–5.6)
HDLC SERPL-MCNC: 79 MG/DL
LDLC SERPL CALC-MCNC: 26 MG/DL
MICROALBUMIN UR-MCNC: 41 MG/L
MICROALBUMIN/CREAT UR: 78.85 MG/G CR (ref 0–17)
NONHDLC SERPL-MCNC: 46 MG/DL
POTASSIUM BLD-SCNC: 5 MMOL/L (ref 3.4–5.3)
SODIUM SERPL-SCNC: 122 MMOL/L (ref 133–144)
TRIGL SERPL-MCNC: 98 MG/DL

## 2022-04-21 PROCEDURE — 82043 UR ALBUMIN QUANTITATIVE: CPT | Performed by: FAMILY MEDICINE

## 2022-04-21 PROCEDURE — 36415 COLL VENOUS BLD VENIPUNCTURE: CPT | Performed by: FAMILY MEDICINE

## 2022-04-21 PROCEDURE — 99397 PER PM REEVAL EST PAT 65+ YR: CPT | Mod: 25 | Performed by: FAMILY MEDICINE

## 2022-04-21 PROCEDURE — 0054A COVID-19,PF,PFIZER (12+ YRS): CPT | Performed by: FAMILY MEDICINE

## 2022-04-21 PROCEDURE — 84460 ALANINE AMINO (ALT) (SGPT): CPT | Performed by: FAMILY MEDICINE

## 2022-04-21 PROCEDURE — 83036 HEMOGLOBIN GLYCOSYLATED A1C: CPT | Performed by: FAMILY MEDICINE

## 2022-04-21 PROCEDURE — 99214 OFFICE O/P EST MOD 30 MIN: CPT | Mod: 25 | Performed by: FAMILY MEDICINE

## 2022-04-21 PROCEDURE — 91305 COVID-19,PF,PFIZER (12+ YRS): CPT | Performed by: FAMILY MEDICINE

## 2022-04-21 PROCEDURE — 80061 LIPID PANEL: CPT | Performed by: FAMILY MEDICINE

## 2022-04-21 PROCEDURE — 80048 BASIC METABOLIC PNL TOTAL CA: CPT | Performed by: FAMILY MEDICINE

## 2022-04-21 RX ORDER — ALOGLIPTIN 6.25 MG/1
1 TABLET, FILM COATED ORAL DAILY
COMMUNITY
Start: 2021-07-06 | End: 2022-04-21

## 2022-04-21 RX ORDER — GLIPIZIDE 10 MG/1
10 TABLET ORAL 2 TIMES DAILY WITH MEALS
Qty: 180 TABLET | Refills: 0 | Status: SHIPPED | OUTPATIENT
Start: 2022-04-21

## 2022-04-21 RX ORDER — AMLODIPINE BESYLATE 10 MG/1
TABLET ORAL
Qty: 90 TABLET | Refills: 0 | OUTPATIENT
Start: 2022-04-21

## 2022-04-21 RX ORDER — AMLODIPINE BESYLATE 10 MG/1
1 TABLET ORAL DAILY
COMMUNITY
Start: 2020-12-29 | End: 2022-04-21

## 2022-04-21 RX ORDER — LISINOPRIL 40 MG/1
TABLET ORAL
Qty: 90 TABLET | Refills: 1 | Status: SHIPPED | OUTPATIENT
Start: 2022-04-21

## 2022-04-21 RX ORDER — ALOGLIPTIN 25 MG/1
25 TABLET, FILM COATED ORAL DAILY
Qty: 90 TABLET | Refills: 1 | Status: SHIPPED | OUTPATIENT
Start: 2022-04-21 | End: 2022-04-21

## 2022-04-21 RX ORDER — AMLODIPINE BESYLATE 10 MG/1
10 TABLET ORAL DAILY
Qty: 90 TABLET | Refills: 1 | Status: SHIPPED | OUTPATIENT
Start: 2022-04-21

## 2022-04-21 RX ORDER — ALOGLIPTIN 25 MG/1
25 TABLET, FILM COATED ORAL DAILY
Qty: 90 TABLET | Refills: 1 | Status: SHIPPED | OUTPATIENT
Start: 2022-04-21 | End: 2022-05-26

## 2022-04-21 RX ORDER — ATORVASTATIN CALCIUM 80 MG/1
80 TABLET, FILM COATED ORAL DAILY
Qty: 90 TABLET | Refills: 1 | Status: SHIPPED | OUTPATIENT
Start: 2022-04-21

## 2022-04-21 ASSESSMENT — ACTIVITIES OF DAILY LIVING (ADL)
CURRENT_FUNCTION: HOUSEWORK REQUIRES ASSISTANCE
CURRENT_FUNCTION: LAUNDRY REQUIRES ASSISTANCE
CURRENT_FUNCTION: TELEPHONE REQUIRES ASSISTANCE

## 2022-04-21 ASSESSMENT — ENCOUNTER SYMPTOMS
ARTHRALGIAS: 0
CHILLS: 0
DIARRHEA: 0
SORE THROAT: 0
NERVOUS/ANXIOUS: 0
FEVER: 0
PALPITATIONS: 0
HEMATOCHEZIA: 0
ABDOMINAL PAIN: 0
PARESTHESIAS: 0
HEMATURIA: 0
HEARTBURN: 0
SHORTNESS OF BREATH: 0
CONSTIPATION: 0
MYALGIAS: 0
NAUSEA: 0
WEAKNESS: 0
JOINT SWELLING: 0
FREQUENCY: 1
EYE PAIN: 0
DIZZINESS: 0
DYSURIA: 0
COUGH: 0
HEADACHES: 0

## 2022-04-21 ASSESSMENT — PAIN SCALES - GENERAL: PAINLEVEL: NO PAIN (0)

## 2022-04-21 NOTE — PROGRESS NOTES
"SUBJECTIVE:   Manuel Rosales is a 89 year old male who presents for Preventive Visit. He is accompanied by his daughter.         Are you in the first 12 months of your Medicare coverage?  No    Healthy Habits:     In general, how would you rate your overall health?  Fair    Frequency of exercise:  None    Do you usually eat at least 4 servings of fruit and vegetables a day, include whole grains    & fiber and avoid regularly eating high fat or \"junk\" foods?  No    Taking medications regularly:  Yes    Medication side effects:  None    Ability to successfully perform activities of daily living:  Telephone requires assistance, housework requires assistance and laundry requires assistance    Home Safety:  No safety concerns identified    Hearing Impairment:  Difficulty following a conversation in a noisy restaurant or crowded room, feel that people are mumbling or not speaking clearly, need to ask people to speak up or repeat themselves, difficulty understanding soft or whispered speech and difficulty understanding speech on the telephone    In the past 6 months, have you been bothered by leaking of urine?  No    In general, how would you rate your overall mental or emotional health?  Good      PHQ-2 Total Score: 0    Additional concerns today:  No    Do you feel safe in your environment? Yes    Have you ever done Advance Care Planning? (For example, a Health Directive, POLST, or a discussion with a medical provider or your loved ones about your wishes): No, advance care planning information given to patient to review.  Advanced care planning was discussed at today's visit.     September 2021  Fall risk has not fallen  Fallen 2 or more times in the past year?: No  Any fall with injury in the past year?: No    Cognitive Screening   1) Repeat 3 items (Leader, Season, Table)    2) Clock draw: NORMAL  3) 3 item recall: Recalls 1 object   Results: NORMAL clock, 1-2 items recalled: COGNITIVE IMPAIRMENT LESS " LIKELY    Mini-CogTM Copyright SARA Kulkarni. Licensed by the author for use in [facility]; reprinted with permission (azra@[facility]). All rights reserved.      Do you have sleep apnea, excessive snoring or daytime drowsiness?: no    Past medical, family, and social histories, medications, and allergies are reviewed and updated in Epic.    Social History     Tobacco Use     Smoking status: Former Smoker     Quit date: 1970     Years since quittin.3     Smokeless tobacco: Never Used   Substance Use Topics     Alcohol use: Yes     Alcohol/week: 7.0 standard drinks     Types: 7 Standard drinks or equivalent per week     Comment: 1 drink /day (a decrease, as of 16)         Alcohol Use 2022   Prescreen: >3 drinks/day or >7 drinks/week? Yes   Prescreen: >3 drinks/day or >7 drinks/week? -   AUDIT SCORE  5       Diabetes Follow-up    How often are you checking your blood sugar? One time daily  What time of day are you checking your blood sugars (select all that apply)?  qam, log reviewed, virtually all range   Have you had any blood sugars above 200?  No  Have you had any blood sugars below 70?  No    What symptoms do you notice when your blood sugar is low?  Not applicable    What concerns do you have today about your diabetes? None     Do you have any of these symptoms? (Select all that apply)  No numbness or tingling in feet.  No redness, sores or blisters on feet.  No complaints of excessive thirst.  No reports of blurry vision.  No significant changes to weight.      BP Readings from Last 2 Encounters:   22 128/78   10/14/21 113/69     Hemoglobin A1C POCT (%)   Date Value   2021 6.6 (H)   2020 6.6 (H)     Hemoglobin A1C (%)   Date Value   10/14/2021 6.8 (H)     LDL Cholesterol Calculated (mg/dL)   Date Value   10/14/2021 28   2021 24   2020 58               Hyperlipidemia Follow-Up      Are you regularly taking any medication or supplement to lower your  cholesterol?   Yes- atorvastatin    Are you having muscle aches or other side effects that you think could be caused by your cholesterol lowering medication?  No    Hypertension Follow-up      Do you check your blood pressure regularly outside of the clinic? No     Are you following a low salt diet? Yes    Are your blood pressures ever more than 140 on the top number (systolic) OR more   than 90 on the bottom number (diastolic), for example 140/90?       Current providers sharing in care for this patient include:   Patient Care Team:  Ronaldo Corral MD as PCP - General  Ronaldo Corral MD as Assigned PCP  Willy Cardoso MD as Assigned Surgical Provider    The following health maintenance items are reviewed in Epic and correct as of today:  Health Maintenance Due   Topic Date Due     HF ACTION PLAN  Never done     ZOSTER IMMUNIZATION (2 of 3) 11/25/2008     DIABETIC FOOT EXAM  07/09/2019     ALT  04/08/2022     MICROALBUMIN  04/08/2022     A1C  04/14/2022     BMP  04/14/2022       Review of Systems   Constitutional: Negative for chills and fever.   HENT: Positive for hearing loss. Negative for congestion, ear pain and sore throat.    Eyes: Negative for pain and visual disturbance.   Respiratory: Negative for cough and shortness of breath.    Cardiovascular: Negative for chest pain, palpitations and peripheral edema.   Gastrointestinal: Negative for abdominal pain, constipation, diarrhea, heartburn, hematochezia and nausea.   Genitourinary: Positive for frequency and impotence. Negative for dysuria, genital sores, hematuria, penile discharge and urgency.   Musculoskeletal: Negative for arthralgias, joint swelling and myalgias.   Skin: Negative for rash.   Neurological: Negative for dizziness, weakness, headaches and paresthesias.   Psychiatric/Behavioral: Negative for mood changes. The patient is not nervous/anxious.          OBJECTIVE:   /78 (BP Location: Left arm, Patient Position: Sitting, Cuff  "Size: Adult Regular)   Pulse 84   Temp 98.6  F (37  C) (Oral)   Resp 20   Ht 1.682 m (5' 6.22\")   Wt 72.2 kg (159 lb 2 oz)   SpO2 98%   BMI 25.51 kg/m   Estimated body mass index is 25.51 kg/m  as calculated from the following:    Height as of this encounter: 1.682 m (5' 6.22\").    Weight as of this encounter: 72.2 kg (159 lb 2 oz).  Physical Exam  GENERAL: healthy, alert and no distress  EYES: Eyes grossly normal to inspection, PERRL and conjunctivae and sclerae normal  HENT: ear canals and TM's normal, nose and mouth without ulcers or lesions  NECK: no adenopathy, no asymmetry, masses, or scars and thyroid normal to palpation  RESP: lungs clear to auscultation - no rales, rhonchi or wheezes  CV: regular rate and rhythm, normal S1 S2, no S3 or S4, no murmur, click or rub, no peripheral edema and peripheral pulses strong  ABDOMEN: soft, nontender, no hepatosplenomegaly, no masses and bowel sounds normal  MS: no gross musculoskeletal defects noted, no edema  SKIN: no suspicious lesions , scaly skin of lower legs and feet  NEURO: Normal strength and tone, mentation intact and speech normal  PSYCH: mentation appears normal, affect normal/bright  Diabetic foot exam: normal DP and PT pulses, no trophic changes or ulcerative lesions, normal sensory exam and normal monofilament exam    Lab Results   Component Value Date    A1C 6.8 10/14/2021    A1C 6.6 04/08/2021    A1C 6.6 09/28/2020    A1C 7.5 04/09/2020    A1C 8.2 01/30/2020    A1C 7.5 07/29/2019          ASSESSMENT / PLAN:   (Z00.00) Encounter for Medicare annual wellness exam  (primary encounter diagnosis)  Comment:   Plan: COVID-19,PF,PFIZER (12+ Yrs GRAY LABEL)        F/u 1 year    (E11.29,  R80.9,  Z79.4) Type 2 diabetes mellitus with microalbuminuria, with long-term current use of insulin (H)  Comment: historically at goal   Plan: Albumin Random Urine Quantitative with Creat         Ratio, HEMOGLOBIN A1C, alogliptin (NESINA) 25         MG tablet, glipiZIDE " "(GLUCOTROL) 10 MG tablet        Return in about 6 months (around 10/21/2022) for diabetes.      (I73.9) PVD (peripheral vascular disease) (H)  (E78.5) Hyperlipidemia LDL goal <100  Comment: on a high-intensity statin.  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 Hernandez) this month.   Plan:     (C18.6) Malignant neoplasm of descending colon (H)  Comment: no new symptoms referable to colon cancer  Plan:     (Z23) Need for vaccination  Comment:   Plan: COVID-19,PF,PFIZER (12+ Yrs GRAY LABEL)                  COUNSELING:  Reviewed preventive health counseling, as reflected in patient instructions  Special attention given to:       Hearing screening       Immunizations    Vaccinated for: COVID-19 (second booster)           Colon cancer screening    Estimated body mass index is 25.51 kg/m  as calculated from the following:    Height as of this encounter: 1.682 m (5' 6.22\").    Weight as of this encounter: 72.2 kg (159 lb 2 oz).        He reports that he quit smoking about 52 years ago. He has never used smokeless tobacco.      Appropriate preventive services were discussed with this patient, including applicable screening as appropriate for cardiovascular disease, diabetes, osteopenia/osteoporosis, and glaucoma.  As appropriate for " age/gender, discussed screening for colorectal cancer, prostate cancer, breast cancer, and cervical cancer. Checklist reviewing preventive services available has been given to the patient.    Reviewed patients plan of care and provided an AVS. The Basic Care Plan (routine screening as documented in Health Maintenance) for Manuel meets the Care Plan requirement. This Care Plan has been established and reviewed with the Patient and daughter.    Counseling Resources:  ATP IV Guidelines  Pooled Cohorts Equation Calculator  Breast Cancer Risk Calculator  Breast Cancer: Medication to Reduce Risk  FRAX Risk Assessment  ICSI Preventive Guidelines  Dietary Guidelines for Americans, 2010  USDA's MyPlate  ASA Prophylaxis  Lung CA Screening    Ronaldo Corral MD  [facility]    Identified Health Risks:

## 2022-04-21 NOTE — TELEPHONE ENCOUNTER
Plan does not cover alogliptin (NESINA) 25 MG tablet. Please go to [email or web address] to initiate a prior authorization or switch to alternative medication.    KEY: [id number]

## 2022-04-21 NOTE — LETTER
May 10, 2022      David Rosales  [address]   [facility] [address]        Dear ,    We are writing to inform you of your test results.    Here are your lab results.    Resulted Orders   ALT   Result Value Ref Range    ALT 33 0 - 70 U/L   Albumin Random Urine Quantitative with Creat Ratio   Result Value Ref Range    Creatinine Urine mg/dL 52 mg/dL    Albumin Urine mg/L 41 mg/L    Albumin Urine mg/g Cr 78.85 (H) 0.00 - 17.00 mg/g Cr   HEMOGLOBIN A1C   Result Value Ref Range    Hemoglobin A1C 7.3 (H) 0.0 - 5.6 %      Comment:      Normal <5.7%   Prediabetes 5.7-6.4%    Diabetes 6.5% or higher     Note: Adopted from ADA consensus guidelines.   BASIC METABOLIC PANEL   Result Value Ref Range    Sodium 122 (L) 133 - 144 mmol/L    Potassium 5.0 3.4 - 5.3 mmol/L    Chloride 92 (L) 94 - 109 mmol/L    Carbon Dioxide (CO2) 21 20 - 32 mmol/L    Anion Gap 9 3 - 14 mmol/L    Urea Nitrogen 10 7 - 30 mg/dL    Creatinine 1.21 0.66 - 1.25 mg/dL    Calcium 10.4 (H) 8.5 - 10.1 mg/dL    Glucose 217 (H) 70 - 99 mg/dL    GFR Estimate 57 (L) >60 mL/min/1.73m2      Comment:      Effective December 21, 2021 eGFRcr in adults is calculated using the 2021 CKD-EPI creatinine equation which includes age and gender (Karen altamirano al., NEJ, DOI: 10.1056/YPRQva4067375)   Lipid panel reflex to direct LDL Non-fasting   Result Value Ref Range    Cholesterol 125 <200 mg/dL    Triglycerides 98 <150 mg/dL    Direct Measure HDL 79 >=40 mg/dL    LDL Cholesterol Calculated 26 <=100 mg/dL    Non HDL Cholesterol 46 <130 mg/dL    Patient Fasting > 8hrs? No     Narrative    Cholesterol  Desirable:  <200 mg/dL    Triglycerides  Normal:  Less than 150 mg/dL  Borderline High:  150-199 mg/dL  High:  200-499 mg/dL  Very High:  Greater than or equal to 500 mg/dL    Direct Measure HDL  Female:  Greater than or equal to 50 mg/dL   Male:  Greater than or equal to 40 mg/dL    LDL Cholesterol  Desirable:  <100mg/dL  Above Desirable:  100-129 mg/dL    Borderline High:  130-159 mg/dL   High:  160-189 mg/dL   Very High:  >= 190 mg/dL    Non HDL Cholesterol  Desirable:  130 mg/dL  Above Desirable:  130-159 mg/dL  Borderline High:  160-189 mg/dL  High:  190-219 mg/dL  Very High:  Greater than or equal to 220 mg/dL       If you have any questions or concerns, please call the clinic at the number listed above.       Sincerely,      Ronaldo Corral MD

## 2022-04-21 NOTE — PROGRESS NOTES
"    The patient was provided with suggestions to help him develop a healthy physical lifestyle.  He is at risk for lack of exercise and has been provided with information to increase physical activity for the benefit of his well-being.  The patient reports that he drinks more than one alcoholic drink per day but denies binge or excessive drinking. He was counseled and given information about possible harmful effects of excessive alcohol intake.  The patient was counseled and encouraged to consider modifying their diet and eating habits. He was provided with information on recommended healthy diet options.  The patient reports that he has difficulty with activities of daily living.  Answers for HPI/ROS submitted by the patient on 4/21/2022  In general, how would you rate your overall physical health?: fair  Frequency of exercise:: None  Do you usually eat at least 4 servings of fruit and vegetables a day, include whole grains & fiber, and avoid regularly eating high fat or \"junk\" foods? : No  Taking medications regularly:: Yes  Medication side effects:: None  Activities of Daily Living: telephone requires assistance, housework requires assistance, laundry requires assistance  Home safety: no safety concerns identified  Hearing Impairment:: difficulty following a conversation in a noisy restaurant or crowded room, feel that people are mumbling or not speaking clearly, need to ask people to speak up or repeat themselves, difficulty understanding soft or whispered speech, difficulty understanding speech on the telephone  In the past 6 months, have you been bothered by leaking of urine?: No  abdominal pain: No  Blood in stool: No  Blood in urine: No  chest pain: No  chills: No  congestion: No  constipation: No  cough: No  diarrhea: No  dizziness: No  ear pain: No  eye pain: No  nervous/anxious: No  fever: No  frequency: Yes  genital sores: No  headaches: No  hearing loss: Yes  heartburn: No  arthralgias: No  joint " swelling: No  peripheral edema: No  mood changes: No  myalgias: No  nausea: No  dysuria: No  palpitations: No  Skin sensation changes: No  sore throat: No  urgency: No  rash: No  shortness of breath: No  visual disturbance: No  weakness: No  impotence: Yes  penile discharge: No  In general, how would you rate your overall mental or emotional health?: good  Additional concerns today:: No

## 2022-04-21 NOTE — PATIENT INSTRUCTIONS
Patient Education   Preventive Health Recommendations  See your health care provider every year to    Review health changes.     Discuss preventive care.      Review your medicines if your doctor has prescribed any.    Talk with your health care provider about whether you should have a test to screen for prostate cancer (PSA).    Every 3 years, have a diabetes test (fasting glucose). If you are at risk for diabetes, you should have this test more often.    Every 5 years, have a cholesterol test. Have this test more often if you are at risk for high cholesterol or heart disease.     Every 10 years, have a colonoscopy. Or, have a yearly FIT test (stool test). These exams will check for colon cancer.    Talk to with your health care provider about screening for Abdominal Aortic Aneurysm if you have a family history of AAA or have a history of smoking.    Shots:     Get a flu shot each year.     Get a tetanus shot every 10 years.     Talk to your doctor about your pneumonia vaccines. There are now two you should receive - Pneumovax (PPSV 23) and Prevnar (PCV 13).    Talk to your pharmacist about a shingles vaccine.     Talk to your doctor about the hepatitis B vaccine.    Nutrition:     Eat at least 5 servings of fruits and vegetables each day.     Eat whole-grain bread, whole-wheat pasta and brown rice instead of white grains and rice.     Get adequate Calcium and Vitamin D.     Lifestyle    Exercise for at least 150 minutes a week (30 minutes a day, 5 days a week). This will help you control your weight and prevent disease.     Limit alcohol to one drink per day.     No smoking.     Wear sunscreen to prevent skin cancer.     See your dentist every six months for an exam and cleaning.     See your eye doctor every 1 to 2 years to screen for conditions such as glaucoma, macular degeneration and cataracts.    Personalized Prevention Plan  You are due for the preventive services outlined below.  Your care team is  available to assist you in scheduling these services.  If you have already completed any of these items, please share that information with your care team to update in your medical record.  Health Maintenance   Topic Date Due     HF ACTION PLAN  Never done     ZOSTER IMMUNIZATION (2 of 3) 11/25/2008     DIABETIC FOOT EXAM  07/09/2019     ALT  04/08/2022     MICROALBUMIN  04/08/2022     A1C  04/14/2022     BMP  04/14/2022     LIPID  10/14/2022     ANNUAL REVIEW OF HM ORDERS  10/14/2022     FALL RISK ASSESSMENT  10/14/2022     CBC  10/14/2022     HEMOGLOBIN  10/14/2022     EYE EXAM  10/28/2022     COLORECTAL CANCER SCREENING  04/18/2023     MEDICARE ANNUAL WELLNESS VISIT  04/21/2023     DTAP/TDAP/TD IMMUNIZATION (4 - Td or Tdap) 10/01/2023     ADVANCE CARE PLANNING  04/21/2027     TSH W/FREE T4 REFLEX  Completed     PHQ-2 (once per calendar year)  Completed     INFLUENZA VACCINE  Completed     Pneumococcal Vaccine: 65+ Years  Completed     URINALYSIS  Completed     COVID-19 Vaccine  Completed     IPV IMMUNIZATION  Aged Out     MENINGITIS IMMUNIZATION  Aged Out     Your Health Risk Assessment indicates you feel you are not in good health    A healthy lifestyle helps keep the body fit and the mind alert. It helps protect you from disease, helps you fight disease, and helps prevent chronic disease (disease that doesn't go away) from getting worse. This is important as you get older and begin to notice twinges in muscles and joints and a decline in the strength and stamina you once took for granted. A healthy lifestyle includes good healthcare, good nutrition, weight control, recreation, and regular exercise. Avoid harmful substances and do what you can to keep safe. Another part of a healthy lifestyle is stay mentally active and socially involved.    Good healthcare     Have a wellness visit every year.     If you have new symptoms, let us know right away. Don't wait until the next checkup.     Take medicines exactly as  prescribed and keep your medicines in a safe place. Tell us if your medicine causes problems.   Healthy diet and weight control     Eat 3 or 4 small, nutritious, low-fat, high-fiber meals a day. Include a variety of fruits, vegetables, and whole-grain foods.     Make sure you get enough calcium in your diet. Calcium, vitamin D, and exercise help prevent osteoporosis (bone thinning).     If you live alone, try eating with others when you can. That way you get a good meal and have company while you eat it.     Try to keep a healthy weight. If you eat more calories than your body uses for energy, it will be stored as fat and you will gain weight.     Recreation   Recreation is not limited to sports and team events. It includes any activity that provides relaxation, interest, enjoyment, and exercise. Recreation provides an outlet for physical, mental, and social energy. It can give a sense of worth and achievement. It can help you stay healthy.    Mental Exercise and Social Involvement  Mental and emotional health is as important as physical health. Keep in touch with friends and family. Stay as active as possible. Continue to learn and challenge yourself.   Things you can do to stay mentally active are:    Learn something new, like a foreign language or musical instrument.     Play SCRABBLE or do crossword puzzles. If you cannot find people to play these games with you at home, you can play them with others on your computer through the Internet.     Join a games club--anything from card games to chess or checkers or lawn bowling.     Start a new hobby.     Go back to school.     Volunteer.     Read.   Keep up with world events.    Exercise for a Healthier Heart  You may wonder how you can improve the health of your heart. If you re thinking about exercise, you re on the right track. You don t need to become an athlete. But you do need a certain amount of brisk exercise to help strengthen your heart. If you have been  diagnosed with a heart condition, your healthcare provider may advise exercise to help stabilize your condition. To help make exercise a habit, choose safe, fun activities.      Exercise with a friend. When activity is fun, you're more likely to stick with it.   Before you start  Check with your healthcare provider before starting an exercise program. This is especially important if you have not been active for a while. It's also important if you have a long-term (chronic) health problem such as heart disease, diabetes, or obesity. Or if you are at high risk for having these problems.   Why exercise?  Exercising regularly offers many healthy rewards. It can help you do all of the following:     Improve your blood cholesterol level to help prevent further heart trouble    Lower your blood pressure to help prevent a stroke or heart attack    Control diabetes, or reduce your risk of getting this disease    Improve your heart and lung function    Reach and stay at a healthy weight    Make your muscles stronger so you can stay active    Prevent falls and fractures by slowing the loss of bone mass (osteoporosis)    Manage stress better    Reduce your blood pressure    Improve your sense of self and your body image  Exercise tips      Ease into your routine. Set small goals. Then build on them. If you are not sure what your activity level should be, talk with your healthcare provider first before starting an exercise routine.    Exercise on most days. Aim for a total of 150 minutes (2 hours and 30 minutes) or more of moderate-intensity aerobic activity each week. Or 75 minutes (1 hour and 15 minutes) or more of vigorous-intensity aerobic activity each week. Or try for a combination of both. Moderate activity means that you breathe heavier and your heart rate increases but you can still talk. Think about doing 40 minutes of moderate exercise, 3 to 4 times a week. For best results, activity should last for about 40 minutes  to lower blood pressure and cholesterol. It's OK to work up to the 40-minute period over time. Examples of moderate-intensity activity are walking 1 mile in 15 minutes. Or doing 30 to 45 minutes of yard work.    Step up your daily activity level.  Along with your exercise program, try being more active the whole day. Walk instead of drive. Or park further away so that you take more steps each day. Do more household tasks or yard work. You may not be able to meet the advised mount of physical activity. But doing some moderate- or vigorous-intensity aerobic activity can help reduce your risk for heart disease. Your healthcare provider can help you figure out what is best for you.    Choose 1 or more activities you enjoy.  Walking is one of the easiest things you can do. You can also try swimming, riding a bike, dancing, or taking an exercise class.    When to call your healthcare provider  Call your healthcare provider if you have any of these:     Chest pain or feel dizzy or lightheaded    Burning, tightness, pressure, or heaviness in your chest, neck, shoulders, back, or arms    Abnormal shortness of breath    More joint or muscle pain    A very fast or irregular heartbeat (palpitations)  [employer] last reviewed this educational content on 7/1/2019 2000-2021 [employer]. All rights reserved. This information is not intended as a substitute for professional medical care. Always follow your healthcare professional's instructions.         Patient Education   Alcohol Use     Many people can enjoy a glass of wine or beer without any negative consequences to their health. According to the Centers for Disease Control and Prevention (CDC), having one or fewer drinks per day for women and two or fewer per day for men is considered moderate drinking.     When people drink more than moderately, it can become concerning. Excessive drinking is defined as consuming 15 drinks or more per week for men and 8 drinks  or more per week for women. There are various health problems associated with excessive drinking, which include:    Damage to vital organs like the heart, brain, liver and pancreas    Harm to the digestive tract    Weaken the immune system    Higher risk for heart disease and cancer    There are many resources available to people who are addicted to alcohol. A counselor or other health care provider can give you support. So can a , , or rabbi who is trained in substance abuse counseling. Friends and family may also help once you are connected with professionals.           Understanding USDA MyPlate  The USDA has guidelines to help you make healthy food choices. These are called MyPlate. MyPlate shows the food groups that make up healthy meals using the image of a place setting. Before you eat, think about the healthiest choices for what to put on your plate or in your cup or bowl. To learn more about building a healthy plate, visit www.choosemyplate.gov.    The food groups    Fruits. Any fruit or 100% fruit juice counts as part of the Fruit Group. Fruits may be fresh, canned, frozen, or dried, and may be whole, cut-up, or pureed. Make 1/2 of your plate fruits and vegetables.    Vegetables. Any vegetable or 100% vegetable juice counts as a member of the Vegetable Group. Vegetables may be fresh, frozen, canned, or dried. They can be served raw or cooked and may be whole, cut-up, or mashed. Make 1/2 of your plate fruits and vegetables.    Grains. All foods made from grains are part of the Grains Group. These include wheat, rice, oats, cornmeal, and barley. Grains are often used to make foods such as bread, pasta, oatmeal, cereal, tortillas, and grits. Grains should be no more than 1/4 of your plate. At least half of your grains should be whole grains.    Protein. This group includes meat, poultry, seafood, beans and peas, eggs, processed soy products (such as tofu), nuts (including nut butters), and  seeds. Make protein choices no more than 1/4 of your plate. Meat and poultry choices should be lean or low fat.    Dairy. The Dairy Group includes all fluid milk products and foods made from milk that contain calcium, such as yogurt and cheese. (Foods that have little calcium, such as cream, butter, and cream cheese, are not part of this group.) Most dairy choices should be low-fat or fat-free.    Oils. Oils aren't a food group, but they do contain essential nutrients. However it's important to watch your intake of oils. These are fats that are liquid at room temperature. They include canola, corn, olive, soybean, vegetable, and sunflower oil. Foods that are mainly oil include mayonnaise, certain salad dressings, and soft margarines. You likely already get your daily oil allowance from the foods you eat.  Things to limit  Eating healthy also means limiting these things in your diet:       Salt (sodium). Many processed foods have a lot of sodium. To keep sodium intake down, eat fresh vegetables, meats, poultry, and seafood when possible. Purchase low-sodium, reduced-sodium, or no-salt-added food products at the store. And don't add salt to your meals at home. Instead, season them with herbs and spices such as dill, oregano, cumin, and paprika. Or try adding flavor with lemon or lime zest and juice.    Saturated fat. Saturated fats are most often found in animal products such as beef, pork, and chicken. They are often solid at room temperature, such as butter. To reduce your saturated fat intake, choose leaner cuts of meat and poultry. And try healthier cooking methods such as grilling, broiling, roasting, or baking. For a simple lower-fat swap, use plain nonfat yogurt instead of mayonnaise when making potato salad or macaroni salad.    Added sugars. These are sugars added to foods. They are in foods such as ice cream, candy, soda, fruit drinks, sports drinks, energy drinks, cookies, pastries, jams, and syrups. Cut  down on added sugars by sharing sweet treats with a family member or friend. You can also choose fruit for dessert, and drink water or other unsweetened beverages.     [employer] last reviewed this educational content on 6/1/2020 2000-2021 [employer]. All rights reserved. This information is not intended as a substitute for professional medical care. Always follow your healthcare professional's instructions.        Activities of Daily Living    Your Health Risk Assessment indicates you have difficulties with activities of daily living such as housework, bathing, preparing meals, taking medication, etc. Please make a follow up appointment for us to address this issue in more detail.

## 2022-04-28 ENCOUNTER — OFFICE VISIT (OUTPATIENT)
Dept: OPHTHALMOLOGY | Facility: CLINIC | Age: 87
End: 2022-04-28
Payer: COMMERCIAL

## 2022-04-28 DIAGNOSIS — H40.1132 PRIMARY OPEN ANGLE GLAUCOMA (POAG) OF BOTH EYES, MODERATE STAGE: ICD-10-CM

## 2022-04-28 PROCEDURE — 92012 INTRM OPH EXAM EST PATIENT: CPT | Performed by: OPHTHALMOLOGY

## 2022-04-28 RX ORDER — LATANOPROST 50 UG/ML
1 SOLUTION/ DROPS OPHTHALMIC AT BEDTIME
Qty: 7.5 ML | Refills: 3 | Status: SHIPPED | OUTPATIENT
Start: 2022-04-28

## 2022-04-28 RX ORDER — DORZOLAMIDE HYDROCHLORIDE AND TIMOLOL MALEATE 20; 5 MG/ML; MG/ML
1 SOLUTION/ DROPS OPHTHALMIC 2 TIMES DAILY
Qty: 30 ML | Refills: 3 | Status: SHIPPED | OUTPATIENT
Start: 2022-04-28

## 2022-04-28 RX ORDER — BRIMONIDINE TARTRATE 1.5 MG/ML
1 SOLUTION/ DROPS OPHTHALMIC 2 TIMES DAILY
Qty: 15 ML | Refills: 3 | Status: SHIPPED | OUTPATIENT
Start: 2022-04-28

## 2022-04-28 ASSESSMENT — TONOMETRY
IOP_METHOD: APPLANATION
OD_IOP_MMHG: 13
OS_IOP_MMHG: 13

## 2022-04-28 ASSESSMENT — EXTERNAL EXAM - LEFT EYE: OS_EXAM: NORMAL

## 2022-04-28 ASSESSMENT — VISUAL ACUITY
OD_CC: 20/30
OS_CC: 20/25
CORRECTION_TYPE: GLASSES
METHOD: SNELLEN - LINEAR

## 2022-04-28 ASSESSMENT — EXTERNAL EXAM - RIGHT EYE: OD_EXAM: NORMAL

## 2022-04-28 NOTE — PATIENT INSTRUCTIONS
Continue:  Brimonidine drop into twice a day both eyes.  Dorzolamide/timolol drop twice a day both eyes.  Latanoprost drop at bedtime both eyes .   Return visit in 6 months for a complete exam.  Willy Cardoso M.D.  [phone]

## 2022-04-28 NOTE — LETTER
4/28/2022         RE: Manuel Rosales  [address]  [facility] [address]        Dear Colleague,    Thank you for referring your patient, Manuel Rosales, to the [facility]. Please see a copy of my visit note below.     Current Eye Medications:    Brimonidine 1 drop into both eyes twice a day.(needs refill)  Dorzolamide/timolol 1 drop into both eyes twice a day.  Latanoprost 1 drop into both eyes at bedtime. (needs refill)     Subjective:  Glaucoma follow up with HVF/OCT/IOP.  He notes vision is stable in both eyes.   Denies pain/burning/itching.  Lots of tearing on both eyes.      Objective:  See Ophthalmology Exam.       Assessment:  Stable intraocular pressure, glaucoma OCT, and Rios Visual Field both eyes in patient with moderate open angle glaucoma.      Plan:  Continue:  Brimonidine drop into twice a day both eyes.  Dorzolamide/timolol drop twice a day both eyes.  Latanoprost drop at bedtime both eyes .   Return visit in 6 months for a complete exam.  Willy Cardoso M.D.  [phone]         Again, thank you for allowing me to participate in the care of your patient.        Sincerely,        Willy Cardoso MD

## 2022-04-28 NOTE — PROGRESS NOTES
Current Eye Medications:    Brimonidine 1 drop into both eyes twice a day.(needs refill)  Dorzolamide/timolol 1 drop into both eyes twice a day.  Latanoprost 1 drop into both eyes at bedtime. (needs refill)     Subjective:  Glaucoma follow up with HVF/OCT/IOP.  He notes vision is stable in both eyes.   Denies pain/burning/itching.  Lots of tearing on both eyes.      Objective:  See Ophthalmology Exam.       Assessment:  Stable intraocular pressure, glaucoma OCT, and Rios Visual Field both eyes in patient with moderate open angle glaucoma.      Plan:  Continue:  Brimonidine drop into twice a day both eyes.  Dorzolamide/timolol drop twice a day both eyes.  Latanoprost drop at bedtime both eyes .   Return visit in 6 months for a complete exam.  Willy Cardoso M.D.  [phone]

## 2022-04-29 ASSESSMENT — PACHYMETRY
OS_CT(UM): 594
OD_CT(UM): 593

## 2022-05-06 NOTE — TELEPHONE ENCOUNTER
Another PA request sent for medication. Alternative medications, Glimepiride, Glipizide, Glipizide ER, Glipizide-Metformin HCL, Metformin HCL, Metformin HCL ER, Pioglitazone HCL.    KEY: [id number]

## 2022-05-10 ENCOUNTER — VIRTUAL VISIT (OUTPATIENT)
Dept: FAMILY MEDICINE | Facility: CLINIC | Age: 87
End: 2022-05-10
Payer: COMMERCIAL

## 2022-05-10 DIAGNOSIS — E11.29 TYPE 2 DIABETES MELLITUS WITH MICROALBUMINURIA, WITH LONG-TERM CURRENT USE OF INSULIN (H): ICD-10-CM

## 2022-05-10 DIAGNOSIS — Z95.0 CARDIAC PACEMAKER IN SITU: ICD-10-CM

## 2022-05-10 DIAGNOSIS — E83.52 HYPERCALCEMIA: ICD-10-CM

## 2022-05-10 DIAGNOSIS — R35.1 NOCTURIA: Primary | ICD-10-CM

## 2022-05-10 DIAGNOSIS — I50.32 CHRONIC DIASTOLIC HEART FAILURE (H): ICD-10-CM

## 2022-05-10 DIAGNOSIS — Z79.4 TYPE 2 DIABETES MELLITUS WITH MICROALBUMINURIA, WITH LONG-TERM CURRENT USE OF INSULIN (H): ICD-10-CM

## 2022-05-10 DIAGNOSIS — R80.9 TYPE 2 DIABETES MELLITUS WITH MICROALBUMINURIA, WITH LONG-TERM CURRENT USE OF INSULIN (H): ICD-10-CM

## 2022-05-10 DIAGNOSIS — R35.0 URINARY FREQUENCY: ICD-10-CM

## 2022-05-10 PROCEDURE — 99213 OFFICE O/P EST LOW 20 MIN: CPT | Mod: 95 | Performed by: FAMILY MEDICINE

## 2022-05-10 RX ORDER — TAMSULOSIN HYDROCHLORIDE 0.4 MG/1
0.4 CAPSULE ORAL DAILY
Qty: 90 CAPSULE | Refills: 3 | Status: SHIPPED | OUTPATIENT
Start: 2022-05-10 | End: 2022-07-25

## 2022-05-10 NOTE — TELEPHONE ENCOUNTER
Please clarify with the patient (or daughter) that the Nesina prescription needs a PA.  (We have received similar messages in the past, but the patient tells me that he gets the prescription without a cost issue or other problem.)

## 2022-05-10 NOTE — PATIENT INSTRUCTIONS
At [facility], we strive to deliver an exceptional experience to you, every time we see you. If you receive a survey, please complete it as we do value your feedback.  If you have MyChart, you can expect to receive results automatically within 24 hours of their completion.  Your provider will send a note interpreting your results as well.   If you do not have MyChart, you should receive your results in about a week by mail.    Your care team:                            Family Medicine Internal Medicine   MD Kaiden Harris MD Shantel Branch-Fleming, MD Srinivasa Vaka, MD Katya Belousova, MAULIK Tomlin CNP, MD ([facility]   Wiley Muller, MD Aliza Moore MD Amelia Massimini APRN CNP Kim Thein, APRN CNP Bethany Templen, MD             Clinic hours: Monday - Thursday 7 am-6 pm; Fridays 7 am-5 pm.   Urgent care: Monday - Friday 10 am- 8 pm; Saturday and Sunday 9 am-5 pm.    Clinic: [phone]       [facility]: Monday - Thursday 8 am - 7 pm; Friday 8 am - 6 pm  [facility]: [phone]

## 2022-05-10 NOTE — PROGRESS NOTES
David is a 89 year old who is being evaluated via a billable telephone visit.      What phone number would you like to be contacted at? [phone]  How would you like to obtain your AVS? Mail a copy    Assessment & Plan      (R35.1) Nocturia  (primary encounter diagnosis)  (R35.0) Urinary frequency  Comment:   Plan: tamsulosin (FLOMAX) 0.4 MG capsule            (I50.32) Chronic diastolic heart failure (H)  (Z95.0) Cardiac pacemaker in situ  (E11.29,  R80.9,  Z79.4) Type 2 diabetes mellitus with microalbuminuria, with long-term current use of insulin (H)  Comment: Increasing need for help at home with ADLs.  He is not getting around very well nowadays  Plan: Home Care Referral            (E83.52) Hypercalcemia  Comment: Unknown cause and significance.  I would like to work this up with another set of labs  Plan: Ionized Calcium, Vitamin D Deficiency, TSH with        free T4 reflex, Protein electrophoresis,         Magnesium, Protein electrophoresis random         urine, Parathyroid Hormone Intact, PTH Related         Peptide Test        Future lab orders entered        27 minutes spent on the date of the encounter doing chart review, review of test results, patient visit and documentation       Ronaldo Corral MD  [facility]   David is a 89 year old who presents for the following health issues  accompanied by his daughter.    HPI     Would like to set up assessment for daily activity; possible home health nurse    Receive daily living help.        How many servings of fruits and vegetables do you eat daily?  2-3    On average, how many sweetened beverages do you drink each day (Examples: soda, juice, sweet tea, etc.  Do NOT count diet or artificially sweetened beverages)?   0    How many days per week do you exercise enough to make your heart beat faster? 3 or less    How many minutes a day do you exercise enough to make your heart beat faster? 10 - 19    How many days per  week do you miss taking your medication? 0    Genitourinary - Male  Onset/Duration: A few weeks  Description:   Dysuria (painful urination): no  Hematuria (blood in urine): no  Frequency: YES  Waking at night to urinate: YES  Hesitancy (delay in urine): no  Retention (unable to empty): YES  Decrease in urinary flow: YES  Incontinence: no  Progression of Symptoms:  same  Accompanying Signs & Symptoms:  Fever: no  Back/Flank pain: no  Urethral discharge: no  Testicle lumps/masses/pain: no  Nausea and/or vomiting: no  Abdominal pain: no  History:   History of frequent UTI s: no  History of kidney stones: no  History of hernias: no  Personal or Family history of Prostate problems: no  Sexually active: no  Precipitating or alleviating factors: None  Therapies tried and outcome: none      Review of Systems         Objective              Physical Exam   healthy, alert and no distress  PSYCH: Alert and oriented times 3; coherent speech, normal   rate and volume, able to articulate logical thoughts, able   to abstract reason, no tangential thoughts, no hallucinations   or delusions  His affect is normal and pleasant  RESP: No cough, no audible wheezing, able to talk in full sentences  Remainder of exam unable to be completed due to telephone visits    Office Visit on 04/21/2022   Component Date Value Ref Range Status     ALT 04/21/2022 33  0 - 70 U/L Final     Creatinine Urine mg/dL 04/21/2022 52  mg/dL Final     Albumin Urine mg/L 04/21/2022 41  mg/L Final     Albumin Urine mg/g Cr 04/21/2022 78.85 (A) 0.00 - 17.00 mg/g Cr Final     Hemoglobin A1C 04/21/2022 7.3 (A) 0.0 - 5.6 % Final    Normal <5.7%   Prediabetes 5.7-6.4%    Diabetes 6.5% or higher     Note: Adopted from ADA consensus guidelines.     Sodium 04/21/2022 122 (A) 133 - 144 mmol/L Final     Potassium 04/21/2022 5.0  3.4 - 5.3 mmol/L Final     Chloride 04/21/2022 92 (A) 94 - 109 mmol/L Final     Carbon Dioxide (CO2) 04/21/2022 21  20 - 32 mmol/L Final     Anion  Gap 04/21/2022 9  3 - 14 mmol/L Final     Urea Nitrogen 04/21/2022 10  7 - 30 mg/dL Final     Creatinine 04/21/2022 1.21  0.66 - 1.25 mg/dL Final     Calcium 04/21/2022 10.4 (A) 8.5 - 10.1 mg/dL Final     Glucose 04/21/2022 217 (A) 70 - 99 mg/dL Final     GFR Estimate 04/21/2022 57 (A) >60 mL/min/1.73m2 Final    Effective December 21, 2021 eGFRcr in adults is calculated using the 2021 CKD-EPI creatinine equation which includes age and gender (Karen altamirano al., NEJM, DOI: 10.1056/ZEFRgy7236933)     Cholesterol 04/21/2022 125  <200 mg/dL Final     Triglycerides 04/21/2022 98  <150 mg/dL Final     Direct Measure HDL 04/21/2022 79  >=40 mg/dL Final     LDL Cholesterol Calculated 04/21/2022 26  <=100 mg/dL Final     Non HDL Cholesterol 04/21/2022 46  <130 mg/dL Final     Patient Fasting > 8hrs? 04/21/2022 No   Final               Phone call duration: 16 minutes

## 2022-05-10 NOTE — TELEPHONE ENCOUNTER
Called pt and daughter and they state they pt does not want to take this medication anymore due to kidney failure. Pt would like this  Removed from the medication list.

## 2022-05-11 ENCOUNTER — TELEPHONE (OUTPATIENT)
Dept: FAMILY MEDICINE | Facility: CLINIC | Age: 87
End: 2022-05-11
Payer: COMMERCIAL

## 2022-05-11 NOTE — TELEPHONE ENCOUNTER
[facility] is calling saying that due to capacity they are declining this patient for home care right now.  Pricilla Lujan  [facility]  2nd Floor  Primary Care

## 2022-05-18 ENCOUNTER — VIRTUAL VISIT (OUTPATIENT)
Dept: FAMILY MEDICINE | Facility: CLINIC | Age: 87
End: 2022-05-18
Payer: COMMERCIAL

## 2022-05-18 ENCOUNTER — TELEPHONE (OUTPATIENT)
Dept: FAMILY MEDICINE | Facility: CLINIC | Age: 87
End: 2022-05-18
Payer: COMMERCIAL

## 2022-05-18 DIAGNOSIS — Z91.199 NO-SHOW FOR APPOINTMENT: Primary | ICD-10-CM

## 2022-05-18 NOTE — TELEPHONE ENCOUNTER
Daughter is concerned with increased decline in patient's health in past month.   Patient does not eat, feels he is malnourished. Has increased falls  Feels he has dementia setting in  Daughter has been staying with patient recently  Daughter went to take the garbage out last week, states wasn't gone for more than 5 minutes, came back into the house and patient had fallen face down on the carpet  This morning daughter found patient fallen in the bathroom, patient had managed to crawl back to his bedroom and get himself in bed  Daughter has been providing patient's ADLs. Daughter feels patient would be better suited in an assisted living facility  Advised daughter patient needs to be seen.  Appointment today with Dr. Taiwo Rios BSN, RN

## 2022-05-18 NOTE — PROGRESS NOTES
Video visit invite sent X 2 on the phone number provided. No response after 10 minutes. Hence, will close encounter as a No show.  Deidre Maravilla MD MPH   This patient was a no show for this scheduled appointment.

## 2022-05-23 ENCOUNTER — TELEPHONE (OUTPATIENT)
Dept: FAMILY MEDICINE | Facility: CLINIC | Age: 87
End: 2022-05-23
Payer: COMMERCIAL

## 2022-05-23 NOTE — LETTER
May 26, 2022        Re:  Manuel Rosales  : 3/22/1931  Our MR#: [id number]         To whom it may concern,    Mr. Rosales's health has been diminishing over time, due to several medical factors, including (but not limited to):    Congestive heart failure, chronic kidney disease, peripheral neuropathy, type 2 diabetes, occlusion of the right carotid artery, glaucoma, pulmonary hypertension, peripheral vascular disease    He is now at the point where he requires the assistance of others in order to be safe while performing activities of daily living and ambulation.  This is best provided by the patient living in an assisted living environment.  He has such an environment available to him, and his in his best interest to relocate there as soon as possible.        Sincerely,          Ronaldo Corral MD

## 2022-05-23 NOTE — TELEPHONE ENCOUNTER
Oxana is calling about her father. Father was seen by provider on 4/21/2022 and they had filled out some paperwork regarding ADL's. Patient and daughter have decided that its time for patient to move into Assisted Living and need a letter from the doctor about this so that they can break the lease for where patient lives. If there are any questions its ok to call daughter. Daughter would like to have this available for pickup as soon as possible.  Pricilla Lujan  [facility]  2nd Floor  Primary Care

## 2022-05-25 NOTE — TELEPHONE ENCOUNTER
Patient's daughter, Tenisha, calling to follow up on request for letter below.     She is requesting the letter be ready by 5/27/22 due to patient lease. She states if they do not have it by then she will be charged another month's rent, which she would like to avoid.     She is requesting this request be sent as high priority.     She states she can come to the clinic to  the letter when it is ready. Please call her at [phone] to confirm letter is ready.     Routing to provider to please provide letter.    Astrid Xiong RN  [facility]

## 2022-05-25 NOTE — TELEPHONE ENCOUNTER
Form received via fax concerning this issue. Form is listed as urgent. Form placed in provider's bin to address, it is from the [employer] phone number: [phone]

## 2022-05-26 NOTE — TELEPHONE ENCOUNTER
Patients daughter is calling about this letter she has requested to have her dad move to assisted living. She states that time is running out for him and . She is asking if she should write the letter than have provider sign. Patient would like to pick this up today. Please advise.  Pricilla Lujan  [facility]  2nd Floor  Primary Care

## 2022-05-26 NOTE — TELEPHONE ENCOUNTER
Form completed.  Letter written.  Unfortunately I cannot print and sign the letter because her printers are not working.

## 2022-05-26 NOTE — TELEPHONE ENCOUNTER
Printed letter. Received signed form. Bringing unsigned letter and form to the  by 6:30 pm today, 5/26/2022. Copy to TC and abstracting.  Called and  Left a voicemail message regarding form/letter .  Muriel Gonsales MA  [facility]  2nd Floor  Primary Care

## 2022-05-26 NOTE — TELEPHONE ENCOUNTER
"Oxana calling requesting a letter. Very upset and wants to know when this can be completed. She states she is \"coming in tomorrow and camp out until she receives this\".  Muriel Gonsales [facility]  2nd Floor  Primary Care    "

## 2022-05-27 ENCOUNTER — TELEPHONE (OUTPATIENT)
Dept: FAMILY MEDICINE | Facility: CLINIC | Age: 87
End: 2022-05-27

## 2022-05-27 NOTE — TELEPHONE ENCOUNTER
Received a fax from [facility], Alesha Shearer LPN.   1) 2nd request-The progress note received does not refect why the patient's Alogliptin increased to 25 mg from 6.25 mg daily, Please send a recent progress note.  OR  Indication to disregard rx sent to [facility], no intentional increase, continue active VA order?    Active VA rx:  Alogliptin 6.25 mg tablet- Take 1 tablet by mouth daily.   (dose reduction July 2021. Med order detail with fill history attached as FYI)  Placed the fax in TC's waiting to hear back basket as not sure who will be taking care of this telephone encounter as Dr Corral is on vacation and his last visit notes on 5/10/2022 are unsigned.  Please advise.  Called Alesha and left a message that the provider is on vacation and to return our call to see if this can wait for his return.  Muriel Gonsales [facility]  2nd Floor  Primary Care

## 2022-05-27 NOTE — TELEPHONE ENCOUNTER
Routed to Dr Corral, is not on out of office pool. Routing to the RN Pool to advise.  Muriel Gonsales [facility]  2nd Floor  Primary Care

## 2022-05-27 NOTE — TELEPHONE ENCOUNTER
Writer reviewed chart, provider discontinued alogliptin 25mg on 5/26/22, I do not see an active prescription for alogliptin 6.25mg on patient's list, this appears to have been discontinued on 4/21/22 with med rec cleanup  Routing to provider to clarify  Chantelle Jorge RN  [facility]

## 2022-06-07 ENCOUNTER — TELEPHONE (OUTPATIENT)
Dept: FAMILY MEDICINE | Facility: CLINIC | Age: 87
End: 2022-06-07
Payer: COMMERCIAL

## 2022-06-07 ENCOUNTER — VIRTUAL VISIT (OUTPATIENT)
Dept: FAMILY MEDICINE | Facility: CLINIC | Age: 87
End: 2022-06-07
Payer: COMMERCIAL

## 2022-06-07 DIAGNOSIS — F32.9 REACTIVE DEPRESSION (SITUATIONAL): Primary | ICD-10-CM

## 2022-06-07 PROCEDURE — 99443 PR PHYSICIAN TELEPHONE EVALUATION 21-30 MIN: CPT | Mod: 95 | Performed by: FAMILY MEDICINE

## 2022-06-07 RX ORDER — SERTRALINE HYDROCHLORIDE 25 MG/1
25 TABLET, FILM COATED ORAL DAILY
Qty: 30 TABLET | Refills: 1 | Status: SHIPPED | OUTPATIENT
Start: 2022-06-07 | End: 2022-07-25

## 2022-06-07 ASSESSMENT — PATIENT HEALTH QUESTIONNAIRE - PHQ9
SUM OF ALL RESPONSES TO PHQ QUESTIONS 1-9: 24
10. IF YOU CHECKED OFF ANY PROBLEMS, HOW DIFFICULT HAVE THESE PROBLEMS MADE IT FOR YOU TO DO YOUR WORK, TAKE CARE OF THINGS AT HOME, OR GET ALONG WITH OTHER PEOPLE: VERY DIFFICULT
SUM OF ALL RESPONSES TO PHQ QUESTIONS 1-9: 24

## 2022-06-07 NOTE — TELEPHONE ENCOUNTER
"Daughter Oxana called. No consent to communicate on file.   State that pt is going to move to Assisted living soon and today pt said it was his \"last day\".   Daughter said that pt \"lost his will to live\".   Pt \"knows that he is going to die\".     Pt is requesting for provider to prescribe medication to help with his mood.     Pt has said similar comments before. He was in the ED on 5/28/22 and he said that it was his last day.     From what daughter knows, pt has no plan of suicide. No access to weapons. Pt does not know the \"how or why\" it is his last day.     Pt is in his apartment and daughter is with him. She is usually with him all day until 4pm.     Scheduled virtual visit with provider today.    Advised daughter to have pt seen at the ED if he is suicidal and develops a plan.     Routing to provider as DIPESH Thomas RN  [facility]    "

## 2022-06-07 NOTE — PATIENT INSTRUCTIONS
At [facility], we strive to deliver an exceptional experience to you, every time we see you. If you receive a survey, please complete it as we do value your feedback.  If you have MyChart, you can expect to receive results automatically within 24 hours of their completion.  Your provider will send a note interpreting your results as well.   If you do not have MyChart, you should receive your results in about a week by mail.    Your care team:                            Family Medicine Internal Medicine   MD Kaiden Harris MD Shantel Branch-Fleming, MD Srinivasa Vaka, MD Katya Belousova, MAULIK Tomlin CNP, MD ([facility]   Wiley Muller, MD Aliza Moore MD Amelia Massimini APRN CNP Kim Thein, APRN CNP Bethany Templen, MD             Clinic hours: Monday - Thursday 7 am-6 pm; Fridays 7 am-5 pm.   Urgent care: Monday - Friday 10 am- 8 pm; Saturday and Sunday 9 am-5 pm.    Clinic: [phone]       [facility]: Monday - Thursday 8 am - 7 pm; Friday 8 am - 6 pm  [facility]: [phone]

## 2022-06-07 NOTE — PROGRESS NOTES
David is a 89 year old who is being evaluated via a billable telephone visit.      What phone number would you like to be contacted at? [phone]  How would you like to obtain your AVS? Mail a copy    Assessment & Plan     (F32.9) Reactive depression (situational)  (primary encounter diagnosis)  Comment: Mild baseline history of depression.  This is all apparently  Response to considering his overall physical health and age.  Plan: sertraline (ZOLOFT) 25 MG tablet, Adult Mental         Health  Referral        Start sertraline.  I contracted with the patient not to do anything to harm himself.  Unfortunately, I could not get him to verbally agree to meet with a counselor, and he was rather very/ambivalent about wanting to schedule follow-up appointment with me.    Return in about 2 weeks (around 6/21/2022) for recheck depression, recheck medications.    Better states she will try to get him to follow-up as requested.  We discussed parameters for taking him to the emergency department.    Ronaldo Corral MD  [facility]    Idalmis Hernandez is a 89 year old who presents for the following health issues  accompanied by his daughter.    History of Present Illness       Mental Health Follow-up:  Patient presents to follow-up on Depression.Patient's depression since last visit has been:  Bad  The patient is having other symptoms associated with depression.      Any significant life events: health concerns  Patient is not feeling anxious or having panic attacks.  Patient has no concerns about alcohol or drug use.    He eats 0-1 servings of fruits and vegetables daily.He consumes 0 sweetened beverage(s) daily.He exercises with enough effort to increase his heart rate 9 or less minutes per day.  He exercises with enough effort to increase his heart rate 3 or less days per week.   He is taking medications regularly.    Today's PHQ-9         PHQ-9 Total Score: 24    PHQ-9 Q9 Thoughts of  better off dead/self-harm past 2 weeks :   Nearly every day  Thoughts of suicide or self harm: (P) Yes  Self-harm Plan:   (P) No  Self-harm Action:     (P) No  Safety concerns for self or others: (P) No    How difficult have these problems made it for you to do your work, take care of things at home, or get along with other people: Very difficult     Depressed about his declining health over the past month.   Oversleeps. Not willing to increase his food intake (as we discussed at a previous visit).        Review of Systems         Objective           Vitals:  No vitals were obtained today due to virtual visit.    Physical Exam   healthy, alert and no distress  PSYCH: Alert and oriented times 3; coherent speech, normal   rate and volume, able to articulate logical thoughts, able   to abstract reason, no tangential thoughts, no hallucinations   or delusions  His affect is angry  RESP: No cough, no audible wheezing, able to talk in full sentences  Remainder of exam unable to be completed due to telephone visits                Phone call duration: 32 minutes

## 2022-06-09 ENCOUNTER — TELEPHONE (OUTPATIENT)
Dept: FAMILY MEDICINE | Facility: CLINIC | Age: 87
End: 2022-06-09

## 2022-06-09 NOTE — TELEPHONE ENCOUNTER
Patient's daughter, Oxana, calling regarding this patient. She is caregiver for Pt.     Daughter reporting that she cares for him during the days, but cannot during the night because she tries to sleep when he sleeps.    Pt's daughter found this patient on the floor in the bathroom today with blood and feces all over.   Was able to clean him in the tub, but Pt is very frail and depressed.     Pt's daughter reporting wound on the hand, back, and leg from falling off the toilet. Wounds were cleaned and covered.   Daughter expressed concern and was crying over the phone. She is scared for her father and burnt out.     She is waiting on a call back from [facility] but is concerned he needs skilled nurses available for him.     Pt's daughter states he is very quickly deteriorating and getting worse and worse.   She is requesting help for caring for him tonight.     Routing to provider - can we get an urgent referral for this patient in home care or nursing home?     Please review/advise. Oxana, daughter, would like call back ASAP at [phone].    Mandy Mondragon RN, BSN  [facility]

## 2022-07-14 ENCOUNTER — LAB REQUISITION (OUTPATIENT)
Dept: LAB | Facility: CLINIC | Age: 87
End: 2022-07-14
Payer: COMMERCIAL

## 2022-07-14 DIAGNOSIS — R76.12 NONSPECIFIC REACTION TO CELL MEDIATED IMMUNITY MEASUREMENT OF GAMMA INTERFERON ANTIGEN RESPONSE WITHOUT ACTIVE TUBERCULOSIS: ICD-10-CM

## 2022-07-15 PROCEDURE — 86481 TB AG RESPONSE T-CELL SUSP: CPT | Mod: ORL | Performed by: INTERNAL MEDICINE

## 2022-07-15 PROCEDURE — P9603 ONE-WAY ALLOW PRORATED MILES: HCPCS | Mod: ORL | Performed by: INTERNAL MEDICINE

## 2022-07-15 PROCEDURE — 36415 COLL VENOUS BLD VENIPUNCTURE: CPT | Mod: ORL | Performed by: INTERNAL MEDICINE

## 2022-07-18 ENCOUNTER — TRANSITIONAL CARE UNIT VISIT (OUTPATIENT)
Dept: GERIATRICS | Facility: CLINIC | Age: 87
End: 2022-07-18
Payer: COMMERCIAL

## 2022-07-18 VITALS
DIASTOLIC BLOOD PRESSURE: 71 MMHG | SYSTOLIC BLOOD PRESSURE: 143 MMHG | WEIGHT: 151.6 LBS | HEART RATE: 63 BPM | RESPIRATION RATE: 16 BRPM | OXYGEN SATURATION: 99 % | BODY MASS INDEX: 24.31 KG/M2 | TEMPERATURE: 96.8 F

## 2022-07-18 DIAGNOSIS — I65.21 OCCLUSION OF RIGHT CAROTID ARTERY: ICD-10-CM

## 2022-07-18 DIAGNOSIS — I10 BENIGN ESSENTIAL HYPERTENSION: ICD-10-CM

## 2022-07-18 DIAGNOSIS — Z79.4 TYPE 2 DIABETES MELLITUS WITH MICROALBUMINURIA, WITH LONG-TERM CURRENT USE OF INSULIN (H): ICD-10-CM

## 2022-07-18 DIAGNOSIS — H40.1132 PRIMARY OPEN ANGLE GLAUCOMA OF BOTH EYES, MODERATE STAGE: ICD-10-CM

## 2022-07-18 DIAGNOSIS — E87.1 HYPONATREMIA: ICD-10-CM

## 2022-07-18 DIAGNOSIS — E11.29 TYPE 2 DIABETES MELLITUS WITH MICROALBUMINURIA, WITH LONG-TERM CURRENT USE OF INSULIN (H): ICD-10-CM

## 2022-07-18 DIAGNOSIS — R80.9 TYPE 2 DIABETES MELLITUS WITH MICROALBUMINURIA, WITH LONG-TERM CURRENT USE OF INSULIN (H): ICD-10-CM

## 2022-07-18 DIAGNOSIS — I48.91 ATRIAL FIBRILLATION, UNSPECIFIED TYPE (H): ICD-10-CM

## 2022-07-18 DIAGNOSIS — R53.81 PHYSICAL DECONDITIONING: ICD-10-CM

## 2022-07-18 DIAGNOSIS — D64.9 NORMOCYTIC ANEMIA: ICD-10-CM

## 2022-07-18 DIAGNOSIS — I50.32 CHRONIC DIASTOLIC HEART FAILURE (H): ICD-10-CM

## 2022-07-18 DIAGNOSIS — L97.429 ULCER OF HEEL AND MIDFOOT, LEFT, WITH UNSPECIFIED SEVERITY (H): Primary | ICD-10-CM

## 2022-07-18 LAB
GAMMA INTERFERON BACKGROUND BLD IA-ACNC: 0 IU/ML
M TB IFN-G BLD-IMP: NEGATIVE
M TB IFN-G CD4+ BCKGRND COR BLD-ACNC: 10 IU/ML
MITOGEN IGNF BCKGRD COR BLD-ACNC: 0.04 IU/ML
MITOGEN IGNF BCKGRD COR BLD-ACNC: 0.08 IU/ML
QUANTIFERON MITOGEN: 10 IU/ML
QUANTIFERON NIL TUBE: 0 IU/ML
QUANTIFERON TB1 TUBE: 0.04 IU/ML
QUANTIFERON TB2 TUBE: 0.08

## 2022-07-18 PROCEDURE — 99310 SBSQ NF CARE HIGH MDM 45: CPT | Performed by: NURSE PRACTITIONER

## 2022-07-18 RX ORDER — SENNOSIDES 8.6 MG
1 TABLET ORAL 2 TIMES DAILY PRN
COMMUNITY

## 2022-07-18 RX ORDER — ACETAMINOPHEN 325 MG/1
325 TABLET ORAL EVERY 4 HOURS PRN
COMMUNITY

## 2022-07-18 RX ORDER — MULTIPLE VITAMINS W/ MINERALS TAB 9MG-400MCG
1 TAB ORAL DAILY
COMMUNITY

## 2022-07-18 RX ORDER — ACETAMINOPHEN 650 MG
TABLET, EXTENDED RELEASE ORAL DAILY
COMMUNITY

## 2022-07-18 RX ORDER — ALOGLIPTIN 6.25 MG/1
6.25 TABLET, FILM COATED ORAL DAILY
COMMUNITY
End: 2022-08-09

## 2022-07-18 RX ORDER — METOPROLOL SUCCINATE 50 MG/1
50 TABLET, EXTENDED RELEASE ORAL DAILY
COMMUNITY

## 2022-07-18 NOTE — PROGRESS NOTES
[facility] GERIATRICS  PRIMARY CARE PROVIDER AND CLINIC:  Ronaldo Corral MD, [address] [place] [address]  Chief Complaint   Patient presents with     Hospital F/U      [place] Medical Record Number:  [id number]  [facility] where [id number]    Manuel Rosales  is a 89 year old  (3/22/1931), admitted to the above facility [place]  [facility] [place] [facility] [place] Hospital stay 7/10/22 through 7/14/22.     HPI:    This is a 89-year-old male, with a past medical history significant for peripheral vascular disease, type 2 diabetes mellitus, glaucoma, atrial fibrillation with high grade AV block s/p pacemaker placement. Coronary artery disease with occluded right internal carotid, heart failure with preserved ejection fracture of 55-60% on 7/22/20, hypertension, and depression, who was admitted to [facility] 7/10/22 through 7/14/22 for left foot eschar. Previously hospitalized 6/9/22 through 6/15/22 after a fall with weakness. During this hospitalization, no signs of infection. Labs revealed Sodium 131 and IV fluids were administered with improvement. A TCU stay was recommended for ongoing physical rehabilitation.     Today, patient is sitting in a recliner with his daughter present. Was not on a diabetic diet initially, but this has been adjusted. When asked about his hospital stay, requests his daughter answer the question. Daughter reports patient was initially attempting to lose weight so his diabetes was under better control, but he went a little too far and became dehydrated. Was hospitalized and discharged to an outside TCU. Was not happy with the outside TCU and felt patient declined overall. The outside TCU was tentatively planning to discharge the patient to [facility], but the AL felt they could not accommodate his needs. Patient had nowhere to discharge to so daughter elected to send patient to the hospital for  worsening left foot wound. Have been pleased with the care at the current TCU. Is planning for patient to discharge to [facility] in [place] when he has completed therapy.     CODE STATUS/ADVANCE DIRECTIVES DISCUSSION:  No CPR- Do NOT Intubate    ALLERGIES:   Allergies   Allergen Reactions     Metformin      Renal failure     Niaspan [Niacin] Other (See Comments)     hyperglycemia      PAST MEDICAL HISTORY:   Past Medical History:   Diagnosis Date     Bradycardia     History of bradycardia with 2nd degree AV block Mobitz Type I, resolved with reduction in beta blocker.     Closed fracture of acromial end of clavicle 5/27/2012     Diabetes (H)      Glaucoma (increased eye pressure)      Heart murmur 01/2004    (?MR)     Hypertension       PAST SURGICAL HISTORY:   has a past surgical history that includes COLONOSCOPY THRU STOMA, DIAGNOSTIC (2007, 2012); REMOVE TONSILS/ADENOIDS,<11 Y/O; BIOPSY PROSTATE NEEDLE/PUNCH (7/02); Phacoemulsification with standard intraocular lens implant (10/2008; 11/2009); Laser selective trabeculoplasty (7/2011; 7/2013); Laser selective trabeculoplasty (7/2007; 2/2016); cataract iol, rt/lt; Colonoscopy with CO2 insufflation (N/A, 4/16/2018); Implant pacemaker (05/2018); Implant pacemaker; and Colon surgery.  FAMILY HISTORY: family history includes Cancer in his sister; Diabetes in his sister; Lipids in his mother.  SOCIAL HISTORY:   reports that he quit smoking about 52 years ago. He has never used smokeless tobacco. He reports current alcohol use of about 7.0 standard drinks of alcohol per week. He reports that he does not use drugs.  Patient's living condition: lives alone    Post Discharge Medication Reconciliation Status: discharge medications reconciled, continue medications without change  Current Outpatient Medications   Medication Sig     acetaminophen (TYLENOL) 325 MG tablet Take 325-650 mg by mouth every 4 hours as needed for mild pain     alogliptin (NESINA) 6.25 MG  tablet Take 6.25 mg by mouth daily     amLODIPine (NORVASC) 10 MG tablet Take 1 tablet (10 mg) by mouth daily for blood pressure.     apixaban ANTICOAGULANT (ELIQUIS) 5 MG tablet Take 1 tablet by mouth 2 times daily for blood thinner.     atorvastatin (LIPITOR) 80 MG tablet Take 1 tablet (80 mg) by mouth daily for cholesterol.     brimonidine (ALPHAGAN-P) 0.15 % ophthalmic solution Place 1 drop into both eyes 2 times daily     Cholecalciferol (VITAMIN D) 2000 UNITS CAPS Take 1 capsule by mouth daily     dorzolamide-timolol (COSOPT) 2-0.5 % ophthalmic solution Place 1 drop into both eyes 2 times daily     glipiZIDE (GLUCOTROL) 10 MG tablet Take 1 tablet (10 mg) by mouth 2 times daily (with meals) for diabetes.     latanoprost (XALATAN) 0.005 % ophthalmic solution Place 1 drop into both eyes At Bedtime     lisinopril (ZESTRIL) 40 MG tablet Take 1 tablet by mouth once daily for blood pressure     metoprolol succinate ER (TOPROL XL) 50 MG 24 hr tablet Take 50 mg by mouth daily     multivitamin w/minerals (THERA-VIT-M) tablet Take 1 tablet by mouth daily     povidone-iodine (BETADINE) 10 % topical solution Apply topically daily     sennosides (SENOKOT) 8.6 MG tablet Take 1 tablet by mouth 2 times daily as needed for constipation     sertraline (ZOLOFT) 50 MG tablet Take 50 mg by mouth daily     ASPIRIN NOT PRESCRIBED (INTENTIONAL) while taking apixaban (Eliquis).     BETA BLOCKER NOT PRESCRIBED, INTENTIONAL, Beta Blocker not prescribed intentionally due to Cardiac Arrythmia (bradycardia, complete heart block)     No current facility-administered medications for this visit.     ROS:  4 point ROS including Respiratory, CV, GI and , other than that noted in the HPI,  is negative    Vitals:  BP (!) 143/71   Pulse 63   Temp 96.8  F (36  C)   Resp 16   Wt 68.8 kg (151 lb 9.6 oz)   SpO2 99%   BMI 24.31 kg/m    Exam:  GENERAL APPEARANCE:  Alert, in no distress  ENT:  Mouth and posterior oropharynx normal, moist mucous  membranes  EYES:  EOM, conjunctivae, lids, pupils and irises normal  RESP:  respiratory effort and palpation of chest normal, lungs clear to auscultation , no respiratory distress  CV:  Palpation and auscultation of heart done , regular rate and rhythm, no rub or gallop  ABDOMEN:  normal bowel sounds, soft, nontender, no hepatosplenomegaly or other masses  M/S:   No edema in BLE  SKIN:  Did not visualize heel wound  NEURO:   Cranial nerves 2-12 are normal tested and grossly at patient's baseline  PSYCH:  affect and mood normal    Lab/Diagnostic data:  Labs done in SNF are in [facility]. Please refer to them using [facility] Everywhere.    ASSESSMENT/PLAN:  Chronic Left Heel Ulcer. No s/s of infection per hospital notes, x-ray, and staff update today. Continue dressing changes as ordered - Paint with Betadine daily. OK to cover with non-adherent gauze if needed for comfort. Bilateral Heel: Elevate heels with heel suspension boot at all times.     Hyponatremia. Improved with IV fluids during hospitalization. Last Sodium 134. Will recheck labs next week to ensure stability. Patient would like to avoid further blood draws if at all possible.    Chronic Normocytic Anemia. Baseline Hemoglobin ~ 11-13. Last Hemoglobin 11 on 7/14/22. Repeat CBC on 7/25/22 to ensure stability.      Type 2 Diabetes Mellitus. Last A1C 7.3 on 4/21/22. Continue Alogliptin and Glipizide as ordered. Monitor blood sugars prior to meals and at bedtime. Adjust treatment accordingly.     Atrial Fibrillation with High Grade AV Block S/P Pacemaker Placement. Monitor heart rate daily. Takes Apixaban and Metoprolol..     Hypertension and Hyperlipidemia. Monitor blood pressure daily. Continue Amlodipine, Atorvastatin, Metoprolol, and Lisinopril as ordered.    Heart Failure with Preserved Ejection Fraction 55-60% on 7/22/20. Monitor weights to establish trend.     Carotid Artery Disease with Occluded Right Internal Carotid. Ultrasound on 3/31/22 stable as  compared to previous ultrasound. Takes Atorvastatin.     Glaucoma. Continue Brimonidine, Latanoprost, and Dorzolamide-Timolol as ordered.    Depression. Continue Sertraline as ordered.    Hard Of Hearing. Daughter notes [facility]. Has a pocket talker.    Constipation. Continue Senna as ordered.    Physical Deconditioning. Secondary to recent hospitalization and co-morbidities. Physical and Occupational Therapy ordered.    Orders:  CBC, BMP on 7/25/22    Total time spent with patient visit at the skilled nursing facility was 35 min including patient visit and review of past records. Greater than 50% of total time spent with counseling and coordinating care due to review of code status, review of anticipated TCU stay, discussion regarding previous TCU stay, review of discharge plans, discussion with daughter, review of code status, and orders electronically entered into facility EHR.      Electronically signed by:  MAULIK Islas CNP

## 2022-07-18 NOTE — LETTER
7/18/2022        RE: Manuel Rosales  [address]  [facility] [address]        [facility] GERIATRICS  PRIMARY CARE PROVIDER AND CLINIC:  Ronaldo Corral MD, [address] / [place] [address]  Chief Complaint   Patient presents with     Hospital F/U      [facility] Medical Record Number:  [id number]  Place of Service where encounter took place:  [facility] (TCU) [[id number]]    Manuel Rosales  is a 89 year old  (3/22/1931), admitted to the above facility from  [facility]. Hospital stay 7/10/22 through 7/14/22.     HPI:    This is a 89-year-old male, with a past medical history significant for peripheral vascular disease, type 2 diabetes mellitus, glaucoma, atrial fibrillation with high grade AV block s/p pacemaker placement. Coronary artery disease with occluded right internal carotid, heart failure with preserved ejection fracture of 55-60% on 7/22/20, hypertension, and depression, who was admitted to [facility] 7/10/22 through 7/14/22 for left foot eschar. Previously hospitalized 6/9/22 through 6/15/22 after a fall with weakness. During this hospitalization, no signs of infection. Labs revealed Sodium 131 and IV fluids were administered with improvement. A TCU stay was recommended for ongoing physical rehabilitation.     Today, patient is sitting in a recliner with his daughter present. Was not on a diabetic diet initially, but this has been adjusted. When asked about his hospital stay, requests his daughter answer the question. Daughter reports patient was initially attempting to lose weight so his diabetes was under better control, but he went a little too far and became dehydrated. Was hospitalized and discharged to an outside TCU. Was not happy with the outside TCU and felt patient declined overall. The outside TCU was tentatively planning to discharge the patient to [facility], but the AL felt they could not accommodate his needs.  Patient had nowhere to discharge to so daughter elected to send patient to the hospital for worsening left foot wound. Have been pleased with the care at the current TCU. Is planning for patient to discharge to [facility] in [place] when he has completed therapy.     CODE STATUS/ADVANCE DIRECTIVES DISCUSSION:  No CPR- Do NOT Intubate    ALLERGIES:   Allergies   Allergen Reactions     Metformin      Renal failure     Niaspan [Niacin] Other (See Comments)     hyperglycemia      PAST MEDICAL HISTORY:   Past Medical History:   Diagnosis Date     Bradycardia     History of bradycardia with 2nd degree AV block Mobitz Type I, resolved with reduction in beta blocker.     Closed fracture of acromial end of clavicle 5/27/2012     Diabetes (H)      Glaucoma (increased eye pressure)      Heart murmur 01/2004    (?MR)     Hypertension       PAST SURGICAL HISTORY:   has a past surgical history that includes COLONOSCOPY THRU STOMA, DIAGNOSTIC (2007, 2012); REMOVE TONSILS/ADENOIDS,<13 Y/O; BIOPSY PROSTATE NEEDLE/PUNCH (7/02); Phacoemulsification with standard intraocular lens implant (10/2008; 11/2009); Laser selective trabeculoplasty (7/2011; 7/2013); Laser selective trabeculoplasty (7/2007; 2/2016); cataract iol, rt/lt; Colonoscopy with CO2 insufflation (N/A, 4/16/2018); Implant pacemaker (05/2018); Implant pacemaker; and Colon surgery.  FAMILY HISTORY: family history includes Cancer in his sister; Diabetes in his sister; Lipids in his mother.  SOCIAL HISTORY:   reports that he quit smoking about 52 years ago. He has never used smokeless tobacco. He reports current alcohol use of about 7.0 standard drinks of alcohol per week. He reports that he does not use drugs.  Patient's living condition: lives alone    Post Discharge Medication Reconciliation Status: discharge medications reconciled, continue medications without change  Current Outpatient Medications   Medication Sig     acetaminophen (TYLENOL) 325 MG tablet Take  325-650 mg by mouth every 4 hours as needed for mild pain     alogliptin (NESINA) 6.25 MG tablet Take 6.25 mg by mouth daily     amLODIPine (NORVASC) 10 MG tablet Take 1 tablet (10 mg) by mouth daily for blood pressure.     apixaban ANTICOAGULANT (ELIQUIS) 5 MG tablet Take 1 tablet by mouth 2 times daily for blood thinner.     atorvastatin (LIPITOR) 80 MG tablet Take 1 tablet (80 mg) by mouth daily for cholesterol.     brimonidine (ALPHAGAN-P) 0.15 % ophthalmic solution Place 1 drop into both eyes 2 times daily     Cholecalciferol (VITAMIN D) 2000 UNITS CAPS Take 1 capsule by mouth daily     dorzolamide-timolol (COSOPT) 2-0.5 % ophthalmic solution Place 1 drop into both eyes 2 times daily     glipiZIDE (GLUCOTROL) 10 MG tablet Take 1 tablet (10 mg) by mouth 2 times daily (with meals) for diabetes.     latanoprost (XALATAN) 0.005 % ophthalmic solution Place 1 drop into both eyes At Bedtime     lisinopril (ZESTRIL) 40 MG tablet Take 1 tablet by mouth once daily for blood pressure     metoprolol succinate ER (TOPROL XL) 50 MG 24 hr tablet Take 50 mg by mouth daily     multivitamin w/minerals (THERA-VIT-M) tablet Take 1 tablet by mouth daily     povidone-iodine (BETADINE) 10 % topical solution Apply topically daily     sennosides (SENOKOT) 8.6 MG tablet Take 1 tablet by mouth 2 times daily as needed for constipation     sertraline (ZOLOFT) 50 MG tablet Take 50 mg by mouth daily     ASPIRIN NOT PRESCRIBED (INTENTIONAL) while taking apixaban (Eliquis).     BETA BLOCKER NOT PRESCRIBED, INTENTIONAL, Beta Blocker not prescribed intentionally due to Cardiac Arrythmia (bradycardia, complete heart block)     No current facility-administered medications for this visit.     ROS:  4 point ROS including Respiratory, CV, GI and , other than that noted in the HPI,  is negative    Vitals:  BP (!) 143/71   Pulse 63   Temp 96.8  F (36  C)   Resp 16   Wt 68.8 kg (151 lb 9.6 oz)   SpO2 99%   BMI 24.31 kg/m    Exam:  GENERAL  APPEARANCE:  Alert, in no distress  ENT:  Mouth and posterior oropharynx normal, moist mucous membranes  EYES:  EOM, conjunctivae, lids, pupils and irises normal  RESP:  respiratory effort and palpation of chest normal, lungs clear to auscultation , no respiratory distress  CV:  Palpation and auscultation of heart done , regular rate and rhythm, no rub or gallop  ABDOMEN:  normal bowel sounds, soft, nontender, no hepatosplenomegaly or other masses  M/S:   No edema in BLE  SKIN:  Did not visualize heel wound  NEURO:   Cranial nerves 2-12 are normal tested and grossly at patient's baseline  PSYCH:  affect and mood normal    Lab/Diagnostic data:  Labs done in SNF are in [facility]. Please refer to them using [employer] logo/[employer] Everywhere.    ASSESSMENT/PLAN:  Chronic Left Heel Ulcer. No s/s of infection per hospital notes, x-ray, and staff update today. Continue dressing changes as ordered - Paint with Betadine daily. OK to cover with non-adherent gauze if needed for comfort. Bilateral Heel: Elevate heels with heel suspension boot at all times.     Hyponatremia. Improved with IV fluids during hospitalization. Last Sodium 134. Will recheck labs next week to ensure stability. Patient would like to avoid further blood draws if at all possible.    Chronic Normocytic Anemia. Baseline Hemoglobin ~ 11-13. Last Hemoglobin 11 on 7/14/22. Repeat CBC on 7/25/22 to ensure stability.      Type 2 Diabetes Mellitus. Last A1C 7.3 on 4/21/22. Continue Alogliptin and Glipizide as ordered. Monitor blood sugars prior to meals and at bedtime. Adjust treatment accordingly.     Atrial Fibrillation with High Grade AV Block S/P Pacemaker Placement. Monitor heart rate daily. Takes Apixaban and Metoprolol..     Hypertension and Hyperlipidemia. Monitor blood pressure daily. Continue Amlodipine, Atorvastatin, Metoprolol, and Lisinopril as ordered.    Heart Failure with Preserved Ejection Fraction 55-60% on 7/22/20. Monitor weights to establish trend.      Carotid Artery Disease with Occluded Right Internal Carotid. Ultrasound on 3/31/22 stable as compared to previous ultrasound. Takes Atorvastatin.     Glaucoma. Continue Brimonidine, Latanoprost, and Dorzolamide-Timolol as ordered.    Depression. Continue Sertraline as ordered.    Hard Of Hearing. Daughter notes [facility]. Has a pocket talker.    Constipation. Continue Senna as ordered.    Physical Deconditioning. Secondary to recent hospitalization and co-morbidities. Physical and Occupational Therapy ordered.    Orders:  MARIA, JAXON on 7/25/22    Total time spent with patient visit at the skilled nursing facility was 35 min including patient visit and review of past records. Greater than 50% of total time spent with counseling and coordinating care due to review of code status, review of anticipated TCU stay, discussion regarding previous TCU stay, review of discharge plans, discussion with daughter, review of code status, and orders electronically entered into facility EHR.      Electronically signed by:  MAULIK Islas CNP                       Sincerely,        MAULIK Islas CNP

## 2022-07-21 ENCOUNTER — TRANSITIONAL CARE UNIT VISIT (OUTPATIENT)
Dept: GERIATRICS | Facility: CLINIC | Age: 87
End: 2022-07-21
Payer: COMMERCIAL

## 2022-07-21 VITALS
HEART RATE: 68 BPM | RESPIRATION RATE: 20 BRPM | BODY MASS INDEX: 24.72 KG/M2 | TEMPERATURE: 97.4 F | SYSTOLIC BLOOD PRESSURE: 123 MMHG | WEIGHT: 154.2 LBS | DIASTOLIC BLOOD PRESSURE: 68 MMHG | OXYGEN SATURATION: 100 %

## 2022-07-21 DIAGNOSIS — I10 BENIGN ESSENTIAL HYPERTENSION: Primary | ICD-10-CM

## 2022-07-21 DIAGNOSIS — K59.00 CONSTIPATION, UNSPECIFIED CONSTIPATION TYPE: ICD-10-CM

## 2022-07-21 DIAGNOSIS — R80.9 TYPE 2 DIABETES MELLITUS WITH MICROALBUMINURIA, WITH LONG-TERM CURRENT USE OF INSULIN (H): ICD-10-CM

## 2022-07-21 DIAGNOSIS — Z79.4 TYPE 2 DIABETES MELLITUS WITH MICROALBUMINURIA, WITH LONG-TERM CURRENT USE OF INSULIN (H): ICD-10-CM

## 2022-07-21 DIAGNOSIS — E11.29 TYPE 2 DIABETES MELLITUS WITH MICROALBUMINURIA, WITH LONG-TERM CURRENT USE OF INSULIN (H): ICD-10-CM

## 2022-07-21 PROCEDURE — 99309 SBSQ NF CARE MODERATE MDM 30: CPT | Performed by: NURSE PRACTITIONER

## 2022-07-21 NOTE — LETTER
7/21/2022        RE: Manuel Rosales  [address]  [facility] [address]        [facility] GERIATRICS    Chief Complaint   Patient presents with     RECHECK     HPI:  Manuel Rosales is a 89 year old  (3/22/1931), who is being seen today for an episodic care visit at: [facility] ([facility]) [[id number]].     Background:    This is a 89-year-old male, with a past medical history significant for peripheral vascular disease, type 2 diabetes mellitus, glaucoma, atrial fibrillation with high grade AV block s/p pacemaker placement. Coronary artery disease with occluded right internal carotid, heart failure with preserved ejection fracture of 55-60% on 7/22/20, hypertension, and depression, who was admitted to [facility] 7/10/22 through 7/14/22 for left foot eschar. Previously hospitalized 6/9/22 through 6/15/22 after a fall with weakness. During this hospitalization, no signs of infection. Labs revealed Sodium 131 and IV fluids were administered with improvement. A TCU stay was recommended for ongoing physical rehabilitation    Today's concern is:     Constipation. Today, patient and daughter report issues with bowel movements. When feeling the urge to go, needs to go right away. Has tried Miralax, Metamucil, and Prunes. Needs to stay regular, but attempts to avoid diarrhea.     Type 2 Diabetes Mellitus. Upon review of blood sugars since 7/19/22, range is as follows:    Breakfast: 123-158  Lunch: 141-331  Dinner: 184-291  Bedtime: 189-334    Hypertension. Upon review of blood pressures over the past 5 days, systolic range from 110-145, most < 140. Diastolic range from 57-75.    Allergies, and PMH/PSH reviewed in EPIC today.  REVIEW OF SYSTEMS:  4 point ROS including Respiratory, CV, GI and , other than that noted in the HPI,  is negative    Objective:   /68   Pulse 68   Temp 97.4  F (36.3  C)   Resp 20   Wt 69.9 kg (154 lb 3.2 oz)   SpO2 100%   BMI 24.72 kg/m    GENERAL  APPEARANCE:  Alert, in no distress  ENT:  Mouth and posterior oropharynx normal, moist mucous membranes  EYES:  EOM, conjunctivae, lids, pupils and irises normal  RESP:  respiratory effort and palpation of chest normal, lungs clear to auscultation , no respiratory distress  CV:  Palpation and auscultation of heart done , regular rate and rhythm, no murmur, rub, or gallop  ABDOMEN:  normal bowel sounds, soft, nontender, no hepatosplenomegaly or other masses  M/S:   No edema in BLE  SKIN:  Inspection of skin and subcutaneous tissue baseline, Palpation of skin and subcutaneous tissue baseline  NEURO:   Cranial nerves 2-12 are normal tested and grossly at patient's baseline  PSYCH:  oriented to person    Labs done in SNF are in [facility]. Please refer to them using [employer] Everywhere.    Assessment/Plan:  Constipation. With chronic issues with urgency. Discussed options such as fiber supplements, but patient reports he has tried a lot of things and is not interested in trying something new. Continue Senna as ordered.    Type 2 Diabetes Mellitus. Last A1C 7.3 on 4/21/22. Blood sugars elevated later in the day, but < 150 in the morning. Continue Alogliptin and Glipizide as ordered for now. Monitor blood sugars prior to meals and at bedtime. Adjust treatment accordingly.     Hypertension and Hyperlipidemia. Blood pressures < 140/90. Continue Amlodipine, Atorvastatin, Metoprolol, and Lisinopril as ordered.    Chronic Left Heel Ulcer. Continue dressing changes as ordered - Paint with Betadine daily. OK to cover with non-adherent gauze if needed for comfort. Bilateral Heel: Elevate heels with heel suspension boot at all times.      Hyponatremia. Improved with IV fluids during hospitalization. Last Sodium 134. Repeat BMP on 7/25/22 to ensure stability.     Chronic Normocytic Anemia. Baseline Hemoglobin ~ 11-13. Last Hemoglobin 11 on 7/14/22. Repeat CBC on 7/25/22 to ensure stability.       Atrial Fibrillation with High  Grade AV Block S/P Pacemaker Placement. Monitor heart rate daily. Takes Apixaban and Metoprolol..      Heart Failure with Preserved Ejection Fraction 55-60% on 7/22/20. Continue to monitor weights to establish trend.      Carotid Artery Disease with Occluded Right Internal Carotid. Ultrasound on 3/31/22 stable as compared to previous ultrasound. Takes Atorvastatin.      Glaucoma. Continue Brimonidine, Latanoprost, and Dorzolamide-Timolol as ordered.     Depression. Continue Sertraline as ordered.     Hard Of Hearing. Daughter notes [facility] are [facility]. Has a pocket talker.     Physical Deconditioning. Secondary to recent hospitalization and co-morbidities. Physical and Occupational Therapy ordered. Tentative plan is to discharge to [facility] in [place]    Orders:  None    Electronically signed by: MAULIK Islas CNP             Sincerely,        MAULIK Islas CNP

## 2022-07-21 NOTE — PROGRESS NOTES
[facility] GERIATRICS    Chief Complaint   Patient presents with     RECHECK     HPI:  Manuel Rosales is a 89 year old  (3/22/1931), who is being seen today for an episodic care visit at: [facility] ([facility]) [[id number]].     Background:    This is a 89-year-old male, with a past medical history significant for peripheral vascular disease, type 2 diabetes mellitus, glaucoma, atrial fibrillation with high grade AV block s/p pacemaker placement. Coronary artery disease with occluded right internal carotid, heart failure with preserved ejection fracture of 55-60% on 7/22/20, hypertension, and depression, who was admitted to [facility] 7/10/22 through 7/14/22 for left foot eschar. Previously hospitalized 6/9/22 through 6/15/22 after a fall with weakness. During this hospitalization, no signs of infection. Labs revealed Sodium 131 and IV fluids were administered with improvement. A TCU stay was recommended for ongoing physical rehabilitation    Today's concern is:     Constipation. Today, patient and daughter report issues with bowel movements. When feeling the urge to go, needs to go right away. Has tried Miralax, Metamucil, and Prunes. Needs to stay regular, but attempts to avoid diarrhea.     Type 2 Diabetes Mellitus. Upon review of blood sugars since 7/19/22, range is as follows:    Breakfast: 123-158  Lunch: 141-331  Dinner: 184-291  Bedtime: 189-334    Hypertension. Upon review of blood pressures over the past 5 days, systolic range from 110-145, most < 140. Diastolic range from 57-75.    Allergies, and PMH/PSH reviewed in EPIC today.  REVIEW OF SYSTEMS:  4 point ROS including Respiratory, CV, GI and , other than that noted in the HPI,  is negative    Objective:   /68   Pulse 68   Temp 97.4  F (36.3  C)   Resp 20   Wt 69.9 kg (154 lb 3.2 oz)   SpO2 100%   BMI 24.72 kg/m    GENERAL APPEARANCE:  Alert, in no distress  ENT:  Mouth and posterior oropharynx normal, moist mucous  membranes  EYES:  EOM, conjunctivae, lids, pupils and irises normal  RESP:  respiratory effort and palpation of chest normal, lungs clear to auscultation , no respiratory distress  CV:  Palpation and auscultation of heart done , regular rate and rhythm, no murmur, rub, or gallop  ABDOMEN:  normal bowel sounds, soft, nontender, no hepatosplenomegaly or other masses  M/S:   No edema in BLE  SKIN:  Inspection of skin and subcutaneous tissue baseline, Palpation of skin and subcutaneous tissue baseline  NEURO:   Cranial nerves 2-12 are normal tested and grossly at patient's baseline  PSYCH:  oriented to person    Labs done in SNF are in [facility]. Please refer to them using [employer] Everywhere.    Assessment/Plan:  Constipation. With chronic issues with urgency. Discussed options such as fiber supplements, but patient reports he has tried a lot of things and is not interested in trying something new. Continue Senna as ordered.    Type 2 Diabetes Mellitus. Last A1C 7.3 on 4/21/22. Blood sugars elevated later in the day, but < 150 in the morning. Continue Alogliptin and Glipizide as ordered for now. Monitor blood sugars prior to meals and at bedtime. Adjust treatment accordingly.     Hypertension and Hyperlipidemia. Blood pressures < 140/90. Continue Amlodipine, Atorvastatin, Metoprolol, and Lisinopril as ordered.    Chronic Left Heel Ulcer. Continue dressing changes as ordered - Paint with Betadine daily. OK to cover with non-adherent gauze if needed for comfort. Bilateral Heel: Elevate heels with heel suspension boot at all times.      Hyponatremia. Improved with IV fluids during hospitalization. Last Sodium 134. Repeat BMP on 7/25/22 to ensure stability.     Chronic Normocytic Anemia. Baseline Hemoglobin ~ 11-13. Last Hemoglobin 11 on 7/14/22. Repeat CBC on 7/25/22 to ensure stability.       Atrial Fibrillation with High Grade AV Block S/P Pacemaker Placement. Monitor heart rate daily. Takes Apixaban and  Metoprolol..      Heart Failure with Preserved Ejection Fraction 55-60% on 7/22/20. Continue to monitor weights to establish trend.      Carotid Artery Disease with Occluded Right Internal Carotid. Ultrasound on 3/31/22 stable as compared to previous ultrasound. Takes Atorvastatin.      Glaucoma. Continue Brimonidine, Latanoprost, and Dorzolamide-Timolol as ordered.     Depression. Continue Sertraline as ordered.     Hard Of Hearing. Daughter notes [facility] are [facility]. Has a pocket talker.     Physical Deconditioning. Secondary to recent hospitalization and co-morbidities. Physical and Occupational Therapy ordered. Tentative plan is to discharge to [facility] in [place]    Orders:  None    Electronically signed by: MAULIK Islas CNP

## 2022-07-24 ENCOUNTER — LAB REQUISITION (OUTPATIENT)
Dept: LAB | Facility: CLINIC | Age: 87
End: 2022-07-24
Payer: COMMERCIAL

## 2022-07-24 DIAGNOSIS — E11.59 TYPE 2 DIABETES MELLITUS WITH OTHER CIRCULATORY COMPLICATIONS (H): ICD-10-CM

## 2022-07-25 ENCOUNTER — TRANSITIONAL CARE UNIT VISIT (OUTPATIENT)
Dept: GERIATRICS | Facility: CLINIC | Age: 87
End: 2022-07-25

## 2022-07-25 VITALS
TEMPERATURE: 97.5 F | OXYGEN SATURATION: 97 % | BODY MASS INDEX: 24.72 KG/M2 | SYSTOLIC BLOOD PRESSURE: 137 MMHG | RESPIRATION RATE: 18 BRPM | HEART RATE: 83 BPM | DIASTOLIC BLOOD PRESSURE: 77 MMHG | WEIGHT: 154.2 LBS

## 2022-07-25 DIAGNOSIS — R53.83 OTHER FATIGUE: Primary | ICD-10-CM

## 2022-07-25 DIAGNOSIS — E87.1 HYPONATREMIA: ICD-10-CM

## 2022-07-25 DIAGNOSIS — E83.52 HYPERCALCEMIA: ICD-10-CM

## 2022-07-25 DIAGNOSIS — I50.32 CHRONIC HEART FAILURE WITH PRESERVED EJECTION FRACTION (H): ICD-10-CM

## 2022-07-25 DIAGNOSIS — R53.81 PHYSICAL DECONDITIONING: ICD-10-CM

## 2022-07-25 LAB
ANION GAP SERPL CALCULATED.3IONS-SCNC: 10 MMOL/L (ref 7–15)
BASOPHILS # BLD AUTO: 0.1 10E3/UL (ref 0–0.2)
BASOPHILS NFR BLD AUTO: 1 %
BUN SERPL-MCNC: 16.8 MG/DL (ref 8–23)
CALCIUM SERPL-MCNC: 10.3 MG/DL (ref 8.2–9.6)
CHLORIDE SERPL-SCNC: 98 MMOL/L (ref 98–107)
CREAT SERPL-MCNC: 0.81 MG/DL (ref 0.67–1.17)
DEPRECATED HCO3 PLAS-SCNC: 24 MMOL/L (ref 22–29)
EOSINOPHIL # BLD AUTO: 0.1 10E3/UL (ref 0–0.7)
EOSINOPHIL NFR BLD AUTO: 1 %
ERYTHROCYTE [DISTWIDTH] IN BLOOD BY AUTOMATED COUNT: 14.4 % (ref 10–15)
GFR SERPL CREATININE-BSD FRML MDRD: 83 ML/MIN/1.73M2
GLUCOSE SERPL-MCNC: 222 MG/DL (ref 70–99)
HCT VFR BLD AUTO: 36.7 % (ref 40–53)
HGB BLD-MCNC: 11.5 G/DL (ref 13.3–17.7)
IMM GRANULOCYTES # BLD: 0 10E3/UL
IMM GRANULOCYTES NFR BLD: 0 %
LYMPHOCYTES # BLD AUTO: 0.8 10E3/UL (ref 0.8–5.3)
LYMPHOCYTES NFR BLD AUTO: 9 %
MCH RBC QN AUTO: 29.7 PG (ref 26.5–33)
MCHC RBC AUTO-ENTMCNC: 31.3 G/DL (ref 31.5–36.5)
MCV RBC AUTO: 95 FL (ref 78–100)
MONOCYTES # BLD AUTO: 0.7 10E3/UL (ref 0–1.3)
MONOCYTES NFR BLD AUTO: 7 %
NEUTROPHILS # BLD AUTO: 7.7 10E3/UL (ref 1.6–8.3)
NEUTROPHILS NFR BLD AUTO: 82 %
NRBC # BLD AUTO: 0 10E3/UL
NRBC BLD AUTO-RTO: 0 /100
PLATELET # BLD AUTO: 300 10E3/UL (ref 150–450)
POTASSIUM SERPL-SCNC: 4.8 MMOL/L (ref 3.4–5.3)
RBC # BLD AUTO: 3.87 10E6/UL (ref 4.4–5.9)
SODIUM SERPL-SCNC: 132 MMOL/L (ref 136–145)
WBC # BLD AUTO: 9.5 10E3/UL (ref 4–11)

## 2022-07-25 PROCEDURE — 99309 SBSQ NF CARE MODERATE MDM 30: CPT | Performed by: NURSE PRACTITIONER

## 2022-07-25 PROCEDURE — 85025 COMPLETE CBC W/AUTO DIFF WBC: CPT | Mod: ORL | Performed by: NURSE PRACTITIONER

## 2022-07-25 PROCEDURE — P9604 ONE-WAY ALLOW PRORATED TRIP: HCPCS | Mod: ORL | Performed by: NURSE PRACTITIONER

## 2022-07-25 PROCEDURE — 36415 COLL VENOUS BLD VENIPUNCTURE: CPT | Mod: ORL | Performed by: NURSE PRACTITIONER

## 2022-07-25 PROCEDURE — 80048 BASIC METABOLIC PNL TOTAL CA: CPT | Mod: ORL | Performed by: NURSE PRACTITIONER

## 2022-07-25 NOTE — PROGRESS NOTES
[facility] GERIATRICS    Chief Complaint   Patient presents with     RECHECK     HPI:  Manuel Rosales is a 89 year old  (3/22/1931), who is being seen today for an episodic care visit at: [facility] (U) [[id number]].     Background:    This is a 89-year-old male, with a past medical history significant for peripheral vascular disease, type 2 diabetes mellitus, glaucoma, atrial fibrillation with high grade AV block s/p pacemaker placement. Coronary artery disease with occluded right internal carotid, heart failure with preserved ejection fracture of 55-60% on 7/22/20, hypertension, and depression, who was admitted to [facility] 7/10/22 through 7/14/22 for left foot eschar. Previously hospitalized 6/9/22 through 6/15/22 after a fall with weakness. During this hospitalization, no signs of infection. Labs revealed Sodium 131 and IV fluids were administered with improvement. A TCU stay was recommended for ongoing physical rehabilitation     Today's concern is:     Fatigue. Today, patient reports he is tired. Didn't sleep well last night. Had some issues with his bed sheets needing to be changed and staff having to change them in the middle of the night. Prefers to go to sleep around 6 pm.     Physical Deconditioning. Working with Physical and Occupational Therapy. Upon review of documentation, CGA needed with transfers. Ambulating 180 feet with FWW and CGA. Tinetti 18/28. Maximum assistance needed with lower body dressing. Set-up for upper body dressing. SLUMS  Score 16/30. MoCA 23/30. Safety Questionnaire 11.5/19.    Hyponatremia. Today, Sodium 132. Previous Sodium 134 on 7/14/22.    Heart Failure with Preserved Ejection Fraction 55-60% on 7/22/20. Denies shortness of breath. Upon review of weights, 151.6 lbs on 7/14/22 -> 154.2 lbs on 7/19/22.     Hypercalcemia. Calcium 10.4 on 4/21/22. Calcium 9.7 during hospitalization on 7/14/22. Today, Calcium 10.3.    Allergies, and PMH/PSH reviewed in  EPIC today.  REVIEW OF SYSTEMS:  4 point ROS including Respiratory, CV, GI and , other than that noted in the HPI,  is negative    Objective:   /77   Pulse 83   Temp 97.5  F (36.4  C)   Resp 18   Wt 69.9 kg (154 lb 3.2 oz)   SpO2 97%   BMI 24.72 kg/m    GENERAL APPEARANCE:  Alert, in no distress  ENT:  Mouth and posterior oropharynx normal, moist mucous membranes  EYES:  EOM, conjunctivae, lids, pupils and irises normal  RESP:  respiratory effort and palpation of chest normal, lungs clear to auscultation , no respiratory distress  CV:  Palpation and auscultation of heart done , regular rate and rhythm, no murmur, rub, or gallop  ABDOMEN:  normal bowel sounds, soft, nontender, no hepatosplenomegaly or other masses  M/S:   No edema in BLE  SKIN:  Inspection of skin and subcutaneous tissue baseline, Palpation of skin and subcutaneous tissue baseline  NEURO:   Cranial nerves 2-12 are normal tested and grossly at patient's baseline  PSYCH:  oriented to person    Labs done in SNF are in [facility]. Please refer to them using [employer]/Care Everywhere.    Assessment/Plan:  Fatigue. Due to not sleeping well the night prior. Per patient report, would like to avoid medications for sleep. Continue to monitor to determine if this is an ongoing issue.    Hypertension and Hyperlipidemia. Most blood pressures < 140/90. Continue Amlodipine, Atorvastatin, Metoprolol, and Lisinopril as ordered.     Chronic Left Heel Ulcer. Continue dressing changes as ordered - Paint with Betadine daily. OK to cover with non-adherent gauze if needed for comfort. Bilateral Heel: Elevate heels with heel suspension boot at all times.      Hyponatremia. Improved with IV fluids during hospitalization. Sodium 132 today. Monitor periodically to ensure stability.     Hypercalcemia. PCP requested further labs in May 2022, but this does not appear to have been drawn. Would defer further work-up outpatient. Will discontinue Vitamin D supplement.       Chronic Normocytic Anemia. Baseline Hemoglobin ~ 11-13. Hemoglobin 11.5 today. Monitor periodically to ensure stability.      Constipation. With chronic issues with urgency. Continue Senna as ordered.     Type 2 Diabetes Mellitus. Last A1C 7.3 on 4/21/22. Blood sugars elevated later in the day, but < 150 in the morning. Continue Alogliptin and Glipizide as ordered for now. Monitor blood sugars prior to meals and at bedtime. Adjust treatment accordingly.      Atrial Fibrillation with High Grade AV Block S/P Pacemaker Placement. Monitor heart rate daily. Takes Apixaban and Metoprolol..      Heart Failure with Preserved Ejection Fraction 55-60% on 7/22/20. Limited weights since TCU admission, but stable. Continue to monitor.      Carotid Artery Disease with Occluded Right Internal Carotid. Ultrasound on 3/31/22 stable as compared to previous ultrasound. Takes Atorvastatin.      Glaucoma. Continue Brimonidine, Latanoprost, and Dorzolamide-Timolol as ordered.     Depression. Continue Sertraline as ordered.     Hard Of Hearing. Using a pocket talker. D[facility].      Physical Deconditioning. Secondary to recent hospitalization and co-morbidities. Physical and Occupational Therapy ordered. Tentative plan is to discharge to [facility] in [place] next week    Orders:  Discontinue Vitamin D supplement    Electronically signed by: MAULIK Islas CNP

## 2022-07-25 NOTE — LETTER
7/25/2022        RE: Manuel Rosales  [address]  [facility] [address]        [facility] GERIATRICS    Chief Complaint   Patient presents with     RECHECK     HPI:  Manuel Rosales is a 89 year old  (3/22/1931), who is being seen today for an episodic care visit at: [facility] (U) [[id number]].     Background:    This is a 89-year-old male, with a past medical history significant for peripheral vascular disease, type 2 diabetes mellitus, glaucoma, atrial fibrillation with high grade AV block s/p pacemaker placement. Coronary artery disease with occluded right internal carotid, heart failure with preserved ejection fracture of 55-60% on 7/22/20, hypertension, and depression, who was admitted to [facility] 7/10/22 through 7/14/22 for left foot eschar. Previously hospitalized 6/9/22 through 6/15/22 after a fall with weakness. During this hospitalization, no signs of infection. Labs revealed Sodium 131 and IV fluids were administered with improvement. A TCU stay was recommended for ongoing physical rehabilitation     Today's concern is:     Fatigue. Today, patient reports he is tired. Didn't sleep well last night. Had some issues with his bed sheets needing to be changed and staff having to change them in the middle of the night. Prefers to go to sleep around 6 pm.     Physical Deconditioning. Working with Physical and Occupational Therapy. Upon review of documentation, CGA needed with transfers. Ambulating 180 feet with FWW and CGA. Tinetti 18/28. Maximum assistance needed with lower body dressing. Set-up for upper body dressing. SLUMS  Score 16/30. MoCA 23/30. Safety Questionnaire 11.5/19.    Hyponatremia. Today, Sodium 132. Previous Sodium 134 on 7/14/22.    Heart Failure with Preserved Ejection Fraction 55-60% on 7/22/20. Denies shortness of breath. Upon review of weights, 151.6 lbs on 7/14/22 -> 154.2 lbs on 7/19/22.     Hypercalcemia. Calcium 10.4 on 4/21/22. Calcium 9.7  during hospitalization on 7/14/22. Today, Calcium 10.3.    Allergies, and PMH/PSH reviewed in EPIC today.  REVIEW OF SYSTEMS:  4 point ROS including Respiratory, CV, GI and , other than that noted in the HPI,  is negative    Objective:   /77   Pulse 83   Temp 97.5  F (36.4  C)   Resp 18   Wt 69.9 kg (154 lb 3.2 oz)   SpO2 97%   BMI 24.72 kg/m    GENERAL APPEARANCE:  Alert, in no distress  ENT:  Mouth and posterior oropharynx normal, moist mucous membranes  EYES:  EOM, conjunctivae, lids, pupils and irises normal  RESP:  respiratory effort and palpation of chest normal, lungs clear to auscultation , no respiratory distress  CV:  Palpation and auscultation of heart done , regular rate and rhythm, no murmur, rub, or gallop  ABDOMEN:  normal bowel sounds, soft, nontender, no hepatosplenomegaly or other masses  M/S:   No edema in BLE  SKIN:  Inspection of skin and subcutaneous tissue baseline, Palpation of skin and subcutaneous tissue baseline  NEURO:   Cranial nerves 2-12 are normal tested and grossly at patient's baseline  PSYCH:  oriented to person    Labs done in SNF are in [facility]. Please refer to them using [employer]/Care Everywhere.    Assessment/Plan:  Fatigue. Due to not sleeping well the night prior. Per patient report, would like to avoid medications for sleep. Continue to monitor to determine if this is an ongoing issue.    Hypertension and Hyperlipidemia. Most blood pressures < 140/90. Continue Amlodipine, Atorvastatin, Metoprolol, and Lisinopril as ordered.     Chronic Left Heel Ulcer. Continue dressing changes as ordered - Paint with Betadine daily. OK to cover with non-adherent gauze if needed for comfort. Bilateral Heel: Elevate heels with heel suspension boot at all times.      Hyponatremia. Improved with IV fluids during hospitalization. Sodium 132 today. Monitor periodically to ensure stability.     Hypercalcemia. PCP requested further labs in May 2022, but this does not appear to have  been drawn. Would defer further work-up outpatient. Will discontinue Vitamin D supplement.      Chronic Normocytic Anemia. Baseline Hemoglobin ~ 11-13. Hemoglobin 11.5 today. Monitor periodically to ensure stability.      Constipation. With chronic issues with urgency. Continue Senna as ordered.     Type 2 Diabetes Mellitus. Last A1C 7.3 on 4/21/22. Blood sugars elevated later in the day, but < 150 in the morning. Continue Alogliptin and Glipizide as ordered for now. Monitor blood sugars prior to meals and at bedtime. Adjust treatment accordingly.      Atrial Fibrillation with High Grade AV Block S/P Pacemaker Placement. Monitor heart rate daily. Takes Apixaban and Metoprolol..      Heart Failure with Preserved Ejection Fraction 55-60% on 7/22/20. Limited weights since TCU admission, but stable. Continue to monitor.      Carotid Artery Disease with Occluded Right Internal Carotid. Ultrasound on 3/31/22 stable as compared to previous ultrasound. Takes Atorvastatin.      Glaucoma. Continue Brimonidine, Latanoprost, and Dorzolamide-Timolol as ordered.     Depression. Continue Sertraline as ordered.     Hard Of Hearing. Using a pocket talker. D[facility].      Physical Deconditioning. Secondary to recent hospitalization and co-morbidities. Physical and Occupational Therapy ordered. Tentative plan is to discharge to [facility] in [place] next week    Orders:  Discontinue Vitamin D supplement    Electronically signed by: MAULIK Islas CNP             Sincerely,        MAULIK Islas CNP

## 2022-08-01 VITALS
RESPIRATION RATE: 18 BRPM | DIASTOLIC BLOOD PRESSURE: 91 MMHG | SYSTOLIC BLOOD PRESSURE: 147 MMHG | WEIGHT: 153.6 LBS | TEMPERATURE: 97.1 F | HEART RATE: 86 BPM | BODY MASS INDEX: 24.63 KG/M2 | OXYGEN SATURATION: 95 %

## 2022-08-01 NOTE — PROGRESS NOTES
[facility] GERIATRICS DISCHARGE SUMMARY  PATIENT'S NAME: Manuel Rosales  YOB: 1931  MEDICAL RECORD NUMBER:  [id number]  Place of Service where encounter took place:  [facility] (TCU) [[id number]]    PRIMARY CARE PROVIDER AND CLINIC RESPONSIBLE AFTER TRANSFER:   Ronaldo Corral MD, [id number] [place] [address]   [facility]     Transferring providers: MAULIK Islas CNP, Fabiola Sehpherd MD  Recent Hospitalization/ED:  [facility] stay 7/10/22 to 7/14/22.  Date of SNF Admission: 7/14/22  Date of SNF (anticipated) Discharge: 8/5/22  Discharged to: new assisted living for patient [facility], [place]  Cognitive Scores: SLUMS Score 16/30. MoCA Score 23/30. Safety Questionnaire 11.5/19  Physical Function: SBA needed with transfers. Ambulating 300 feet with FWW and SBA. Tinetti Score 18/28 with retest 20/28. Maximum assistance needed with lower body dressing. Set-up needed with upper body dressing.  DME: No new DME needed    CODE STATUS/ADVANCE DIRECTIVES DISCUSSION:  No CPR- Do NOT Intubate   ALLERGIES: Metformin and Niaspan [niacin]    NURSING FACILITY COURSE    This is a 89-year-old male, with a past medical history significant for peripheral vascular disease, type 2 diabetes mellitus, glaucoma, atrial fibrillation with high grade AV block s/p pacemaker placement. Coronary artery disease with occluded right internal carotid, heart failure with preserved ejection fracture of 55-60% on 7/22/20, hypertension, and depression, who was admitted to [facility] 7/10/22 through 7/14/22 for left foot eschar. Previously hospitalized 6/9/22 through 6/15/22 after a fall with weakness. During this hospitalization, no signs of infection. Labs revealed Sodium 131 and IV fluids were administered with improvement. A TCU stay was recommended for ongoing physical rehabilitation.    Chronic Left Heel Ulcer. Continue dressing changes as ordered  - Paint with Betadine daily. OK to cover with non-adherent gauze if needed for comfort. Bilateral Heel: Elevate heels with heel suspension boot at all times.     Cognitive Impairment. SLUMS Score 16/30, MoCA Score 23/30, and Safety Questionnaire 11.5/19 indicate cognitive impairment. Will discharge to [facility] with home care services    Hypertension and Hyperlipidemia. Upon review of blood pressures over the past 5 days, systolic range from 110-148, most < 140. Diastolic range from 60-91. Continue Amlodipine, Atorvastatin, Metoprolol, and Lisinopril as ordered.     Hyponatremia. Improved with IV fluids during hospitalization. Sodium 132 on 7/25/22. Monitor periodically to ensure stability.      Hypercalcemia. PCP requested further labs in May 2022, but this does not appear to have been drawn. Will defer further work-up outpatient. Vitamin D supplement discontinued during TCU stay.      Chronic Normocytic Anemia. Baseline Hemoglobin ~ 11-13. Hemoglobin 11.5 on 7/25/22. Monitor periodically to ensure stability.       Constipation. With chronic issues with urgency per patient report. Continue Senna as ordered.     Type 2 Diabetes Mellitus. Last A1C 7.3 on 4/21/22. Continue Alogliptin and Glipizide as ordered. Blood sugars over the past 5 days have been as follows:    Breakfast: 106-173  Lunch: 142-264  Dinner: 147-233  Bedtime: 155-165    Atrial Fibrillation with High Grade AV Block S/P Pacemaker Placement. Upon review of heart rate over the past 5 days, range 60-90. Takes Apixaban and Metoprolol..      Heart Failure with Preserved Ejection Fraction 55-60% on 7/22/20. Weights 151-154 lbs during TCU admission.     Carotid Artery Disease with Occluded Right Internal Carotid. Ultrasound on 3/31/22 stable as compared to previous ultrasound. Takes Atorvastatin.      Glaucoma. Continue Brimonidine, Latanoprost, and Dorzolamide-Timolol as ordered.     Depression. Continue Sertraline as ordered.     Hard Of Hearing.  Using a pocket talker. D[facility].      Physical Deconditioning. Secondary to recent hospitalization and co-morbidities. Home Physical and Occupational Therapy ordered.     Discharge Medications:  Current Outpatient Medications   Medication Sig Dispense Refill     acetaminophen (TYLENOL) 325 MG tablet Take 325 mg by mouth every 4 hours as needed for mild pain       alogliptin (NESINA) 6.25 MG tablet Take 6.25 mg by mouth daily       amLODIPine (NORVASC) 10 MG tablet Take 1 tablet (10 mg) by mouth daily for blood pressure. 90 tablet 1     apixaban ANTICOAGULANT (ELIQUIS) 5 MG tablet Take 1 tablet by mouth 2 times daily for blood thinner.       ASPIRIN NOT PRESCRIBED (INTENTIONAL) while taking apixaban (Eliquis).       atorvastatin (LIPITOR) 80 MG tablet Take 1 tablet (80 mg) by mouth daily for cholesterol. 90 tablet 1     brimonidine (ALPHAGAN-P) 0.15 % ophthalmic solution Place 1 drop into both eyes 2 times daily 15 mL 3     dorzolamide-timolol (COSOPT) 2-0.5 % ophthalmic solution Place 1 drop into both eyes 2 times daily 30 mL 3     glipiZIDE (GLUCOTROL) 10 MG tablet Take 1 tablet (10 mg) by mouth 2 times daily (with meals) for diabetes. 180 tablet 0     latanoprost (XALATAN) 0.005 % ophthalmic solution Place 1 drop into both eyes At Bedtime 7.5 mL 3     lisinopril (ZESTRIL) 40 MG tablet Take 1 tablet by mouth once daily for blood pressure 90 tablet 1     metoprolol succinate ER (TOPROL XL) 50 MG 24 hr tablet Take 50 mg by mouth daily       multivitamin w/minerals (THERA-VIT-M) tablet Take 1 tablet by mouth daily       povidone-iodine (BETADINE) 10 % topical solution Apply topically daily       sennosides (SENOKOT) 8.6 MG tablet Take 1 tablet by mouth 2 times daily as needed for constipation       sertraline (ZOLOFT) 50 MG tablet Take 50 mg by mouth daily        Controlled medications:   not applicable/none     Past Medical History:   Past Medical History:   Diagnosis  Date     Bradycardia     History of bradycardia with 2nd degree AV block Mobitz Type I, resolved with reduction in beta blocker.     Closed fracture of acromial end of clavicle 5/27/2012     Diabetes (H)      Glaucoma (increased eye pressure)      Heart murmur 01/2004    (?MR)     Hypertension      Physical Exam:   Vitals: BP (!) 147/91   Pulse 86   Temp 97.1  F (36.2  C)   Resp 18   Wt 69.7 kg (153 lb 9.6 oz)   SpO2 95%   BMI 24.63 kg/m    BMI: Body mass index is 24.63 kg/m .  GENERAL APPEARANCE:  Alert, in no distress  ENT:  Mouth and posterior oropharynx normal, moist mucous membranes  EYES:  EOM, conjunctivae, lids, pupils and irises normal  RESP:  respiratory effort and palpation of chest normal, lungs clear to auscultation , no respiratory distress  CV:  Palpation and auscultation of heart done , regular rate and rhythm, no murmur, rub, or gallop  ABDOMEN:  normal bowel sounds, soft, nontender, no hepatosplenomegaly or other masses  M/S:   Active movement of bilateral upper extremities  SKIN:  Inspection of skin and subcutaneous tissue baseline, Palpation of skin and subcutaneous tissue baseline  NEURO:   Cranial nerves 2-12 are normal tested and grossly at patient's baseline  PSYCH:  oriented to person     SNF labs: Labs done in SNF are in [facility]. Please refer to them using [employer] Everywhere.    DISCHARGE PLAN:    Follow up labs: Monitor Na, Ca    Medical Follow Up:      Follow up with primary care provider in 1 week      Discharge Services: Home Care:  Occupational Therapy, Physical Therapy, Registered Nurse, Home Health Aide and From:  [facility]    TOTAL DISCHARGE TIME:   Greater than 30 minutes  Electronically signed by:  MAULIK Islas CNP         Documentation of Face-to-Face and Certification for Home Health Services     Patient: Manuel Rosales   YOB: 1931  MR Number: [id number]  Today's Date: 8/2/2022    I certify that patient: Manuel Rosales is under  my care and that I, or a nurse practitioner or physician's assistant working with me, had a face-to-face encounter that meets the physician face-to-face encounter requirements with this patient on: Left Heel Ulcer, Recent Fall.    This encounter with the patient was in whole, or in part, for the following medical condition, which is the primary reason for home health care: 8/2/22    I certify that, based on my findings, the following services are medically necessary home health services: Nursing, Occupational Therapy and Physical Therapy.    My clinical findings support the need for the above services because: Nurse is needed: To assess blood sugars after changes in medications or other medical regimen and left heel wound.., Occupational Therapy Services are needed to assess and treat cognitive ability and address ADL safety due to impairment in cognitive impairment. and Physical Therapy Services are needed to assess and treat the following functional impairments: Recent fall.    Further, I certify that my clinical findings support that this patient is homebound (i.e. absences from home require considerable and taxing effort and are for medical reasons or Worship services or infrequently or of short duration when for other reasons) because: Requires assistance of another person or specialized equipment to access medical services because patient: Is prone to wander/get lost without assistance...    Based on the above findings. I certify that this patient is confined to the home and needs intermittent skilled nursing care, physical therapy and/or speech therapy.  The patient is under my care, and I have initiated the establishment of the plan of care.  This patient will be followed by a physician who will periodically review the plan of care.  Physician/Provider to provide follow up care: Ronaldo Corral    Responsible Medicare certified [place] Physician: Dr. Fabiola Shepherd  Physician Signature: See electronic signature  associated with these discharge orders.  Date: 8/2/2022

## 2022-08-02 ENCOUNTER — DISCHARGE SUMMARY NURSING HOME (OUTPATIENT)
Dept: GERIATRICS | Facility: CLINIC | Age: 87
End: 2022-08-02
Payer: COMMERCIAL

## 2022-08-02 DIAGNOSIS — R53.81 PHYSICAL DECONDITIONING: ICD-10-CM

## 2022-08-02 DIAGNOSIS — Z79.4 TYPE 2 DIABETES MELLITUS WITH MICROALBUMINURIA, WITH LONG-TERM CURRENT USE OF INSULIN (H): ICD-10-CM

## 2022-08-02 DIAGNOSIS — R41.89 COGNITIVE IMPAIRMENT: ICD-10-CM

## 2022-08-02 DIAGNOSIS — I10 BENIGN ESSENTIAL HYPERTENSION: ICD-10-CM

## 2022-08-02 DIAGNOSIS — E87.1 HYPONATREMIA: ICD-10-CM

## 2022-08-02 DIAGNOSIS — I50.32 CHRONIC HEART FAILURE WITH PRESERVED EJECTION FRACTION (H): ICD-10-CM

## 2022-08-02 DIAGNOSIS — E11.29 TYPE 2 DIABETES MELLITUS WITH MICROALBUMINURIA, WITH LONG-TERM CURRENT USE OF INSULIN (H): ICD-10-CM

## 2022-08-02 DIAGNOSIS — E83.52 HYPERCALCEMIA: ICD-10-CM

## 2022-08-02 DIAGNOSIS — L97.429 ULCER OF HEEL AND MIDFOOT, LEFT, WITH UNSPECIFIED SEVERITY (H): Primary | ICD-10-CM

## 2022-08-02 DIAGNOSIS — K59.00 CONSTIPATION, UNSPECIFIED CONSTIPATION TYPE: ICD-10-CM

## 2022-08-02 DIAGNOSIS — R80.9 TYPE 2 DIABETES MELLITUS WITH MICROALBUMINURIA, WITH LONG-TERM CURRENT USE OF INSULIN (H): ICD-10-CM

## 2022-08-02 PROCEDURE — 99316 NF DSCHRG MGMT 30 MIN+: CPT | Performed by: NURSE PRACTITIONER

## 2022-08-02 NOTE — LETTER
8/1/2022        RE: Manuel Rosales  [address]  [place] [address]        [facility] GERIATRICS DISCHARGE SUMMARY  PATIENT'S NAME: Manuel Rosales  YOB: 1931  MEDICAL RECORD NUMBER:  [id number]  Place of Service where encounter took [id number]    PRIMARY CARE PROVIDER AND CLINIC RESPONSIBLE AFTER TRANSFER:   Ronaldo Corral MD, [address] [facility] / [place] [address]   [facility] Provider     Transferring providers: MAULIK Islas CNP, Fabiola Shepherd MD  Recent Hospitalization/ED:  [facility] stay 7/10/22 to 7/14/22.  Date of SNF Admission: 7/14/22  Date of SNF (anticipated) Discharge: 8/5/22  Discharged to: new assisted living for patient [facility] [place]  Cognitive Scores: SLUMS Score 16/30. MoCA Score 23/30. Safety Questionnaire 11.5/19  Physical Function: SBA needed with transfers. Ambulating 300 feet with FWW and SBA. Tinetti Score 18/28 with retest 20/28. Maximum assistance needed with lower body dressing. Set-up needed with upper body dressing.  DME: No new DME needed    CODE STATUS/ADVANCE DIRECTIVES DISCUSSION:  No CPR- Do NOT Intubate   ALLERGIES: Metformin and Niaspan [niacin]    NURSING FACILITY COURSE    This is a 89-year-old male, with a past medical history significant for peripheral vascular disease, type 2 diabetes mellitus, glaucoma, atrial fibrillation with high grade AV block s/p pacemaker placement. Coronary artery disease with occluded right internal carotid, heart failure with preserved ejection fracture of 55-60% on 7/22/20, hypertension, and depression, who was admitted to [facility] 7/10/22 through 7/14/22 for left foot eschar. Previously hospitalized 6/9/22 through 6/15/22 after a fall with weakness. During this hospitalization, no signs of infection. Labs revealed Sodium 131 and IV fluids were administered with improvement. A TCU stay was recommended for  ongoing physical rehabilitation.    Chronic Left Heel Ulcer. Continue dressing changes as ordered - Paint with Betadine daily. OK to cover with non-adherent gauze if needed for comfort. Bilateral Heel: Elevate heels with heel suspension boot at all times.     Cognitive Impairment. SLUMS Score 16/30, MoCA Score 23/30, and Safety Questionnaire 11.5/19 indicate cognitive impairment. [facility] with [facility] care services    Hypertension and Hyperlipidemia. Upon review of blood pressures over the past 5 days, systolic range from 110-148, most < 140. Diastolic range from 60-91. Continue Amlodipine, Atorvastatin, Metoprolol, and Lisinopril as ordered.     Hyponatremia. Improved with IV fluids during hospitalization. Sodium 132 on 7/25/22. Monitor periodically to ensure stability.      Hypercalcemia. PCP requested further labs in May 2022, but this does not appear to have been drawn. Will defer further work-up outpatient. Vitamin D supplement discontinued during TCU stay.      Chronic Normocytic Anemia. Baseline Hemoglobin ~ 11-13. Hemoglobin 11.5 on 7/25/22. Monitor periodically to ensure stability.       Constipation. With chronic issues with urgency per patient report. Continue Senna as ordered.     Type 2 Diabetes Mellitus. Last A1C 7.3 on 4/21/22. Continue Alogliptin and Glipizide as ordered. Blood sugars over the past 5 days have been as follows:    Breakfast: 106-173  Lunch: 142-264  Dinner: 147-233  Bedtime: 155-165    Atrial Fibrillation with High Grade AV Block S/P Pacemaker Placement. Upon review of heart rate over the past 5 days, range 60-90. Takes Apixaban and Metoprolol..      Heart Failure with Preserved Ejection Fraction 55-60% on 7/22/20. Weights 151-154 lbs during TCU admission.     Carotid Artery Disease with Occluded Right Internal Carotid. Ultrasound on 3/31/22 stable as compared to previous ultrasound. Takes Atorvastatin.      Glaucoma. Continue Brimonidine, Latanoprost, and  Dorzolamide-Timolol as ordered.     Depression. Continue Sertraline as ordered.     Hard Of Hearing. Using a pocket talker. Daughter is working with [facility] to get new [facility] aids.      Physical Deconditioning. Secondary to recent hospitalization and co-morbidities. Home Physical and Occupational Therapy ordered.     Discharge Medications:  Current Outpatient Medications   Medication Sig Dispense Refill     acetaminophen (TYLENOL) 325 MG tablet Take 325 mg by mouth every 4 hours as needed for mild pain       alogliptin (NESINA) 6.25 MG tablet Take 6.25 mg by mouth daily       amLODIPine (NORVASC) 10 MG tablet Take 1 tablet (10 mg) by mouth daily for blood pressure. 90 tablet 1     apixaban ANTICOAGULANT (ELIQUIS) 5 MG tablet Take 1 tablet by mouth 2 times daily for blood thinner.       ASPIRIN NOT PRESCRIBED (INTENTIONAL) while taking apixaban (Eliquis).       atorvastatin (LIPITOR) 80 MG tablet Take 1 tablet (80 mg) by mouth daily for cholesterol. 90 tablet 1     brimonidine (ALPHAGAN-P) 0.15 % ophthalmic solution Place 1 drop into both eyes 2 times daily 15 mL 3     dorzolamide-timolol (COSOPT) 2-0.5 % ophthalmic solution Place 1 drop into both eyes 2 times daily 30 mL 3     glipiZIDE (GLUCOTROL) 10 MG tablet Take 1 tablet (10 mg) by mouth 2 times daily (with meals) for diabetes. 180 tablet 0     latanoprost (XALATAN) 0.005 % ophthalmic solution Place 1 drop into both eyes At Bedtime 7.5 mL 3     lisinopril (ZESTRIL) 40 MG tablet Take 1 tablet by mouth once daily for blood pressure 90 tablet 1     metoprolol succinate ER (TOPROL XL) 50 MG 24 hr tablet Take 50 mg by mouth daily       multivitamin w/minerals (THERA-VIT-M) tablet Take 1 tablet by mouth daily       povidone-iodine (BETADINE) 10 % topical solution Apply topically daily       sennosides (SENOKOT) 8.6 MG tablet Take 1 tablet by mouth 2 times daily as needed for constipation       sertraline (ZOLOFT) 50 MG tablet Take 50 mg by mouth daily         Controlled medications:   not applicable/none     Past Medical History:   Past Medical History:   Diagnosis Date     Bradycardia     History of bradycardia with 2nd degree AV block Mobitz Type I, resolved with reduction in beta blocker.     Closed fracture of acromial end of clavicle 5/27/2012     Diabetes (H)      Glaucoma (increased eye pressure)      Heart murmur 01/2004    (?MR)     Hypertension      Physical Exam:   Vitals: BP (!) 147/91   Pulse 86   Temp 97.1  F (36.2  C)   Resp 18   Wt 69.7 kg (153 lb 9.6 oz)   SpO2 95%   BMI 24.63 kg/m    BMI: Body mass index is 24.63 kg/m .  GENERAL APPEARANCE:  Alert, in no distress  ENT:  Mouth and posterior oropharynx normal, moist mucous membranes  EYES:  EOM, conjunctivae, lids, pupils and irises normal  RESP:  respiratory effort and palpation of chest normal, lungs clear to auscultation , no respiratory distress  CV:  Palpation and auscultation of heart done , regular rate and rhythm, no murmur, rub, or gallop  ABDOMEN:  normal bowel sounds, soft, nontender, no hepatosplenomegaly or other masses  M/S:   Active movement of bilateral upper extremities  SKIN:  Inspection of skin and subcutaneous tissue baseline, Palpation of skin and subcutaneous tissue baseline  NEURO:   Cranial nerves 2-12 are normal tested and grossly at patient's baseline  PSYCH:  oriented to person     SNF labs: Labs done in SNF are in [facility] EPIC. Please refer to them using [employer]Care Everywhere.    DISCHARGE PLAN:    Follow up labs: Monitor Na, Ca    Medical Follow Up:      Follow up with primary care provider in 1 week      Discharge Services: Home Care:  Occupational Therapy, Physical Therapy, Registered Nurse, Home Health Aide and From:  [facility]    TOTAL DISCHARGE TIME:   Greater than 30 minutes  Electronically signed by:  MAULIK Islas CNP         Documentation of Face-to-Face and Certification for Home Health Services     Patient: Manuel Rosales   Date of Birth:  3/22/1931  MR Number: [id number]  Today's Date: 8/2/2022    I certify that patient: Manuel Rosales is under my care and that I, or a nurse practitioner or physician's assistant working with me, had a face-to-face encounter that meets the physician face-to-face encounter requirements with this patient on: Left Heel Ulcer, Recent Fall.    This encounter with the patient was in whole, or in part, for the following medical condition, which is the primary reason for home health care: 8/2/22    I certify that, based on my findings, the following services are medically necessary home health services: Nursing, Occupational Therapy and Physical Therapy.    My clinical findings support the need for the above services because: Nurse is needed: To assess blood sugars after changes in medications or other medical regimen and left heel wound.., Occupational Therapy Services are needed to assess and treat cognitive ability and address ADL safety due to impairment in cognitive impairment. and Physical Therapy Services are needed to assess and treat the following functional impairments: Recent fall.    Further, I certify that my clinical findings support that this patient is homebound (i.e. absences from home require considerable and taxing effort and are for medical reasons or [facility] services or infrequently or of short duration when for other reasons) because: Requires assistance of another person or specialized equipment to access medical services because patient: Is prone to wander/get lost without assistance...    Based on the above findings. I certify that this patient is confined to the home and needs intermittent skilled nursing care, physical therapy and/or speech therapy.  The patient is under my care, and I have initiated the establishment of the plan of care.  This patient will be followed by a physician who will periodically review the plan of care.  Physician/Provider to provide follow up care: Ronaldo Corral  J    Responsible Medicare certified PECOS Physician: Dr. Fabiola Shepherd  Physician Signature: See electronic signature associated with these discharge orders.  Date: 8/2/2022              Sincerely,        MAULIK Islas CNP

## 2022-08-09 ENCOUNTER — TELEPHONE (OUTPATIENT)
Dept: GERIATRICS | Facility: CLINIC | Age: 87
End: 2022-08-09

## 2022-08-09 DIAGNOSIS — E11.29 TYPE 2 DIABETES MELLITUS WITH MICROALBUMINURIA, WITH LONG-TERM CURRENT USE OF INSULIN (H): Primary | ICD-10-CM

## 2022-08-09 DIAGNOSIS — Z79.4 TYPE 2 DIABETES MELLITUS WITH MICROALBUMINURIA, WITH LONG-TERM CURRENT USE OF INSULIN (H): Primary | ICD-10-CM

## 2022-08-09 DIAGNOSIS — R80.9 TYPE 2 DIABETES MELLITUS WITH MICROALBUMINURIA, WITH LONG-TERM CURRENT USE OF INSULIN (H): Primary | ICD-10-CM

## 2022-08-09 RX ORDER — ALOGLIPTIN 6.25 MG/1
6.25 TABLET, FILM COATED ORAL DAILY
Start: 2022-08-09

## 2022-08-09 NOTE — TELEPHONE ENCOUNTER
Prior Authorization Retail Medication Request    Medication/Dose: Alogliptin Benzoate 6.25MG Tablets  ICD code (if different than what is on RX):  E11.9  Previously Tried and Failed:  Concurrent Tx with Glipizide; previous alogliptin 25mg daily; Lantus; Januvia 100mg daily; Tradjenta 5mg daily; Saxagliptin 5mg daily; Pioglitazone 45mg daily  Rationale:  Take 1 tablet (6.25mg) by mouth once daily    Insurance Name:  [employer] MEDICARE  Insurance ID:  [id number]       Pharmacy Information (if different than what is on RX)  Name:  [facility] - [address], [facility], [address]    Phone: [phone]

## 2022-08-09 NOTE — TELEPHONE ENCOUNTER
Prior Authorization Approval    Authorization Effective Date: 7/10/2022  Authorization Expiration Date: 8/9/2023  Medication: alogliptin (NESINA) 6.25 MG tablet - APPROVED  Insurance Company: [employer] - Phone [phone] Fax [phone]  Which Pharmacy is filling the prescription (Not needed for infusion/clinic administered): [id number] [place], [place] - [address] [facility]

## 2022-08-09 NOTE — TELEPHONE ENCOUNTER
Central Prior Authorization Team   Phone: [phone]      PA Initiation    Medication: alogliptin (NESINA) 6.25 MG tablet - INITIATED  Insurance Company: [employer] - Phone [phone] Fax [phone]  Pharmacy Filling the Rx: [id number] - [place], [place] - [address]  Filling Pharmacy Phone: [phone]  Filling Pharmacy Fax:    Start Date: 8/9/2022

## 2022-08-25 ENCOUNTER — LAB REQUISITION (OUTPATIENT)
Dept: LAB | Facility: CLINIC | Age: 87
End: 2022-08-25
Payer: COMMERCIAL

## 2022-08-25 DIAGNOSIS — E11.51 TYPE 2 DIABETES MELLITUS WITH DIABETIC PERIPHERAL ANGIOPATHY WITHOUT GANGRENE (H): ICD-10-CM

## 2022-08-25 DIAGNOSIS — E78.5 HYPERLIPIDEMIA, UNSPECIFIED: ICD-10-CM

## 2022-08-25 DIAGNOSIS — I13.0 HYPERTENSIVE HEART AND CHRONIC KIDNEY DISEASE WITH HEART FAILURE AND STAGE 1 THROUGH STAGE 4 CHRONIC KIDNEY DISEASE, OR UNSPECIFIED CHRONIC KIDNEY DISEASE (H): ICD-10-CM

## 2022-08-25 DIAGNOSIS — E11.69 TYPE 2 DIABETES MELLITUS WITH OTHER SPECIFIED COMPLICATION (H): ICD-10-CM

## 2022-08-25 DIAGNOSIS — I65.29 OCCLUSION AND STENOSIS OF UNSPECIFIED CAROTID ARTERY: ICD-10-CM

## 2022-08-25 LAB
ALBUMIN SERPL BCG-MCNC: 3.3 G/DL (ref 3.5–5.2)
ALP SERPL-CCNC: 94 U/L (ref 40–129)
ALT SERPL W P-5'-P-CCNC: 21 U/L (ref 10–50)
ALT SERPL W P-5'-P-CCNC: 21 U/L (ref 10–50)
ANION GAP SERPL CALCULATED.3IONS-SCNC: 9 MMOL/L (ref 7–15)
AST SERPL W P-5'-P-CCNC: 28 U/L (ref 10–50)
BILIRUB SERPL-MCNC: 0.4 MG/DL
BUN SERPL-MCNC: 12.2 MG/DL (ref 8–23)
CALCIUM SERPL-MCNC: 9.7 MG/DL (ref 8.2–9.6)
CHLORIDE SERPL-SCNC: 102 MMOL/L (ref 98–107)
CHOLEST SERPL-MCNC: 103 MG/DL
CREAT SERPL-MCNC: 0.77 MG/DL (ref 0.67–1.17)
DEPRECATED HCO3 PLAS-SCNC: 23 MMOL/L (ref 22–29)
ERYTHROCYTE [DISTWIDTH] IN BLOOD BY AUTOMATED COUNT: 13.8 % (ref 10–15)
GFR SERPL CREATININE-BSD FRML MDRD: 85 ML/MIN/1.73M2
GLUCOSE SERPL-MCNC: 103 MG/DL (ref 70–99)
HBA1C MFR BLD: 7.4 %
HCT VFR BLD AUTO: 34.3 % (ref 40–53)
HDLC SERPL-MCNC: 42 MG/DL
HGB BLD-MCNC: 10.8 G/DL (ref 13.3–17.7)
LDLC SERPL CALC-MCNC: 46 MG/DL
MCH RBC QN AUTO: 29.3 PG (ref 26.5–33)
MCHC RBC AUTO-ENTMCNC: 31.5 G/DL (ref 31.5–36.5)
MCV RBC AUTO: 93 FL (ref 78–100)
NONHDLC SERPL-MCNC: 61 MG/DL
PLATELET # BLD AUTO: 316 10E3/UL (ref 150–450)
POTASSIUM SERPL-SCNC: 4.3 MMOL/L (ref 3.4–5.3)
PROT SERPL-MCNC: 5.9 G/DL (ref 6.4–8.3)
RBC # BLD AUTO: 3.68 10E6/UL (ref 4.4–5.9)
SODIUM SERPL-SCNC: 134 MMOL/L (ref 136–145)
TRIGL SERPL-MCNC: 73 MG/DL
WBC # BLD AUTO: 10.7 10E3/UL (ref 4–11)

## 2022-08-25 PROCEDURE — 83036 HEMOGLOBIN GLYCOSYLATED A1C: CPT | Mod: ORL | Performed by: INTERNAL MEDICINE

## 2022-08-25 PROCEDURE — 85027 COMPLETE CBC AUTOMATED: CPT | Mod: ORL | Performed by: INTERNAL MEDICINE

## 2022-08-25 PROCEDURE — 80061 LIPID PANEL: CPT | Mod: ORL | Performed by: INTERNAL MEDICINE

## 2022-08-25 PROCEDURE — 36415 COLL VENOUS BLD VENIPUNCTURE: CPT | Mod: ORL | Performed by: INTERNAL MEDICINE

## 2022-08-25 PROCEDURE — P9604 ONE-WAY ALLOW PRORATED TRIP: HCPCS | Mod: ORL | Performed by: INTERNAL MEDICINE

## 2022-08-25 PROCEDURE — 80053 COMPREHEN METABOLIC PANEL: CPT | Mod: ORL | Performed by: INTERNAL MEDICINE

## 2022-08-26 ENCOUNTER — MEDICAL CORRESPONDENCE (OUTPATIENT)
Dept: FAMILY MEDICINE | Facility: CLINIC | Age: 87
End: 2022-08-26

## 2022-08-26 ENCOUNTER — LAB REQUISITION (OUTPATIENT)
Dept: LAB | Facility: CLINIC | Age: 87
End: 2022-08-26
Payer: COMMERCIAL

## 2022-08-26 DIAGNOSIS — I13.0 HYPERTENSIVE HEART AND CHRONIC KIDNEY DISEASE WITH HEART FAILURE AND STAGE 1 THROUGH STAGE 4 CHRONIC KIDNEY DISEASE, OR UNSPECIFIED CHRONIC KIDNEY DISEASE (H): ICD-10-CM

## 2022-08-26 LAB
CREAT UR-MCNC: 147 MG/DL
MICROALBUMIN UR-MCNC: 38.6 MG/L
MICROALBUMIN/CREAT UR: 26.26 MG/G CR (ref 0–17)

## 2022-08-26 PROCEDURE — 82043 UR ALBUMIN QUANTITATIVE: CPT | Mod: ORL | Performed by: INTERNAL MEDICINE

## 2022-09-01 ENCOUNTER — MEDICAL CORRESPONDENCE (OUTPATIENT)
Dept: FAMILY MEDICINE | Facility: CLINIC | Age: 87
End: 2022-09-01

## 2022-09-02 DIAGNOSIS — Z53.9 DIAGNOSIS NOT YET DEFINED: Primary | ICD-10-CM

## 2022-09-02 PROCEDURE — G0180 MD CERTIFICATION HHA PATIENT: HCPCS | Performed by: FAMILY MEDICINE

## 2022-10-25 NOTE — TELEPHONE ENCOUNTER
"Requested Prescriptions   Pending Prescriptions Disp Refills     amLODIPine (NORVASC) 10 MG tablet [Pharmacy Med Name: AMLODIPINE 10MG TAB]  Last Written Prescription Date:  05/22/17  Last Fill Quantity: 180,  # refills: 1   Last Office Visit with [facility] or  Health prescribing provider:  04/19/18   Future Office Visit:    Next 5 appointments (look out 90 days)     Jul 09, 2018 11:20 AM CDT   Office Visit with Ronaldo Corral MD   [facility])    [address] [facility] [id number] [phone]                  180 tablet 1     Sig: TAKE ONE TABLET BY MOUTH TWICE DAILY FOR BLOOD PRESSURE    Calcium Channel Blockers Protocol  Failed    6/22/2018 10:57 AM       Failed - Blood pressure under 140/90 in past 12 months    BP Readings from Last 3 Encounters:   04/19/18 181/51   04/16/18 188/78   03/08/18 158/52                Passed - Recent (12 mo) or future (30 days) visit within the authorizing provider's specialty    Patient had office visit in the last 12 months or has a visit in the next 30 days with authorizing provider or within the authorizing provider's specialty.  See \"Patient Info\" tab in inbasket, or \"Choose Columns\" in Meds & Orders section of the refill encounter.           Passed - Patient is age 18 or older       Passed - Normal serum creatinine on file in past 12 months    Recent Labs   Lab Test  01/08/18   1041   CR  1.07             glipiZIDE (GLUCOTROL) 10 MG tablet [Pharmacy Med Name: GLIPIZIDE 10MG      TAB]  Last Written Prescription Date:  12/22/17  Last Fill Quantity: 180,  # refills: 1   Last Office Visit with [facility] or  Health prescribing provider:  04/19/18   Future Office Visit:    Next 5 appointments (look out 90 days)     Jul 09, 2018 11:20 AM CDT   Office Visit with Ronaldo Corral MD   [facility]    [address] [facility] [address] " "  [phone]                180 tablet 1     Sig: TAKE ONE TABLET BY MOUTH TWICE DAILY WITH MEALS FOR DIABETES    Sulfonylurea Agents Failed    6/22/2018 10:57 AM       Failed - Blood pressure less than 140/90 in past 6 months    BP Readings from Last 3 Encounters:   04/19/18 181/51   04/16/18 188/78   03/08/18 158/52                Passed - Patient has documented LDL within the past 12 mos.    Recent Labs   Lab Test  01/08/18   1041   LDL  60            Passed - Patient has had a Microalbumin in the past 12 mos.    Recent Labs   Lab Test  01/08/18   1122   MICROL  298   UMALCR  623.43*            Passed - Patient has documented A1c within the specified period of time.    If HgbA1C is 8 or greater, it needs to be on file within the past 3 months.  If less than 8, must be on file within the past 6 months.     Recent Labs   Lab Test  01/08/18   1041   A1C  7.4*            Passed - Patient is age 18 or older       Passed - Patient has a recent creatinine (normal) within the past 12 mos.    Recent Labs   Lab Test  01/08/18   1041   CR  1.07            Passed - Recent (6 mo) or future (30 days) visit within the authorizing provider's specialty    Patient had office visit in the last 6 months or has a visit in the next 30 days with authorizing provider or within the authorizing provider's specialty.  See \"Patient Info\" tab in inbasket, or \"Choose Columns\" in Meds & Orders section of the refill encounter.              " V-Y Flap Text: The defect edges were debeveled with a #15 scalpel blade.  Given the location of the defect, shape of the defect and the proximity to free margins a V-Y flap was deemed most appropriate.  Using a sterile surgical marker, an appropriate advancement flap was drawn incorporating the defect and placing the expected incisions within the relaxed skin tension lines where possible.    The area thus outlined was incised deep to adipose tissue with a #15 scalpel blade.  The skin margins were undermined to an appropriate distance in all directions utilizing iris scissors.

## 2022-12-14 ENCOUNTER — LAB REQUISITION (OUTPATIENT)
Dept: LAB | Facility: CLINIC | Age: 87
End: 2022-12-14
Payer: COMMERCIAL

## 2022-12-14 DIAGNOSIS — E11.51 TYPE 2 DIABETES MELLITUS WITH DIABETIC PERIPHERAL ANGIOPATHY WITHOUT GANGRENE (H): ICD-10-CM

## 2022-12-15 LAB
ANION GAP SERPL CALCULATED.3IONS-SCNC: 12 MMOL/L (ref 7–15)
BUN SERPL-MCNC: 20.9 MG/DL (ref 8–23)
CALCIUM SERPL-MCNC: 10 MG/DL (ref 8.2–9.6)
CHLORIDE SERPL-SCNC: 110 MMOL/L (ref 98–107)
CREAT SERPL-MCNC: 0.97 MG/DL (ref 0.67–1.17)
DEPRECATED HCO3 PLAS-SCNC: 20 MMOL/L (ref 22–29)
GFR SERPL CREATININE-BSD FRML MDRD: 74 ML/MIN/1.73M2
GLUCOSE SERPL-MCNC: 108 MG/DL (ref 70–99)
POTASSIUM SERPL-SCNC: 4.1 MMOL/L (ref 3.4–5.3)
SODIUM SERPL-SCNC: 142 MMOL/L (ref 136–145)

## 2022-12-15 PROCEDURE — 80048 BASIC METABOLIC PNL TOTAL CA: CPT | Mod: ORL | Performed by: INTERNAL MEDICINE

## 2022-12-15 PROCEDURE — P9604 ONE-WAY ALLOW PRORATED TRIP: HCPCS | Mod: ORL | Performed by: INTERNAL MEDICINE

## 2022-12-15 PROCEDURE — 36415 COLL VENOUS BLD VENIPUNCTURE: CPT | Mod: ORL | Performed by: INTERNAL MEDICINE

## 2023-03-16 ENCOUNTER — LAB REQUISITION (OUTPATIENT)
Dept: LAB | Facility: CLINIC | Age: 88
End: 2023-03-16
Payer: COMMERCIAL

## 2023-03-16 DIAGNOSIS — E11.51 TYPE 2 DIABETES MELLITUS WITH DIABETIC PERIPHERAL ANGIOPATHY WITHOUT GANGRENE (H): ICD-10-CM

## 2023-03-16 LAB
ANION GAP SERPL CALCULATED.3IONS-SCNC: 11 MMOL/L (ref 7–15)
BUN SERPL-MCNC: 19.8 MG/DL (ref 8–23)
CALCIUM SERPL-MCNC: 10.3 MG/DL (ref 8.2–9.6)
CHLORIDE SERPL-SCNC: 107 MMOL/L (ref 98–107)
CREAT SERPL-MCNC: 0.97 MG/DL (ref 0.67–1.17)
DEPRECATED HCO3 PLAS-SCNC: 22 MMOL/L (ref 22–29)
GFR SERPL CREATININE-BSD FRML MDRD: 74 ML/MIN/1.73M2
GLUCOSE SERPL-MCNC: 237 MG/DL (ref 70–99)
HBA1C MFR BLD: 8.6 %
POTASSIUM SERPL-SCNC: 4.3 MMOL/L (ref 3.4–5.3)
SODIUM SERPL-SCNC: 140 MMOL/L (ref 136–145)

## 2023-03-16 PROCEDURE — P9603 ONE-WAY ALLOW PRORATED MILES: HCPCS | Mod: ORL | Performed by: INTERNAL MEDICINE

## 2023-03-16 PROCEDURE — 80048 BASIC METABOLIC PNL TOTAL CA: CPT | Mod: ORL | Performed by: INTERNAL MEDICINE

## 2023-03-16 PROCEDURE — 83036 HEMOGLOBIN GLYCOSYLATED A1C: CPT | Mod: ORL | Performed by: INTERNAL MEDICINE

## 2023-03-16 PROCEDURE — 36415 COLL VENOUS BLD VENIPUNCTURE: CPT | Mod: ORL | Performed by: INTERNAL MEDICINE

## 2023-03-22 ENCOUNTER — LAB REQUISITION (OUTPATIENT)
Dept: LAB | Facility: CLINIC | Age: 88
End: 2023-03-22
Payer: COMMERCIAL

## 2023-03-22 DIAGNOSIS — E11.51 TYPE 2 DIABETES MELLITUS WITH DIABETIC PERIPHERAL ANGIOPATHY WITHOUT GANGRENE (H): ICD-10-CM

## 2023-03-23 LAB
ANION GAP SERPL CALCULATED.3IONS-SCNC: 10 MMOL/L (ref 7–15)
BUN SERPL-MCNC: 16.9 MG/DL (ref 8–23)
CALCIUM SERPL-MCNC: 10.2 MG/DL (ref 8.2–9.6)
CHLORIDE SERPL-SCNC: 106 MMOL/L (ref 98–107)
CREAT SERPL-MCNC: 0.97 MG/DL (ref 0.67–1.17)
DEPRECATED HCO3 PLAS-SCNC: 24 MMOL/L (ref 22–29)
GFR SERPL CREATININE-BSD FRML MDRD: 73 ML/MIN/1.73M2
GLUCOSE SERPL-MCNC: 108 MG/DL (ref 70–99)
HBA1C MFR BLD: 8.1 %
POTASSIUM SERPL-SCNC: 4.3 MMOL/L (ref 3.4–5.3)
SODIUM SERPL-SCNC: 140 MMOL/L (ref 136–145)

## 2023-03-23 PROCEDURE — P9603 ONE-WAY ALLOW PRORATED MILES: HCPCS | Mod: ORL | Performed by: INTERNAL MEDICINE

## 2023-03-23 PROCEDURE — 80048 BASIC METABOLIC PNL TOTAL CA: CPT | Mod: ORL | Performed by: INTERNAL MEDICINE

## 2023-03-23 PROCEDURE — 36415 COLL VENOUS BLD VENIPUNCTURE: CPT | Mod: ORL | Performed by: INTERNAL MEDICINE

## 2023-03-23 PROCEDURE — 83036 HEMOGLOBIN GLYCOSYLATED A1C: CPT | Mod: ORL | Performed by: INTERNAL MEDICINE

## 2023-04-03 ENCOUNTER — LAB REQUISITION (OUTPATIENT)
Dept: LAB | Facility: CLINIC | Age: 88
End: 2023-04-03
Payer: COMMERCIAL

## 2023-04-03 DIAGNOSIS — E83.52 HYPERCALCEMIA: ICD-10-CM

## 2023-04-06 LAB
ALBUMIN SERPL BCG-MCNC: 4 G/DL (ref 3.5–5.2)
PTH-INTACT SERPL-MCNC: 69 PG/ML (ref 15–65)

## 2023-04-06 PROCEDURE — P9603 ONE-WAY ALLOW PRORATED MILES: HCPCS | Mod: ORL | Performed by: INTERNAL MEDICINE

## 2023-04-06 PROCEDURE — 82306 VITAMIN D 25 HYDROXY: CPT | Mod: ORL | Performed by: INTERNAL MEDICINE

## 2023-04-06 PROCEDURE — 83970 ASSAY OF PARATHORMONE: CPT | Mod: ORL | Performed by: INTERNAL MEDICINE

## 2023-04-06 PROCEDURE — 36415 COLL VENOUS BLD VENIPUNCTURE: CPT | Mod: ORL | Performed by: INTERNAL MEDICINE

## 2023-04-06 PROCEDURE — 82040 ASSAY OF SERUM ALBUMIN: CPT | Mod: ORL | Performed by: INTERNAL MEDICINE

## 2023-04-07 LAB — DEPRECATED CALCIDIOL+CALCIFEROL SERPL-MC: 39 UG/L (ref 20–75)

## 2023-06-07 ENCOUNTER — TELEPHONE (OUTPATIENT)
Dept: FAMILY MEDICINE | Facility: CLINIC | Age: 88
End: 2023-06-07
Payer: COMMERCIAL

## 2023-06-07 NOTE — TELEPHONE ENCOUNTER
Patient Quality Outreach    Patient is due for the following:   Colon Cancer Screening  Depression  -  PHQ-9 needed  Physical Annual Wellness Visit      Topic Date Due     Zoster (Shingles) Vaccine (1 of 2) 11/25/2008     COVID-19 Vaccine (5 - Pfizer series) 06/16/2022       Next Steps:   Schedule a Annual Wellness Visit    Type of outreach:    Sent letter.      Questions for provider review:    None           Hansel Galdamez MA

## 2023-06-07 NOTE — LETTER
June 7, 2023    Manuel Rosales  [address]   [facility] [address]    Dear David,    At [facility] we care about your health and are committed to providing quality patient care.     Here is a list of Health Maintenance topics that are due now or due soon:  Health Maintenance Due   Topic Date Due    HF ACTION PLAN  Never done    DEPRESSION ACTION PLAN  Never done    ZOSTER IMMUNIZATION (1 of 2) 11/25/2008    TSH W/FREE T4 REFLEX  04/22/2022    COVID-19 Vaccine (5 - Pfizer series) 06/16/2022    ANNUAL REVIEW OF HM ORDERS  10/14/2022    EYE EXAM  10/28/2022    PHQ-9  12/07/2022    MEDICARE ANNUAL WELLNESS VISIT  04/21/2023    DIABETIC FOOT EXAM  04/21/2023    FALL RISK ASSESSMENT  04/21/2023    COLORECTAL CANCER SCREENING  04/18/2023    HEMOGLOBIN  08/25/2023        We are recommending that you:  Schedule a WELLNESS (Preventative/Physical) APPOINTMENT with your primary care provider. If you go elsewhere for your wellness appointments then please disregard this reminder    ,   Schedule a COLONOSCOPY to assess for colon cancer (due every 10 years or 5 years in higher risk situations.)       Colon cancer is now the second leading cause of cancer-related deaths in the [place] for both men and women and there are over 130,000 new cases and 50,000 deaths per year from colon cancer.  Colonoscopies can prevent 90-95% of these deaths.  Problem lesions can be removed before they ever become cancer.  This test is not only looking for cancer, but also getting rid of precancerious lesions.    If you are under/uninsured, we recommend you contact the [employer] program. [employer] is a free colorectal cancer screening program that provides colonoscopies for eligible under/uninsured [place] men and women. If you are interested in receiving a free colonoscopy, please call [facility] at [phone] (mention code [employer]) to see if you re eligible.     If you do not wish to do a  colonoscopy or cannot afford to do one, at this time, there is another option. It is called a FIT test or Fecal Immunochemical Occult Blood Test (take home stool sample kit).  It does not replace the colonoscopy for colorectal cancer screening, but it can detect hidden bleeding in the lower colon.  It does need to be repeated every year and if a positive result is obtained, you would be referred for a colonoscopy.    If you have completed either one of these tests at another facility, please call/respond to this message with the details of when and where the tests were done and if they were normal or not. Or have the records sent to our clinic so that we can best coordinate your care.,   Complete the attached Questionnaire:  You are due to complete the attached PHQ questionnaire. Please complete as soon as you are able.    , and   Schedule a Nurse-Only appointment to update your immunizations: Your records indicate that you are not up to date with your immunizations, please schedule a nurse-only appointment to get these updated or update them at your next office visit. If this is incorrect, please disregard.    To schedule an appointment or discuss this further, you may contact us by phone at the [facility] at [phone] or online through the patient portal/[email or web address] @ [email or web address]    Thank you for trusting [facility] and we appreciate the opportunity to serve you.  We look forward to supporting your healthcare needs in the future.    Your partners in health,      Quality Committee at [facility]

## 2023-07-14 ENCOUNTER — TELEPHONE (OUTPATIENT)
Dept: PHARMACY | Facility: OTHER | Age: 88
End: 2023-07-14
Payer: COMMERCIAL

## 2023-07-14 NOTE — TELEPHONE ENCOUNTER
Called to schedule MTM appt, phone number not in service, unable to leave message    Saranya Quigley, KeD  Medication Therapy Management Pharmacist  [phone]  [facility]: [phone]    To make an appointment, please call our MTM scheduling line at [phone].

## 2023-09-08 ENCOUNTER — TELEPHONE (OUTPATIENT)
Dept: PHARMACY | Facility: CLINIC | Age: 88
End: 2023-09-08
Payer: COMMERCIAL

## 2023-09-08 NOTE — TELEPHONE ENCOUNTER
MTM referral from: Patient's insurance ([employer] payor products)    MTM referral outreach attempt #2 on September 8, 2023 at 10:07 AM      Outcome: phone number not in service - unable to reach patient    Teresita Chavez, Pharm.D.,[facility]  Board Certified Geriatric Pharmacist  Medication Therapy Management Pharmacist  [phone]

## 2023-12-06 NOTE — MR AVS SNAPSHOT
After Visit Summary   8/9/2018    Manuel Rosales    MRN: [id number]           Patient Information     Date Of Birth          3/22/1931        Visit Information        Provider Department      8/9/2018 1:30 PM Willy Cardoso MD [facility]        Today's Diagnoses     Encounter for examination of eyes and vision with abnormal findings    -  1    Presbyopia        Primary open angle glaucoma of both eyes, moderate stage        Epiretinal membrane, left        Cicatricial ectropion, unspecified laterality        Posterior vitreous detachment of both eyes        Pseudophakia, ou          Care Instructions    Glasses Rx given - optional.  Continue using Brimonidine both eyes twice daily,   Dorzolamide/Timolol both eyes twice daily,   And Latanoprost both eyes at bedtime.   Possible clouding of posterior capsule both eyes discussed.  Return visit in 6 months for intraocular pressure check, glaucoma OCT, Rios Visual Field.     Willy Cardoso M.D.  [phone]            Follow-ups after your visit        Your next 10 appointments already scheduled     Jabari 10, 2019 11:00 AM CST   PHYSICAL with Ronaldo Corral MD   [facility]    [address] [place] [id number] [phone]              Who to contact     If you have questions or need follow up information about today's clinic visit or your schedule please contact [facility] directly at [phone].  Normal or non-critical lab and imaging results will be communicated to you by MyChart, letter or phone within 4 business days after the clinic has received the results. If you do not hear from us within 7 days, please contact the clinic through MyChart or phone. If you have a critical or abnormal lab result, we will notify you by phone as soon as possible.  Submit refill requests through TiVo or call your pharmacy and they will forward the refill  [facility]   Infusion Clinic Note   Date: 2023   Name: Randy Lewis  : 1942   MRN: [id number]         Reason for Visit: No chief complaint on file.      Accompanied by:Wife   Visit Type:: injection   Diagnosis: Osteopenia, unspecified location    Allergies:   Allergies as of 2023    (No Known Allergies)      Current Meds has a current medication list which includes the following prescription(s): alendronate, aspirin, atorvastatin, calcium carbonate, cholecalciferol, denosumab, donepezil, escitalopram, hydroxyzine hcl, levothyroxine, and multivitamin.        Vitals:  Vitals:    23 0850   BP: 155/80   Pulse: 56   Resp: 18   Temp: 36.6 °C (97.9 °F)   SpO2: 98%      Infusion Pre-procedure Checklist   Allergies reviewed: yes   Medications reviewed: yes   Contraindications to treatment:No   Previous reaction to current treatment:No   Current Health Issues: None   Pain: '    Is the pain different from normal: No   Is the pain tolerable: n/a   Is your Doctor aware: n/a   Contraindications based on patient history: No   Provider notified: Not applicable   Labs: None  Labs reviewed   Fall Risk Screening:      Review of Systems   All other systems reviewed and are negative.     Negative for complaint: [x] all other systems have been reviewed and are negative for complaint   Infusion Readiness:   Assessment Concerns Related to Infusion: No  Provider notified: no  Assess patient for the concerns below. Document provider notification as appropriate:  - Does not meet criteria to treat No  - Has an active or recent infection with/without current antibiotic use No  - Has recent/planned dental work No  - Has recent/planned surgeries No  - Has recently received or plans to receive vaccinations No  - Has treatment related toxicities No  - Is pregnant (unless noted otherwise) N/A      (Unless otherwise specified on patient specific therapy plan):     TREATMENT  CONDITIONS:  Unless otherwise specified on patient specific therapy plan HOLD and notify provider prior to proceeding with today's injection if patients:  o Calcium is LESS THAN 8.6 mg/dL OR  Ionized Calcium LESS THAN 1.1 mmol/L  o Recent or planned invasive dental procedure (within 4 weeks)    Lab Results   Component Value Date    CALCIUM 9.3 11/22/2023          Labs reviewed and patient meets treatment conditions? Yes    DRUG SPECIFIC QUESTIONS:  Is the patient taking calcium and vitamin D? Yes  (Recommended)    Pt Instructed on following risks: (1) hypocalcemia, (2) osteonecrosis of the jaw, (3) atypical femoral fractures, (4) serious infections, and (5) dermatologic reactions?  Yes      REMINDER:  NOT APPLICABLE  PREGNANCY CATEGORY X DRUG. OBTAIN NEGTATIVE PREGNANCY TEST PRIOR TO FIRST INFUSION  REMS DRUG        Initiated By: Amber Bryan RN   Time: 9:00 AM     We administered denosumab.       request to us. Please allow 3 business days for your refill to be completed.          Additional Information About Your Visit        Care EveryWhere ID     This is your Care EveryWhere ID. This could be used by other organizations to access your [place] medical records  [id number]         Blood Pressure from Last 3 Encounters:   07/09/18 124/61   04/19/18 181/51   04/16/18 188/78    Weight from Last 3 Encounters:   07/09/18 91.6 kg (202 lb)   04/19/18 93.4 kg (206 lb)   03/08/18 93.9 kg (207 lb)              Today, you had the following     No orders found for display       Primary Care Provider Office Phone # Fax #    Ronaldo Corral [phone]       [address]  [place] [facility] [address]        Equal Access to Services     [facility]: Hadii sarah johnson hadasho Soomaali, waaxda luqadaha, qaybta kaalmada adeegyada, radha sheppard . So [facility] [phone].    ATENCIÓN: Si habla español, tiene a claire disposición servicios gratuitos de asistencia lingüística. Chad al [phone].    We comply with applicable federal civil rights laws and [place] laws. We do not discriminate on the basis of race, color, national origin, age, disability, sex, sexual orientation, or gender identity.            Thank you!     Thank you for choosing [facility]  for your care. Our goal is always to provide you with excellent care. Hearing back from our patients is one way we can continue to improve our services. Please take a few minutes to complete the written survey that you may receive in the mail after your visit with us. Thank you!             Your Updated Medication List - Protect others around you: Learn how to safely use, store and throw away your medicines at www.disposemymeds.org.          This list is accurate as of 8/9/18  2:23 PM.  Always use your most recent med list.                   Brand Name Dispense Instructions for use Diagnosis    amLODIPine 10 MG tablet     NORVASC    180 tablet    Take 1 tablet (10 mg) by mouth 2 times daily for blood pressure.    Benign hypertension with chronic kidney disease, stage III       atorvastatin 80 MG tablet    LIPITOR    90 tablet    Take 1 tablet (80 mg) by mouth daily for cholesterol.    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    Lower extremity edema       glipiZIDE 10 MG tablet    GLUCOTROL    180 tablet    Take 1 tablet (10 mg) by mouth 2 times daily (with meals) for diabetes.    Uncontrolled type 2 diabetes mellitus with microalbuminuria, with long-term current use of insulin (H)       hydrALAZINE 10 MG tablet    APRESOLINE     10 mg 3 times daily        hydrochlorothiazide 25 MG tablet    HYDRODIURIL    90 tablet    Take 1 tablet (25 mg) by mouth daily for blood pressure and leg swelling.    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    Type 2 diabetes mellitus with microalbuminuria, with long-term current use of insulin (H)       latanoprost 0.005 % ophthalmic solution    XALATAN    1 Bottle    Place 1 drop into both eyes At Bedtime    Primary open angle glaucoma of both eyes, moderate stage       lisinopril 40 MG tablet    PRINIVIL/ZESTRIL    90 tablet    Take 1 tablet (40 mg) by mouth daily for blood pressure.    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

## 2024-01-10 ENCOUNTER — LAB REQUISITION (OUTPATIENT)
Dept: LAB | Facility: CLINIC | Age: 89
End: 2024-01-10
Payer: COMMERCIAL

## 2024-01-10 DIAGNOSIS — E21.3 HYPERPARATHYROIDISM, UNSPECIFIED (H): ICD-10-CM

## 2024-01-10 DIAGNOSIS — E11.51 TYPE 2 DIABETES MELLITUS WITH DIABETIC PERIPHERAL ANGIOPATHY WITHOUT GANGRENE (H): ICD-10-CM

## 2024-01-11 LAB
ALBUMIN SERPL BCG-MCNC: 3.7 G/DL (ref 3.5–5.2)
ALP SERPL-CCNC: 109 U/L (ref 40–150)
ALT SERPL W P-5'-P-CCNC: 20 U/L (ref 0–70)
ANION GAP SERPL CALCULATED.3IONS-SCNC: 17 MMOL/L (ref 7–15)
AST SERPL W P-5'-P-CCNC: 28 U/L (ref 0–45)
BASOPHILS # BLD AUTO: 0.1 10E3/UL (ref 0–0.2)
BASOPHILS NFR BLD AUTO: 1 %
BILIRUB SERPL-MCNC: 0.3 MG/DL
BUN SERPL-MCNC: 29.1 MG/DL (ref 8–23)
CALCIUM SERPL-MCNC: 10.5 MG/DL (ref 8.2–9.6)
CHLORIDE SERPL-SCNC: 107 MMOL/L (ref 98–107)
CREAT SERPL-MCNC: 1 MG/DL (ref 0.67–1.17)
DEPRECATED HCO3 PLAS-SCNC: 16 MMOL/L (ref 22–29)
EGFRCR SERPLBLD CKD-EPI 2021: 71 ML/MIN/1.73M2
EOSINOPHIL # BLD AUTO: 0.3 10E3/UL (ref 0–0.7)
EOSINOPHIL NFR BLD AUTO: 3 %
ERYTHROCYTE [DISTWIDTH] IN BLOOD BY AUTOMATED COUNT: 13.2 % (ref 10–15)
GLUCOSE SERPL-MCNC: 282 MG/DL (ref 70–99)
HBA1C MFR BLD: 11.7 %
HCT VFR BLD AUTO: 43.4 % (ref 40–53)
HGB BLD-MCNC: 13.7 G/DL (ref 13.3–17.7)
IMM GRANULOCYTES # BLD: 0.1 10E3/UL
IMM GRANULOCYTES NFR BLD: 1 %
LYMPHOCYTES # BLD AUTO: 1.9 10E3/UL (ref 0.8–5.3)
LYMPHOCYTES NFR BLD AUTO: 16 %
MCH RBC QN AUTO: 29.9 PG (ref 26.5–33)
MCHC RBC AUTO-ENTMCNC: 31.6 G/DL (ref 31.5–36.5)
MCV RBC AUTO: 95 FL (ref 78–100)
MONOCYTES # BLD AUTO: 0.8 10E3/UL (ref 0–1.3)
MONOCYTES NFR BLD AUTO: 7 %
NEUTROPHILS # BLD AUTO: 8.4 10E3/UL (ref 1.6–8.3)
NEUTROPHILS NFR BLD AUTO: 72 %
NRBC # BLD AUTO: 0 10E3/UL
NRBC BLD AUTO-RTO: 0 /100
PLATELET # BLD AUTO: 181 10E3/UL (ref 150–450)
POTASSIUM SERPL-SCNC: 4.7 MMOL/L (ref 3.4–5.3)
PROT SERPL-MCNC: 6.7 G/DL (ref 6.4–8.3)
PTH-INTACT SERPL-MCNC: 55 PG/ML (ref 15–65)
RBC # BLD AUTO: 4.58 10E6/UL (ref 4.4–5.9)
SODIUM SERPL-SCNC: 140 MMOL/L (ref 135–145)
WBC # BLD AUTO: 11.4 10E3/UL (ref 4–11)

## 2024-01-11 PROCEDURE — P9603 ONE-WAY ALLOW PRORATED MILES: HCPCS | Mod: ORL | Performed by: INTERNAL MEDICINE

## 2024-01-11 PROCEDURE — 83970 ASSAY OF PARATHORMONE: CPT | Mod: ORL | Performed by: INTERNAL MEDICINE

## 2024-01-11 PROCEDURE — 36415 COLL VENOUS BLD VENIPUNCTURE: CPT | Mod: ORL | Performed by: INTERNAL MEDICINE

## 2024-01-11 PROCEDURE — 80053 COMPREHEN METABOLIC PANEL: CPT | Mod: ORL | Performed by: INTERNAL MEDICINE

## 2024-01-11 PROCEDURE — 85025 COMPLETE CBC W/AUTO DIFF WBC: CPT | Mod: ORL | Performed by: INTERNAL MEDICINE

## 2024-01-11 PROCEDURE — 83036 HEMOGLOBIN GLYCOSYLATED A1C: CPT | Mod: ORL | Performed by: INTERNAL MEDICINE

## 2024-02-19 ENCOUNTER — TELEPHONE (OUTPATIENT)
Dept: FAMILY MEDICINE | Facility: CLINIC | Age: 89
End: 2024-02-19
Payer: COMMERCIAL

## 2024-02-19 NOTE — LETTER
February 19, 2024    Mnauel Rosales  [address]   [facility] [address]                  Dear David,    At [facility] we care about your health and are committed to providing quality patient care.     Here is a list of Health Maintenance topics that are due now or due soon:  Health Maintenance Due   Topic Date Due    HF ACTION PLAN  Never done    DEPRESSION ACTION PLAN  Never done    RSV VACCINE (Pregnancy & 60+) (1 - 1-dose 60+ series) Never done    ZOSTER IMMUNIZATION (2 of 3) 11/25/2008    ANNUAL REVIEW OF HM ORDERS  10/14/2022    PHQ-9  12/07/2022    COLORECTAL CANCER SCREENING  04/18/2023    DIABETIC FOOT EXAM  04/21/2023    FALL RISK ASSESSMENT  04/21/2023    EYE EXAM  04/28/2023    LIPID  08/25/2023    MICROALBUMIN  08/26/2023    INFLUENZA VACCINE (1) 09/01/2023    COVID-19 Vaccine (5 - 2023-24 season) 09/01/2023    DTAP/TDAP/TD IMMUNIZATION (2 - Td or Tdap) 10/01/2023    MEDICARE ANNUAL WELLNESS VISIT  10/06/2023        We are recommending that you:  Schedule a WELLNESS (Preventative/Physical) APPOINTMENT with your primary care provider. If you go elsewhere for your wellness appointments then please disregard this reminder    ,   Schedule a COLONOSCOPY to assess for colon cancer (due every 10 years or 5 years in higher risk situations.)       Colon cancer is now the second leading cause of cancer-related deaths in the [place] for both men and women and there are over 130,000 new cases and 50,000 deaths per year from colon cancer.  Colonoscopies can prevent 90-95% of these deaths.  Problem lesions can be removed before they ever become cancer.  This test is not only looking for cancer, but also getting rid of precancerious lesions.    If you are under/uninsured, we recommend you contact the "[employer]s program. "[employer]s is a free colorectal cancer screening program that provides colonoscopies for eligible under/uninsured [place] men and women. If you are  interested in receiving a free colonoscopy, please call [employer] at [phone] (mention code [employer]) to see if you re eligible.     If you do not wish to do a colonoscopy or cannot afford to do one, at this time, there is another option. It is called a FIT test or Fecal Immunochemical Occult Blood Test (take home stool sample kit).  It does not replace the colonoscopy for colorectal cancer screening, but it can detect hidden bleeding in the lower colon.  It does need to be repeated every year and if a positive result is obtained, you would be referred for a colonoscopy.    If you have completed either one of these tests at another facility, please call/respond to this message with the details of when and where the tests were done and if they were normal or not. Or have the records sent to our clinic so that we can best coordinate your care.,    Schedule a Diabetes Follow-Up Office Appointment: You are due to be seen with your primary care provider for a diabetes follow up office appointment. Please schedule this as soon as you are able.    ,    Diabetic Eye Exam is due:  If this was done within the last 12 months then please contact the clinic or respond to this message with the date and location so we can update your records.    ,   Complete the attached Questionnaire:  You are due to complete the attached PHQ questionnaire. Please complete as soon as you are able.    , and   Schedule a Nurse-Only appointment to update your immunizations: Your records indicate that you are not up to date with your immunizations, please schedule a nurse-only appointment to get these updated or update them at your next office visit. If this is incorrect, please disregard.    To schedule an appointment or discuss this further, you may contact us by phone at the [facility] at [phone] or online through the patient [email or web address] @ [email or web address]    Thank you for trusting [facility] and we appreciate the opportunity to serve you.  We look forward to supporting your healthcare needs in the future.    Your partners in health,      Quality Committee at [facility]

## 2024-02-19 NOTE — TELEPHONE ENCOUNTER
Patient Quality Outreach    Patient is due for the following:   Diabetes -  LDL (Fasting), Eye Exam, Microalbumin, and Foot Exam  Colon Cancer Screening  Depression  -  PHQ-9 needed  Physical Annual Wellness Visit      Topic Date Due    Zoster (Shingles) Vaccine (2 of 3) 11/25/2008    Flu Vaccine (1) 09/01/2023    COVID-19 Vaccine (5 - 2023-24 season) 09/01/2023    Diptheria Tetanus Pertussis (DTAP/TDAP/TD) Vaccine (2 - Td or Tdap) 10/01/2023       Next Steps:   Schedule a Annual Wellness Visit    Type of outreach:    Sent letter.    Next Steps:  Reach out within 90 days via Phone.    Max number of attempts reached: No. Will try again in 90 days if patient still on fail list.    Questions for provider review:    None           Kaila Alford CNA

## 2024-04-10 ENCOUNTER — LAB REQUISITION (OUTPATIENT)
Dept: LAB | Facility: CLINIC | Age: 89
End: 2024-04-10
Payer: COMMERCIAL

## 2024-04-10 DIAGNOSIS — E11.65 TYPE 2 DIABETES MELLITUS WITH HYPERGLYCEMIA (H): ICD-10-CM

## 2024-04-11 LAB
ANION GAP SERPL CALCULATED.3IONS-SCNC: 13 MMOL/L (ref 7–15)
BUN SERPL-MCNC: 28 MG/DL (ref 8–23)
CALCIUM SERPL-MCNC: 10.4 MG/DL (ref 8.2–9.6)
CHLORIDE SERPL-SCNC: 102 MMOL/L (ref 98–107)
CREAT SERPL-MCNC: 1.22 MG/DL (ref 0.67–1.17)
DEPRECATED HCO3 PLAS-SCNC: 23 MMOL/L (ref 22–29)
EGFRCR SERPLBLD CKD-EPI 2021: 55 ML/MIN/1.73M2
GLUCOSE SERPL-MCNC: 173 MG/DL (ref 70–99)
HBA1C MFR BLD: 8.8 %
POTASSIUM SERPL-SCNC: 4.5 MMOL/L (ref 3.4–5.3)
SODIUM SERPL-SCNC: 138 MMOL/L (ref 135–145)

## 2024-04-11 PROCEDURE — 83036 HEMOGLOBIN GLYCOSYLATED A1C: CPT | Mod: ORL | Performed by: PHYSICIAN ASSISTANT

## 2024-04-11 PROCEDURE — 80048 BASIC METABOLIC PNL TOTAL CA: CPT | Mod: ORL | Performed by: PHYSICIAN ASSISTANT

## 2024-04-11 PROCEDURE — P9604 ONE-WAY ALLOW PRORATED TRIP: HCPCS | Mod: ORL | Performed by: PHYSICIAN ASSISTANT

## 2024-04-11 PROCEDURE — 36415 COLL VENOUS BLD VENIPUNCTURE: CPT | Mod: ORL | Performed by: PHYSICIAN ASSISTANT

## 2024-05-15 ENCOUNTER — LAB REQUISITION (OUTPATIENT)
Dept: LAB | Facility: CLINIC | Age: 89
End: 2024-05-15
Payer: COMMERCIAL

## 2024-05-15 DIAGNOSIS — E11.65 TYPE 2 DIABETES MELLITUS WITH HYPERGLYCEMIA (H): ICD-10-CM

## 2024-05-16 LAB
ANION GAP SERPL CALCULATED.3IONS-SCNC: 10 MMOL/L (ref 7–15)
BUN SERPL-MCNC: 29.8 MG/DL (ref 8–23)
CALCIUM SERPL-MCNC: 10.1 MG/DL (ref 8.2–9.6)
CHLORIDE SERPL-SCNC: 105 MMOL/L (ref 98–107)
CREAT SERPL-MCNC: 1.13 MG/DL (ref 0.67–1.17)
DEPRECATED HCO3 PLAS-SCNC: 25 MMOL/L (ref 22–29)
EGFRCR SERPLBLD CKD-EPI 2021: 61 ML/MIN/1.73M2
GLUCOSE SERPL-MCNC: 138 MG/DL (ref 70–99)
HBA1C MFR BLD: 7.8 %
POTASSIUM SERPL-SCNC: 4.8 MMOL/L (ref 3.4–5.3)
SODIUM SERPL-SCNC: 140 MMOL/L (ref 135–145)

## 2024-05-16 PROCEDURE — 36415 COLL VENOUS BLD VENIPUNCTURE: CPT | Mod: ORL | Performed by: PHYSICIAN ASSISTANT

## 2024-05-16 PROCEDURE — 80048 BASIC METABOLIC PNL TOTAL CA: CPT | Mod: ORL | Performed by: PHYSICIAN ASSISTANT

## 2024-05-16 PROCEDURE — 83036 HEMOGLOBIN GLYCOSYLATED A1C: CPT | Mod: ORL | Performed by: PHYSICIAN ASSISTANT

## 2024-10-15 ENCOUNTER — LAB REQUISITION (OUTPATIENT)
Dept: LAB | Facility: CLINIC | Age: 89
End: 2024-10-15
Payer: COMMERCIAL

## 2024-10-15 DIAGNOSIS — E11.51 TYPE 2 DIABETES MELLITUS WITH DIABETIC PERIPHERAL ANGIOPATHY WITHOUT GANGRENE (H): ICD-10-CM

## 2024-10-17 LAB
ANION GAP SERPL CALCULATED.3IONS-SCNC: 12 MMOL/L (ref 7–15)
BUN SERPL-MCNC: 32.1 MG/DL (ref 8–23)
CALCIUM SERPL-MCNC: 10.2 MG/DL (ref 8.8–10.4)
CHLORIDE SERPL-SCNC: 108 MMOL/L (ref 98–107)
CREAT SERPL-MCNC: 1.37 MG/DL (ref 0.67–1.17)
EGFRCR SERPLBLD CKD-EPI 2021: 48 ML/MIN/1.73M2
EST. AVERAGE GLUCOSE BLD GHB EST-MCNC: 192 MG/DL
GLUCOSE SERPL-MCNC: 186 MG/DL (ref 70–99)
HBA1C MFR BLD: 8.3 %
HCO3 SERPL-SCNC: 21 MMOL/L (ref 22–29)
POTASSIUM SERPL-SCNC: 4.8 MMOL/L (ref 3.4–5.3)
SODIUM SERPL-SCNC: 141 MMOL/L (ref 135–145)

## 2024-10-17 PROCEDURE — 36415 COLL VENOUS BLD VENIPUNCTURE: CPT | Mod: ORL | Performed by: PHYSICIAN ASSISTANT

## 2024-10-17 PROCEDURE — 80048 BASIC METABOLIC PNL TOTAL CA: CPT | Mod: ORL | Performed by: PHYSICIAN ASSISTANT

## 2024-10-17 PROCEDURE — P9604 ONE-WAY ALLOW PRORATED TRIP: HCPCS | Mod: ORL | Performed by: PHYSICIAN ASSISTANT

## 2024-10-17 PROCEDURE — 83036 HEMOGLOBIN GLYCOSYLATED A1C: CPT | Mod: ORL | Performed by: PHYSICIAN ASSISTANT

## 2025-01-13 ENCOUNTER — LAB REQUISITION (OUTPATIENT)
Dept: LAB | Facility: CLINIC | Age: OVER 89
End: 2025-01-13
Payer: COMMERCIAL

## 2025-01-13 DIAGNOSIS — E11.51 TYPE 2 DIABETES MELLITUS WITH DIABETIC PERIPHERAL ANGIOPATHY WITHOUT GANGRENE (H): ICD-10-CM

## 2025-01-13 PROCEDURE — 83036 HEMOGLOBIN GLYCOSYLATED A1C: CPT | Mod: ORL | Performed by: PHYSICIAN ASSISTANT

## 2025-01-13 PROCEDURE — 80048 BASIC METABOLIC PNL TOTAL CA: CPT | Mod: ORL | Performed by: PHYSICIAN ASSISTANT

## 2025-01-13 PROCEDURE — P9604 ONE-WAY ALLOW PRORATED TRIP: HCPCS | Mod: ORL | Performed by: PHYSICIAN ASSISTANT

## 2025-01-13 PROCEDURE — 36415 COLL VENOUS BLD VENIPUNCTURE: CPT | Mod: ORL | Performed by: PHYSICIAN ASSISTANT

## 2025-01-16 LAB
ANION GAP SERPL CALCULATED.3IONS-SCNC: 11 MMOL/L (ref 7–15)
BUN SERPL-MCNC: 26 MG/DL (ref 8–23)
CALCIUM SERPL-MCNC: 9.9 MG/DL (ref 8.8–10.4)
CHLORIDE SERPL-SCNC: 106 MMOL/L (ref 98–107)
CREAT SERPL-MCNC: 1.1 MG/DL (ref 0.67–1.17)
EGFRCR SERPLBLD CKD-EPI 2021: 63 ML/MIN/1.73M2
EST. AVERAGE GLUCOSE BLD GHB EST-MCNC: 186 MG/DL
GLUCOSE SERPL-MCNC: 321 MG/DL (ref 70–99)
HBA1C MFR BLD: 8.1 %
HCO3 SERPL-SCNC: 21 MMOL/L (ref 22–29)
POTASSIUM SERPL-SCNC: 5.1 MMOL/L (ref 3.4–5.3)
SODIUM SERPL-SCNC: 138 MMOL/L (ref 135–145)

## 2025-01-22 DIAGNOSIS — H40.1132 PRIMARY OPEN ANGLE GLAUCOMA (POAG) OF BOTH EYES, MODERATE STAGE: ICD-10-CM

## 2025-01-22 RX ORDER — LATANOPROST 50 UG/ML
1 SOLUTION/ DROPS OPHTHALMIC AT BEDTIME
Qty: 7.5 ML | Refills: 0 | Status: SHIPPED | OUTPATIENT
Start: 2025-01-22

## 2025-01-22 RX ORDER — BRIMONIDINE TARTRATE 1.5 MG/ML
1 SOLUTION/ DROPS OPHTHALMIC 2 TIMES DAILY
Qty: 15 ML | Refills: 0 | Status: SHIPPED | OUTPATIENT
Start: 2025-01-22

## 2025-01-22 RX ORDER — DORZOLAMIDE HYDROCHLORIDE AND TIMOLOL MALEATE 20; 5 MG/ML; MG/ML
1 SOLUTION/ DROPS OPHTHALMIC 2 TIMES DAILY
Qty: 30 ML | Refills: 0 | Status: SHIPPED | OUTPATIENT
Start: 2025-01-22

## 2025-01-22 NOTE — TELEPHONE ENCOUNTER
Received refill of Cosopt. Needs Latanoprost and Alphagan refills as well.  Patient hasn't been in since 2022, needs appointment.  Will send to Dr. Cardoso and [facility] in [place] and remind him to call for an appointment.

## 2025-02-11 ENCOUNTER — LAB REQUISITION (OUTPATIENT)
Dept: LAB | Facility: CLINIC | Age: OVER 89
End: 2025-02-11
Payer: COMMERCIAL

## 2025-02-11 DIAGNOSIS — I48.0 PAROXYSMAL ATRIAL FIBRILLATION (H): ICD-10-CM

## 2025-02-13 LAB
ANION GAP SERPL CALCULATED.3IONS-SCNC: 15 MMOL/L (ref 7–15)
BUN SERPL-MCNC: 30.5 MG/DL (ref 8–23)
CALCIUM SERPL-MCNC: 9.7 MG/DL (ref 8.8–10.4)
CHLORIDE SERPL-SCNC: 101 MMOL/L (ref 98–107)
CREAT SERPL-MCNC: 1.3 MG/DL (ref 0.67–1.17)
EGFRCR SERPLBLD CKD-EPI 2021: 51 ML/MIN/1.73M2
GLUCOSE SERPL-MCNC: 428 MG/DL (ref 70–99)
HCO3 SERPL-SCNC: 21 MMOL/L (ref 22–29)
POTASSIUM SERPL-SCNC: 5.3 MMOL/L (ref 3.4–5.3)
SODIUM SERPL-SCNC: 137 MMOL/L (ref 135–145)

## 2025-02-13 PROCEDURE — P9603 ONE-WAY ALLOW PRORATED MILES: HCPCS | Mod: ORL | Performed by: PEDIATRICS

## 2025-02-13 PROCEDURE — 36415 COLL VENOUS BLD VENIPUNCTURE: CPT | Mod: ORL | Performed by: PEDIATRICS

## 2025-02-13 PROCEDURE — 80048 BASIC METABOLIC PNL TOTAL CA: CPT | Mod: ORL | Performed by: PEDIATRICS

## 2025-04-08 ENCOUNTER — LAB REQUISITION (OUTPATIENT)
Dept: LAB | Facility: CLINIC | Age: OVER 89
End: 2025-04-08
Payer: COMMERCIAL

## 2025-04-08 DIAGNOSIS — E11.65 TYPE 2 DIABETES MELLITUS WITH HYPERGLYCEMIA (H): ICD-10-CM

## 2025-04-10 LAB
ANION GAP SERPL CALCULATED.3IONS-SCNC: 15 MMOL/L (ref 7–15)
BUN SERPL-MCNC: 31.8 MG/DL (ref 8–23)
CALCIUM SERPL-MCNC: 10.1 MG/DL (ref 8.8–10.4)
CHLORIDE SERPL-SCNC: 104 MMOL/L (ref 98–107)
CREAT SERPL-MCNC: 1.41 MG/DL (ref 0.67–1.17)
EGFRCR SERPLBLD CKD-EPI 2021: 46 ML/MIN/1.73M2
EST. AVERAGE GLUCOSE BLD GHB EST-MCNC: 229 MG/DL
GLUCOSE SERPL-MCNC: 263 MG/DL (ref 70–99)
HBA1C MFR BLD: 9.6 %
HCO3 SERPL-SCNC: 22 MMOL/L (ref 22–29)
POTASSIUM SERPL-SCNC: 5.3 MMOL/L (ref 3.4–5.3)
SODIUM SERPL-SCNC: 141 MMOL/L (ref 135–145)

## 2025-04-10 PROCEDURE — 36415 COLL VENOUS BLD VENIPUNCTURE: CPT | Mod: ORL | Performed by: PHYSICIAN ASSISTANT

## 2025-04-10 PROCEDURE — 83036 HEMOGLOBIN GLYCOSYLATED A1C: CPT | Mod: ORL | Performed by: PHYSICIAN ASSISTANT

## 2025-04-10 PROCEDURE — 80048 BASIC METABOLIC PNL TOTAL CA: CPT | Mod: ORL | Performed by: PHYSICIAN ASSISTANT

## 2025-04-10 PROCEDURE — P9603 ONE-WAY ALLOW PRORATED MILES: HCPCS | Mod: ORL | Performed by: PHYSICIAN ASSISTANT

## 2025-06-09 ENCOUNTER — OFFICE VISIT (OUTPATIENT)
Dept: OPHTHALMOLOGY | Facility: CLINIC | Age: OVER 89
End: 2025-06-09
Payer: COMMERCIAL

## 2025-06-09 DIAGNOSIS — H35.372 EPIRETINAL MEMBRANE, LEFT: ICD-10-CM

## 2025-06-09 DIAGNOSIS — Z01.01 ENCOUNTER FOR EXAMINATION OF EYES AND VISION WITH ABNORMAL FINDINGS: Primary | ICD-10-CM

## 2025-06-09 DIAGNOSIS — R80.9 TYPE 2 DIABETES MELLITUS WITH MICROALBUMINURIA, WITH LONG-TERM CURRENT USE OF INSULIN (H): Chronic | ICD-10-CM

## 2025-06-09 DIAGNOSIS — H43.813 POSTERIOR VITREOUS DETACHMENT OF BOTH EYES: ICD-10-CM

## 2025-06-09 DIAGNOSIS — H52.4 PRESBYOPIA: ICD-10-CM

## 2025-06-09 DIAGNOSIS — H40.1132 PRIMARY OPEN ANGLE GLAUCOMA (POAG) OF BOTH EYES, MODERATE STAGE: ICD-10-CM

## 2025-06-09 DIAGNOSIS — H02.112 CICATRICIAL ECTROPION OF LOWER EYELIDS OF BOTH EYES: ICD-10-CM

## 2025-06-09 DIAGNOSIS — E11.3299 BACKGROUND DIABETIC RETINOPATHY (H): ICD-10-CM

## 2025-06-09 DIAGNOSIS — Z96.1 PSEUDOPHAKIA: ICD-10-CM

## 2025-06-09 DIAGNOSIS — H02.115 CICATRICIAL ECTROPION OF LOWER EYELIDS OF BOTH EYES: ICD-10-CM

## 2025-06-09 DIAGNOSIS — E11.29 TYPE 2 DIABETES MELLITUS WITH MICROALBUMINURIA, WITH LONG-TERM CURRENT USE OF INSULIN (H): Chronic | ICD-10-CM

## 2025-06-09 DIAGNOSIS — Z79.4 TYPE 2 DIABETES MELLITUS WITH MICROALBUMINURIA, WITH LONG-TERM CURRENT USE OF INSULIN (H): Chronic | ICD-10-CM

## 2025-06-09 PROCEDURE — 92004 COMPRE OPH EXAM NEW PT 1/>: CPT | Performed by: OPHTHALMOLOGY

## 2025-06-09 PROCEDURE — 2022F DILAT RTA XM EVC RTNOPTHY: CPT | Performed by: OPHTHALMOLOGY

## 2025-06-09 PROCEDURE — 92015 DETERMINE REFRACTIVE STATE: CPT | Performed by: OPHTHALMOLOGY

## 2025-06-09 RX ORDER — DORZOLAMIDE HYDROCHLORIDE AND TIMOLOL MALEATE 20; 5 MG/ML; MG/ML
1 SOLUTION/ DROPS OPHTHALMIC 2 TIMES DAILY
Qty: 30 ML | Refills: 3 | Status: SHIPPED | OUTPATIENT
Start: 2025-06-09

## 2025-06-09 RX ORDER — BRIMONIDINE TARTRATE 1.5 MG/ML
1 SOLUTION/ DROPS OPHTHALMIC 2 TIMES DAILY
Qty: 15 ML | Refills: 3 | Status: CANCELLED | OUTPATIENT
Start: 2025-06-09

## 2025-06-09 RX ORDER — LATANOPROST 50 UG/ML
1 SOLUTION/ DROPS OPHTHALMIC AT BEDTIME
Qty: 7.5 ML | Refills: 3 | Status: SHIPPED | OUTPATIENT
Start: 2025-06-09

## 2025-06-09 ASSESSMENT — REFRACTION_MANIFEST
OS_SPHERE: -2.00
OS_CYLINDER: +1.25
OD_AXIS: 015
OD_SPHERE: -0.50
OS_AXIS: 170
OS_ADD: +3.00
OD_CYLINDER: +1.00
OD_ADD: +3.00

## 2025-06-09 ASSESSMENT — REFRACTION_WEARINGRX
SPECS_TYPE: BIFOCAL
OD_AXIS: 180
OD_ADD: +3.00
OS_AXIS: 165
OS_SPHERE: -2.00
OS_CYLINDER: +1.25
OS_ADD: +3.00
OD_SPHERE: -0.75
OD_CYLINDER: +0.75

## 2025-06-09 ASSESSMENT — VISUAL ACUITY
CORRECTION_TYPE: GLASSES
OD_CC: 20/40
OS_CC: 20/50
OD_PH_CC+: -2
OD_CC+: +2
METHOD: SNELLEN - LINEAR
OS_PH_CC+: +2
OS_PH_CC: 20/40
OS_CC+: +1
OD_PH_CC: 20/30

## 2025-06-09 ASSESSMENT — TONOMETRY
OS_IOP_MMHG: 14
OD_IOP_MMHG: 13
IOP_METHOD: APPLANATION

## 2025-06-09 ASSESSMENT — CONF VISUAL FIELD
OS_INFERIOR_NASAL_RESTRICTION: 0
OS_NORMAL: 1
OD_INFERIOR_NASAL_RESTRICTION: 0
OD_NORMAL: 1
OS_INFERIOR_TEMPORAL_RESTRICTION: 0
OD_SUPERIOR_NASAL_RESTRICTION: 0
OD_SUPERIOR_TEMPORAL_RESTRICTION: 0
OS_SUPERIOR_NASAL_RESTRICTION: 0
OS_SUPERIOR_TEMPORAL_RESTRICTION: 0
OD_INFERIOR_TEMPORAL_RESTRICTION: 0
METHOD: COUNTING FINGERS

## 2025-06-09 ASSESSMENT — CUP TO DISC RATIO
OS_RATIO: 0.9
OD_RATIO: 0.8

## 2025-06-09 ASSESSMENT — EXTERNAL EXAM - LEFT EYE: OS_EXAM: NORMAL

## 2025-06-09 ASSESSMENT — EXTERNAL EXAM - RIGHT EYE: OD_EXAM: NORMAL

## 2025-06-09 NOTE — LETTER
6/9/2025      Manuel Rosales  [address] [place]  [address]  [facility] [address]      Dear Colleague,    Thank you for referring your patient, Manuel Rosales, to the [facility]. Please see a copy of my visit note below.     Current Eye Medications:  brimonidine - both eyes BID   Dorz/timolol - both eyes BID   Latanoprost - both eyes QHS      Subjective: Diabetic, dilated exam  - insulin dependent - drops are instilled by nursing staff. Has been having issues with inflamed lower eyelids for the last 3-4 yrs with crusting/build up on lids. Pt believes that the drops being instilled are causing eyelid issues - believes nursing staff has self reduced the drops and only using one drop for the last few days. Vision overall has been stable.     Here with her daughter.     Type II DM - Insulin Dependent Diabetic.    Lab Results   Component Value Date    A1C 9.6 04/10/2025    A1C 8.1 01/13/2025    A1C 8.3 10/17/2024    A1C 7.8 05/16/2024    A1C 8.8 04/11/2024    A1C 6.6 04/08/2021    A1C 6.6 09/28/2020    A1C 7.5 04/09/2020    A1C 8.2 01/30/2020    A1C 7.5 07/29/2019        Objective:  See Ophthalmology Exam.       Assessment:  New baseline eye exam in patient with diabetes and moderate open angle glaucoma.  Mild background diabetic retinopathy both eyes.  Cicatricial ectropia lower lid both eyes (has had in past but appear worse) - may be related to Brimonidine allergy.  Intraocular pressures stable.      ICD-10-CM    1. Encounter for examination of eyes and vision with abnormal findings  Z01.01 EYE EXAM (SIMPLE-NONBILLABLE)     REFRACTION      2. Presbyopia  H52.4 EYE EXAM (SIMPLE-NONBILLABLE)     REFRACTION      3. Type 2 diabetes mellitus with microalbuminuria, with long-term current use of insulin (H)  E11.29     R80.9     Z79.4       4. Background diabetic retinopathy, mild, ou  E11.3299       5. Primary open angle glaucoma (POAG) of both eyes, moderate stage  H40.1132  "dorzolamide-timolol (COSOPT) 2-0.5 % ophthalmic solution     latanoprost (XALATAN) 0.005 % ophthalmic solution      6. Pseudophakia, ou  Z96.1       7. 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 Epiretinal membrane, mild, left  H35.372       9. Posterior vitreous detachment of both eyes  H43.813            Plan:  Continue:   Latanoprost (green top) every evening both eyes.  Cosopt (dorzolamide-timolol -- dark blue top) twice daily both eyes. About 12 hours apart.     Begin:  Refresh PM ointment - apply a 1/4 -1/2 inch strip inside each eye at bedtime. Use this at least 5 min after the last drops in the evening.      May use artificial tears up to four times a day (like Refresh Optive, Systane Balance, or TheraTears. Avoid \"get the red out\" drops and generic artifical tears).     Wait at least 5 minutes between drops if using more than one at a time.     Stop:  Brimonidine (purple top) twice daily both eyes. About 12 hours apart.     Possible clouding of posterior capsule both eyes discussed.     Glasses prescription given - optional    Return visit in 2 months for an intraocular pressure check, glaucoma OCT, retinal OCT, and Rios Visual Field.     Willy Cardoso M.D.  [phone]             Again, thank you for allowing me to participate in the care of your patient.        Sincerely,        Willy Cardoso MD    Electronically signed"

## 2025-06-09 NOTE — PATIENT INSTRUCTIONS
"Continue:   Latanoprost (green top) every evening both eyes.  Cosopt (dorzolamide-timolol -- dark blue top) twice daily both eyes. About 12 hours apart.     Begin:  Refresh PM ointment - apply a 1/4 -1/2 inch strip inside each eye at bedtime. Use this at least 5 min after the last drops in the evening.      May use artificial tears up to four times a day (like Refresh Optive, Systane Balance, or TheraTears. Avoid \"get the red out\" drops and generic artifical tears).     Wait at least 5 minutes between drops if using more than one at a time.     Stop:  Brimonidine (purple top) twice daily both eyes. About 12 hours apart.     Possible clouding of posterior capsule both eyes discussed.     Glasses prescription given - optional    Return visit in 2 months for an intraocular pressure check, glaucoma OCT, retinal OCT, and Rios Visual Field.     Willy Cardoso M.D.  [phone]      Patient Education   Diabetes weakens the blood vessels all over the body, including the eyes. Damage to the blood vessels in the eyes can cause swelling or bleeding into part of the eye (called the retina). This is called diabetic retinopathy (FERNANDO-tin--pu-thee). If not treated, this disease can cause vision loss or blindness.   Symptoms may include blurred or distorted vision, but many people have no symptoms. It's important to see your eye doctor regularly to check for problems.   Early treatment and good control can help protect your vision. Here are the things you can do to help prevent vision loss:      1. Keep your blood sugar levels under tight control.      2. Bring high blood pressure under control.      3. No smoking.      4. Have yearly dilated eye exams.     "

## 2025-06-09 NOTE — PROGRESS NOTES
Current Eye Medications:  brimonidine - both eyes BID   Dorz/timolol - both eyes BID   Latanoprost - both eyes QHS      Subjective: Diabetic, dilated exam  - insulin dependent - drops are instilled by nursing staff. Has been having issues with inflamed lower eyelids for the last 3-4 yrs with crusting/build up on lids. Pt believes that the drops being instilled are causing eyelid issues - believes nursing staff has self reduced the drops and only using one drop for the last few days. Vision overall has been stable.     Here with her daughter.     Type II DM - Insulin Dependent Diabetic.    Lab Results   Component Value Date    A1C 9.6 04/10/2025    A1C 8.1 01/13/2025    A1C 8.3 10/17/2024    A1C 7.8 05/16/2024    A1C 8.8 04/11/2024    A1C 6.6 04/08/2021    A1C 6.6 09/28/2020    A1C 7.5 04/09/2020    A1C 8.2 01/30/2020    A1C 7.5 07/29/2019        Objective:  See Ophthalmology Exam.       Assessment:  New baseline eye exam in patient with diabetes and moderate open angle glaucoma.  Mild background diabetic retinopathy both eyes.  Cicatricial ectropia lower lid both eyes (has had in past but appear worse) - may be related to Brimonidine allergy.  Intraocular pressures stable.      ICD-10-CM    1. Encounter for examination of eyes and vision with abnormal findings  Z01.01 EYE EXAM (SIMPLE-NONBILLABLE)     REFRACTION      2. Presbyopia  H52.4 EYE EXAM (SIMPLE-NONBILLABLE)     REFRACTION      3. Type 2 diabetes mellitus with microalbuminuria, with long-term current use of insulin (H)  E11.29     R80.9     Z79.4       4. Background diabetic retinopathy, mild, ou  E11.3299       5. Primary open angle glaucoma (POAG) of both eyes, moderate stage  H40.1132 dorzolamide-timolol (COSOPT) 2-0.5 % ophthalmic solution     latanoprost (XALATAN) 0.005 % ophthalmic solution      6. Pseudophakia, ou  Z96.1       7. 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 Epiretinal membrane, mild, left  H35.372       9. Posterior vitreous detachment of both eyes  H41.279            Plan:  Continue:   Latanoprost (green top) every evening both eyes.  Cosopt (dorzolamide-timolol -- dark blue top) twice daily both eyes. About 12 hours apart.     Begin:  Refresh PM ointment - apply a 1/4 -1/2 inch strip inside each eye at bedtime. Use this at least 5 min after the last drops in the evening.      May use artificial tears up to four times a day (like Refresh Optive, Systane Balance, or TheraTears. Avoid \"get the red out\" drops and generic artifical tears).     Wait at least 5 minutes between drops if using more than one at a time.     Stop:  Brimonidine (purple top) twice daily both eyes. About 12 hours apart.     Possible clouding of posterior capsule both eyes discussed.     Glasses prescription given - optional    Return visit in 2 months for an intraocular pressure check, glaucoma OCT, retinal OCT, and Rios Visual Field.     Willy Cardoso M.D.  [phone]           "

## 2025-06-10 ENCOUNTER — TELEPHONE (OUTPATIENT)
Dept: OPHTHALMOLOGY | Facility: CLINIC | Age: OVER 89
End: 2025-06-10
Payer: COMMERCIAL

## 2025-06-10 RX ORDER — MINERAL OIL, PETROLATUM 425; 573 MG/G; MG/G
OINTMENT OPHTHALMIC
Qty: 3.5 G | Refills: 11 | Status: SHIPPED | OUTPATIENT
Start: 2025-06-10

## 2025-06-10 NOTE — TELEPHONE ENCOUNTER
Spoke to Mariely, RN - is okay with reprint AVS but asking that Dr. Cardoso sign it a reprint and we fax it back to their office with an updated medication list.  Mariely also asking that medication list also be updated to reflect that Brimonidine be discontinued with an rx of Refresh Pm be sent to [facility].     Mariely understands that Dr. Cardoso is not the office until later today. Will have him review when he is.

## 2025-06-10 NOTE — TELEPHONE ENCOUNTER
[facility] Call Center    Phone Message    May a detailed message be left on voicemail: yes     Reason for Call: Form or Letter   Type or form/letter needing completion: per care giver Mariely RN, requesting orders to update pts medication list that are signed so caller can also send to pts pharmacy to have them filled   Provider: Dr Cardoso  Date form needed: ASAP  Once completed: Fax form to: Attn: [facility] [place] FAX# [phone]    Action Taken: Other: EYE FZ    Travel Screening: Not Applicable     Date of Service:

## 2025-06-22 PROBLEM — H02.112 CICATRICIAL ECTROPION OF LOWER EYELIDS OF BOTH EYES: Status: ACTIVE | Noted: 2025-06-22

## 2025-06-22 PROBLEM — H02.115 CICATRICIAL ECTROPION OF LOWER EYELIDS OF BOTH EYES: Status: ACTIVE | Noted: 2025-06-22

## 2025-06-22 PROBLEM — E11.3299 BACKGROUND DIABETIC RETINOPATHY (H): Status: ACTIVE | Noted: 2025-06-22

## 2025-07-14 ENCOUNTER — TELEPHONE (OUTPATIENT)
Dept: OPHTHALMOLOGY | Facility: CLINIC | Age: OVER 89
End: 2025-07-14
Payer: COMMERCIAL

## 2025-07-14 NOTE — TELEPHONE ENCOUNTER
Retail Pharmacy Prior Authorization Team  Phone: [phone]    [facility] GAXIOLA: [id number]

## 2025-07-17 NOTE — TELEPHONE ENCOUNTER
Prior Authorization Not Needed per Insurance    Medication: DORZOLAMIDE HCL-TIMOLOL MAL 2-0.5 % OP SOLN  Insurance Company: [employer] - Phone [phone] Fax [phone]  Expected CoPay: $    Pharmacy Filling the Rx: [id number] - [place], [place] - [email or web address]  Pharmacy Notified: yes  Patient Notified: yes

## 2025-08-11 ENCOUNTER — OFFICE VISIT (OUTPATIENT)
Dept: OPHTHALMOLOGY | Facility: CLINIC | Age: OVER 89
End: 2025-08-11
Payer: COMMERCIAL

## 2025-08-11 DIAGNOSIS — H02.112 CICATRICIAL ECTROPION OF LOWER EYELIDS OF BOTH EYES: ICD-10-CM

## 2025-08-11 DIAGNOSIS — H35.372 EPIRETINAL MEMBRANE, LEFT: ICD-10-CM

## 2025-08-11 DIAGNOSIS — E11.3299 BACKGROUND DIABETIC RETINOPATHY (H): ICD-10-CM

## 2025-08-11 DIAGNOSIS — H40.1132 PRIMARY OPEN ANGLE GLAUCOMA (POAG) OF BOTH EYES, MODERATE STAGE: Primary | ICD-10-CM

## 2025-08-11 DIAGNOSIS — H02.115 CICATRICIAL ECTROPION OF LOWER EYELIDS OF BOTH EYES: ICD-10-CM

## 2025-08-11 DIAGNOSIS — H43.813 POSTERIOR VITREOUS DETACHMENT OF BOTH EYES: ICD-10-CM

## 2025-08-11 PROCEDURE — 2022F DILAT RTA XM EVC RTNOPTHY: CPT | Performed by: OPHTHALMOLOGY

## 2025-08-11 PROCEDURE — 92133 CPTRZD OPH DX IMG PST SGM ON: CPT | Performed by: OPHTHALMOLOGY

## 2025-08-11 PROCEDURE — 92012 INTRM OPH EXAM EST PATIENT: CPT | Performed by: OPHTHALMOLOGY

## 2025-08-11 PROCEDURE — 92083 EXTENDED VISUAL FIELD XM: CPT | Performed by: OPHTHALMOLOGY

## 2025-08-11 ASSESSMENT — TONOMETRY
IOP_METHOD: APPLANATION
OS_IOP_MMHG: 13
OD_IOP_MMHG: 13

## 2025-08-11 ASSESSMENT — VISUAL ACUITY
CORRECTION_TYPE: GLASSES
OD_CC+: +1
OD_CC: 20/50
OS_CC+: -3/+2
OS_CC: 20/50
METHOD: SNELLEN - LINEAR

## 2025-08-12 ENCOUNTER — LAB REQUISITION (OUTPATIENT)
Dept: LAB | Facility: CLINIC | Age: OVER 89
End: 2025-08-12
Payer: COMMERCIAL

## 2025-08-17 ASSESSMENT — PACHYMETRY
OS_CT(UM): 594
OD_CT(UM): 593

## 2025-08-17 ASSESSMENT — EXTERNAL EXAM - RIGHT EYE: OD_EXAM: NORMAL

## 2025-08-17 ASSESSMENT — EXTERNAL EXAM - LEFT EYE: OS_EXAM: NORMAL

## (undated) DEVICE — SOL WATER IRRIG 1000ML BOTTLE 07139-09

## (undated) RX ORDER — SIMETHICONE 40MG/0.6ML
SUSPENSION, DROPS(FINAL DOSAGE FORM)(ML) ORAL
Status: DISPENSED
Start: 2018-04-16

## (undated) RX ORDER — FENTANYL CITRATE 50 UG/ML
INJECTION, SOLUTION INTRAMUSCULAR; INTRAVENOUS
Status: DISPENSED
Start: 2018-04-16